# Patient Record
Sex: FEMALE | Race: WHITE | NOT HISPANIC OR LATINO | Employment: OTHER | URBAN - METROPOLITAN AREA
[De-identification: names, ages, dates, MRNs, and addresses within clinical notes are randomized per-mention and may not be internally consistent; named-entity substitution may affect disease eponyms.]

---

## 2017-01-03 ENCOUNTER — TELEPHONE (OUTPATIENT)
Dept: ENDOCRINOLOGY | Age: 72
End: 2017-01-03

## 2017-01-03 ENCOUNTER — HOSPITAL ENCOUNTER (OUTPATIENT)
Dept: CT IMAGING | Facility: HOSPITAL | Age: 72
Discharge: HOME | End: 2017-01-03
Attending: INTERNAL MEDICINE | Admitting: INTERNAL MEDICINE

## 2017-01-03 DIAGNOSIS — E89.0 POSTSURGICAL HYPOTHYROIDISM: ICD-10-CM

## 2017-01-03 DIAGNOSIS — R53.82 CHRONIC FATIGUE: ICD-10-CM

## 2017-01-03 DIAGNOSIS — E67.3 HYPERVITAMINOSIS D: ICD-10-CM

## 2017-01-03 DIAGNOSIS — N18.30 CKD (CHRONIC KIDNEY DISEASE) STAGE 3, GFR 30-59 ML/MIN (HCC): ICD-10-CM

## 2017-01-03 DIAGNOSIS — IMO0002 UNCONTROLLED TYPE 2 DIABETES MELLITUS WITH COMPLICATION, WITH LONG-TERM CURRENT USE OF INSULIN: ICD-10-CM

## 2017-01-03 DIAGNOSIS — R10.9 ABDOMINAL PAIN, UNSPECIFIED LOCATION: ICD-10-CM

## 2017-01-03 PROCEDURE — 74176 CT ABD & PELVIS W/O CONTRAST: CPT

## 2017-01-03 NOTE — TELEPHONE ENCOUNTER
----- Message from Chantal Goldberg sent at 1/3/2017  4:18 PM EST -----  Contact: PATIENT  THE PATIENT WOULD LIKE FOR HER V-GO 20, 20 UNITS DAILY, 30 DAY SUPPLY TO BE SENT [facility] AN[facility] SHE RE[facility]ED FOR IT TO GO THIS MORNING. PLEASE CANCEL THAT SCRIPT AND RESEND IT TO:  [address]   [phone] (Phone)  [phone] (Fax)    THE PATIENT SAYS SHE IS OUT OF INSULIN AND NEEDS THIS TO BE SENT IN TODAY.

## 2017-01-03 NOTE — TELEPHONE ENCOUNTER
----- Message from Riana Greene sent at 1/3/2017  9:12 AM EST -----  Contact: patient  Patient needs Vego pump insulin, 20 units per day, 30 day supply  Send to [facility]  [phone] (Phone)  [phone] (Fax)

## 2017-01-04 DIAGNOSIS — IMO0002 UNCONTROLLED TYPE 2 DIABETES MELLITUS WITH COMPLICATION, WITH LONG-TERM CURRENT USE OF INSULIN: ICD-10-CM

## 2017-01-04 DIAGNOSIS — E67.3 HYPERVITAMINOSIS D: ICD-10-CM

## 2017-01-04 DIAGNOSIS — R53.82 CHRONIC FATIGUE: ICD-10-CM

## 2017-01-04 DIAGNOSIS — E89.0 POSTSURGICAL HYPOTHYROIDISM: ICD-10-CM

## 2017-01-04 DIAGNOSIS — N18.30 CKD (CHRONIC KIDNEY DISEASE) STAGE 3, GFR 30-59 ML/MIN (HCC): ICD-10-CM

## 2017-01-04 RX ORDER — SUB-Q INSULIN DEVICE, 40 UNIT
EACH MISCELLANEOUS
Qty: 30 KIT | Refills: 0 | Status: SHIPPED | OUTPATIENT
Start: 2017-01-04 | End: 2017-01-05

## 2017-01-05 DIAGNOSIS — IMO0002 UNCONTROLLED TYPE 2 DIABETES MELLITUS WITH COMPLICATION, WITH LONG-TERM CURRENT USE OF INSULIN: Primary | ICD-10-CM

## 2017-01-05 RX ORDER — INSULIN DETEMIR 100 [IU]/ML
INJECTION, SOLUTION SUBCUTANEOUS
Qty: 5 PEN | Refills: 4 | Status: SHIPPED | OUTPATIENT
Start: 2017-01-05 | End: 2018-04-12 | Stop reason: HOSPADM

## 2017-01-06 ENCOUNTER — TELEPHONE (OUTPATIENT)
Dept: ENDOCRINOLOGY | Age: 72
End: 2017-01-06

## 2017-01-06 NOTE — TELEPHONE ENCOUNTER
----- Message from DENY Pinto sent at 1/5/2017  4:29 PM EST -----  Contact: PATIENT   Levemir 30 units in the morning increase 1 unit every day if evening blood glucose is over 120 mg/Destinee.    NovoLog 6 units before each meal with correction 1 unit for every 25 above 120 mg/Destinee    Set up for additional education with Natali Sadler the first ID in February at 9:30.  Prescriptions have been sent over      ----- Message -----     From: Moon Howell MA     Sent: 1/5/2017   1:33 PM       To: DENY Pinto    Patient wants to go back on Lantus, she cannot afford the VGO. Can we send her a RX for lantus?     ----- Message -----     From: Pinky Walker     Sent: 1/5/2017  12:59 PM       To: Moon Howell MA    PATIENT ASKED FOR CALL BACK REGARDS TO INSULIN SAYING SHE HAS NONE. SHE SAID V-G0  PUMP COST TO HER IS $300 AND HUMALOG IS $150.    SHE CANNOT AFFORD $450.  PT  [phone]

## 2017-01-11 ENCOUNTER — OFFICE VISIT (OUTPATIENT)
Dept: GASTROENTEROLOGY | Facility: CLINIC | Age: 72
End: 2017-01-11

## 2017-01-11 VITALS
WEIGHT: 219 LBS | SYSTOLIC BLOOD PRESSURE: 126 MMHG | BODY MASS INDEX: 36.49 KG/M2 | DIASTOLIC BLOOD PRESSURE: 78 MMHG | HEIGHT: 65 IN

## 2017-01-11 DIAGNOSIS — R12 HEARTBURN: Primary | ICD-10-CM

## 2017-01-11 DIAGNOSIS — K52.3 COLITIS, INDETERMINATE: ICD-10-CM

## 2017-01-11 DIAGNOSIS — R19.7 DIARRHEA, UNSPECIFIED TYPE: ICD-10-CM

## 2017-01-11 DIAGNOSIS — D12.6 ADENOMATOUS COLON POLYP: ICD-10-CM

## 2017-01-11 PROCEDURE — 99214 OFFICE O/P EST MOD 30 MIN: CPT | Performed by: INTERNAL MEDICINE

## 2017-01-11 RX ORDER — SACCHAROMYCES BOULARDII 250 MG
250 CAPSULE ORAL DAILY
Qty: 30 CAPSULE | Refills: 11 | Status: SHIPPED | OUTPATIENT
Start: 2017-01-11 | End: 2017-10-30

## 2017-01-11 RX ORDER — DICYCLOMINE HCL 20 MG
20 TABLET ORAL
Qty: 50 TABLET | Refills: 5 | Status: SHIPPED | OUTPATIENT
Start: 2017-01-11 | End: 2017-02-10

## 2017-01-11 NOTE — LETTER
January 11, 2017     Robin Ramey MD  [address]  [address]  [place] [id number]    Patient: Ya Paz   YOB: 1945   Date of Visit: 1/11/2017       Dear Dr. Karoline MD:    Ya Paz was in my office today. Below is a copy of my note.    If you have questions, please do not hesitate to call me. I look forward to following Ya along with you.         Sincerely,        Paloma Santizo MD        CC: Tank Taylor MD    Chief Complaint   Patient presents with   • Abdominal Pain   • Diverticulitis       Ya Paz is a  71 y.o. female here for a follow up visit for acute diverticulitis.  She does have chronic diarrhea at baseline. She denies seeing any blood in her stool.  CT showed acute diverticulitis in the left colon at the junction of the descending and sigmoid colons. She's never had any prior diverticulitis. She did have a hospitalization in 2007 which she states was for pancreatitis. At that time she had bloody diarrhea and underwent a colonoscopy by Dr. Berto Clinton. She had significant inflammation in the transverse colon biopsies of which were positive for ulceration and nonspecific inflammation she was found to be C. difficile positive during that hospitalization. She's had subsequent evaluations as an outpatient. She had an EGD and colonoscopy in 2013 performed by Dr. Robles Taylor. Random biopsies at that time showed focal active colitis. She had 1 tubular adenoma removed. Her most recent colonoscopy was in 2015 performed by Dr. Oscar Nelson. She had 2 polyps removed at that time-path was notable for tubular adenomas with low-grade dysplasia.    She is feeling better overall.  She has recovered from her hospitalization in November for diverticulitis and C. Difficile colitis.  She did have a resurgence of her symptoms about 2 weeks ago and I restarted her on Flagyl.  We repeated a CT of the abdomen and pelvis which showed resolution of the  previously noted diverticulitis.  She does feel better after completing Flagyl.  She's had no fevers or chills.  She has been eating a fairly mild bland diet.  She's had no nausea or vomiting.  She is not currently taking anything for acid reflux.  She has intermittent symptoms.  Her PPI was discontinued due to chronic kidney disease.  She's currently being followed by Dr. Fu Daily.  For the most part she is not having much in the way of symptoms.  She has been told that she can take an H2 blocker if needed.  She's not seen any blood in her stool.  HPI  Past Medical History   Diagnosis Date   • Anxiety    • Diabetes mellitus    • Disease of thyroid gland    • Hypertension    • Migraine    • Pancreatitis    • Tachycardia    • TIA (transient ischemic attack)      Past Surgical History   Procedure Laterality Date   • Thyroid surgery     • Lung surgery       mass LL   • Hernia repair     • Appendectomy     • Cholecystectomy     • Cardiac catheterization     •  section     • Gastric bypass     • Colonoscopy       normal per pt, Dr. Reagan       Current Outpatient Prescriptions:   •  ALPRAZolam (XANAX) 0.5 MG tablet, Take 0.5 mg by mouth 3 (Three) Times a Day As Needed., Disp: , Rfl:   •  amLODIPine (NORVASC) 2.5 MG tablet, Take 3 mg by mouth., Disp: , Rfl:   •  aspirin 81 MG EC tablet, Take 81 mg by mouth Daily., Disp: , Rfl:   •  atorvastatin (LIPITOR) 10 MG tablet, TAKE ONE TABLET BY MOUTH ONCE DAILY, Disp: , Rfl:   •  carvedilol (COREG) 6.25 MG tablet, TAKE ONE TABLET BY MOUTH TWICE DAILY, Disp: , Rfl:   •  Cholecalciferol (VITAMIN D) 1000 UNITS tablet, Take 1,000 Units by mouth 2 (Two) Times a Day., Disp: , Rfl:   •  folic acid (FOLVITE) 1 MG tablet, TAKE ONE TABLET BY MOUTH ONCE DAILY, Disp: , Rfl:   •  insulin aspart (NOVOLOG FLEXPEN) 100 UNIT/ML solution pen-injector sc pen, 6 units before each meal with sliding scale with a max of 100 units daily, Disp: 5 pen, Rfl: 4  •  LEVEMIR FLEXPEN 100  UNIT/ML injection, 30 units in the AM titrate up as instructed with a max of 100 units daily, Disp: 5 pen, Rfl: 4  •  lisinopril (PRINIVIL,ZESTRIL) 10 MG tablet, Take 1 tablet by mouth Daily., Disp: 30 tablet, Rfl: 4  •  predniSONE (DELTASONE) 5 MG tablet, Take 5 mg by mouth Daily., Disp: , Rfl:   •  SYNTHROID 112 MCG tablet, Take 1 tablet by mouth Daily. On an empty stomach, Disp: 30 tablet, Rfl: 3  •  dicyclomine (BENTYL) 20 MG tablet, Take 1 tablet by mouth 4 (Four) Times a Day Before Meals & at Bedtime As Needed (cramping/diarrhea) for up to 30 days., Disp: 50 tablet, Rfl: 5  •  saccharomyces boulardii (FLORASTOR) 250 MG capsule, Take 1 capsule by mouth Daily., Disp: 30 capsule, Rfl: 11  PRN Meds:.  Allergies   Allergen Reactions   • Augmentin [Amoxicillin-Pot Clavulanate]    • Dapsone    • Detrol [Tolterodine Tartrate]      Pt. Does not recall this allergy.   • Levaquin [Levofloxacin]    • Sulfa Antibiotics    • Tetracyclines & Related      Social History     Social History   • Marital status:      Spouse name: N/A   • Number of children: N/A   • Years of education: N/A     Occupational History   • Not on file.     Social History Main Topics   • Smoking status: Never Smoker   • Smokeless tobacco: Never Used   • Alcohol use No   • Drug use: No   • Sexual activity: Not on file     Other Topics Concern   • Not on file     Social History Narrative     Family History   Problem Relation Age of Onset   • Pancreatitis Brother    • Ulcerative colitis Son    • Crohn's disease Grandchild      Review of Systems- CKD, otherwise reviewed and negative except hpi  Vitals:    01/11/17 1521   BP: 126/78     Last Weight    01/11/17  1521   Weight: 219 lb (99.3 kg)     Physical Exam   Constitutional: She appears well-developed and well-nourished.   HENT:   Head: Normocephalic and atraumatic.   Eyes: No scleral icterus.   Abdominal: Soft. She exhibits no distension and no mass. There is no tenderness.   Incisional hernia  upper abdomen, mild rlq pain   Neurological: She is alert.   Skin: Skin is warm and dry.   Psychiatric: She has a normal mood and affect.     No images are attached to the encounter.  Diagnoses and all orders for this visit:    Heartburn  -     Case Request; Standing  -     Case Request    Diarrhea, unspecified type    Adenomatous colon polyp    Colitis, indeterminate    Other orders  -     saccharomyces boulardii (FLORASTOR) 250 MG capsule; Take 1 capsule by mouth Daily.  -     dicyclomine (BENTYL) 20 MG tablet; Take 1 tablet by mouth 4 (Four) Times a Day Before Meals & at Bedtime As Needed (cramping/diarrhea) for up to 30 days.  -     Implement Anesthesia orders day of procedure.; Standing  -     Obtain informed consent; Standing  -     Verify bowel prep was successful; Standing  -     Give tap water enema if bowel prep was insufficient; Standing  -     Give Fleet's enema for intolerance of bowel prep; Standing    Plan:  - resume Florastor she feels like her bowels move better on this medication and she has had C. Difficile twice in the past.  -We'll plan on a repeat colonoscopy to be scheduled at her convenience.  We will plan on doing an EGD at the same time.  She was recently taken off of her PPI due to her chronic kidney disease.  We will evaluate her for any evidence of ongoing acid reflux type inflammation and if needed resume an H2 blocker.  Otherwise she can continue when necessary antacids.  -Have given her a prescription for Bentyl to use as needed for cramping and urgency  -I have recommended that she start a fiber supplement daily to bulk up her stools and help prevent incontinence and urgency  - We'll take biopsies of her colon during her upcoming colonoscopy.  Of note she has had previous nonspecific colitis noted on biopsies in 2013.  She also had ulceration noted on a prior colonoscopy however she had C. Difficile at the time.  Given her ongoing issues it will be interesting to see whether she has  any evidence of chronic inflammation.

## 2017-01-11 NOTE — PROGRESS NOTES
Chief Complaint   Patient presents with   • Abdominal Pain   • Diverticulitis       Ya Paz is a  71 y.o. female here for a follow up visit for acute diverticulitis.  She does have chronic diarrhea at baseline. She denies seeing any blood in her stool.  CT showed acute diverticulitis in the left colon at the junction of the descending and sigmoid colons. She's never had any prior diverticulitis. She did have a hospitalization in 2007 which she states was for pancreatitis. At that time she had bloody diarrhea and underwent a colonoscopy by Dr. Berto Clinton. She had significant inflammation in the transverse colon biopsies of which were positive for ulceration and nonspecific inflammation she was found to be C. difficile positive during that hospitalization. She's had subsequent evaluations as an outpatient. She had an EGD and colonoscopy in 2013 performed by Dr. Robles Taylor. Random biopsies at that time showed focal active colitis. She had 1 tubular adenoma removed. Her most recent colonoscopy was in 2015 performed by Dr. Oscar Nelson. She had 2 polyps removed at that time-path was notable for tubular adenomas with low-grade dysplasia.    She is feeling better overall.  She has recovered from her hospitalization in November for diverticulitis and C. Difficile colitis.  She did have a resurgence of her symptoms about 2 weeks ago and I restarted her on Flagyl.  We repeated a CT of the abdomen and pelvis which showed resolution of the previously noted diverticulitis.  She does feel better after completing Flagyl.  She's had no fevers or chills.  She has been eating a fairly mild bland diet.  She's had no nausea or vomiting.  She is not currently taking anything for acid reflux.  She has intermittent symptoms.  Her PPI was discontinued due to chronic kidney disease.  She's currently being followed by Dr. Fu Daily.  For the most part she is not having much in the way of symptoms.  She has been told that  she can take an H2 blocker if needed.  She's not seen any blood in her stool.  HPI  Past Medical History   Diagnosis Date   • Anxiety    • Diabetes mellitus    • Disease of thyroid gland    • Hypertension    • Migraine    • Pancreatitis    • Tachycardia    • TIA (transient ischemic attack)      Past Surgical History   Procedure Laterality Date   • Thyroid surgery     • Lung surgery       mass LL   • Hernia repair     • Appendectomy     • Cholecystectomy     • Cardiac catheterization     •  section     • Gastric bypass     • Colonoscopy       normal per pt, Dr. Reagan       Current Outpatient Prescriptions:   •  ALPRAZolam (XANAX) 0.5 MG tablet, Take 0.5 mg by mouth 3 (Three) Times a Day As Needed., Disp: , Rfl:   •  amLODIPine (NORVASC) 2.5 MG tablet, Take 3 mg by mouth., Disp: , Rfl:   •  aspirin 81 MG EC tablet, Take 81 mg by mouth Daily., Disp: , Rfl:   •  atorvastatin (LIPITOR) 10 MG tablet, TAKE ONE TABLET BY MOUTH ONCE DAILY, Disp: , Rfl:   •  carvedilol (COREG) 6.25 MG tablet, TAKE ONE TABLET BY MOUTH TWICE DAILY, Disp: , Rfl:   •  Cholecalciferol (VITAMIN D) 1000 UNITS tablet, Take 1,000 Units by mouth 2 (Two) Times a Day., Disp: , Rfl:   •  folic acid (FOLVITE) 1 MG tablet, TAKE ONE TABLET BY MOUTH ONCE DAILY, Disp: , Rfl:   •  insulin aspart (NOVOLOG FLEXPEN) 100 UNIT/ML solution pen-injector sc pen, 6 units before each meal with sliding scale with a max of 100 units daily, Disp: 5 pen, Rfl: 4  •  LEVEMIR FLEXPEN 100 UNIT/ML injection, 30 units in the AM titrate up as instructed with a max of 100 units daily, Disp: 5 pen, Rfl: 4  •  lisinopril (PRINIVIL,ZESTRIL) 10 MG tablet, Take 1 tablet by mouth Daily., Disp: 30 tablet, Rfl: 4  •  predniSONE (DELTASONE) 5 MG tablet, Take 5 mg by mouth Daily., Disp: , Rfl:   •  SYNTHROID 112 MCG tablet, Take 1 tablet by mouth Daily. On an empty stomach, Disp: 30 tablet, Rfl: 3  •  dicyclomine (BENTYL) 20 MG tablet, Take 1 tablet by mouth 4 (Four) Times a Day  Before Meals & at Bedtime As Needed (cramping/diarrhea) for up to 30 days., Disp: 50 tablet, Rfl: 5  •  saccharomyces boulardii (FLORASTOR) 250 MG capsule, Take 1 capsule by mouth Daily., Disp: 30 capsule, Rfl: 11  PRN Meds:.  Allergies   Allergen Reactions   • Augmentin [Amoxicillin-Pot Clavulanate]    • Dapsone    • Detrol [Tolterodine Tartrate]      Pt. Does not recall this allergy.   • Levaquin [Levofloxacin]    • Sulfa Antibiotics    • Tetracyclines & Related      Social History     Social History   • Marital status:      Spouse name: N/A   • Number of children: N/A   • Years of education: N/A     Occupational History   • Not on file.     Social History Main Topics   • Smoking status: Never Smoker   • Smokeless tobacco: Never Used   • Alcohol use No   • Drug use: No   • Sexual activity: Not on file     Other Topics Concern   • Not on file     Social History Narrative     Family History   Problem Relation Age of Onset   • Pancreatitis Brother    • Ulcerative colitis Son    • Crohn's disease Grandchild      Review of Systems- CKD, otherwise reviewed and negative except hpi  Vitals:    01/11/17 1521   BP: 126/78     Last Weight    01/11/17  1521   Weight: 219 lb (99.3 kg)     Physical Exam   Constitutional: She appears well-developed and well-nourished.   HENT:   Head: Normocephalic and atraumatic.   Eyes: No scleral icterus.   Abdominal: Soft. She exhibits no distension and no mass. There is no tenderness.   Incisional hernia upper abdomen, mild rlq pain   Neurological: She is alert.   Skin: Skin is warm and dry.   Psychiatric: She has a normal mood and affect.     No images are attached to the encounter.  Diagnoses and all orders for this visit:    Heartburn  -     Case Request; Standing  -     Case Request    Diarrhea, unspecified type    Adenomatous colon polyp    Colitis, indeterminate    Other orders  -     saccharomyces boulardii (FLORASTOR) 250 MG capsule; Take 1 capsule by mouth Daily.  -      dicyclomine (BENTYL) 20 MG tablet; Take 1 tablet by mouth 4 (Four) Times a Day Before Meals & at Bedtime As Needed (cramping/diarrhea) for up to 30 days.  -     Implement Anesthesia orders day of procedure.; Standing  -     Obtain informed consent; Standing  -     Verify bowel prep was successful; Standing  -     Give tap water enema if bowel prep was insufficient; Standing  -     Give Fleet's enema for intolerance of bowel prep; Standing    Plan:  - resume Florastor she feels like her bowels move better on this medication and she has had C. Difficile twice in the past.  -We'll plan on a repeat colonoscopy to be scheduled at her convenience.  We will plan on doing an EGD at the same time.  She was recently taken off of her PPI due to her chronic kidney disease.  We will evaluate her for any evidence of ongoing acid reflux type inflammation and if needed resume an H2 blocker.  Otherwise she can continue when necessary antacids.  -Have given her a prescription for Bentyl to use as needed for cramping and urgency  -I have recommended that she start a fiber supplement daily to bulk up her stools and help prevent incontinence and urgency  - We'll take biopsies of her colon during her upcoming colonoscopy.  Of note she has had previous nonspecific colitis noted on biopsies in 2013.  She also had ulceration noted on a prior colonoscopy however she had C. Difficile at the time.  Given her ongoing issues it will be interesting to see whether she has any evidence of chronic inflammation.

## 2017-01-11 NOTE — MR AVS SNAPSHOT
Ya YOUNGER Brandi   1/11/2017 3:00 PM   Office Visit    Dept Phone:  [phone]   Encounter #:  [id number]    Provider:  Paloma Santizo MD   Department:  [facility]                Your Full Care Plan              Today's Medication Changes          These changes are accurate as of: 1/11/17  3:57 PM.  If you have any questions, ask your nurse or doctor.               New Medication(s)Ordered:     dicyclomine 20 MG tablet   Commonly known as:  BENTYL   Take 1 tablet by mouth 4 (Four) Times a Day Before Meals & at Bedtime As Needed (cramping/diarrhea) for up to 30 days.   Started by:  Paloma Santizo MD         Medication(s)that have changed:     saccharomyces boulardii 250 MG capsule   Commonly known as:  FLORASTOR   Take 1 capsule by mouth Daily.   What changed:  when to take this   Changed by:  Paloma Santizo MD       SYNTHROID 112 MCG tablet   Generic drug:  levothyroxine   Take 1 tablet by mouth Daily. On an empty stomach   What changed:  Another medication with the same name was removed. Continue taking this medication, and follow the directions you see here.   Changed by:  DENY Pinto         Stop taking medication(s)listed here:     metroNIDAZOLE 500 MG tablet   Commonly known as:  FLAGYL   Stopped by:  Paloma Santizo MD           pantoprazole 40 MG EC tablet   Commonly known as:  PROTONIX   Stopped by:  Paloma Santizo MD           vancomycin 50 MG/ML solution oral solution   Stopped by:  Paloma Santizo MD                Where to Get Your Medications      These medications were sent to [facility] [id number] [place] [address] [place] - [address] SEC of [facility] [address] - [phone]  - [phone] FX  [address] [facility] [place] [address]     Phone:  [phone]     dicyclomine 20 MG tablet    saccharomyces boulardii 250 MG capsule                  Your Updated Medication List          This  list is accurate as of: 1/11/17  3:57 PM.  Always use your most recent med list.                ALPRAZolam 0.5 MG tablet   Commonly known as:  XANAX       amLODIPine 2.5 MG tablet   Commonly known as:  NORVASC       aspirin 81 MG EC tablet       atorvastatin 10 MG tablet   Commonly known as:  LIPITOR       carvedilol 6.25 MG tablet   Commonly known as:  COREG       dicyclomine 20 MG tablet   Commonly known as:  BENTYL   Take 1 tablet by mouth 4 (Four) Times a Day Before Meals & at Bedtime As Needed (cramping/diarrhea) for up to 30 days.       folic acid 1 MG tablet   Commonly known as:  FOLVITE       insulin aspart 100 UNIT/ML solution pen-injector sc pen   Commonly known as:  NOVOLOG FLEXPEN   6 units before each meal with sliding scale with a max of 100 units daily       LEVEMIR FLEXPEN 100 UNIT/ML injection   Generic drug:  insulin detemir   30 units in the AM titrate up as instructed with a max of 100 units daily       lisinopril 10 MG tablet   Commonly known as:  PRINIVIL,ZESTRIL   Take 1 tablet by mouth Daily.       predniSONE 5 MG tablet   Commonly known as:  DELTASONE       saccharomyces boulardii 250 MG capsule   Commonly known as:  FLORASTOR   Take 1 capsule by mouth Daily.       SYNTHROID 112 MCG tablet   Generic drug:  levothyroxine   Take 1 tablet by mouth Daily. On an empty stomach       Vitamin D 1000 UNITS tablet               We Performed the Following     Case Request       You Were Diagnosed With        Codes Comments    Heartburn    -  Primary ICD-10-CM: R12  ICD-9-CM: 787.1     Diarrhea, unspecified type     ICD-10-CM: R19.7  ICD-9-CM: 787.91     Adenomatous colon polyp     ICD-10-CM: D12.6  ICD-9-CM: 211.3     Colitis, indeterminate     ICD-10-CM: K52.3  ICD-9-CM: 558.9       Instructions     None    Patient Instructions History      Upcoming Appointments     Visit Type Date Time Department    FOLLOW UP 1/11/2017  3:00 PM [facility]    OFFICE VISIT 1/17/2017  2:20 PM LISA [facility]  "[facility]      [address] [facility] Signup     [facility] allows you to send messages to your doctor, view your test results, renew your prescriptions, schedule appointments, and more. To sign up, go to [employer] and click on the Sign Up Now link in the New User? box. Enter your [employer] Activation Code exactly as it appears below along with the last four digits of your Social Security Number and your Date of Birth () to complete the sign-up process. If you do not sign up before the expiration date, you must request a new code.    [employer] Activation Code: [email or web address]  Expires: 2017  5:38 AM    If you have questions, you can email [email or web address] or call [phone] to talk to our [employer] staff. Remember, [employer] is NOT to be used for urgent needs. For medical emergencies, dial 911.               Other Info from Your Visit           Your Appointments     2017  2:20 PM EST   Office Visit with DENY Pinto   [facility] (--)    [address]  [facility] [address] [phone]           Arrive 15 minutes prior to appointment.              Allergies     Augmentin [Amoxicillin-pot Clavulanate]      Dapsone      Detrol [Tolterodine Tartrate]      Pt. Does not recall this allergy.    Levaquin [Levofloxacin]      Sulfa Antibiotics      Tetracyclines & Related        Reason for Visit     Abdominal Pain     Diverticulitis           Vital Signs     Blood Pressure Height Weight Body Mass Index Smoking Status       126/78 65\" (165.1 cm) 219 lb (99.3 kg) 36.44 kg/m2 Never Smoker       Problems and Diagnoses Noted     Heartburn    -  Primary    Diarrhea        Benign colon polyp        Colitis, indeterminate            "

## 2017-01-17 ENCOUNTER — OFFICE VISIT (OUTPATIENT)
Dept: ENDOCRINOLOGY | Age: 72
End: 2017-01-17

## 2017-01-17 ENCOUNTER — TELEPHONE (OUTPATIENT)
Dept: GASTROENTEROLOGY | Facility: CLINIC | Age: 72
End: 2017-01-17

## 2017-01-17 VITALS
WEIGHT: 218 LBS | SYSTOLIC BLOOD PRESSURE: 126 MMHG | BODY MASS INDEX: 36.32 KG/M2 | HEIGHT: 65 IN | DIASTOLIC BLOOD PRESSURE: 78 MMHG

## 2017-01-17 DIAGNOSIS — IMO0002 UNCONTROLLED TYPE 2 DIABETES MELLITUS WITH COMPLICATION, WITH LONG-TERM CURRENT USE OF INSULIN: Primary | ICD-10-CM

## 2017-01-17 DIAGNOSIS — N18.30 CKD (CHRONIC KIDNEY DISEASE) STAGE 3, GFR 30-59 ML/MIN (HCC): ICD-10-CM

## 2017-01-17 DIAGNOSIS — E89.0 POSTSURGICAL HYPOTHYROIDISM: ICD-10-CM

## 2017-01-17 DIAGNOSIS — R53.82 CHRONIC FATIGUE: ICD-10-CM

## 2017-01-17 PROCEDURE — 99214 OFFICE O/P EST MOD 30 MIN: CPT | Performed by: NURSE PRACTITIONER

## 2017-01-17 NOTE — TELEPHONE ENCOUNTER
Called pt and advised per Dr Santizo that her ct shows resolution of deverticuliti - no other new abnormalities noted.  Pt verb understanding.

## 2017-01-17 NOTE — TELEPHONE ENCOUNTER
----- Message from Paloma Santizo MD sent at 1/9/2017  8:25 AM EST -----  CT shows resolution of diverticulitis - no other new abnormalities noted

## 2017-01-17 NOTE — PATIENT INSTRUCTIONS
home blood tests -  Blood glucose testing: 3-4 times daily, that are: fasting- 1st thing in morning before eating or drinking, before each meal and 1 or 2 hours after meal  Bedtime, anytime you feel symptoms of hyperglycemia or hypoglycemia (high or low blood sugars)    New instructions for basal insulIn  Levemir U100 32 units in AM   Titration instructions - increase AM dose 1 units every 1 days if blood sugar before evening meal is over 120mg/dl    Novolog U100 - 1 unit for every 25 above 120 mg/dl - this is done with each meal  7 units before breakfast, 7 units before lunch, 7 units before dinner

## 2017-01-17 NOTE — PROGRESS NOTES
Ya Paz  Presents to the office  for the follow-up appointment for Type 2 diabetes mellitus.     Location is system with the  clinical course has fluctuated, the severity is Mild, and the modifying/allievating factors are insulin.  Medications and  Dosages were  reviewed with Ya Paz and suggested that compliance most of the time.    Associated symptoms of hyperglycemia have been nausea and tachycardia and headache.  Associated symptoms of hypoglycemia have been sweating. Patient reports  hypoglycemia threshold for symptoms is 50 mg/dl .     The patient is currently on insulin.    Compliance with blood glucose monitoring: good.     Meal panning: The patient is using avoidance of concentrated sweets.    The patient is currently taking home blood tests - Blood glucose testing: 3-4 times daily, that are:  fasting- 1st thing in morning before eating or drinking  before each meal  bedtime  anytime you feel symptoms of hyperglycemia or hypoglycemia (high or low blood sugars)   basal insulIn  Levemir U100 30 units in AM   Novolog U100 6 units before each meal 1 unit for every 25 over 120    Last instructions given were:   New instructions for basal insulIn Lantus U100 40 units in AM    Titration instructions - increase AM dose 1 units every 1 days if blood sugar before evening meal is over 120mg/dl    Novolog 5u before each meal- unitl you go onto the Vgo30       Home blood glucose testing daily: 3-4  FB to 120 mg/dl  before lunch 100 to 140 mg/dl  before dinner/supper 125 to 244 mg/dl  bedtime 112 to 219 mg/dl    Last reported blood glucose readings are:   Home blood glucose testing daily: 2  FB to 150 mg/dl  after breakfast  170 to 175 mg/dl  before lunch 103 to 248 mg/dl  after lunch  182 to 269 mg/dl  before dinner/supper 210 to 208 mg/dl  after dinner/supper 197 to 297 mg/dl  bedtime 170 to 270 mg/dl    Patterns reported per patient are that her blood sugars are worse now than when she  first started. The VGO has really messed her up states the patient.          The following portions of the patient's history were reviewed and updated as appropriate: current medications, past family history, past medical history, past social history, past surgical history and problem list.      Current Outpatient Prescriptions:   •  ALPRAZolam (XANAX) 0.5 MG tablet, Take 0.5 mg by mouth 3 (Three) Times a Day As Needed., Disp: , Rfl:   •  amLODIPine (NORVASC) 2.5 MG tablet, Take 3 mg by mouth., Disp: , Rfl:   •  aspirin 81 MG EC tablet, Take 81 mg by mouth Daily., Disp: , Rfl:   •  atorvastatin (LIPITOR) 10 MG tablet, TAKE ONE TABLET BY MOUTH ONCE DAILY, Disp: , Rfl:   •  carvedilol (COREG) 6.25 MG tablet, TAKE ONE TABLET BY MOUTH TWICE DAILY, Disp: , Rfl:   •  Cholecalciferol (VITAMIN D) 1000 UNITS tablet, Take 1,000 Units by mouth 2 (Two) Times a Day., Disp: , Rfl:   •  dicyclomine (BENTYL) 20 MG tablet, Take 1 tablet by mouth 4 (Four) Times a Day Before Meals & at Bedtime As Needed (cramping/diarrhea) for up to 30 days., Disp: 50 tablet, Rfl: 5  •  folic acid (FOLVITE) 1 MG tablet, TAKE ONE TABLET BY MOUTH ONCE DAILY, Disp: , Rfl:   •  insulin aspart (NOVOLOG FLEXPEN) 100 UNIT/ML solution pen-injector sc pen, 6 units before each meal with sliding scale with a max of 100 units daily, Disp: 5 pen, Rfl: 4  •  LEVEMIR FLEXPEN 100 UNIT/ML injection, 30 units in the AM titrate up as instructed with a max of 100 units daily, Disp: 5 pen, Rfl: 4  •  lisinopril (PRINIVIL,ZESTRIL) 10 MG tablet, Take 1 tablet by mouth Daily., Disp: 30 tablet, Rfl: 4  •  predniSONE (DELTASONE) 5 MG tablet, Take 5 mg by mouth Daily., Disp: , Rfl:   •  saccharomyces boulardii (FLORASTOR) 250 MG capsule, Take 1 capsule by mouth Daily., Disp: 30 capsule, Rfl: 11  •  SYNTHROID 112 MCG tablet, Take 1 tablet by mouth Daily. On an empty stomach, Disp: 30 tablet, Rfl: 3    Patient Active Problem List    Diagnosis   • ARF (acute renal failure) [N17.9]  "  • CKD (chronic kidney disease) stage 3, GFR 30-59 ml/min [N18.3]   • Proteinuria [R80.9]   • Diverticulitis [K57.92]   • Diverticulitis of large intestine without perforation or abscess without bleeding [K57.32]       Review of Systems   A comprehensive review of the 14 systems was negative except of listed below:  Constitutional: fatigue  Endocrine: diabetic symptoms including increased fatigue and polydipsia  hyperglycemia     Objective:     Wt Readings from Last 3 Encounters:   01/17/17 218 lb (98.9 kg)   01/11/17 219 lb (99.3 kg)   12/12/16 220 lb 9.6 oz (100 kg)     Temp Readings from Last 3 Encounters:   11/15/16 98.4 °F (36.9 °C) (Oral)     BP Readings from Last 3 Encounters:   01/17/17 126/78   01/11/17 126/78   12/12/16 124/70     Pulse Readings from Last 3 Encounters:   11/15/16 72          Visit Vitals   • /78   • Ht 65\" (165.1 cm)   • Wt 218 lb (98.9 kg)   • BMI 36.28 kg/m2       General appearance:  alert, cooperative, delirious, fatigued, nourished\" \"appears stated age and cooperative\" \"NAD   Neck: no carotid bruit, supple, symmetrical, trachea midline and thyroid not enlarged, symmetric, no tenderness/mass/nodules   Thyroid:  no palpable nodule, no tenderness, no enlargement   Lung:  \"clear to auscultation bilaterally\" \"no abnormal breath sounds  \" effort was normal, no labored breath, no use of accessory muscles.   Heart: regular rate and rhythm, S1, S2 normal, no murmur, click, rub or gallop      Abdomen:  normal bowel sounds- 4 quads, soft non-tender, contour - rounded and contour - obese      Extremities: extremities normal, atraumatic, no cyanosis or edema extremities normal, atraumatic, no cyanosis or edema\" WNL - gait and station, strength and stability\"       Skin:   Pulses:  warm and dry, no hyperpigmentation, normal skin coloring, or suspicious lesions   2+ and symmetric   Neuro: Alert and oriented x3. Gait normal. Reflexes and motor strength normal and symmetric. Cranial nerves 2-12 " and sensation grossly intact.      Psych: behavior - normal, judgement - normal, mood - normal, affect - normal                 Lab Review      Office Visit on 12/12/2016   Component Date Value Ref Range Status   • Glucose 12/12/2016 157* 65 - 99 mg/dL Final   • BUN 12/12/2016 40* 8 - 23 mg/dL Final   • Creatinine 12/12/2016 1.94* 0.57 - 1.00 mg/dL Final   • eGFR Non African Am 12/12/2016 26* >60 mL/min/1.73 Final   • eGFR African Am 12/12/2016 31* >60 mL/min/1.73 Final   • BUN/Creatinine Ratio 12/12/2016 20.6  7.0 - 25.0 Final   • Sodium 12/12/2016 142  136 - 145 mmol/L Final   • Potassium 12/12/2016 5.4* 3.5 - 5.2 mmol/L Final   • Chloride 12/12/2016 100  98 - 107 mmol/L Final   • Total CO2 12/12/2016 26.6  22.0 - 29.0 mmol/L Final   • Calcium 12/12/2016 9.9  8.6 - 10.5 mg/dL Final   • Total Protein 12/12/2016 7.1  6.0 - 8.5 g/dL Final   • Albumin 12/12/2016 4.00  3.50 - 5.20 g/dL Final   • Globulin 12/12/2016 3.1  gm/dL Final   • A/G Ratio 12/12/2016 1.3  g/dL Final   • Total Bilirubin 12/12/2016 0.3  0.1 - 1.2 mg/dL Final   • Alkaline Phosphatase 12/12/2016 65  39 - 117 U/L Final   • AST (SGOT) 12/12/2016 13  1 - 32 U/L Final   • ALT (SGPT) 12/12/2016 13  1 - 33 U/L Final   • C-Peptide 12/12/2016 4.1  1.1 - 4.4 ng/mL Final    C-Peptide reference interval is for fasting patients.   • Hemoglobin A1C 12/12/2016 7.85* 4.80 - 5.60 % Final    Comment: Hemoglobin A1C Ranges:  Increased Risk for Diabetes  5.7% to 6.4%  Diabetes                     >= 6.5%  Diabetic Goal                < 7.0%     • Total Cholesterol 12/12/2016 157  0 - 200 mg/dL Final   • Triglycerides 12/12/2016 174* 0 - 150 mg/dL Final   • HDL Cholesterol 12/12/2016 48  40 - 60 mg/dL Final   • VLDL Cholesterol 12/12/2016 34.8  5 - 40 mg/dL Final   • LDL Cholesterol  12/12/2016 74  0 - 100 mg/dL Final   • 25 Hydroxy, Vitamin D 12/12/2016 37.9  30.0 - 100.0 ng/mL Final    Comment: Reference Range for Total Vitamin D 25(OH)  Deficiency    <20.0  ng/mL  Insufficiency 21-29 ng/mL  Sufficiency    ng/mL  Toxicity      >100 ng/ml        • TSH 12/12/2016 0.102* 0.270 - 4.200 mIU/mL Final   • Free T4 12/12/2016 1.72* 0.93 - 1.70 ng/dL Final   • T4, Total 12/12/2016 9.64  4.50 - 11.70 mcg/dL Final   • T3, Free 12/12/2016 2.7  2.0 - 4.4 pg/mL Final   • T3, Total 12/12/2016 106.6  80.0 - 200.0 ng/dL Final   • TIBC 12/12/2016 273  298 - 536 mcg/dL Final   • UIBC 12/12/2016 226  mcg/dL Final   • Iron 12/12/2016 47  37 - 145 mcg/dL Final    Comment: Please Note: The Folate Reference Range has changed based  on the WHO Standard. Previous Reference Range was  (7.3-26.1)     • Iron Saturation 12/12/2016 17* 20 - 50 % Final   • Ferritin 12/12/2016 482.00* 13.00 - 150.00 ng/mL Final   • Vitamin B-12 12/12/2016 >2000* 211 - 946 pg/mL Final   • Folate 12/12/2016 >20.00  4.78 - 24.20 ng/mL Final   • WBC 12/12/2016 11.59* 4.50 - 10.70 10*3/mm3 Final   • RBC 12/12/2016 3.85* 3.90 - 5.20 10*6/mm3 Final   • Hemoglobin 12/12/2016 11.9  11.9 - 15.5 g/dL Final   • Hematocrit 12/12/2016 37.6  35.6 - 45.5 % Final   • MCV 12/12/2016 97.7  80.5 - 98.2 fL Final   • MCH 12/12/2016 30.9  26.9 - 32.0 pg Final   • MCHC 12/12/2016 31.6* 32.4 - 36.3 g/dL Final   • RDW 12/12/2016 14.3* 11.7 - 13.0 % Final   • Platelets 12/12/2016 323  140 - 500 10*3/mm3 Final   • Neutrophil Rel % 12/12/2016 82.8* 42.7 - 76.0 % Final   • Lymphocyte Rel % 12/12/2016 10.9* 19.6 - 45.3 % Final   • Monocyte Rel % 12/12/2016 4.5* 5.0 - 12.0 % Final   • Eosinophil Rel % 12/12/2016 1.7  0.3 - 6.2 % Final   • Basophil Rel % 12/12/2016 0.1  0.0 - 1.5 % Final   • Neutrophils Absolute 12/12/2016 9.60* 1.90 - 8.10 10*3/mm3 Final   • Lymphocytes Absolute 12/12/2016 1.26  0.90 - 4.80 10*3/mm3 Final   • Monocytes Absolute 12/12/2016 0.52  0.20 - 1.20 10*3/mm3 Final   • Eosinophils Absolute 12/12/2016 0.20  0.00 - 0.70 10*3/mm3 Final   • Basophils Absolute 12/12/2016 0.01  0.00 - 0.20 10*3/mm3 Final   • Immature Granulocyte  Rel % 12/12/2016 0.0  0.0 - 0.5 % Final   • Immature Grans Absolute 12/12/2016 0.00  0.00 - 0.03 10*3/mm3 Final   • Reticulocyte Absolute 12/12/2016 1.52* 0.50 - 1.50 % Final   • Insulin 12/12/2016 11.0  2.6 - 24.9 uIU/mL Final           Assessment:   Ya was seen today for follow-up.    Diagnoses and all orders for this visit:    Uncontrolled type 2 diabetes mellitus with complication, with long-term current use of insulin  -     Basic Metabolic Panel    Postsurgical hypothyroidism  -     TSH  -     T4, Free  -     T4  -     T3, Free  -     T3  -     Basic Metabolic Panel  -     CBC & Differential  -     UA / M With / Rflx Culture([facility] ONLY)    CKD (chronic kidney disease) stage 3, GFR 30-59 ml/min  -     Basic Metabolic Panel    Chronic fatigue  -     Basic Metabolic Panel          Plan:    Education:  interpretation of lab results, blood sugar goals, complications of diabetes mellitus, hypoglycemia prevention and treatment, exercise, illness management, self-monitoring of blood glucose skills, nutrition, carbohydrate counting, site rotation, use of insulin pen, insulin adjustments, self-injection of insulin, use of sliding scale/correction formula and use of insulin: carb ratio    home blood tests -  Blood glucose testing: 3-4 times daily, that are:  fasting- 1st thing in morning before eating or drinking  before each meal and 1 or 2 hours after meal  bedtime  anytime you feel symptoms of hyperglycemia or hypoglycemia (high or low blood sugars)  New instructions for basal insulIn  Levemir U100 32 units in AM   Titration instructions - increase AM dose 1 units every 1 days if blood sugar before evening meal is over 120mg/dl  Novolog U100   1 unit for every 25 above 120 mg/dl - this is done with each meal  7 units before breakfast, 7 units before lunch, 7 units before dinner    Office visit 24 minutes with additional education given 12 minutes - insulin titration with SMBG with goals.       Return in about  3 months (around 4/17/2017). 3 months with Dr. Patton - 1 week prior to appr. 6 months with Edith - 1 week with labs

## 2017-01-17 NOTE — MR AVS SNAPSHOT
Ya CARISA Paz   1/17/2017 2:20 PM   Office Visit    Dept Phone:  [phone]   Encounter #:  [id number]    Provider:  DENY Pinto   Department:  [facility] ENDOCRINOLOGY                Your Full Care Plan              Your Updated Medication List          This list is accurate as of: 1/17/17  3:42 PM.  Always use your most recent med list.                ALPRAZolam 0.5 MG tablet   Commonly known as:  XANAX       amLODIPine 2.5 MG tablet   Commonly known as:  NORVASC       aspirin 81 MG EC tablet       atorvastatin 10 MG tablet   Commonly known as:  LIPITOR       carvedilol 6.25 MG tablet   Commonly known as:  COREG       dicyclomine 20 MG tablet   Commonly known as:  BENTYL   Take 1 tablet by mouth 4 (Four) Times a Day Before Meals & at Bedtime As Needed (cramping/diarrhea) for up to 30 days.       folic acid 1 MG tablet   Commonly known as:  FOLVITE       insulin aspart 100 UNIT/ML solution pen-injector sc pen   Commonly known as:  NOVOLOG FLEXPEN   6 units before each meal with sliding scale with a max of 100 units daily       LEVEMIR FLEXPEN 100 UNIT/ML injection   Generic drug:  insulin detemir   30 units in the AM titrate up as instructed with a max of 100 units daily       lisinopril 10 MG tablet   Commonly known as:  PRINIVIL,ZESTRIL   Take 1 tablet by mouth Daily.       predniSONE 5 MG tablet   Commonly known as:  DELTASONE       saccharomyces boulardii 250 MG capsule   Commonly known as:  FLORASTOR   Take 1 capsule by mouth Daily.       SYNTHROID 112 MCG tablet   Generic drug:  levothyroxine   Take 1 tablet by mouth Daily. On an empty stomach       Vitamin D 1000 UNITS tablet               We Performed the Following     Basic Metabolic Panel     CBC & Differential     T3, Free     T3     T4, Free     T4     TSH     UA / M With / Rflx Culture([facility] ONLY)       You Were Diagnosed With        Codes Comments    Uncontrolled type 2 diabetes mellitus  with complication, with long-term current use of insulin    -  Primary ICD-10-CM: E11.8, E11.65, Z79.4  ICD-9-CM: 250.82, V58.67     Postsurgical hypothyroidism     ICD-10-CM: E89.0  ICD-9-CM: 244.0     CKD (chronic kidney disease) stage 3, GFR 30-59 ml/min     ICD-10-CM: N18.3  ICD-9-CM: 585.3     Chronic fatigue     ICD-10-CM: R53.82  ICD-9-CM: 780.79       Instructions    home blood tests -  Blood glucose testing: 3-4 times daily, that are: fasting- 1st thing in morning before eating or drinking, before each meal and 1 or 2 hours after meal  Bedtime, anytime you feel symptoms of hyperglycemia or hypoglycemia (high or low blood sugars)    New instructions for basal insulIn  Levemir U100 32 units in AM   Titration instructions - increase AM dose 1 units every 1 days if blood sugar before evening meal is over 120mg/dl    Novolog U100 - 1 unit for every 25 above 120 mg/dl - this is done with each meal  7 units before breakfast, 7 units before lunch, 7 units before dinner       Patient Instructions History      Upcoming Appointments     Visit Type Date Time Department    OFFICE VISIT 2017  2:20 PM [facility]    [facility] 2017  1:00 PM [facility]    OFFICE VISIT 2017  1:00 PM [facility]    [facility] 2017  1:00 PM [facility]    OFFICE VISIT 2017  1:40 PM [facility]      [email or web address] Signup     [facility] [employer] allows you to send messages to your doctor, view your test results, renew your prescriptions, schedule appointments, and more. To sign up, go to [employer] and click on the Sign Up Now link in the New User? box. Enter your [employer]Engine Activation Code exactly as it appears below along with the last four digits of your Social Security Number and your Date of Birth () to complete the sign-up process. If you do not sign up before the expiration date, you must request a new code.    [employer]Engine Activation Code: [email or web address]  Expires:  "1/19/2017  5:38 AM    If you have questions, you can email [email or web address] or call [phone] to talk to our MyChart staff. Remember, [employer] is NOT to be used for urgent needs. For medical emergencies, dial 911.               Other Info from Your Visit           Your Appointments     Apr 11, 2017  1:00 PM EDT   [facility]   [facility] ENDOCRINOLOGY (--)    [address]  [place] [id number] [phone]            Apr 18, 2017  1:00 PM EDT   Office Visit with Fred Patton MD   [facility] ENDOCRINOLOGY (--)    [address]  [place] [id number] [phone]           Arrive 15 minutes prior to appointment.            Jul 11, 2017  1:00 PM EDT   [facility], [facility]   [facility] ENDOCRINOLOGY (--)    [address] [place] [id number] [phone]            Jul 18, 2017  1:40 PM EDT   Office Visit with DENY Pinto   [facility] ENDOCRINOLOGY (--)    [address] [place] [id number] [phone]           Arrive 15 minutes prior to appointment.              Allergies     Augmentin [Amoxicillin-pot Clavulanate]      Dapsone      Detrol [Tolterodine Tartrate]      Pt. Does not recall this allergy.    Levaquin [Levofloxacin]      Sulfa Antibiotics      Tetracyclines & Related        Reason for Visit     Follow-up DM2, labs 12/12/16, (RM 1)      Vital Signs     Blood Pressure Height Weight Body Mass Index Smoking Status       126/78 65\" (165.1 cm) 218 lb (98.9 kg) 36.28 kg/m2 Never Smoker       Problems and Diagnoses Noted     CKD (chronic kidney disease) stage 3, GFR 30-59 ml/min    Uncontrolled type 2 diabetes mellitus with complication, with long-term current use of insulin    -  Primary    Underactive thyroid        Chronic fatigue            "

## 2017-01-18 LAB
BASOPHILS # BLD AUTO: 0.02 10*3/MM3 (ref 0–0.2)
BASOPHILS NFR BLD AUTO: 0.2 % (ref 0–1.5)
BUN SERPL-MCNC: 42 MG/DL (ref 8–23)
BUN/CREAT SERPL: 22.1 (ref 7–25)
CALCIUM SERPL-MCNC: 10.3 MG/DL (ref 8.6–10.5)
CHLORIDE SERPL-SCNC: 101 MMOL/L (ref 98–107)
CO2 SERPL-SCNC: 25.4 MMOL/L (ref 22–29)
CREAT SERPL-MCNC: 1.9 MG/DL (ref 0.57–1)
EOSINOPHIL # BLD AUTO: 0.09 10*3/MM3 (ref 0–0.7)
EOSINOPHIL NFR BLD AUTO: 0.7 % (ref 0.3–6.2)
ERYTHROCYTE [DISTWIDTH] IN BLOOD BY AUTOMATED COUNT: 14.3 % (ref 11.7–13)
GLUCOSE SERPL-MCNC: 112 MG/DL (ref 65–99)
HCT VFR BLD AUTO: 39.7 % (ref 35.6–45.5)
HGB BLD-MCNC: 11.8 G/DL (ref 11.9–15.5)
IMM GRANULOCYTES # BLD: 0.04 10*3/MM3 (ref 0–0.03)
IMM GRANULOCYTES NFR BLD: 0.3 % (ref 0–0.5)
LYMPHOCYTES # BLD AUTO: 1.21 10*3/MM3 (ref 0.9–4.8)
LYMPHOCYTES NFR BLD AUTO: 9.7 % (ref 19.6–45.3)
MCH RBC QN AUTO: 29.9 PG (ref 26.9–32)
MCHC RBC AUTO-ENTMCNC: 29.7 G/DL (ref 32.4–36.3)
MCV RBC AUTO: 100.5 FL (ref 80.5–98.2)
MONOCYTES # BLD AUTO: 0.62 10*3/MM3 (ref 0.2–1.2)
MONOCYTES NFR BLD AUTO: 5 % (ref 5–12)
NEUTROPHILS # BLD AUTO: 10.52 10*3/MM3 (ref 1.9–8.1)
NEUTROPHILS NFR BLD AUTO: 84.1 % (ref 42.7–76)
PLATELET # BLD AUTO: 335 10*3/MM3 (ref 140–500)
POTASSIUM SERPL-SCNC: 5.2 MMOL/L (ref 3.5–5.2)
RBC # BLD AUTO: 3.95 10*6/MM3 (ref 3.9–5.2)
SODIUM SERPL-SCNC: 142 MMOL/L (ref 136–145)
T3 SERPL-MCNC: 78.7 NG/DL (ref 80–200)
T3FREE SERPL-MCNC: 2 PG/ML (ref 2–4.4)
T4 FREE SERPL-MCNC: 1.2 NG/DL (ref 0.93–1.7)
T4 SERPL-MCNC: 7.05 MCG/DL (ref 4.5–11.7)
TSH SERPL DL<=0.005 MIU/L-ACNC: 0.75 MIU/ML (ref 0.27–4.2)
WBC # BLD AUTO: 12.5 10*3/MM3 (ref 4.5–10.7)

## 2017-01-23 ENCOUNTER — TELEPHONE (OUTPATIENT)
Dept: ENDOCRINOLOGY | Age: 72
End: 2017-01-23

## 2017-01-27 ENCOUNTER — RESULTS ENCOUNTER (OUTPATIENT)
Dept: ENDOCRINOLOGY | Age: 72
End: 2017-01-27

## 2017-01-27 DIAGNOSIS — E03.8 OTHER SPECIFIED HYPOTHYROIDISM: ICD-10-CM

## 2017-03-01 ENCOUNTER — HOSPITAL ENCOUNTER (OUTPATIENT)
Facility: HOSPITAL | Age: 72
Setting detail: HOSPITAL OUTPATIENT SURGERY
Discharge: HOME | End: 2017-03-01
Attending: INTERNAL MEDICINE | Admitting: INTERNAL MEDICINE

## 2017-03-01 ENCOUNTER — ANESTHESIA EVENT (OUTPATIENT)
Dept: GASTROENTEROLOGY | Facility: HOSPITAL | Age: 72
End: 2017-03-01

## 2017-03-01 ENCOUNTER — ANESTHESIA (OUTPATIENT)
Dept: GASTROENTEROLOGY | Facility: HOSPITAL | Age: 72
End: 2017-03-01

## 2017-03-01 VITALS
WEIGHT: 217.25 LBS | OXYGEN SATURATION: 95 % | DIASTOLIC BLOOD PRESSURE: 77 MMHG | SYSTOLIC BLOOD PRESSURE: 141 MMHG | TEMPERATURE: 97.9 F | HEIGHT: 65 IN | RESPIRATION RATE: 15 BRPM | HEART RATE: 67 BPM | BODY MASS INDEX: 36.19 KG/M2

## 2017-03-01 DIAGNOSIS — R12 HEARTBURN: ICD-10-CM

## 2017-03-01 LAB — GLUCOSE BLDC GLUCOMTR-MCNC: 86 MG/DL (ref 70–130)

## 2017-03-01 PROCEDURE — 82962 GLUCOSE BLOOD TEST: CPT

## 2017-03-01 PROCEDURE — 88312 SPECIAL STAINS GROUP 1: CPT | Performed by: INTERNAL MEDICINE

## 2017-03-01 PROCEDURE — 45385 COLONOSCOPY W/LESION REMOVAL: CPT | Performed by: INTERNAL MEDICINE

## 2017-03-01 PROCEDURE — 45380 COLONOSCOPY AND BIOPSY: CPT | Performed by: INTERNAL MEDICINE

## 2017-03-01 PROCEDURE — 88305 TISSUE EXAM BY PATHOLOGIST: CPT | Performed by: INTERNAL MEDICINE

## 2017-03-01 PROCEDURE — 25010000002 PROPOFOL 10 MG/ML EMULSION: Performed by: ANESTHESIOLOGY

## 2017-03-01 PROCEDURE — 43239 EGD BIOPSY SINGLE/MULTIPLE: CPT | Performed by: INTERNAL MEDICINE

## 2017-03-01 RX ORDER — LOSARTAN POTASSIUM 100 MG
100 TABLET ORAL DAILY
Status: ON HOLD | COMMUNITY
End: 2018-04-11 | Stop reason: ALTCHOICE

## 2017-03-01 RX ORDER — LIDOCAINE HYDROCHLORIDE 20 MG/ML
INJECTION, SOLUTION INFILTRATION; PERINEURAL AS NEEDED
Status: DISCONTINUED | OUTPATIENT
Start: 2017-03-01 | End: 2017-03-01 | Stop reason: SURG

## 2017-03-01 RX ORDER — SODIUM CHLORIDE, SODIUM LACTATE, POTASSIUM CHLORIDE, CALCIUM CHLORIDE 600; 310; 30; 20 MG/100ML; MG/100ML; MG/100ML; MG/100ML
30 INJECTION, SOLUTION INTRAVENOUS CONTINUOUS PRN
Status: DISCONTINUED | OUTPATIENT
Start: 2017-03-01 | End: 2017-03-01 | Stop reason: HOSPADM

## 2017-03-01 RX ORDER — PROPOFOL 10 MG/ML
VIAL (ML) INTRAVENOUS CONTINUOUS PRN
Status: DISCONTINUED | OUTPATIENT
Start: 2017-03-01 | End: 2017-03-01 | Stop reason: SURG

## 2017-03-01 RX ORDER — PROPOFOL 10 MG/ML
VIAL (ML) INTRAVENOUS AS NEEDED
Status: DISCONTINUED | OUTPATIENT
Start: 2017-03-01 | End: 2017-03-01 | Stop reason: SURG

## 2017-03-01 RX ADMIN — SODIUM CHLORIDE, POTASSIUM CHLORIDE, SODIUM LACTATE AND CALCIUM CHLORIDE: 600; 310; 30; 20 INJECTION, SOLUTION INTRAVENOUS at 12:15

## 2017-03-01 RX ADMIN — PROPOFOL 100 MG: 10 INJECTION, EMULSION INTRAVENOUS at 12:15

## 2017-03-01 RX ADMIN — LIDOCAINE HYDROCHLORIDE 60 MG: 20 INJECTION, SOLUTION INFILTRATION; PERINEURAL at 12:02

## 2017-03-01 RX ADMIN — PROPOFOL 100 MCG/KG/MIN: 10 INJECTION, EMULSION INTRAVENOUS at 12:15

## 2017-03-01 NOTE — H&P
[facility]  Pre Procedure History & Physical    Chief Complaint:   Heartburn, diarrhea, hx colitis on prior c/s, hx adenomatous polyps    Subjective     HPI:   Ya Paz is a 71 y.o. female here for a follow up visit for acute diverticulitis. She does have chronic diarrhea at baseline. She denies seeing any blood in her stool. CT showed acute diverticulitis in the left colon at the junction of the descending and sigmoid colons. She's never had any prior diverticulitis. She did have a hospitalization in 2007 which she states was for pancreatitis. At that time she had bloody diarrhea and underwent a colonoscopy by Dr. Berto Clinton. She had significant inflammation in the transverse colon biopsies of which were positive for ulceration and nonspecific inflammation she was found to be C. difficile positive during that hospitalization. She's had subsequent evaluations as an outpatient. She had an EGD and colonoscopy in 2013 performed by Dr. Robles Taylor. Random biopsies at that time showed focal active colitis. She had 1 tubular adenoma removed. Her most recent colonoscopy was in 2015 performed by Dr. Oscar Nelson. She had 2 polyps removed at that time-path was notable for tubular adenomas with low-grade dysplasia.     She is feeling better overall. She has recovered from her hospitalization in November for diverticulitis and C. Difficile colitis. She did have a resurgence of her symptoms about 2 weeks ago and I restarted her on Flagyl. We repeated a CT of the abdomen and pelvis which showed resolution of the previously noted diverticulitis. She does feel better after completing Flagyl. She's had no fevers or chills. She has been eating a fairly mild bland diet. She's had no nausea or vomiting. She is not currently taking anything for acid reflux. She has intermittent symptoms. Her PPI was discontinued due to chronic kidney disease. She's currently being followed by Dr. Tank Tran. For  the most part she is not having much in the way of symptoms. She has been told that she can take an H2 blocker if needed. She's not seen any blood in her stool.    Past Medical History:   Past Medical History   Diagnosis Date   • Anxiety    • Diabetes mellitus    • Disease of thyroid gland    • Hypertension    • Migraine    • Pancreatitis    • Tachycardia    • TIA (transient ischemic attack)        Past Surgical History:  Past Surgical History   Procedure Laterality Date   • Thyroid surgery     • Lung surgery       mass LL   • Hernia repair     • Appendectomy     • Cholecystectomy     • Cardiac catheterization     •  section     • Gastric bypass     • Colonoscopy       normal per pt, Dr. Reagan       Family History:  Family History   Problem Relation Age of Onset   • Pancreatitis Brother    • Ulcerative colitis Son    • Crohn's disease Grandchild        Social History:   reports that she has never smoked. She has never used smokeless tobacco. She reports that she does not drink alcohol or use illicit drugs.    Medications:   Prescriptions Prior to Admission   Medication Sig Dispense Refill Last Dose   • AMLODIPINE BESYLATE PO Take 1.5 tablets by mouth Daily.   2017 at Unknown time   • atorvastatin (LIPITOR) 10 MG tablet TAKE ONE TABLET BY MOUTH ONCE DAILY   2017 at Unknown time   • Calcium Citrate-Vitamin D (CALCIUM + D PO) Take 2 tablets by mouth Daily.   2017   • carvedilol (COREG) 6.25 MG tablet TAKE ONE TABLET BY MOUTH TWICE DAILY   2017 at Unknown time   • Cholecalciferol (VITAMIN D) 1000 UNITS tablet Take 1,000 Units by mouth 2 (Two) Times a Day.   2017 at Unknown time   • folic acid (FOLVITE) 1 MG tablet TAKE ONE TABLET BY MOUTH ONCE DAILY   2017 at Unknown time   • losartan (COZAAR) 100 MG tablet Take 100 mg by mouth Daily.   2017 at Unknown time   • predniSONE (DELTASONE) 5 MG tablet Take 5 mg by mouth Daily.   2017 at Unknown time   • SYNTHROID 112 MCG  "tablet Take 1 tablet by mouth Daily. On an empty stomach 30 tablet 3 2/28/2017 at Unknown time   • ALPRAZolam (XANAX) 0.5 MG tablet Take 0.5 mg by mouth 3 (Three) Times a Day As Needed.   More than a month at Unknown time   • aspirin 81 MG EC tablet Take 81 mg by mouth Daily.   2/23/2017   • insulin aspart (NOVOLOG FLEXPEN) 100 UNIT/ML solution pen-injector sc pen 6 units before each meal with sliding scale with a max of 100 units daily (Patient taking differently: 7 units before each meal with sliding scale with a max of 100 units daily per patient) 5 pen 4 2/27/2017   • Insulin Pen Needle 32G X 4 MM misc Use to inject insulin 4 times daily 120 each 5 2/27/2017   • LEVEMIR FLEXPEN 100 UNIT/ML injection 30 units in the AM titrate up as instructed with a max of 100 units daily 5 pen 4 2/28/2017   • saccharomyces boulardii (FLORASTOR) 250 MG capsule Take 1 capsule by mouth Daily. 30 capsule 11 2/25/2017       Allergies:  Augmentin [amoxicillin-pot clavulanate]; Dapsone; Detrol [tolterodine tartrate]; Levaquin [levofloxacin]; Sulfa antibiotics; Tetracycline; and Tetracyclines & related    ROS:    Pertinent items are noted in HPI, all other systems reviewed and negative     Objective     Blood pressure 166/64, pulse 67, temperature 97.9 °F (36.6 °C), temperature source Oral, resp. rate 16, height 65\" (165.1 cm), weight 217 lb 4 oz (98.5 kg), SpO2 98 %.    Physical Exam   Constitutional: Pt is oriented to person, place, and time and well-developed, well-nourished, and in no distress.   Mouth/Throat: Oropharynx is clear and moist.   Neck: Normal range of motion.   Cardiovascular: Normal rate, regular rhythm   Pulmonary/Chest: Effort normal   Abdominal: Soft. Nontender  Skin: Skin is warm and dry.   Psychiatric: Mood, memory, affect and judgment normal.     Assessment/Plan     Diagnosis:  Heartburn, diarrhea, hx colitis on prior c/s    Anticipated Surgical Procedure:  egd/colonoscopy    The risks, benefits, and " alternatives of this procedure have been discussed with the patient or the responsible party- the patient understands and agrees to proceed.

## 2017-03-01 NOTE — ANESTHESIA POSTPROCEDURE EVALUATION
Patient: Ya Paz    Procedure Summary     Date Anesthesia Start Anesthesia Stop Room / Location    03/01/17 1216 1305  [facility]       Procedure Diagnosis Surgeon Provider    ESOPHAGOGASTRODUODENOSCOPY with biopsy (cold) (N/A Esophagus); COLONOSCOPY with biopsies (cold) and polypectomy (cold biopsy) (N/A ) Heartburn  (Heartburn [R12]) MD Desmond Martinez MD          Anesthesia Type: MAC  Last vitals  /77 (03/01/17 1324)    Temp      Pulse 67 (03/01/17 1324)   Resp 15 (03/01/17 1324)    SpO2 95 % (03/01/17 1324)      Post Anesthesia Care and Evaluation    Patient location during evaluation: PACU  Patient participation: complete - patient participated  Level of consciousness: awake and alert  Pain score: 0  Pain management: adequate  Airway patency: patent  Anesthetic complications: No anesthetic complications    Cardiovascular status: acceptable  Respiratory status: acceptable  Hydration status: acceptable

## 2017-03-01 NOTE — PLAN OF CARE
Problem: Patient Care Overview (Adult)  Goal: Plan of Care Review  Outcome: Ongoing (interventions implemented as appropriate)    03/01/17 1139   Coping/Psychosocial Response Interventions   Plan Of Care Reviewed With patient   Patient Care Overview   Progress no change       Goal: Adult Individualization and Mutuality  Outcome: Ongoing (interventions implemented as appropriate)  Goal: Discharge Needs Assessment  Outcome: Ongoing (interventions implemented as appropriate)    Problem: GI Endoscopy (Adult)  Goal: Signs and Symptoms of Listed Potential Problems Will be Absent or Manageable (GI Endoscopy)  Outcome: Ongoing (interventions implemented as appropriate)

## 2017-03-01 NOTE — DISCHARGE INSTRUCTIONS
For the next 24 hours patient needs to be with a responsible adult.    For 24 hours DO NOT drive, operate machinery, appliances, drink alcohol, make important decisions or sign legal documents.    Start with a light or bland diet and advance to regular diet as tolerated.    Follow recommendations on procedure report provided by your doctor.    Call Dr. Santizo for problems [phone]    Problems may include but not limited to: large amounts of bleeding, trouble breathing, repeated vomiting, severe unrelieved pain, fever or chills.   See Provation reports.

## 2017-03-01 NOTE — ANESTHESIA PREPROCEDURE EVALUATION
Anesthesia Evaluation     Patient summary reviewed and Nursing notes reviewed      Airway   Mallampati: I  TM distance: <3 FB  Neck ROM: full  no difficulty expected  Dental - normal exam     Pulmonary - negative pulmonary ROS and normal exam   Cardiovascular - normal exam    (+) hypertension,       Neuro/Psych  (+) TIA, headaches, psychiatric history,    GI/Hepatic/Renal/Endo    (+)  chronic renal disease, diabetes mellitus,     Musculoskeletal (-) negative ROS    Abdominal  - normal exam    Bowel sounds: normal.   Substance History - negative use     OB/GYN negative ob/gyn ROS         Other                                    Anesthesia Plan    ASA 3     MAC     Anesthetic plan and risks discussed with patient.

## 2017-03-02 LAB
CYTO UR: NORMAL
LAB AP CASE REPORT: NORMAL
Lab: NORMAL
PATH REPORT.FINAL DX SPEC: NORMAL
PATH REPORT.GROSS SPEC: NORMAL

## 2017-03-08 ENCOUNTER — TELEPHONE (OUTPATIENT)
Dept: GASTROENTEROLOGY | Facility: CLINIC | Age: 72
End: 2017-03-08

## 2017-03-08 NOTE — TELEPHONE ENCOUNTER
----- Message from Paloma Santizo MD sent at 3/3/2017  3:14 PM EST -----  Gastric, small bowel and random colon biopsies were normal. The polyp(s) biopsies showed adenomatous change. This is not cancerous but is considered potentially precancerous. Follow-up colonoscopy in 3 years is advised. Schedule 6 wk office f/u

## 2017-03-08 NOTE — TELEPHONE ENCOUNTER
Called pt and advised per Dr Sumner that the gastric , sm bowel and random colon bx were normal.  The polyp(s) showed adenomatous change , which is not cancerous but is considered to be potentially precancerous and she recommends repeat c/s in 3 yrs and f/u in 8 wks.  Pt verb understanding and already has appt .    Message sent to Radha to place c/s in recall for 03/02/2020.

## 2017-04-07 DIAGNOSIS — E67.3 HYPERVITAMINOSIS D: Primary | ICD-10-CM

## 2017-04-07 DIAGNOSIS — E11.65 UNCONTROLLED TYPE 2 DIABETES MELLITUS WITH COMPLICATION, UNSPECIFIED LONG TERM INSULIN USE STATUS: ICD-10-CM

## 2017-04-07 DIAGNOSIS — R53.82 CHRONIC FATIGUE: ICD-10-CM

## 2017-04-07 DIAGNOSIS — E89.0 POSTSURGICAL HYPOTHYROIDISM: ICD-10-CM

## 2017-04-07 DIAGNOSIS — E11.8 UNCONTROLLED TYPE 2 DIABETES MELLITUS WITH COMPLICATION, UNSPECIFIED LONG TERM INSULIN USE STATUS: ICD-10-CM

## 2017-04-07 PROBLEM — IMO0002 DIABETES MELLITUS TYPE 2, UNCONTROLLED, WITH COMPLICATIONS: Status: ACTIVE | Noted: 2017-04-07

## 2017-05-06 ENCOUNTER — RESULTS ENCOUNTER (OUTPATIENT)
Dept: ENDOCRINOLOGY | Age: 72
End: 2017-05-06

## 2017-05-06 DIAGNOSIS — E67.3 HYPERVITAMINOSIS D: ICD-10-CM

## 2017-05-08 ENCOUNTER — RESULTS ENCOUNTER (OUTPATIENT)
Dept: ENDOCRINOLOGY | Age: 72
End: 2017-05-08

## 2017-05-08 DIAGNOSIS — E11.65 UNCONTROLLED TYPE 2 DIABETES MELLITUS WITH COMPLICATION, UNSPECIFIED LONG TERM INSULIN USE STATUS: ICD-10-CM

## 2017-05-08 DIAGNOSIS — E67.3 HYPERVITAMINOSIS D: ICD-10-CM

## 2017-05-08 DIAGNOSIS — R53.82 CHRONIC FATIGUE: ICD-10-CM

## 2017-05-08 DIAGNOSIS — E89.0 POSTSURGICAL HYPOTHYROIDISM: ICD-10-CM

## 2017-05-08 DIAGNOSIS — E11.8 UNCONTROLLED TYPE 2 DIABETES MELLITUS WITH COMPLICATION, UNSPECIFIED LONG TERM INSULIN USE STATUS: ICD-10-CM

## 2017-05-24 DIAGNOSIS — E03.8 OTHER SPECIFIED HYPOTHYROIDISM: ICD-10-CM

## 2017-05-25 RX ORDER — LEVOTHYROXINE SODIUM 112 MCG
TABLET ORAL
Qty: 30 TABLET | Refills: 0 | Status: SHIPPED | OUTPATIENT
Start: 2017-05-25 | End: 2017-10-30 | Stop reason: DRUGHIGH

## 2017-06-19 ENCOUNTER — HOSPITAL ENCOUNTER (OUTPATIENT)
Dept: LAB | Facility: HOSPITAL | Age: 72
Discharge: HOME | End: 2017-06-19
Attending: INTERNAL MEDICINE | Admitting: INTERNAL MEDICINE

## 2017-06-19 LAB
ANION GAP SERPL CALC-SCNC: 15.6 MMOL/L (ref 10–20)
BASOPHILS # BLD AUTO: 0 10*3/UL (ref 0–0.2)
BASOPHILS NFR BLD AUTO: 0 % (ref 0–2)
BILIRUB UR QL STRIP: ABNORMAL
BILIRUB UR QL STRIP: ABNORMAL MG/DL
BUN SERPL-MCNC: 38 MG/DL (ref 8–20)
BUN/CREAT SERPL: 19 (ref 5.4–26.2)
CALCIUM SERPL-MCNC: 8.5 MG/DL (ref 8.9–10.3)
CASTS URNS QL MICRO: ABNORMAL /[LPF]
CHLORIDE SERPL-SCNC: 105 MMOL/L (ref 101–111)
COLOR UR: YELLOW
CONV BACTERIA IN URINE MICRO: NEGATIVE
CONV CLARITY OF URINE: ABNORMAL
CONV CO2: 25 MMOL/L (ref 22–32)
CONV HYALINE CASTS IN URINE MICRO: 5 /[LPF] (ref 0–5)
CONV PROTEIN IN URINE BY AUTOMATED TEST STRIP: 30 MG/DL
CONV SMALL ROUND CELLS: ABNORMAL /[HPF]
CONV UROBILINOGEN IN URINE BY AUTOMATED TEST STRIP: 0.2 MG/DL
CREAT UR-MCNC: 2 MG/DL (ref 0.4–1)
CULTURE INDICATED?: ABNORMAL
DIFFERENTIAL METHOD BLD: (no result)
EOSINOPHIL # BLD AUTO: 0.1 10*3/UL (ref 0–0.3)
EOSINOPHIL # BLD AUTO: 1 % (ref 0–3)
ERYTHROCYTE [DISTWIDTH] IN BLOOD BY AUTOMATED COUNT: 14.9 % (ref 11.5–14.5)
GLUCOSE SERPL-MCNC: 131 MG/DL (ref 65–99)
GLUCOSE UR QL: NEGATIVE MG/DL
HCT VFR BLD AUTO: 33 % (ref 35–49)
HGB BLD-MCNC: 10.7 G/DL (ref 12–15)
HGB UR QL STRIP: NEGATIVE
KETONES UR QL STRIP: NEGATIVE MG/DL
LEUKOCYTE ESTERASE UR QL STRIP: NEGATIVE
LYMPHOCYTES # BLD AUTO: 1 10*3/UL (ref 0.8–4.8)
LYMPHOCYTES NFR BLD AUTO: 10 % (ref 18–42)
MCH RBC QN AUTO: 30.6 PG (ref 26–32)
MCHC RBC AUTO-ENTMCNC: 32.4 G/DL (ref 32–36)
MCV RBC AUTO: 94.5 FL (ref 80–94)
MONOCYTES # BLD AUTO: 0.5 10*3/UL (ref 0.1–1.3)
MONOCYTES NFR BLD AUTO: 6 % (ref 2–11)
NEUTROPHILS # BLD AUTO: 7.7 10*3/UL (ref 2.3–8.6)
NEUTROPHILS NFR BLD AUTO: 83 % (ref 50–75)
NITRITE UR QL STRIP: NEGATIVE
NRBC BLD AUTO-RTO: 0 /100{WBCS}
NRBC/RBC NFR BLD MANUAL: 0 10*3/UL
PH UR STRIP.AUTO: 6 [PH] (ref 4.5–8)
PHOSPHATE SERPL-MCNC: 3.9 MG/DL (ref 2.4–4.7)
PLATELET # BLD AUTO: 298 10*3/UL (ref 150–450)
PMV BLD AUTO: 7.4 FL (ref 7.4–10.4)
POTASSIUM SERPL-SCNC: 5.6 MMOL/L (ref 3.6–5.1)
RBC # BLD AUTO: 3.49 10*6/UL (ref 4–5.4)
RBC #/AREA URNS HPF: 1 /[HPF] (ref 0–3)
SODIUM SERPL-SCNC: 140 MMOL/L (ref 136–144)
SP GR UR: 1.02 (ref 1–1.03)
SPERM URNS QL MICRO: ABNORMAL /[HPF]
SQUAMOUS SPT QL MICRO: 6 /[HPF] (ref 0–5)
UNIDENT CRYS URNS QL MICRO: ABNORMAL /[HPF]
WBC # BLD AUTO: 9.3 10*3/UL (ref 4.5–11.5)
WBC #/AREA URNS HPF: 3 /[HPF] (ref 0–5)
YEAST SPEC QL WET PREP: ABNORMAL /[HPF]

## 2017-06-21 LAB — PTH-INTACT SERPL-MCNC: 129 PG/ML (ref 11–72)

## 2017-08-08 ENCOUNTER — OFFICE VISIT (OUTPATIENT)
Dept: GASTROENTEROLOGY | Facility: CLINIC | Age: 72
End: 2017-08-08

## 2017-08-08 VITALS
BODY MASS INDEX: 35.52 KG/M2 | SYSTOLIC BLOOD PRESSURE: 122 MMHG | DIASTOLIC BLOOD PRESSURE: 72 MMHG | WEIGHT: 213.2 LBS | HEIGHT: 65 IN | TEMPERATURE: 98.5 F

## 2017-08-08 DIAGNOSIS — K59.00 CONSTIPATION, UNSPECIFIED CONSTIPATION TYPE: Primary | ICD-10-CM

## 2017-08-08 DIAGNOSIS — D12.6 ADENOMATOUS COLON POLYP: ICD-10-CM

## 2017-08-08 DIAGNOSIS — R13.10 DYSPHAGIA, UNSPECIFIED TYPE: ICD-10-CM

## 2017-08-08 PROCEDURE — 99214 OFFICE O/P EST MOD 30 MIN: CPT | Performed by: INTERNAL MEDICINE

## 2017-08-08 RX ORDER — LISINOPRIL 40 MG/1
40 TABLET ORAL
COMMUNITY
Start: 2017-06-05 | End: 2017-09-03

## 2017-08-08 NOTE — PROGRESS NOTES
Chief Complaint   Patient presents with   • Follow-up   • Heartburn       Ya Paz is a  72 y.o. female here for a follow up visit for Difficulty using the restroom.  She reports that she either has diarrhea or nothing.  She uses Dulcolax as needed to have a bowel movement but she also drinks coffee or eat something that she knows will stimulate her bowels.  If she does not do one of these things will not have a bowel movement.  This will often cause diarrhea which is sometimes associated with short-lived cramping.  She has been using fiber gummies.  She was not able to try a powdered fiber supplement because she does not like to drink things.    She also reports some difficulty swallowing.  This has happened before after eating and it also happened at night.  She feels choked.  She does not have any heartburn or acid regurgitation.  We discussed doing a video swallow for further evaluation.  She reports that she had this remotely after her thyroid surgery but she reports that her symptoms have gotten worse over the years.    Since her last visit she has undergone a colonoscopy performed by me in 2017 that was notable for multiple adenomatous polyps which were removed.  She also had evidence of previous gastroplasty with a stenotic anastomosis.  Biopsies of the stomach were normal.  Biopsies of the duodenum were normal.  Random colon biopsies were also found to be normal.  HPI  Past Medical History:   Diagnosis Date   • Anxiety    • Clostridium difficile infection 2016    and in    • Diabetes mellitus    • Disease of thyroid gland    • Hypertension    • Migraine    • Pancreatitis    • Tachycardia    • TIA (transient ischemic attack)      Past Surgical History:   Procedure Laterality Date   • APPENDECTOMY     • CARDIAC CATHETERIZATION     •  SECTION     • CHOLECYSTECTOMY     • COLONOSCOPY      normal per pt, Dr. Reagan   • COLONOSCOPY N/A 3/1/2017    multiple polyps, tics, IH, scar in  transverse colon   • ENDOSCOPY N/A 3/1/2017    gastroplasty, nodular mucosa in gastric antrum, nodule in duodenum   • GASTRIC BYPASS     • HERNIA REPAIR     • LUNG SURGERY      mass LL   • THYROID SURGERY         Current Outpatient Prescriptions:   •  ALPRAZolam (XANAX) 0.5 MG tablet, Take 0.5 mg by mouth 3 (Three) Times a Day As Needed., Disp: , Rfl:   •  AMLODIPINE BESYLATE PO, Take 1.5 tablets by mouth Daily., Disp: , Rfl:   •  aspirin 81 MG EC tablet, Take 81 mg by mouth Daily., Disp: , Rfl:   •  atorvastatin (LIPITOR) 10 MG tablet, TAKE ONE TABLET BY MOUTH ONCE DAILY, Disp: , Rfl:   •  Calcium Citrate-Vitamin D (CALCIUM + D PO), Take 2 tablets by mouth Daily., Disp: , Rfl:   •  carvedilol (COREG) 6.25 MG tablet, TAKE ONE TABLET BY MOUTH TWICE DAILY, Disp: , Rfl:   •  Cholecalciferol (VITAMIN D) 1000 UNITS tablet, Take 1,000 Units by mouth 2 (Two) Times a Day., Disp: , Rfl:   •  folic acid (FOLVITE) 1 MG tablet, TAKE ONE TABLET BY MOUTH ONCE DAILY, Disp: , Rfl:   •  insulin aspart (NOVOLOG FLEXPEN) 100 UNIT/ML solution pen-injector sc pen, 6 units before each meal with sliding scale with a max of 100 units daily (Patient taking differently: 7 units before each meal with sliding scale with a max of 100 units daily per patient), Disp: 5 pen, Rfl: 4  •  Insulin Pen Needle 32G X 4 MM misc, Use to inject insulin 4 times daily, Disp: 120 each, Rfl: 5  •  LEVEMIR FLEXPEN 100 UNIT/ML injection, 30 units in the AM titrate up as instructed with a max of 100 units daily, Disp: 5 pen, Rfl: 4  •  lisinopril (PRINIVIL,ZESTRIL) 40 MG tablet, Take 40 mg by mouth., Disp: , Rfl:   •  predniSONE (DELTASONE) 5 MG tablet, Take 5 mg by mouth Daily., Disp: , Rfl:   •  saccharomyces boulardii (FLORASTOR) 250 MG capsule, Take 1 capsule by mouth Daily., Disp: 30 capsule, Rfl: 11  •  SYNTHROID 112 MCG tablet, TAKE 1 TABLET BY MOUTH DAILY ON AN EMPTY STOMACH, Disp: 30 tablet, Rfl: 0  •  losartan (COZAAR) 100 MG tablet, Take 100 mg by mouth  "Daily., Disp: , Rfl:   PRN Meds:.  Allergies   Allergen Reactions   • Augmentin [Amoxicillin-Pot Clavulanate] Rash     Rash in mouth  Rash in mouth   • Dapsone      Pt states she \"quit producing blood.\"   • Detrol [Tolterodine Tartrate]      Pt. Does not recall this allergy.   • Levaquin [Levofloxacin] Hives   • Sulfa Antibiotics      Sister went blind with taking and was advised not to take.  Sister went blind with taking and was advised not to take.   • Tetracycline Hives and Itching   • Tetracyclines & Related      Social History     Social History   • Marital status:      Spouse name: N/A   • Number of children: N/A   • Years of education: N/A     Occupational History   • Not on file.     Social History Main Topics   • Smoking status: Never Smoker   • Smokeless tobacco: Never Used   • Alcohol use No   • Drug use: No   • Sexual activity: Defer     Other Topics Concern   • Not on file     Social History Narrative     Family History   Problem Relation Age of Onset   • Pancreatitis Brother    • Ulcerative colitis Son    • Crohn's disease Grandchild      Review of Systems   Constitutional: Negative for appetite change and unexpected weight change.   Gastrointestinal: Positive for diarrhea. Negative for nausea and vomiting.     Vitals:    08/08/17 1308   BP: 122/72   Temp: 98.5 °F (36.9 °C)     Last Weight    08/08/17  1308   Weight: 213 lb 3.2 oz (96.7 kg)     Physical Exam   Constitutional: She is oriented to person, place, and time. She appears well-developed and well-nourished.   HENT:   Head: Normocephalic and atraumatic.   Eyes: Conjunctivae are normal. No scleral icterus.   Neck: Normal range of motion. Neck supple.   Pulmonary/Chest: Effort normal.   Abdominal: Soft. She exhibits no distension. There is no tenderness.   Musculoskeletal: She exhibits no edema.   Neurological: She is alert and oriented to person, place, and time.   Skin: Skin is warm and dry.   Psychiatric: She has a normal mood and " affect.   Nursing note and vitals reviewed.    No images are attached to the encounter.  Diagnoses and all orders for this visit:    Constipation, unspecified constipation type    Adenomatous colon polyp    Dysphagia, unspecified type    Other orders  -     lisinopril (PRINIVIL,ZESTRIL) 40 MG tablet; Take 40 mg by mouth.    Plan-  - Trial low-dose Amitiza for further management of her constipation.  I think that the Dulcolax is causing her diarrhea.  I've advised her to start with once daily and increase to twice a day if needed.  - She will need a repeat colonoscopy due to her history of adenomatous polyps in 3 years  - We discussed doing a video swallow evaluation given her history of dysphagia and choking.  Her symptoms sound more oropharyngeal at this point.  She do recent EGD with normal esophagus.  She would like to hold off on this for now and think about it and will let me know if she decides that she wants to proceed.

## 2017-08-15 ENCOUNTER — TRANSCRIBE ORDERS (OUTPATIENT)
Dept: ADMINISTRATIVE | Facility: HOSPITAL | Age: 72
End: 2017-08-15

## 2017-08-15 DIAGNOSIS — N28.89 KIDNEY MASS: Primary | ICD-10-CM

## 2017-09-06 ENCOUNTER — TELEPHONE (OUTPATIENT)
Dept: GASTROENTEROLOGY | Facility: CLINIC | Age: 72
End: 2017-09-06

## 2017-09-06 NOTE — TELEPHONE ENCOUNTER
Called pt and pt reports she began taking the amitiza on 08/09 and she reports it is not working.  She reports she is having a bm every 4 days.   She states it is hard to get the stool out.    She is asking if there is something else she can try.  Advised would send message to Dr Santizo.

## 2017-09-06 NOTE — TELEPHONE ENCOUNTER
----- Message from Inga Santos sent at 9/6/2017 12:15 PM EDT -----  Regarding: PT CALLED   Contact: [phone]   PT IS CALLING STATING MEDICATION AMITIZA IS NOT WORKING FOR HER ?? PT WOULD LIKE A CALL BACK

## 2017-09-11 ENCOUNTER — HOSPITAL ENCOUNTER (OUTPATIENT)
Dept: MRI IMAGING | Facility: HOSPITAL | Age: 72
Discharge: HOME | End: 2017-09-11
Attending: UROLOGY | Admitting: UROLOGY

## 2017-09-11 DIAGNOSIS — N28.89 KIDNEY MASS: ICD-10-CM

## 2017-09-11 PROCEDURE — 74181 MRI ABDOMEN W/O CONTRAST: CPT

## 2017-09-14 RX ORDER — LUBIPROSTONE 24 UG/1
24 CAPSULE ORAL 2 TIMES DAILY WITH MEALS
Qty: 60 CAPSULE | Refills: 2 | Status: SHIPPED | OUTPATIENT
Start: 2017-09-14 | End: 2018-06-06

## 2017-09-14 NOTE — TELEPHONE ENCOUNTER
Will send higher dose amitiza to pharmacy - start with one pill daily and can increase to bid if no improvement - take w/food

## 2017-09-14 NOTE — TELEPHONE ENCOUNTER
Called pt and advised per Dr Santizo that she has sent the higher dose of amitiza to her pharmacy.      Pt verb understanding , but states that it is going to cost her over $100 and she can not pay this.  She is asking what else can she do.  Advised would send message to Dr Santizo.

## 2017-09-25 RX ORDER — PEN NEEDLE, DIABETIC 32GX 5/32"
NEEDLE, DISPOSABLE MISCELLANEOUS
Refills: 0 | OUTPATIENT
Start: 2017-09-25

## 2017-10-09 ENCOUNTER — HOSPITAL ENCOUNTER (OUTPATIENT)
Dept: LAB | Facility: HOSPITAL | Age: 72
Discharge: HOME | End: 2017-10-09
Attending: INTERNAL MEDICINE | Admitting: INTERNAL MEDICINE

## 2017-10-09 LAB
ALBUMIN SERPL-MCNC: 3.1 G/DL (ref 3.5–4.8)
ALBUMIN/GLOB SERPL: 0.9 {RATIO} (ref 1–1.7)
ALP SERPL-CCNC: 49 IU/L (ref 32–91)
ALT SERPL-CCNC: 12 IU/L (ref 14–54)
ANION GAP SERPL CALC-SCNC: 13.9 MMOL/L (ref 10–20)
AST SERPL-CCNC: 14 IU/L (ref 15–41)
BASOPHILS # BLD AUTO: 0 10*3/UL (ref 0–0.2)
BASOPHILS NFR BLD AUTO: 0 % (ref 0–2)
BILIRUB SERPL-MCNC: 0.4 MG/DL (ref 0.3–1.2)
BUN SERPL-MCNC: 33 MG/DL (ref 8–20)
BUN/CREAT SERPL: 15.7 (ref 5.4–26.2)
CALCIUM SERPL-MCNC: 9 MG/DL (ref 8.9–10.3)
CHLORIDE SERPL-SCNC: 102 MMOL/L (ref 101–111)
CONV CO2: 26 MMOL/L (ref 22–32)
CONV TOTAL PROTEIN: 6.4 G/DL (ref 6.1–7.9)
CREAT UR-MCNC: 2.1 MG/DL (ref 0.4–1)
DIFFERENTIAL METHOD BLD: (no result)
EOSINOPHIL # BLD AUTO: 0.2 10*3/UL (ref 0–0.3)
EOSINOPHIL # BLD AUTO: 1 % (ref 0–3)
ERYTHROCYTE [DISTWIDTH] IN BLOOD BY AUTOMATED COUNT: 14.1 % (ref 11.5–14.5)
GLOBULIN UR ELPH-MCNC: 3.3 G/DL (ref 2.5–3.8)
GLUCOSE SERPL-MCNC: 155 MG/DL (ref 65–99)
HCT VFR BLD AUTO: 33.5 % (ref 35–49)
HGB BLD-MCNC: 11.1 G/DL (ref 12–15)
LYMPHOCYTES # BLD AUTO: 1.2 10*3/UL (ref 0.8–4.8)
LYMPHOCYTES NFR BLD AUTO: 10 % (ref 18–42)
MCH RBC QN AUTO: 30.7 PG (ref 26–32)
MCHC RBC AUTO-ENTMCNC: 33.1 G/DL (ref 32–36)
MCV RBC AUTO: 92.8 FL (ref 80–94)
MONOCYTES # BLD AUTO: 0.5 10*3/UL (ref 0.1–1.3)
MONOCYTES NFR BLD AUTO: 5 % (ref 2–11)
NEUTROPHILS # BLD AUTO: 9.6 10*3/UL (ref 2.3–8.6)
NEUTROPHILS NFR BLD AUTO: 84 % (ref 50–75)
NRBC BLD AUTO-RTO: 0 /100{WBCS}
NRBC/RBC NFR BLD MANUAL: 0 10*3/UL
PLATELET # BLD AUTO: 344 10*3/UL (ref 150–450)
PMV BLD AUTO: 7.3 FL (ref 7.4–10.4)
POTASSIUM SERPL-SCNC: 4.9 MMOL/L (ref 3.6–5.1)
RBC # BLD AUTO: 3.61 10*6/UL (ref 4–5.4)
SODIUM SERPL-SCNC: 137 MMOL/L (ref 136–144)
WBC # BLD AUTO: 11.6 10*3/UL (ref 4.5–11.5)

## 2017-10-10 ENCOUNTER — HOSPITAL ENCOUNTER (OUTPATIENT)
Dept: LAB | Facility: HOSPITAL | Age: 72
Setting detail: SPECIMEN
Discharge: HOME | End: 2017-10-10
Attending: INTERNAL MEDICINE | Admitting: INTERNAL MEDICINE

## 2017-10-10 LAB
BILIRUB UR QL STRIP: NEGATIVE MG/DL
CASTS URNS QL MICRO: NORMAL /[LPF]
COLOR UR: YELLOW
CONV BACTERIA IN URINE MICRO: NEGATIVE
CONV CLARITY OF URINE: CLEAR
CONV HYALINE CASTS IN URINE MICRO: 3 /[LPF] (ref 0–5)
CONV PROTEIN IN URINE BY AUTOMATED TEST STRIP: NEGATIVE MG/DL
CONV SMALL ROUND CELLS: NORMAL /[HPF]
CONV UROBILINOGEN IN URINE BY AUTOMATED TEST STRIP: 0.2 MG/DL
CREAT 24H UR-MCNC: 40.2 MG/DL
CULTURE INDICATED?: NORMAL
GLUCOSE UR QL: NEGATIVE MG/DL
HGB UR QL STRIP: NEGATIVE
KETONES UR QL STRIP: NEGATIVE MG/DL
LEUKOCYTE ESTERASE UR QL STRIP: NEGATIVE
NITRITE UR QL STRIP: NEGATIVE
PH UR STRIP.AUTO: 6.5 [PH] (ref 4.5–8)
PROT UR-MCNC: 17 MG/DL
PROT/CREAT UR: 0.4 MG/MG (ref 0–22)
RBC #/AREA URNS HPF: 1 /[HPF] (ref 0–3)
SP GR UR: 1.01 (ref 1–1.03)
SPERM URNS QL MICRO: NORMAL /[HPF]
SQUAMOUS SPT QL MICRO: 2 /[HPF] (ref 0–5)
UNIDENT CRYS URNS QL MICRO: NORMAL /[HPF]
WBC #/AREA URNS HPF: 1 /[HPF] (ref 0–5)
YEAST SPEC QL WET PREP: NORMAL /[HPF]

## 2017-10-20 ENCOUNTER — TRANSCRIBE ORDERS (OUTPATIENT)
Dept: ADMINISTRATIVE | Facility: HOSPITAL | Age: 72
End: 2017-10-20

## 2017-10-20 DIAGNOSIS — N28.1 RENAL CYST: ICD-10-CM

## 2017-10-20 DIAGNOSIS — N18.9 CHRONIC KIDNEY INSUFFICIENCY, UNSPECIFIED STAGE: Primary | ICD-10-CM

## 2017-10-20 DIAGNOSIS — N28.89 KIDNEY MASS: ICD-10-CM

## 2017-10-26 NOTE — NURSING NOTE
Called [facility] ([phone]), spoke with Danica who will clarify with  Dasha regarding IVF type and rate for pt for DOS 10/30/17. She will fax order clarification to Radiology Triage once corrected on order.

## 2017-10-28 RX ORDER — SODIUM CHLORIDE 9 MG/ML
150 INJECTION, SOLUTION INTRAVENOUS CONTINUOUS
Status: ACTIVE | OUTPATIENT
Start: 2017-10-30 | End: 2017-10-30

## 2017-10-30 ENCOUNTER — HOSPITAL ENCOUNTER (OUTPATIENT)
Dept: RADIOLOGY | Facility: HOSPITAL | Age: 72
Discharge: HOME | End: 2017-10-30
Attending: UROLOGY | Admitting: UROLOGY

## 2017-10-30 ENCOUNTER — HOSPITAL ENCOUNTER (OUTPATIENT)
Dept: RADIOLOGY | Facility: HOSPITAL | Age: 72
Discharge: HOME | End: 2017-10-30
Attending: UROLOGY

## 2017-10-30 ENCOUNTER — HOSPITAL ENCOUNTER (OUTPATIENT)
Dept: CT IMAGING | Facility: HOSPITAL | Age: 72
Discharge: HOME | End: 2017-10-30
Attending: UROLOGY

## 2017-10-30 VITALS
OXYGEN SATURATION: 99 % | SYSTOLIC BLOOD PRESSURE: 193 MMHG | BODY MASS INDEX: 36.86 KG/M2 | HEIGHT: 63 IN | RESPIRATION RATE: 18 BRPM | DIASTOLIC BLOOD PRESSURE: 76 MMHG | TEMPERATURE: 97.8 F | WEIGHT: 208 LBS | HEART RATE: 61 BPM

## 2017-10-30 VITALS
SYSTOLIC BLOOD PRESSURE: 154 MMHG | DIASTOLIC BLOOD PRESSURE: 69 MMHG | HEART RATE: 73 BPM | OXYGEN SATURATION: 98 % | RESPIRATION RATE: 18 BRPM

## 2017-10-30 DIAGNOSIS — N28.89 KIDNEY MASS: ICD-10-CM

## 2017-10-30 DIAGNOSIS — N18.9 CHRONIC KIDNEY INSUFFICIENCY, UNSPECIFIED STAGE: ICD-10-CM

## 2017-10-30 DIAGNOSIS — N28.1 RENAL CYST: ICD-10-CM

## 2017-10-30 LAB — CREAT BLDA-MCNC: 2 MG/DL (ref 0.6–1.3)

## 2017-10-30 PROCEDURE — 0 IOPAMIDOL 61 % SOLUTION: Performed by: UROLOGY

## 2017-10-30 PROCEDURE — 82565 ASSAY OF CREATININE: CPT

## 2017-10-30 PROCEDURE — 96361 HYDRATE IV INFUSION ADD-ON: CPT

## 2017-10-30 PROCEDURE — 74178 CT ABD&PLV WO CNTR FLWD CNTR: CPT

## 2017-10-30 PROCEDURE — 96360 HYDRATION IV INFUSION INIT: CPT

## 2017-10-30 RX ORDER — SODIUM CHLORIDE 9 MG/ML
1000 INJECTION, SOLUTION INTRAVENOUS ONCE
Status: COMPLETED | OUTPATIENT
Start: 2017-10-30 | End: 2017-10-30

## 2017-10-30 RX ORDER — LEVOTHYROXINE SODIUM 0.12 MG/1
125 TABLET ORAL DAILY
COMMUNITY
End: 2019-08-28

## 2017-10-30 RX ADMIN — SODIUM CHLORIDE 150 ML/HR: 9 INJECTION, SOLUTION INTRAVENOUS at 16:01

## 2017-10-30 RX ADMIN — SODIUM CHLORIDE 1000 ML/HR: 9 INJECTION, SOLUTION INTRAVENOUS at 09:00

## 2017-10-30 RX ADMIN — IOPAMIDOL 85 ML: 612 INJECTION, SOLUTION INTRAVENOUS at 14:00

## 2017-11-03 ENCOUNTER — HOSPITAL ENCOUNTER (OUTPATIENT)
Dept: LAB | Facility: HOSPITAL | Age: 72
Discharge: HOME | End: 2017-11-03
Attending: INTERNAL MEDICINE | Admitting: INTERNAL MEDICINE

## 2017-11-03 LAB
ANION GAP SERPL CALC-SCNC: 14.9 MMOL/L (ref 10–20)
BUN SERPL-MCNC: 37 MG/DL (ref 8–20)
BUN/CREAT SERPL: 20.6 (ref 5.4–26.2)
CALCIUM SERPL-MCNC: 9.4 MG/DL (ref 8.9–10.3)
CHLORIDE SERPL-SCNC: 103 MMOL/L (ref 101–111)
CONV CO2: 24 MMOL/L (ref 22–32)
CREAT UR-MCNC: 1.8 MG/DL (ref 0.4–1)
GLUCOSE SERPL-MCNC: 228 MG/DL (ref 65–99)
POTASSIUM SERPL-SCNC: 4.9 MMOL/L (ref 3.6–5.1)
SODIUM SERPL-SCNC: 137 MMOL/L (ref 136–144)

## 2017-11-20 ENCOUNTER — HOSPITAL ENCOUNTER (OUTPATIENT)
Dept: URGENT CARE | Facility: CLINIC | Age: 72
Discharge: HOME | End: 2017-11-20
Attending: FAMILY MEDICINE | Admitting: FAMILY MEDICINE

## 2017-11-21 DIAGNOSIS — IMO0002 UNCONTROLLED TYPE 2 DIABETES MELLITUS WITH COMPLICATION, WITH LONG-TERM CURRENT USE OF INSULIN: ICD-10-CM

## 2017-11-21 RX ORDER — INSULIN DETEMIR 100 [IU]/ML
INJECTION, SOLUTION SUBCUTANEOUS
Qty: 15 ML | Refills: 0 | OUTPATIENT
Start: 2017-11-21

## 2017-12-11 ENCOUNTER — TELEPHONE (OUTPATIENT)
Dept: GASTROENTEROLOGY | Facility: CLINIC | Age: 72
End: 2017-12-11

## 2017-12-11 RX ORDER — METRONIDAZOLE 500 MG/1
500 TABLET ORAL 3 TIMES DAILY
Qty: 30 TABLET | Refills: 0 | Status: SHIPPED | OUTPATIENT
Start: 2017-12-11 | End: 2018-04-12 | Stop reason: HOSPADM

## 2017-12-11 NOTE — TELEPHONE ENCOUNTER
----- Message from Inga Santos sent at 12/11/2017 10:46 AM EST -----  Regarding: PT CALLED  Contact: [phone]  DIVERTICULITIS IS FLARING UP ON THE PT & SHE IS ASKING WHAT CAN GET CALLED IN FOR HER. SHE STATED THE FLAGYL HELPED HER & WAS SEEING IF SHE CAN GET A PRESCRIPTION CALLED IN.

## 2017-12-11 NOTE — TELEPHONE ENCOUNTER
I will send a 10 day course of flagyl to pharmacy - pls schedule f/u with me or np in 2 weeks - pt to call if not improving

## 2017-12-11 NOTE — TELEPHONE ENCOUNTER
Called pt and pt reports that on Thursday she developed pain on her left side 12/07.  She states this is the same type of pain that she has when she has diverticulitis and in the same location.  Pt states she last had diverticulitis flare a year ag. Pt denies a fever, chills , nausea or vomiting. Pt states her stools are normal for her. She states she spoke with Dr Farr this weekend and he recommended she go on a liquid diet and pt reports that she has done this and is has helped.  She is asking if she can get Flagyl for this epiosde.  Advised would send message to Dr Santizo, and in the meantime if symptoms worsen to seek medical attn. Pt verb understanding.

## 2017-12-11 NOTE — TELEPHONE ENCOUNTER
Called pt and advised per Dr Santizo that she has sent a 10 day course of flagyl to her pharmacy and to f/u with her or np.  Also advised to call if not improving.    Advised the pt would send message to manager for appt time. Pt verb understanding.

## 2017-12-13 NOTE — TELEPHONE ENCOUNTER
Called pt and pt is unable to make appt for tomorrow due to other appts.  Message sent to manager to see if we can find another appt.

## 2017-12-16 ENCOUNTER — HOSPITAL ENCOUNTER (OUTPATIENT)
Dept: LAB | Facility: HOSPITAL | Age: 72
Discharge: HOME | End: 2017-12-16
Attending: INTERNAL MEDICINE | Admitting: INTERNAL MEDICINE

## 2017-12-16 LAB
ANION GAP SERPL CALC-SCNC: 14.2 MMOL/L (ref 10–20)
BUN SERPL-MCNC: 30 MG/DL (ref 8–20)
BUN/CREAT SERPL: 15 (ref 5.4–26.2)
CALCIUM SERPL-MCNC: 9.7 MG/DL (ref 8.9–10.3)
CHLORIDE SERPL-SCNC: 104 MMOL/L (ref 101–111)
CONV CO2: 26 MMOL/L (ref 22–32)
CREAT UR-MCNC: 2 MG/DL (ref 0.4–1)
GLUCOSE SERPL-MCNC: 126 MG/DL (ref 65–99)
POTASSIUM SERPL-SCNC: 4.2 MMOL/L (ref 3.6–5.1)
SODIUM SERPL-SCNC: 140 MMOL/L (ref 136–144)

## 2017-12-28 ENCOUNTER — HOSPITAL ENCOUNTER (EMERGENCY)
Facility: HOSPITAL | Age: 72
Discharge: LEFT BEFORE DISCHARGE | End: 2017-12-28

## 2017-12-28 ENCOUNTER — APPOINTMENT (OUTPATIENT)
Dept: GENERAL RADIOLOGY | Facility: HOSPITAL | Age: 72
End: 2017-12-28

## 2017-12-28 VITALS
TEMPERATURE: 97.3 F | OXYGEN SATURATION: 95 % | DIASTOLIC BLOOD PRESSURE: 97 MMHG | RESPIRATION RATE: 18 BRPM | HEART RATE: 93 BPM | SYSTOLIC BLOOD PRESSURE: 182 MMHG | BODY MASS INDEX: 36.32 KG/M2 | HEIGHT: 63 IN | WEIGHT: 205 LBS

## 2017-12-28 PROCEDURE — 71020 HC CHEST PA AND LATERAL: CPT

## 2017-12-28 PROCEDURE — 99211 OFF/OP EST MAY X REQ PHY/QHP: CPT

## 2017-12-28 PROCEDURE — 93005 ELECTROCARDIOGRAM TRACING: CPT

## 2017-12-28 PROCEDURE — 93010 ELECTROCARDIOGRAM REPORT: CPT | Performed by: INTERNAL MEDICINE

## 2017-12-28 RX ORDER — SODIUM CHLORIDE 0.9 % (FLUSH) 0.9 %
10 SYRINGE (ML) INJECTION AS NEEDED
Status: DISCONTINUED | OUTPATIENT
Start: 2017-12-28 | End: 2017-12-28 | Stop reason: HOSPADM

## 2018-01-05 ENCOUNTER — HOSPITAL ENCOUNTER (EMERGENCY)
Facility: HOSPITAL | Age: 73
Discharge: HOME | End: 2018-01-05
Attending: FAMILY MEDICINE | Admitting: FAMILY MEDICINE

## 2018-01-05 ENCOUNTER — APPOINTMENT (OUTPATIENT)
Dept: GENERAL RADIOLOGY | Facility: HOSPITAL | Age: 73
End: 2018-01-05
Attending: FAMILY MEDICINE

## 2018-01-05 ENCOUNTER — OFFICE VISIT (OUTPATIENT)
Dept: GASTROENTEROLOGY | Facility: CLINIC | Age: 73
End: 2018-01-05

## 2018-01-05 VITALS
BODY MASS INDEX: 36.68 KG/M2 | DIASTOLIC BLOOD PRESSURE: 80 MMHG | HEIGHT: 63 IN | WEIGHT: 207 LBS | SYSTOLIC BLOOD PRESSURE: 126 MMHG

## 2018-01-05 VITALS
HEART RATE: 66 BPM | RESPIRATION RATE: 18 BRPM | TEMPERATURE: 96.7 F | WEIGHT: 210 LBS | DIASTOLIC BLOOD PRESSURE: 58 MMHG | BODY MASS INDEX: 37.21 KG/M2 | OXYGEN SATURATION: 100 % | HEIGHT: 63 IN | SYSTOLIC BLOOD PRESSURE: 128 MMHG

## 2018-01-05 DIAGNOSIS — R13.10 ODYNOPHAGIA: ICD-10-CM

## 2018-01-05 DIAGNOSIS — R13.10 DYSPHAGIA, UNSPECIFIED TYPE: Primary | ICD-10-CM

## 2018-01-05 DIAGNOSIS — R09.89 GLOBUS SENSATION: ICD-10-CM

## 2018-01-05 DIAGNOSIS — R07.89 ATYPICAL CHEST PAIN: Primary | ICD-10-CM

## 2018-01-05 PROCEDURE — 99214 OFFICE O/P EST MOD 30 MIN: CPT | Performed by: NURSE PRACTITIONER

## 2018-01-05 PROCEDURE — 99283 EMERGENCY DEPT VISIT LOW MDM: CPT

## 2018-01-05 PROCEDURE — 74220 X-RAY XM ESOPHAGUS 1CNTRST: CPT

## 2018-01-05 RX ORDER — LORAZEPAM 2 MG/ML
1 INJECTION INTRAMUSCULAR ONCE
Status: DISCONTINUED | OUTPATIENT
Start: 2018-01-05 | End: 2018-01-05

## 2018-01-05 NOTE — ED NOTES
Pt was seen and d/c from [facility] for same- she c/o a bolus in throat and has intermittent episodes of chest discomfort and inability to swallow. During this time, pt is able to speak and o2 saturation is %. Pt was due to have scope done today and instead came down to ED. MD Rodas notified of pt. Reassurance given; call light in reach. Pts breathing even and unlabored. Family at bedside. She appears to be anxious.       Jud Pina RN  01/05/18 1956

## 2018-01-05 NOTE — PROGRESS NOTES
Chief Complaint   Patient presents with   • Follow-up     been to the hospital 3 times since kris day          HPI    Pt is being seen today for dysphagia and chest pain. She is a patient of Dr. Paloma Mahajan.  She states for the past several days she has had difficulty swallowing and pain with swallowing. Persistent globus sensation x several days.  When she tries to swallow solids it feels like something is stuck in her chest and throat.  She has been able to consume liquids but has increased difficulty swallowing liquids today. She has been spitting excessively with increased saliva.  She reports a burning sensation from her throat to her sternum.  She is currently treated for GERD with Zantac 150mg daily.  Patient was seen at [place] emergency room yesterday.  A CT of the neck without contrast was performed  but does not comment on the esophagus.  Last EGD on 2017 with gastroplasty, nodular mucosa in gastric antrum and nodule in duodenum  Past Medical History:   Diagnosis Date   • Anxiety    • Clostridium difficile infection 2016    and in    • Diabetes mellitus    • Disease of thyroid gland    • Hypertension    • Migraine    • Pancreatitis    • Tachycardia    • TIA (transient ischemic attack)      Past Surgical History:   Procedure Laterality Date   • APPENDECTOMY     • CARDIAC CATHETERIZATION     •  SECTION     • CHOLECYSTECTOMY     • COLONOSCOPY      normal per pt, Dr. Reagan   • COLONOSCOPY N/A 3/1/2017    multiple polyps, tics, IH, scar in transverse colon   • ENDOSCOPY N/A 3/1/2017    gastroplasty, nodular mucosa in gastric antrum, nodule in duodenum   • GASTRIC BYPASS     • HERNIA REPAIR     • LUNG SURGERY      mass LL   • THYROID SURGERY         Current Outpatient Prescriptions   Medication Sig Dispense Refill   • Acetylcysteine (NAC) 600 MG capsule Take 1 tablet by mouth 2 (Two) Times a Day.     • ALPRAZolam (XANAX) 0.5 MG tablet Take 0.5 mg by mouth 3 (Three) Times a Day  As Needed.     • aspirin 81 MG EC tablet Take 81 mg by mouth Daily.     • atorvastatin (LIPITOR) 10 MG tablet TAKE ONE TABLET BY MOUTH ONCE DAILY     • Calcium Citrate-Vitamin D (CALCIUM + D PO) Take 2 tablets by mouth Daily.     • carvedilol (COREG) 6.25 MG tablet TAKE ONE TABLET BY MOUTH TWICE DAILY     • Cholecalciferol (VITAMIN D) 1000 UNITS tablet Take 1,000 Units by mouth 2 (Two) Times a Day.     • folic acid (FOLVITE) 1 MG tablet TAKE ONE TABLET BY MOUTH ONCE DAILY     • insulin aspart (NOVOLOG FLEXPEN) 100 UNIT/ML solution pen-injector sc pen 6 units before each meal with sliding scale with a max of 100 units daily (Patient taking differently: 7 units before each meal with sliding scale with a max of 100 units daily per patient) 5 pen 4   • Insulin Pen Needle 32G X 4 MM misc Use to inject insulin 4 times daily 120 each 5   • LEVEMIR FLEXPEN 100 UNIT/ML injection 30 units in the AM titrate up as instructed with a max of 100 units daily 5 pen 4   • levothyroxine (SYNTHROID, LEVOTHROID) 125 MCG tablet Take 125 mcg by mouth Daily.     • losartan (COZAAR) 100 MG tablet Take 100 mg by mouth Daily.     • lubiprostone (AMITIZA) 24 MCG capsule Take 1 capsule by mouth 2 (Two) Times a Day With Meals. 60 capsule 2   • metroNIDAZOLE (FLAGYL) 500 MG tablet Take 1 tablet by mouth 3 (Three) Times a Day. 30 tablet 0   • predniSONE (DELTASONE) 5 MG tablet Take 5 mg by mouth Daily.       No current facility-administered medications for this visit.        PRN Meds:.    Allergies   Allergen Reactions   • Augmentin [Amoxicillin-Pot Clavulanate] Rash     Rash in mouth  Rash in mouth   • Detrol [Tolterodine Tartrate]      Pt. Does not recall this allergy.   • Levaquin [Levofloxacin] Hives   • Sulfa Antibiotics      Sister went blind with taking and was advised not to take.  Sister went blind with taking and was advised not to take.   • Tetracycline Hives and Itching   • Tetracyclines & Related        Social History     Social  "History   • Marital status:      Spouse name: N/A   • Number of children: N/A   • Years of education: N/A     Occupational History   • Not on file.     Social History Main Topics   • Smoking status: Never Smoker   • Smokeless tobacco: Never Used   • Alcohol use No   • Drug use: No   • Sexual activity: Defer     Other Topics Concern   • Not on file     Social History Narrative       Family History   Problem Relation Age of Onset   • Pancreatitis Brother    • Ulcerative colitis Son    • Crohn's disease Grandchild        Review of Systems   Constitutional: Negative for appetite change and unexpected weight change.   HENT: Positive for trouble swallowing.    Respiratory: Positive for chest tightness. Negative for choking and shortness of breath.    Cardiovascular: Negative for chest pain.   Gastrointestinal: Positive for nausea. Negative for abdominal distention, abdominal pain, blood in stool, constipation, diarrhea and vomiting.   Musculoskeletal: Negative for back pain.   Skin: Negative for color change.   Neurological: Negative for dizziness.   Hematological: Does not bruise/bleed easily.   Psychiatric/Behavioral: Negative.        Vitals:    01/05/18 1435   BP: 126/80     /80  Ht 160 cm (63\")  Wt 93.9 kg (207 lb)  BMI 36.67 kg/m2  Physical Exam   Constitutional: She is oriented to person, place, and time. She appears well-developed and well-nourished. She appears distressed.   Pt gagging during exam and spitting out saliva. Tearful and distressed.    HENT:   Head: Normocephalic and atraumatic.   Eyes: Pupils are equal, round, and reactive to light.   Cardiovascular: Normal rate, regular rhythm and normal heart sounds.    Pulmonary/Chest: Effort normal and breath sounds normal.   Abdominal: Soft. Bowel sounds are normal. She exhibits no shifting dullness, no distension, no pulsatile liver, no fluid wave, no abdominal bruit, no ascites, no pulsatile midline mass and no mass. There is no " hepatosplenomegaly. There is tenderness in the epigastric area. There is no rigidity and no guarding. No hernia.   Musculoskeletal: Normal range of motion.   Neurological: She is alert and oriented to person, place, and time.   Skin: Skin is warm and dry.   Psychiatric: She has a normal mood and affect. Her behavior is normal. Thought content normal.   Nursing note and vitals reviewed.      ASSESSMENT AND PLAN    Ya was seen today for follow-up.    Diagnoses and all orders for this visit:    Dysphagia, unspecified type    Odynophagia    Globus sensation    =  I have discussed the patient's symptoms and presentation with Dr. Colleen Garces who is present in the office today.  An esophagram would not be available for 1-2 weeks as an outpatient. There is concern for partial esophageal obstruction from a foreign body, however, patient reports being able to swallow liquids.  We both highly advise that she go directly to the emergency room for further imaging.      For any additional questions, concerns or changes to your condition after today's office visit please contact the office at [phone].      Cassie BARCLAY   [facility], [facility]  [address] [place] [address]  Office: [phone]

## 2018-01-05 NOTE — ED PROVIDER NOTES
EMERGENCY DEPARTMENT ENCOUNTER    CHIEF COMPLAINT  Chief Complaint: chest pain  History given by: patient, family  History limited by: N/A  Room Number: 42/42  PMD: Nicki Hidalgo MD   Gastroenterologist- Dr Santizo       HPI:  Pt states that since 12/25/17, she has mostly constant central chest pain. She describes the pain as burning and foreign body sensation from her throat to her chest. It is exacerbated by swallowing. She has also had painful swallowing and difficulty swallowing. When she swallows solids, it feels like something is stuck in her throat and chest. She has been able to tolerate PO intake of fluids but has had increased pain with swallowing fluids today. She denies trouble breathing, weakness, dizziness, sweating, N/V/D, abd pain, fevers, chills, and pain and difficulty with urination. Pt states that she was seen at [place] ED for current sx and a rash yesterday and underwent CT soft tissue neck that was unremarkable. Per family, pt followed up with APRN at Dr Santizo's office today and was referred to ED for further evaluation with an esophagram. Pt notes that she has hx of GERD for which she is on zantac (has taken with temporary relief of current sx) and seizures (secondary to stress and anxiety). Pt has no other complaints at this time.     Location: central chest  Radiation: throat  Quality: burning, foreign body sensation  Intensity/Severity: moderate  Duration: started on 12/25/17  Onset quality: gradual  Timing: intermittent  Progression: worsening  Aggravating Factors: swallowing  Alleviating Factors: temporary relief with zantac  Previous Episodes: none  Treatment before arrival: Pt states that she was seen at [place] ED for current sx yesterday and underwent CT soft tissue neck that was unremarkable. Per family, pt followed up with APRN at Dr Santizo's office today and was referred to ED for further evaluation with esophagram.   Associated Symptoms: painful swallowing, trouble  swallowing      PAST MEDICAL HISTORY  Active Ambulatory Problems     Diagnosis Date Noted   • Diverticulitis of large intestine without perforation or abscess without bleeding 11/10/2016   • ARF (acute renal failure) 2016   • CKD (chronic kidney disease) stage 3, GFR 30-59 ml/min 2016   • Proteinuria 2016   • Diverticulitis 2016   • Hypervitaminosis D 2017   • Postsurgical hypothyroidism 2017   • Chronic fatigue 2017   • Diabetes mellitus type 2, uncontrolled, with complications 2017     Resolved Ambulatory Problems     Diagnosis Date Noted   • No Resolved Ambulatory Problems     Past Medical History:   Diagnosis Date   • Anxiety    • Clostridium difficile infection 2016   • Diabetes mellitus    • Disease of thyroid gland    • Hypertension    • Migraine    • Pancreatitis    • Tachycardia    • TIA (transient ischemic attack)          PAST SURGICAL HISTORY  Past Surgical History:   Procedure Laterality Date   • APPENDECTOMY     • CARDIAC CATHETERIZATION     •  SECTION     • CHOLECYSTECTOMY     • COLONOSCOPY      normal per pt, Dr. Reagan   • COLONOSCOPY N/A 3/1/2017    multiple polyps, tics, IH, scar in transverse colon   • ENDOSCOPY N/A 3/1/2017    gastroplasty, nodular mucosa in gastric antrum, nodule in duodenum   • GASTRIC BYPASS     • HERNIA REPAIR     • LUNG SURGERY      mass LL   • THYROID SURGERY           FAMILY HISTORY  Family History   Problem Relation Age of Onset   • Pancreatitis Brother    • Ulcerative colitis Son    • Crohn's disease Grandchild          SOCIAL HISTORY  Social History     Social History   • Marital status:      Spouse name: N/A   • Number of children: N/A   • Years of education: N/A     Occupational History   • Not on file.     Social History Main Topics   • Smoking status: Never Smoker   • Smokeless tobacco: Never Used   • Alcohol use No   • Drug use: No   • Sexual activity: Defer     Other Topics Concern   • Not on  file     Social History Narrative         ALLERGIES  Augmentin [amoxicillin-pot clavulanate]; Detrol [tolterodine tartrate]; Levaquin [levofloxacin]; Sulfa antibiotics; Tetracycline; and Tetracyclines & related        REVIEW OF SYSTEMS  Review of Systems   Constitutional: Negative for chills and fever.   HENT: Positive for sore throat (burning sensation in throat) and trouble swallowing. Negative for congestion and rhinorrhea.         Painful swallowing   Eyes: Negative for pain.   Respiratory: Negative for cough and shortness of breath.    Cardiovascular: Positive for chest pain. Negative for palpitations.   Gastrointestinal: Negative for abdominal pain, diarrhea, nausea and vomiting.   Endocrine: Negative.    Genitourinary: Negative for difficulty urinating.   Musculoskeletal: Negative for myalgias.   Skin: Negative.    Neurological: Negative for speech difficulty, weakness, numbness and headaches.   Psychiatric/Behavioral: Negative.    All other systems reviewed and are negative.           PHYSICAL EXAM  ED Triage Vitals   Temp Heart Rate Resp BP SpO2   01/05/18 1621 01/05/18 1514 01/05/18 1621 01/05/18 1621 01/05/18 1514   98.9 °F (37.2 °C) 72 20 96/63 98 % WNL      Temp src Heart Rate Source Patient Position BP Location FiO2 (%)   01/05/18 1817 01/05/18 1817 01/05/18 1621 -- --   Tympanic Monitor Sitting         Physical Exam   Constitutional: She is oriented to person, place, and time. No distress.   HENT:   Head: Normocephalic.   Mouth/Throat: Mucous membranes are normal.   Tolerating secretions without difficulty, no drooling, speaks in full sentences   Eyes: EOM are normal. Pupils are equal, round, and reactive to light.   Neck: Normal range of motion. Neck supple.   Cardiovascular: Normal rate, regular rhythm and normal heart sounds.    Pulmonary/Chest: Effort normal and breath sounds normal. No stridor. No respiratory distress. She has no decreased breath sounds. She has no wheezes. She has no rhonchi.  She has no rales. She exhibits no tenderness.   No airway obstruction   Abdominal: Soft. There is no tenderness. There is no rebound and no guarding.   Musculoskeletal: Normal range of motion.   Neurological: She is alert and oriented to person, place, and time. She has normal sensation.   Skin: Skin is warm and dry.   Psychiatric: Her mood appears anxious.   Nursing note and vitals reviewed.          RADIOLOGY  FL Esophagram Complete (independently viewed by me, interpreted by radiologist)     Final Result       No foreign body or obstructive process of the esophagus is identified.   With persistent clinical indication, endoscopy could be considered for   further evaluation.       This report was finalized on 1/5/2018 5:43 PM by Dr. Eliel Josue MD.               Ordered the above noted radiological studies. Reviewed by me in PACS.       PROCEDURES  Procedures      PROGRESS AND CONSULTS  ED Course     1621- Ordered esophagram for further evaluation.     1800- Rechecked pt. She is resting comfortably and is in no acute distress. She states that her sx have resolved after esophagram. Discussed with pt and family about esophagram findings (no foreign body or obstructive process of the esophagus). Informed pt of dx and plan for discharge. Instructed to f/u closely with gastroenterologist for recheck. RTER warnings given. Pt understands and agrees with plan. Addressed all questions.    1802- Sent call out to Dr Santizo (GI) for consult.     1810- Discussed case with Dr Santizo (GI) and informed her of pt's normal esophagram.       MEDICAL DECISION MAKING  Results were reviewed/discussed with the patient and family.     MDM  Number of Diagnoses or Management Options  Atypical chest pain:      Amount and/or Complexity of Data Reviewed  Tests in the radiology section of CPT®: reviewed and ordered (Esophagram- No foreign body or obstructive process of the esophagus is identified.)  Decide to obtain previous medical  records or to obtain history from someone other than the patient: yes  Review and summarize past medical records: yes (Reviewed [place] ED visit from yesterday-> Pt was seen for throat pain. At the time, BUN was 34, creatinine was 1.8, albumin was 0.9, and glucose was 56. Troponin was negative. EKG findings were stable. CT soft tissue neck showed no acute abnormality.     EGD from 3/2017- normal esophagus, stenosed vertical banded gastroplasty, nodular mucosa in the gastric antrum, mucosal nodule found in the duodenum.)  Discuss the patient with other providers: yes (Dr Santizo (GI))  Independent visualization of images, tracings, or specimens: yes    Patient Progress  Patient progress: stable             DIAGNOSIS  Final diagnoses:   Atypical chest pain         DISPOSITION  Discharged    DISCHARGE    Patient discharged in stable condition.    Reviewed implications of results, diagnosis, responsibility to follow up, warning signs and symptoms of possible worsening, potential complications and reasons to return to ER.    Patient/Family voiced understanding of above instructions.    Discussed plan for discharge, as there is no emergent indication for admission.  Pt/family is agreeable and understands need for follow up and repeat testing.  Pt is aware that discharge does not mean that nothing is wrong but it indicates no emergency is present and they must continue care with follow-up as given below or physician of their choice.     FOLLOW-UP  Nicki Hidalgo MD  [address] [place] [address] [address]  [place] [address]  [phone]      As needed        Latest Documented Vital Signs:  As of 6:25 PM  BP- 128/58 HR- 66 Temp- 96.7 °F (35.9 °C) (Tympanic) O2 sat- 100%      --  Documentation assistance provided by leah Bailey for Dr Rodas.  Information recorded by the scribe was done at my direction and has been verified and validated by me.     Mariella Bailey  01/05/18 8314       Max Rodas MD  01/05/18  1849

## 2018-01-16 ENCOUNTER — TELEPHONE (OUTPATIENT)
Dept: GASTROENTEROLOGY | Facility: CLINIC | Age: 73
End: 2018-01-16

## 2018-01-16 NOTE — TELEPHONE ENCOUNTER
----- Message from Melissa Alexander sent at 1/16/2018 12:20 PM EST -----  Regarding: PT CALLED - BURNING IN ESOPHAGUS  Contact: [phone]  PT SAW LIAM LAST WK. SHE SENT HER TO THE ER. THEY ADVISED PT TO HAVE SCOPE. PT STATED ALWAYS FEELS AS IF SOMETHING IS IN THERE THROAT. CAN SHE STEPHANIE PROCEDURE OR WOULD DR KOEHLER WANT TO SEE HER 1ST?

## 2018-01-17 DIAGNOSIS — R12 HEARTBURN: Primary | ICD-10-CM

## 2018-01-17 DIAGNOSIS — R13.10 DYSPHAGIA, UNSPECIFIED TYPE: ICD-10-CM

## 2018-01-17 RX ORDER — SUCRALFATE ORAL 1 G/10ML
1 SUSPENSION ORAL
Qty: 840 ML | Refills: 5 | Status: SHIPPED | OUTPATIENT
Start: 2018-01-17 | End: 2018-10-02

## 2018-01-17 RX ORDER — RANITIDINE 300 MG/1
300 TABLET ORAL NIGHTLY
Qty: 60 TABLET | Refills: 11 | Status: SHIPPED | OUTPATIENT
Start: 2018-01-17 | End: 2018-03-27 | Stop reason: SDUPTHER

## 2018-01-17 NOTE — TELEPHONE ENCOUNTER
I prescribe the patient Zantac 300 mg twice a day and Carafate 4 times a day. (hesitiant to prescribe PPI d/t comorbidities)  She did have an upper scope in 3/2017 but due to the severity and persistence of her symptoms despite Zantac 150mg daily, I have placed order for EGD with Dr. Santizo. If she continues to have symptoms despite above medication change please schedule EGD.

## 2018-01-18 NOTE — TELEPHONE ENCOUNTER
Call from pt.  Advise per DENY Sepulveda, that Zantac 300 mg twice a day and Carafate 4x/day has been prescribed.  Did have EGD 3/2017, but due to severity and persistence of symptoms despite Zantac 150 mg daily, order has been placed for EGD with DR Santizo.  If continues to have symptoms despites above med change, please schedule EGD.  Pt verb understanding.

## 2018-01-19 ENCOUNTER — TELEPHONE (OUTPATIENT)
Dept: GASTROENTEROLOGY | Facility: CLINIC | Age: 73
End: 2018-01-19

## 2018-01-19 PROBLEM — R13.10 DYSPHAGIA: Status: ACTIVE | Noted: 2018-01-19

## 2018-01-19 PROBLEM — R12 HEARTBURN: Status: ACTIVE | Noted: 2018-01-19

## 2018-01-19 NOTE — TELEPHONE ENCOUNTER
Pt called and states that the carafate that CARLOS Perez NP prescribed is coming up flagged and someone with kidney disease should not take.  Advised pt would send message to Cassie ROPER and in the meantime to call her kidney MD - Dr Cotter and ask if she can take carafate. Pt verb understanding.

## 2018-02-14 ENCOUNTER — ANESTHESIA EVENT (OUTPATIENT)
Dept: GASTROENTEROLOGY | Facility: HOSPITAL | Age: 73
End: 2018-02-14

## 2018-02-14 ENCOUNTER — HOSPITAL ENCOUNTER (OUTPATIENT)
Facility: HOSPITAL | Age: 73
Setting detail: HOSPITAL OUTPATIENT SURGERY
Discharge: HOME | End: 2018-02-14
Attending: INTERNAL MEDICINE | Admitting: INTERNAL MEDICINE

## 2018-02-14 ENCOUNTER — ANESTHESIA (OUTPATIENT)
Dept: GASTROENTEROLOGY | Facility: HOSPITAL | Age: 73
End: 2018-02-14

## 2018-02-14 VITALS
WEIGHT: 214.6 LBS | SYSTOLIC BLOOD PRESSURE: 158 MMHG | BODY MASS INDEX: 38.01 KG/M2 | OXYGEN SATURATION: 97 % | TEMPERATURE: 98.6 F | DIASTOLIC BLOOD PRESSURE: 60 MMHG | RESPIRATION RATE: 16 BRPM | HEART RATE: 70 BPM

## 2018-02-14 DIAGNOSIS — R12 HEARTBURN: Primary | ICD-10-CM

## 2018-02-14 LAB
GLUCOSE BLDC GLUCOMTR-MCNC: 68 MG/DL (ref 70–130)
GLUCOSE BLDC GLUCOMTR-MCNC: 81 MG/DL (ref 70–130)
GLUCOSE BLDC GLUCOMTR-MCNC: 89 MG/DL (ref 70–130)

## 2018-02-14 PROCEDURE — 43235 EGD DIAGNOSTIC BRUSH WASH: CPT | Performed by: INTERNAL MEDICINE

## 2018-02-14 PROCEDURE — 82962 GLUCOSE BLOOD TEST: CPT

## 2018-02-14 PROCEDURE — 25010000002 PROPOFOL 10 MG/ML EMULSION: Performed by: ANESTHESIOLOGY

## 2018-02-14 PROCEDURE — S0260 H&P FOR SURGERY: HCPCS | Performed by: INTERNAL MEDICINE

## 2018-02-14 RX ORDER — LISINOPRIL 10 MG/1
20 TABLET ORAL DAILY
COMMUNITY
End: 2019-03-10 | Stop reason: HOSPADM

## 2018-02-14 RX ORDER — PROPOFOL 10 MG/ML
VIAL (ML) INTRAVENOUS AS NEEDED
Status: DISCONTINUED | OUTPATIENT
Start: 2018-02-14 | End: 2018-02-14 | Stop reason: SURG

## 2018-02-14 RX ORDER — SODIUM CHLORIDE 0.9 % (FLUSH) 0.9 %
3 SYRINGE (ML) INJECTION AS NEEDED
Status: DISCONTINUED | OUTPATIENT
Start: 2018-02-14 | End: 2018-02-14 | Stop reason: HOSPADM

## 2018-02-14 RX ORDER — SODIUM CHLORIDE, SODIUM LACTATE, POTASSIUM CHLORIDE, CALCIUM CHLORIDE 600; 310; 30; 20 MG/100ML; MG/100ML; MG/100ML; MG/100ML
1000 INJECTION, SOLUTION INTRAVENOUS CONTINUOUS PRN
Status: DISCONTINUED | OUTPATIENT
Start: 2018-02-14 | End: 2018-02-14 | Stop reason: HOSPADM

## 2018-02-14 RX ORDER — HYDROCHLOROTHIAZIDE 25 MG/1
50 TABLET ORAL 3 TIMES DAILY
Status: ON HOLD | COMMUNITY
End: 2018-04-09

## 2018-02-14 RX ORDER — LIDOCAINE HYDROCHLORIDE 20 MG/ML
INJECTION, SOLUTION INFILTRATION; PERINEURAL AS NEEDED
Status: DISCONTINUED | OUTPATIENT
Start: 2018-02-14 | End: 2018-02-14 | Stop reason: SURG

## 2018-02-14 RX ADMIN — PROPOFOL 70 MG: 10 INJECTION, EMULSION INTRAVENOUS at 08:15

## 2018-02-14 RX ADMIN — SODIUM CHLORIDE, POTASSIUM CHLORIDE, SODIUM LACTATE AND CALCIUM CHLORIDE: 600; 310; 30; 20 INJECTION, SOLUTION INTRAVENOUS at 08:10

## 2018-02-14 RX ADMIN — LIDOCAINE HYDROCHLORIDE 60 MG: 20 INJECTION, SOLUTION INFILTRATION; PERINEURAL at 08:10

## 2018-02-14 RX ADMIN — PROPOFOL 100 MG: 10 INJECTION, EMULSION INTRAVENOUS at 08:10

## 2018-02-14 RX ADMIN — SODIUM CHLORIDE, POTASSIUM CHLORIDE, SODIUM LACTATE AND CALCIUM CHLORIDE 1000 ML: 600; 310; 30; 20 INJECTION, SOLUTION INTRAVENOUS at 08:00

## 2018-02-14 NOTE — PLAN OF CARE
Problem: Patient Care Overview (Adult)  Goal: Plan of Care Review  Outcome: Ongoing (interventions implemented as appropriate)   02/14/18 0658   Coping/Psychosocial Response Interventions   Plan Of Care Reviewed With patient   Patient Care Overview   Progress progress toward functional goals as expected     Goal: Adult Individualization and Mutuality  Outcome: Ongoing (interventions implemented as appropriate)   02/14/18 0658   Individualization   Patient Specific Preferences none identified     Goal: Discharge Needs Assessment  Outcome: Ongoing (interventions implemented as appropriate)   02/14/18 0658   Discharge Needs Assessment   Concerns To Be Addressed no discharge needs identified   Discharge Disposition home or self-care   Living Environment   Transportation Available car;family or friend will provide       Problem: GI Endoscopy (Adult)  Goal: Signs and Symptoms of Listed Potential Problems Will be Absent or Manageable (GI Endoscopy)  Outcome: Ongoing (interventions implemented as appropriate)   02/14/18 0658   GI Endoscopy   Problems Assessed (GI Endoscopy) all

## 2018-02-14 NOTE — ANESTHESIA PREPROCEDURE EVALUATION
Anesthesia Evaluation     Patient summary reviewed and Nursing notes reviewed   NPO Solid Status: > 8 hours  NPO Liquid Status: > 6 hours           Airway   Mallampati: I  TM distance: <3 FB  Neck ROM: full  no difficulty expected  Dental - normal exam     Pulmonary - negative pulmonary ROS and normal exam   Cardiovascular - normal exam  Exercise tolerance: good (4-7 METS)    (+) hypertension,       Neuro/Psych  (+) TIA, headaches, psychiatric history Anxiety,     GI/Hepatic/Renal/Endo    (+)  diabetes mellitus, hypothyroidism,     Musculoskeletal (-) negative ROS    Abdominal  - normal exam    Bowel sounds: normal.   Substance History - negative use     OB/GYN negative ob/gyn ROS         Other                      Anesthesia Plan    ASA 3     MAC     Anesthetic plan and risks discussed with patient.

## 2018-02-14 NOTE — H&P
[facility]  Pre Procedure History & Physical    Chief Complaint:   Heartburn, dysphagia    Subjective     HPI:   Pt is being seen today for dysphagia and chest pain. She is a patient of Dr. Paloma Mahajan.  She states for the past several days she has had difficulty swallowing and pain with swallowing. Persistent globus sensation x several days.  When she tries to swallow solids it feels like something is stuck in her chest and throat.  She has been able to consume liquids but has increased difficulty swallowing liquids today. She has been spitting excessively with increased saliva.  She reports a burning sensation from her throat to her sternum.  She is currently treated for GERD with Zantac 150mg daily.  Patient was seen at [place] emergency room yesterday.  A CT of the neck without contrast was performed  but does not comment on the esophagus.  Last EGD on 2017 with gastroplasty, nodular mucosa in gastric antrum and nodule in duodenum    Past Medical History:   Past Medical History:   Diagnosis Date   • Anxiety    • Clostridium difficile infection 2016    and in    • Diabetes mellitus    • Disease of thyroid gland    • Hypertension    • Migraine    • Pancreatitis    • Tachycardia    • TIA (transient ischemic attack)        Past Surgical History:  Past Surgical History:   Procedure Laterality Date   • APPENDECTOMY     • CARDIAC CATHETERIZATION     •  SECTION     • CHOLECYSTECTOMY     • COLONOSCOPY      normal per pt, Dr. Reagan   • COLONOSCOPY N/A 3/1/2017    multiple polyps, tics, IH, scar in transverse colon   • ENDOSCOPY N/A 3/1/2017    gastroplasty, nodular mucosa in gastric antrum, nodule in duodenum   • GASTRIC BYPASS     • HERNIA REPAIR     • LUNG SURGERY      mass LL   • THYROID SURGERY         Family History:  Family History   Problem Relation Age of Onset   • Pancreatitis Brother    • Ulcerative colitis Son    • Crohn's disease Grandchild        Social  History:   reports that she has never smoked. She has never used smokeless tobacco. She reports that she does not drink alcohol or use illicit drugs.    Medications:   Prescriptions Prior to Admission   Medication Sig Dispense Refill Last Dose   • atorvastatin (LIPITOR) 10 MG tablet TAKE ONE TABLET BY MOUTH ONCE DAILY   2/13/2018 at Unknown time   • carvedilol (COREG) 6.25 MG tablet TAKE ONE TABLET BY MOUTH TWICE DAILY   2/14/2018 at Unknown time   • hydrochlorothiazide (HYDRODIURIL) 25 MG tablet Take 25 mg by mouth Daily.   2/14/2018 at Unknown time   • insulin aspart (NOVOLOG FLEXPEN) 100 UNIT/ML solution pen-injector sc pen 6 units before each meal with sliding scale with a max of 100 units daily (Patient taking differently: 7 units before each meal with sliding scale with a max of 100 units daily per patient) 5 pen 4 2/13/2018 at Unknown time   • Insulin Glargine (TOUJEO SOLOSTAR) 300 UNIT/ML solution pen-injector Inject 18 Units under the skin Daily. Adjust dose according to accucheck   2/13/2018 at Unknown time   • Insulin Pen Needle 32G X 4 MM misc Use to inject insulin 4 times daily 120 each 5 2/13/2018 at Unknown time   • levothyroxine (SYNTHROID, LEVOTHROID) 125 MCG tablet Take 125 mcg by mouth Daily.   2/13/2018 at Unknown time   • lisinopril (PRINIVIL,ZESTRIL) 10 MG tablet Take 10 mg by mouth Daily.   2/13/2018 at Unknown time   • predniSONE (DELTASONE) 5 MG tablet Take 5 mg by mouth Daily.   2/13/2018 at Unknown time   • raNITIdine (ZANTAC) 300 MG tablet Take 1 tablet by mouth Every Night. 60 tablet 11 2/12/2018 at Unknown time   • sucralfate (CARAFATE) 1 GM/10ML suspension Take 10 mL by mouth 4 (Four) Times a Day With Meals & at Bedtime. 840 mL 5 2/13/2018 at j   • Acetylcysteine (NAC) 600 MG capsule Take 1 tablet by mouth 2 (Two) Times a Day.   Taking   • ALPRAZolam (XANAX) 0.5 MG tablet Take 0.5 mg by mouth 3 (Three) Times a Day As Needed.   2/7/2018   • aspirin 81 MG EC tablet Take 81 mg by mouth  Daily.   2/5/2018   • Calcium Citrate-Vitamin D (CALCIUM + D PO) Take 2 tablets by mouth Daily.   More than a month at Unknown time   • Cholecalciferol (VITAMIN D) 1000 UNITS tablet Take 1,000 Units by mouth 2 (Two) Times a Day.   More than a month at Unknown time   • folic acid (FOLVITE) 1 MG tablet TAKE ONE TABLET BY MOUTH ONCE DAILY   More than a month at Unknown time   • LEVEMIR FLEXPEN 100 UNIT/ML injection 30 units in the AM titrate up as instructed with a max of 100 units daily 5 pen 4 Taking   • losartan (COZAAR) 100 MG tablet Take 100 mg by mouth Daily.   Unknown at Unknown time   • lubiprostone (AMITIZA) 24 MCG capsule Take 1 capsule by mouth 2 (Two) Times a Day With Meals. 60 capsule 2 Unknown at Unknown time   • metroNIDAZOLE (FLAGYL) 500 MG tablet Take 1 tablet by mouth 3 (Three) Times a Day. 30 tablet 0 Taking       Allergies:  Augmentin [amoxicillin-pot clavulanate]; Dapsone; Detrol [tolterodine tartrate]; Levaquin [levofloxacin]; Sulfa antibiotics; Tetracycline; and Tetracyclines & related    ROS:    Pertinent items are noted in HPI, all other systems reviewed and negative     Objective     Blood pressure (!) 187/79, pulse 67, temperature 97.6 °F (36.4 °C), temperature source Oral, resp. rate 18, weight 97.3 kg (214 lb 9.6 oz), SpO2 99 %.    Physical Exam   Constitutional: Pt is oriented to person, place, and time and well-developed, well-nourished, and in no distress.   Mouth/Throat: Oropharynx is clear and moist.   Neck: Normal range of motion.   Cardiovascular: Normal rate, regular rhythm   Pulmonary/Chest: Effort normal   Abdominal: Soft. Nontender  Skin: Skin is warm and dry.   Psychiatric: Mood, memory, affect and judgment normal.     Assessment/Plan     Diagnosis:  Heartburn, dysphagia    Anticipated Surgical Procedure:  egd    The risks, benefits, and alternatives of this procedure have been discussed with the patient or the responsible party- the patient understands and agrees to  proceed.

## 2018-02-14 NOTE — ANESTHESIA POSTPROCEDURE EVALUATION
Patient: Ya Paz    Procedure Summary     Date Anesthesia Start Anesthesia Stop Room / Location    02/14/18 0809 0832  [facility] /  [facility]       Procedure Diagnosis Surgeon Provider    ESOPHAGOGASTRODUODENOSCOPY (N/A Esophagus) Heartburn; Dysphagia, unspecified type  (Heartburn [R12]; Dysphagia, unspecified type [R13.10]) MD Desmond Martinze MD          Anesthesia Type: MAC  Last vitals  BP   158/60 (02/14/18 0854)   Temp   37 °C (98.6 °F) (02/14/18 0848)   Pulse   70 (02/14/18 0854)   Resp   16 (02/14/18 0854)     SpO2   97 % (02/14/18 0854)     Post Anesthesia Care and Evaluation    Patient location during evaluation: PHASE II  Patient participation: complete - patient participated  Level of consciousness: awake and alert  Pain management: adequate  Airway patency: patent  Anesthetic complications: No anesthetic complications    Cardiovascular status: acceptable  Respiratory status: acceptable  Hydration status: acceptable    Comments: /60 (BP Location: Left arm, Patient Position: Lying)  Pulse 70  Temp 37 °C (98.6 °F) (Oral)   Resp 16  Wt 97.3 kg (214 lb 9.6 oz)  SpO2 97%  BMI 38.01 kg/m2

## 2018-03-22 ENCOUNTER — HOSPITAL ENCOUNTER (OUTPATIENT)
Dept: GENERAL RADIOLOGY | Facility: HOSPITAL | Age: 73
Discharge: HOME | End: 2018-03-22
Attending: INTERNAL MEDICINE | Admitting: INTERNAL MEDICINE

## 2018-03-22 DIAGNOSIS — R12 HEARTBURN: ICD-10-CM

## 2018-03-22 PROCEDURE — 74241: CPT

## 2018-03-22 PROCEDURE — 63710000001 BARIUM SULFATE 96 % RECONSTITUTED SUSPENSION: Performed by: INTERNAL MEDICINE

## 2018-03-22 PROCEDURE — A9270 NON-COVERED ITEM OR SERVICE: HCPCS | Performed by: INTERNAL MEDICINE

## 2018-03-22 RX ADMIN — BARIUM SULFATE 183 ML: 960 POWDER, FOR SUSPENSION ORAL at 10:20

## 2018-03-26 ENCOUNTER — TELEPHONE (OUTPATIENT)
Dept: GASTROENTEROLOGY | Facility: CLINIC | Age: 73
End: 2018-03-26

## 2018-03-26 NOTE — TELEPHONE ENCOUNTER
Discussed the results of the recent upper GI study with the patient.  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  She is no longer draining preferentially from the surgically created opening as this is stenosed.  This reflects the finding seen on her recent EGD.  This may be contributing to her worsening refer proximal symptoms.  However she is managing this fairly well with Zantac.  Have encouraged her to continue to take Zantac but to move it up to prior to her evening meal into continue Carafate at night.  We will bring her back for office follow-up if she is not improving we may refer her back to bariatric surgery to see if she is a candidate for revision.

## 2018-03-28 RX ORDER — RANITIDINE 300 MG/1
300 TABLET ORAL NIGHTLY
Qty: 90 TABLET | Refills: 3 | Status: SHIPPED | OUTPATIENT
Start: 2018-03-28 | End: 2018-06-06

## 2018-04-08 ENCOUNTER — APPOINTMENT (OUTPATIENT)
Dept: GENERAL RADIOLOGY | Facility: HOSPITAL | Age: 73
End: 2018-04-08

## 2018-04-08 ENCOUNTER — HOSPITAL ENCOUNTER (INPATIENT)
Facility: HOSPITAL | Age: 73
LOS: 4 days | Discharge: HOME | End: 2018-04-12
Attending: EMERGENCY MEDICINE | Admitting: INTERNAL MEDICINE

## 2018-04-08 ENCOUNTER — APPOINTMENT (OUTPATIENT)
Dept: CT IMAGING | Facility: HOSPITAL | Age: 73
End: 2018-04-08

## 2018-04-08 DIAGNOSIS — R93.5 ABNORMAL ABDOMINAL CT SCAN: ICD-10-CM

## 2018-04-08 DIAGNOSIS — R10.9 ABDOMINAL PAIN, UNSPECIFIED ABDOMINAL LOCATION: ICD-10-CM

## 2018-04-08 DIAGNOSIS — R07.9 CHEST PAIN, UNSPECIFIED TYPE: Primary | ICD-10-CM

## 2018-04-08 DIAGNOSIS — M25.552 LEFT HIP PAIN: ICD-10-CM

## 2018-04-08 PROBLEM — R19.00 ABDOMINAL MASS: Status: ACTIVE | Noted: 2018-04-08

## 2018-04-08 LAB
ALBUMIN SERPL-MCNC: 3.5 G/DL (ref 3.5–5.2)
ALBUMIN/GLOB SERPL: 1.1 G/DL
ALP SERPL-CCNC: 60 U/L (ref 39–117)
ALT SERPL W P-5'-P-CCNC: 13 U/L (ref 1–33)
ANION GAP SERPL CALCULATED.3IONS-SCNC: 14.8 MMOL/L
AST SERPL-CCNC: 13 U/L (ref 1–32)
BACTERIA UR QL AUTO: NORMAL /HPF
BASOPHILS # BLD AUTO: 0.02 10*3/MM3 (ref 0–0.2)
BASOPHILS NFR BLD AUTO: 0.1 % (ref 0–1.5)
BILIRUB SERPL-MCNC: 0.3 MG/DL (ref 0.1–1.2)
BILIRUB UR QL STRIP: NEGATIVE
BUN BLD-MCNC: 43 MG/DL (ref 8–23)
BUN/CREAT SERPL: 23.4 (ref 7–25)
CALCIUM SPEC-SCNC: 9.2 MG/DL (ref 8.6–10.5)
CHLORIDE SERPL-SCNC: 101 MMOL/L (ref 98–107)
CK SERPL-CCNC: 51 U/L (ref 20–180)
CLARITY UR: CLEAR
CO2 SERPL-SCNC: 23.2 MMOL/L (ref 22–29)
COLOR UR: YELLOW
CREAT BLD-MCNC: 1.84 MG/DL (ref 0.57–1)
D-LACTATE SERPL-SCNC: 0.7 MMOL/L (ref 0.5–2)
DEPRECATED RDW RBC AUTO: 48.1 FL (ref 37–54)
EOSINOPHIL # BLD AUTO: 0.11 10*3/MM3 (ref 0–0.7)
EOSINOPHIL NFR BLD AUTO: 0.8 % (ref 0.3–6.2)
ERYTHROCYTE [DISTWIDTH] IN BLOOD BY AUTOMATED COUNT: 14.3 % (ref 11.7–13)
GFR SERPL CREATININE-BSD FRML MDRD: 27 ML/MIN/1.73
GLOBULIN UR ELPH-MCNC: 3.3 GM/DL
GLUCOSE BLD-MCNC: 131 MG/DL (ref 65–99)
GLUCOSE BLDC GLUCOMTR-MCNC: 116 MG/DL (ref 70–130)
GLUCOSE UR STRIP-MCNC: NEGATIVE MG/DL
HCT VFR BLD AUTO: 35 % (ref 35.6–45.5)
HGB BLD-MCNC: 10.9 G/DL (ref 11.9–15.5)
HGB UR QL STRIP.AUTO: NEGATIVE
HYALINE CASTS UR QL AUTO: NORMAL /LPF
IMM GRANULOCYTES # BLD: 0.03 10*3/MM3 (ref 0–0.03)
IMM GRANULOCYTES NFR BLD: 0.2 % (ref 0–0.5)
KETONES UR QL STRIP: NEGATIVE
LEUKOCYTE ESTERASE UR QL STRIP.AUTO: NEGATIVE
LIPASE SERPL-CCNC: 45 U/L (ref 13–60)
LYMPHOCYTES # BLD AUTO: 1.2 10*3/MM3 (ref 0.9–4.8)
LYMPHOCYTES NFR BLD AUTO: 9 % (ref 19.6–45.3)
MCH RBC QN AUTO: 28.8 PG (ref 26.9–32)
MCHC RBC AUTO-ENTMCNC: 31.1 G/DL (ref 32.4–36.3)
MCV RBC AUTO: 92.3 FL (ref 80.5–98.2)
MONOCYTES # BLD AUTO: 0.65 10*3/MM3 (ref 0.2–1.2)
MONOCYTES NFR BLD AUTO: 4.9 % (ref 5–12)
NEUTROPHILS # BLD AUTO: 11.38 10*3/MM3 (ref 1.9–8.1)
NEUTROPHILS NFR BLD AUTO: 85 % (ref 42.7–76)
NITRITE UR QL STRIP: NEGATIVE
PH UR STRIP.AUTO: 7 [PH] (ref 5–8)
PLATELET # BLD AUTO: 296 10*3/MM3 (ref 140–500)
PMV BLD AUTO: 10 FL (ref 6–12)
POTASSIUM BLD-SCNC: 4.8 MMOL/L (ref 3.5–5.2)
PROCALCITONIN SERPL-MCNC: 0.08 NG/ML (ref 0.1–0.25)
PROT SERPL-MCNC: 6.8 G/DL (ref 6–8.5)
PROT UR QL STRIP: ABNORMAL
RBC # BLD AUTO: 3.79 10*6/MM3 (ref 3.9–5.2)
RBC # UR: NORMAL /HPF
REF LAB TEST METHOD: NORMAL
SODIUM BLD-SCNC: 139 MMOL/L (ref 136–145)
SP GR UR STRIP: 1.01 (ref 1–1.03)
SQUAMOUS #/AREA URNS HPF: NORMAL /HPF
TROPONIN T SERPL-MCNC: <0.01 NG/ML (ref 0–0.03)
UROBILINOGEN UR QL STRIP: ABNORMAL
WBC NRBC COR # BLD: 13.39 10*3/MM3 (ref 4.5–10.7)
WBC UR QL AUTO: NORMAL /HPF

## 2018-04-08 PROCEDURE — 83690 ASSAY OF LIPASE: CPT | Performed by: PHYSICIAN ASSISTANT

## 2018-04-08 PROCEDURE — 93010 ELECTROCARDIOGRAM REPORT: CPT | Performed by: INTERNAL MEDICINE

## 2018-04-08 PROCEDURE — 71250 CT THORAX DX C-: CPT

## 2018-04-08 PROCEDURE — 83605 ASSAY OF LACTIC ACID: CPT | Performed by: PHYSICIAN ASSISTANT

## 2018-04-08 PROCEDURE — 82550 ASSAY OF CK (CPK): CPT | Performed by: PHYSICIAN ASSISTANT

## 2018-04-08 PROCEDURE — 81001 URINALYSIS AUTO W/SCOPE: CPT | Performed by: PHYSICIAN ASSISTANT

## 2018-04-08 PROCEDURE — 84484 ASSAY OF TROPONIN QUANT: CPT | Performed by: PHYSICIAN ASSISTANT

## 2018-04-08 PROCEDURE — 80053 COMPREHEN METABOLIC PANEL: CPT | Performed by: PHYSICIAN ASSISTANT

## 2018-04-08 PROCEDURE — 25010000002 ONDANSETRON PER 1 MG: Performed by: PHYSICIAN ASSISTANT

## 2018-04-08 PROCEDURE — 74176 CT ABD & PELVIS W/O CONTRAST: CPT

## 2018-04-08 PROCEDURE — 73502 X-RAY EXAM HIP UNI 2-3 VIEWS: CPT

## 2018-04-08 PROCEDURE — 25010000002 MORPHINE PER 10 MG: Performed by: EMERGENCY MEDICINE

## 2018-04-08 PROCEDURE — 85025 COMPLETE CBC W/AUTO DIFF WBC: CPT | Performed by: PHYSICIAN ASSISTANT

## 2018-04-08 PROCEDURE — 93005 ELECTROCARDIOGRAM TRACING: CPT | Performed by: PHYSICIAN ASSISTANT

## 2018-04-08 PROCEDURE — 99285 EMERGENCY DEPT VISIT HI MDM: CPT

## 2018-04-08 PROCEDURE — 84145 PROCALCITONIN (PCT): CPT | Performed by: PHYSICIAN ASSISTANT

## 2018-04-08 PROCEDURE — 82962 GLUCOSE BLOOD TEST: CPT

## 2018-04-08 PROCEDURE — 25010000002 HYDROMORPHONE PER 4 MG: Performed by: EMERGENCY MEDICINE

## 2018-04-08 RX ORDER — HYDROCODONE BITARTRATE AND ACETAMINOPHEN 5; 325 MG/1; MG/1
1 TABLET ORAL EVERY 6 HOURS PRN
COMMUNITY
End: 2019-06-18 | Stop reason: HOSPADM

## 2018-04-08 RX ORDER — NITROGLYCERIN 0.4 MG/1
0.4 TABLET SUBLINGUAL ONCE
Status: COMPLETED | OUTPATIENT
Start: 2018-04-08 | End: 2018-04-08

## 2018-04-08 RX ORDER — ONDANSETRON 2 MG/ML
4 INJECTION INTRAMUSCULAR; INTRAVENOUS ONCE
Status: COMPLETED | OUTPATIENT
Start: 2018-04-08 | End: 2018-04-08

## 2018-04-08 RX ORDER — HYDROXYZINE HYDROCHLORIDE 25 MG/1
25 TABLET, FILM COATED ORAL NIGHTLY PRN
COMMUNITY
End: 2019-06-18 | Stop reason: HOSPADM

## 2018-04-08 RX ADMIN — NITROGLYCERIN 0.4 MG: 0.4 TABLET SUBLINGUAL at 18:24

## 2018-04-08 RX ADMIN — MORPHINE SULFATE 4 MG: 4 INJECTION, SOLUTION INTRAMUSCULAR; INTRAVENOUS at 17:35

## 2018-04-08 RX ADMIN — HYDROMORPHONE HYDROCHLORIDE 1 MG: 1 INJECTION, SOLUTION INTRAMUSCULAR; INTRAVENOUS; SUBCUTANEOUS at 20:28

## 2018-04-08 RX ADMIN — SODIUM CHLORIDE 500 ML: 9 INJECTION, SOLUTION INTRAVENOUS at 17:38

## 2018-04-08 RX ADMIN — ONDANSETRON 4 MG: 2 INJECTION INTRAMUSCULAR; INTRAVENOUS at 17:35

## 2018-04-08 NOTE — ED NOTES
Pt began complaining of midsternal chest pain while lying in bed, RN obtaining straight cath urine specimen from pt. EKG completed. MD Mason and ABDOUL Calix notified. Troponin will be added to blood that has already been sent to lab.           Asuncion Reagan RN  04/08/18 0355

## 2018-04-08 NOTE — ED PROVIDER NOTES
"The EDWIN and I have discussed this patient's history, physical exam, and treatment plan.  I have reviewed the documentation and personally had a face to face interaction with the patient. I affirm the documentation and agree with the treatment and plan.  The attached note describes my personal findings.    HPI:  at bedside. Pt is a 72 y.o. female who presents to the ED c/o L inguinal pain that began this morning while at rest. Patient states that her pain is worsened with movement of abd and LE    While in the ED, patient has c/o episodes of midsternal CP with associated diaphoresis after having a cath placed while laying down. Patient has a h/o GERD which is evaluated by Dr. Sumner recently. Patient states that her sx today are a little different than her h/o GERD. Denies worsening of her sx today or h/o similar sx. Patient currently c/o nausea. Patient has a heart cath in 2014 that showed mild disease. Patient is unable to describe much about her current pain, just states that \"it hurts\".  She cannot describe the pain in her left inguinal area for the pain in her left chest.  She is unaware of anything that makes the pain worse or better in the chest.  She is aware that the pain is worse in the left inguinal and hip area with palpation and with movement of the left leg.  Patient states that her CP is not palpable or worse with breathing.  Her abdomen is obese soft subjective tenderness in the left side of her abdomen and left upper mid and lower abdomen and also in the left inguinal area.  There is normal inspection with no signs of obvious infection.  Neurologically she has no motor or sensory changes.  Skin good intact distal pulses.  She does appear anxious on my exam.    Patient has a h/o anxiety, but states that her sx are not similar today compared to her h/o panic attacks. Patient is a poor historian.     On exam, pt has anxious.TTP L inguinal area and with rasiing the LLE. Heart RRR, no murmus, abd " obese, soft. Intact distal pulses all extremi, no motor or sen changes    After reviewing old records with the patient's significant history of GERD and a mild hernia I think this is very likely [place] that that that is the etiology of her pain as this started when she was laying back.  Previous Medical Records:   Patient had an abd CT and pelvis on 10/20/17 that showed:      IMPRESSION:  Multiple bilateral predominantly simple renal cysts and a  single minimally complex cyst in the upper pole of the left kidney. The  complex cyst has increased in size slightly since the preceding CT dated  01/03/2017, but is stable since an MRI of the abdomen dated 09/11/2017.  These are all quite likely benign in etiology. There are no solid renal  masses. There is moderately extensive colonic diverticulosis without  evidence of diverticulitis.    Patient had a FL upper GI single contrast on 3/22/18 that showed:   CONCLUSION: Hiatal herniation of the upper stomach with at least 2 cm  diameter esophagogastric junction and modest gastroesophageal reflux.  Some of the distal esophageal mucosal folds appear thickened. The  findings are compatible with probable gastric stapling type bariatric  surgery, but without definite demonstration of the lumen of distal upper  pouch to the remainder of the stomach and with findings compatible with  the clinically described apparent discontinuity of staple line with  direct flow of barium through the upper stomach and into the more distal  portions of the stomach from this apparent more superior communication.  Sensitivity for inflammatory or neoplastic alterations of the upper  one-half of the stomach is limited on the current exam due to the  postsurgical findings. An apparent fundal diverticulum of a few  centimeters size is demonstrated. A small diverticulum protrudes from  the lesser curvature aspect of distal duodenum.    ED Course     EKG           EKG time: 1756  Rhythm/Rate: 56, sinus  carter  QRS, axis: narrow QRS , borderline LAD  ST and T waves: nonspecific changes  Movement artifact  Normal QT interval     Interpreted Contemporaneously by me, independently viewed  unchanged compared to prior 12 2017    EKG           EKG time: 1803  Rhythm/Rate: NSR, 64  QRS, axis: borderline LAD,   ST and T waves: nonspecific ST changes   Movement artifact  Normal QT interval    Interpreted Contemporaneously by me, independently viewed  No change from previous EKG       I reviewed Lab fingings and radiological studies performed in the ED. I review abd and Chest CT with the radiologist who states that the patient has area of small bowel inflammation in her LUQ, but that her CHest CT is negative    Discussion: I discussed plan for Chest and abd CT to be ordered and plan for NTG and additional labs to be ordered.Pt understands and agrees with the plan, all questions answered.    The patient had resolution of her chest pain in the emergency department.  She did have some improvement in her pain to her left side of her abdomen as well.  I reviewed lab work, CT reports and spoke with the radiologist, and EKG.  Documentation assistance provided by leah Rivas for Jon Mason MD.  Information recorded by the estephaniaibe was done at my direction and has been verified and validated by me     Park Rivas  04/08/18 [id number]       Jon Mason MD  04/08/18 4460

## 2018-04-08 NOTE — ED NOTES
Pt states aching pain began in the left hip this morning. Pt does not remember a source of injury. Pt states she has not been able to bear weight. Dorsalis pedis pulse palpable. Increased pain with movement. No numbness or tingling reported.      Asuncion Reagan RN  04/08/18 7023

## 2018-04-08 NOTE — ED PROVIDER NOTES
EMERGENCY DEPARTMENT ENCOUNTER    Room Number:  04/04  Date seen:  4/8/2018  Time seen: 4:22 PM  PCP: Nicki Hidalgo MD    HPI:  Chief complaint: hip pain  Context:Ya Paz is a 72 y.o. female who presents to the ED with c/o atraumatic left hip pain with increased pain w/ ambulation. Pt advised she developed it this morning upon waking, but was able to ambulate to the bathroom w/ her walker and assistance from her . Pt is typically able to ambulate w/o a walker or pain. She denies known injury or recent strenuous activity. She denies back pain or radiation of pain, fever,  sx, and n/v/d. She is unable to bend her knee w/o pain. Pt has hx of fx pelvis several years ago.    Onset: sudden  Location: left hip  Radiation: denies  Duration: since the a.m.  Timing: constant  Character: 'pain'  Aggravating Factors: ambulation, weight bearing, movement, palpation  Alleviating Factors: none  Severity: moderate      ALLERGIES  Augmentin [amoxicillin-pot clavulanate]; Dapsone; Detrol [tolterodine tartrate]; Levaquin [levofloxacin]; Sulfa antibiotics; Tetracycline; and Tetracyclines & related    PAST MEDICAL HISTORY  Active Ambulatory Problems     Diagnosis Date Noted   • Diverticulitis of large intestine without perforation or abscess without bleeding 11/10/2016   • ARF (acute renal failure) 11/11/2016   • CKD (chronic kidney disease) stage 3, GFR 30-59 ml/min 11/11/2016   • Proteinuria 11/11/2016   • Diverticulitis 11/11/2016   • Hypervitaminosis D 04/07/2017   • Postsurgical hypothyroidism 04/07/2017   • Chronic fatigue 04/07/2017   • Diabetes mellitus type 2, uncontrolled, with complications 04/07/2017   • Heartburn 01/19/2018   • Dysphagia 01/19/2018     Resolved Ambulatory Problems     Diagnosis Date Noted   • No Resolved Ambulatory Problems     Past Medical History:   Diagnosis Date   • Anxiety    • Clostridium difficile infection 11/2016   • Diabetes mellitus    • Disease of thyroid gland    •  Hypertension    • Migraine    • Pancreatitis    • Tachycardia    • TIA (transient ischemic attack)        PAST SURGICAL HISTORY  Past Surgical History:   Procedure Laterality Date   • APPENDECTOMY     • CARDIAC CATHETERIZATION     •  SECTION     • CHOLECYSTECTOMY     • COLONOSCOPY      normal per pt, Dr. Reagan   • COLONOSCOPY N/A 3/1/2017    multiple polyps, tics, IH, scar in transverse colon   • ENDOSCOPY N/A 3/1/2017    gastroplasty, nodular mucosa in gastric antrum, nodule in duodenum   • ENDOSCOPY N/A 2018    Procedure: ESOPHAGOGASTRODUODENOSCOPY;  Surgeon: Paloma Santizo MD;  Location: [facility] ENDOSCOPY;  Service:    • GASTRIC BYPASS     • HERNIA REPAIR     • LUNG SURGERY      mass LL   • THYROID SURGERY         FAMILY HISTORY  Family History   Problem Relation Age of Onset   • Pancreatitis Brother    • Ulcerative colitis Son    • Crohn's disease Grandchild        SOCIAL HISTORY  Social History     Social History   • Marital status:      Spouse name: N/A   • Number of children: N/A   • Years of education: N/A     Occupational History   • Not on file.     Social History Main Topics   • Smoking status: Never Smoker   • Smokeless tobacco: Never Used   • Alcohol use No   • Drug use: No   • Sexual activity: Defer     Other Topics Concern   • Not on file     Social History Narrative   • No narrative on file       REVIEW OF SYSTEMS  Review of Systems   Constitutional: Negative for chills and fever.   HENT: Negative.    Eyes: Negative.    Respiratory: Negative for shortness of breath.    Cardiovascular: Negative for chest pain.   Gastrointestinal: Negative for abdominal pain.   Genitourinary: Negative.    Musculoskeletal: Negative.         Left hip pain   Skin: Negative.    Neurological: Negative.    Psychiatric/Behavioral: Negative.        PHYSICAL EXAM  ED Triage Vitals   Temp Heart Rate Resp BP SpO2   18 1422 18 1340 18 1421 18 1340 18 1340   98 °F (36.7 °C)  62 16 164/84 98 %      Temp src Heart Rate Source Patient Position BP Location FiO2 (%)   04/08/18 1422 04/08/18 1340 04/08/18 1340 -- --   Oral Monitor Sitting       Physical Exam   Constitutional: She is oriented to person, place, and time and well-developed, well-nourished, and in no distress. No distress.   HENT:   Head: Normocephalic and atraumatic.   Right Ear: External ear normal.   Left Ear: External ear normal.   Nose: Nose normal.   Eyes: Conjunctivae are normal.   Neck: Normal range of motion.   Cardiovascular: Normal rate and regular rhythm.    Pulmonary/Chest: Effort normal and breath sounds normal.   Abdominal: There is generalized tenderness and tenderness in the suprapubic area and left lower quadrant.   Normal bowel sounds  Soft  Nontender  Nondistended  No rebound, guarding, or rigidity  No appreciable organomegaly     Musculoskeletal: Normal range of motion.   tenderness in the inguinal, anterior, and lateral hip, left buttock, and anterior and medial mid-thigh.  Sensation is intact to light touch throughout the bilateral lower extremities. Muscle strength is 5/5 and symmetrical with plantarflexion and EHL. Achilles and patellar reflexes are 2+ and equal bilaterally. DP and PT pulses are 2+ bilaterally.   Neurological: She is alert and oriented to person, place, and time.   Skin: Skin is warm and dry.   Psychiatric: Affect normal.   Nursing note and vitals reviewed.      LAB RESULTS  Recent Results (from the past 24 hour(s))   Comprehensive Metabolic Panel    Collection Time: 04/08/18  5:34 PM   Result Value Ref Range    Glucose 131 (H) 65 - 99 mg/dL    BUN 43 (H) 8 - 23 mg/dL    Creatinine 1.84 (H) 0.57 - 1.00 mg/dL    Sodium 139 136 - 145 mmol/L    Potassium 4.8 3.5 - 5.2 mmol/L    Chloride 101 98 - 107 mmol/L    CO2 23.2 22.0 - 29.0 mmol/L    Calcium 9.2 8.6 - 10.5 mg/dL    Total Protein 6.8 6.0 - 8.5 g/dL    Albumin 3.50 3.50 - 5.20 g/dL    ALT (SGPT) 13 1 - 33 U/L    AST (SGOT) 13 1 - 32 U/L     Alkaline Phosphatase 60 39 - 117 U/L    Total Bilirubin 0.3 0.1 - 1.2 mg/dL    eGFR Non African Amer 27 (L) >60 mL/min/1.73    Globulin 3.3 gm/dL    A/G Ratio 1.1 g/dL    BUN/Creatinine Ratio 23.4 7.0 - 25.0    Anion Gap 14.8 mmol/L   Lipase    Collection Time: 04/08/18  5:34 PM   Result Value Ref Range    Lipase 45 13 - 60 U/L   CBC Auto Differential    Collection Time: 04/08/18  5:34 PM   Result Value Ref Range    WBC 13.39 (H) 4.50 - 10.70 10*3/mm3    RBC 3.79 (L) 3.90 - 5.20 10*6/mm3    Hemoglobin 10.9 (L) 11.9 - 15.5 g/dL    Hematocrit 35.0 (L) 35.6 - 45.5 %    MCV 92.3 80.5 - 98.2 fL    MCH 28.8 26.9 - 32.0 pg    MCHC 31.1 (L) 32.4 - 36.3 g/dL    RDW 14.3 (H) 11.7 - 13.0 %    RDW-SD 48.1 37.0 - 54.0 fl    MPV 10.0 6.0 - 12.0 fL    Platelets 296 140 - 500 10*3/mm3    Neutrophil % 85.0 (H) 42.7 - 76.0 %    Lymphocyte % 9.0 (L) 19.6 - 45.3 %    Monocyte % 4.9 (L) 5.0 - 12.0 %    Eosinophil % 0.8 0.3 - 6.2 %    Basophil % 0.1 0.0 - 1.5 %    Immature Grans % 0.2 0.0 - 0.5 %    Neutrophils, Absolute 11.38 (H) 1.90 - 8.10 10*3/mm3    Lymphocytes, Absolute 1.20 0.90 - 4.80 10*3/mm3    Monocytes, Absolute 0.65 0.20 - 1.20 10*3/mm3    Eosinophils, Absolute 0.11 0.00 - 0.70 10*3/mm3    Basophils, Absolute 0.02 0.00 - 0.20 10*3/mm3    Immature Grans, Absolute 0.03 0.00 - 0.03 10*3/mm3   Troponin    Collection Time: 04/08/18  5:34 PM   Result Value Ref Range    Troponin T <0.010 0.000 - 0.030 ng/mL   CK    Collection Time: 04/08/18  5:34 PM   Result Value Ref Range    Creatine Kinase 51 20 - 180 U/L   Urinalysis With / Microscopic If Indicated - Urine, Catheter    Collection Time: 04/08/18  5:55 PM   Result Value Ref Range    Color, UA Yellow Yellow, Straw    Appearance, UA Clear Clear    pH, UA 7.0 5.0 - 8.0    Specific Gravity, UA 1.009 1.005 - 1.030    Glucose, UA Negative Negative    Ketones, UA Negative Negative    Bilirubin, UA Negative Negative    Blood, UA Negative Negative    Protein, UA 30 mg/dL (1+) (A)  Negative    Leuk Esterase, UA Negative Negative    Nitrite, UA Negative Negative    Urobilinogen, UA 0.2 E.U./dL 0.2 - 1.0 E.U./dL   Urinalysis, Microscopic Only - Urine, Clean Catch    Collection Time: 04/08/18  5:55 PM   Result Value Ref Range    RBC, UA 0-2 None Seen, 0-2 /HPF    WBC, UA 0-2 None Seen, 0-2 /HPF    Bacteria, UA None Seen None Seen /HPF    Squamous Epithelial Cells, UA 0-2 None Seen, 0-2 /HPF    Hyaline Casts, UA 0-2 None Seen /LPF    Methodology Automated Microscopy    POC Glucose Once    Collection Time: 04/08/18  6:13 PM   Result Value Ref Range    Glucose 116 70 - 130 mg/dL       I ordered the above labs and reviewed the results    RADIOLOGY  CT Chest Without Contrast   Preliminary Result   1. Segment of small bowel in the left upper quadrant appears mildly   dilated and heterogeneous in density and could conceivably contain a   soft tissue mass. This may represent focal area of inflammatory bowel   disease or neoplastic mass. No other bowel dilatation is seen. There is   some surrounding inflammatory change.   2. No evidence of pneumoperitoneum.   3. Colonic diverticulosis.   4. Multiple renal lesions may all represent benign renal cysts.   5. No acute process demonstrated in the chest.           Radiation dose reduction techniques were utilized, including automated   exposure control and exposure modulation based on body size.              CT Abdomen Pelvis Without Contrast   Preliminary Result   1. Segment of small bowel in the left upper quadrant appears mildly   dilated and heterogeneous in density and could conceivably contain a   soft tissue mass. This may represent focal area of inflammatory bowel   disease or neoplastic mass. No other bowel dilatation is seen. There is   some surrounding inflammatory change.   2. No evidence of pneumoperitoneum.   3. Colonic diverticulosis.   4. Multiple renal lesions may all represent benign renal cysts.   5. No acute process demonstrated in the  "chest.           Radiation dose reduction techniques were utilized, including automated   exposure control and exposure modulation based on body size.              XR Hip With or Without Pelvis 2 - 3 View Left   Preliminary Result   No acute process identified.              I ordered the above noted radiological studies and reviewed the images on the PACS system.    MEDICATIONS GIVEN IN ER  Medications   ondansetron (ZOFRAN) injection 4 mg (4 mg Intravenous Given 4/8/18 1735)   sodium chloride 0.9 % bolus 500 mL (0 mL Intravenous Stopped 4/8/18 2000)   morphine injection 4 mg (4 mg Intravenous Given 4/8/18 1735)   nitroglycerin (NITROSTAT) SL tablet 0.4 mg (0.4 mg Sublingual Given 4/8/18 1824)   HYDROmorphone (DILAUDID) injection 1 mg (1 mg Intravenous Given 4/8/18 2028)       PROCEDURES  Procedures      PROGRESS AND CONSULTS    Progress Notes:    ED Course   1750  RN reported that the pt is now c/o CP after the catheter.    1806  Reviewed pt's history and workup with Dr. Mason.  After a bedside evaluation; Dr. Mason agrees with the plan of care.    1817  BP- 173/76 HR- 66 Temp- 98 °F (36.7 °C) (Oral) O2 sat- 97%  Rechecked the patient who is in NAD. Dr. Mason bedside. Pt was questioned about quality of CP. Pt advises that she has a hx of anxiety but does not believe this pain is similar to that. Pt reports that her pain is the middle of her chest w/ some nausea and describes it as a \"burning\". When questioned pt about her chest pain currently, she said she \"had to think about it.\" Pt's pain is non-tender to palpation. She denies hx of MI but has a hx of TIA. Pt's last cardiac cath was in 2014 and required no intervention. She has not had any following cardiac stress test.    2002  BP- 168/93 HR- 64 Temp- 98 °F (36.7 °C) (Oral) O2 sat- 97%  Rechecked the patient who is in NAD. Pt continues to c/o left hip pain. She denies CP. Discussed imaging showing some inflammation of the small bowel but no acute fx to " "the hip and labs showing an elevated WBC.     2039  Consulted with Dr. Bradshaw [facility], about the pt. He agreed to admit and advised to add a lactic acid and procalcitonin.    Disposition vitals:  /74   Pulse 62   Temp 98 °F (36.7 °C) (Oral)   Resp 18   Ht 160 cm (63\")   Wt 90.3 kg (199 lb)   SpO2 97%   BMI 35.25 kg/m²   ADMISSION    Discussed treatment plan and reason for admission with pt/family and admitting physician.  Pt/family voiced understanding of the plan for admission for further testing/treatment as needed.       DIAGNOSIS  Final diagnoses:   Chest pain, unspecified type   Abdominal pain, unspecified abdominal location   Left hip pain   Abnormal abdominal CT scan       Documentation assistance provided by leah Dietz for Erin Ashraf PA-C.  Information recorded by the scribe was done at my direction and has been verified and validated by me.     Latasha Dietz  04/08/18 2100       ABDOUL Frankel  04/09/18 0139    "

## 2018-04-09 ENCOUNTER — APPOINTMENT (OUTPATIENT)
Dept: MRI IMAGING | Facility: HOSPITAL | Age: 73
End: 2018-04-09

## 2018-04-09 PROBLEM — M25.552 HIP PAIN, ACUTE, LEFT: Status: ACTIVE | Noted: 2018-04-09

## 2018-04-09 LAB
ANION GAP SERPL CALCULATED.3IONS-SCNC: 15.3 MMOL/L
BASOPHILS # BLD AUTO: 0.01 10*3/MM3 (ref 0–0.2)
BASOPHILS NFR BLD AUTO: 0.1 % (ref 0–1.5)
BUN BLD-MCNC: 42 MG/DL (ref 8–23)
BUN/CREAT SERPL: 22.7 (ref 7–25)
CALCIUM SPEC-SCNC: 8.5 MG/DL (ref 8.6–10.5)
CHLORIDE SERPL-SCNC: 107 MMOL/L (ref 98–107)
CO2 SERPL-SCNC: 18.7 MMOL/L (ref 22–29)
CREAT BLD-MCNC: 1.85 MG/DL (ref 0.57–1)
DEPRECATED RDW RBC AUTO: 51.6 FL (ref 37–54)
EOSINOPHIL # BLD AUTO: 0.2 10*3/MM3 (ref 0–0.7)
EOSINOPHIL NFR BLD AUTO: 2.4 % (ref 0.3–6.2)
ERYTHROCYTE [DISTWIDTH] IN BLOOD BY AUTOMATED COUNT: 14.6 % (ref 11.7–13)
GFR SERPL CREATININE-BSD FRML MDRD: 27 ML/MIN/1.73
GLUCOSE BLD-MCNC: 92 MG/DL (ref 65–99)
GLUCOSE BLDC GLUCOMTR-MCNC: 107 MG/DL (ref 70–130)
GLUCOSE BLDC GLUCOMTR-MCNC: 129 MG/DL (ref 70–130)
GLUCOSE BLDC GLUCOMTR-MCNC: 150 MG/DL (ref 70–130)
GLUCOSE BLDC GLUCOMTR-MCNC: 57 MG/DL (ref 70–130)
GLUCOSE BLDC GLUCOMTR-MCNC: 59 MG/DL (ref 70–130)
GLUCOSE BLDC GLUCOMTR-MCNC: 61 MG/DL (ref 70–130)
GLUCOSE BLDC GLUCOMTR-MCNC: 82 MG/DL (ref 70–130)
HBA1C MFR BLD: 6.1 % (ref 4.8–5.6)
HCT VFR BLD AUTO: 32.8 % (ref 35.6–45.5)
HGB BLD-MCNC: 9.8 G/DL (ref 11.9–15.5)
IMM GRANULOCYTES # BLD: 0.02 10*3/MM3 (ref 0–0.03)
IMM GRANULOCYTES NFR BLD: 0.2 % (ref 0–0.5)
LYMPHOCYTES # BLD AUTO: 1.34 10*3/MM3 (ref 0.9–4.8)
LYMPHOCYTES NFR BLD AUTO: 16.1 % (ref 19.6–45.3)
MCH RBC QN AUTO: 28.9 PG (ref 26.9–32)
MCHC RBC AUTO-ENTMCNC: 29.9 G/DL (ref 32.4–36.3)
MCV RBC AUTO: 96.8 FL (ref 80.5–98.2)
MONOCYTES # BLD AUTO: 0.95 10*3/MM3 (ref 0.2–1.2)
MONOCYTES NFR BLD AUTO: 11.4 % (ref 5–12)
NEUTROPHILS # BLD AUTO: 5.78 10*3/MM3 (ref 1.9–8.1)
NEUTROPHILS NFR BLD AUTO: 69.8 % (ref 42.7–76)
PLATELET # BLD AUTO: 238 10*3/MM3 (ref 140–500)
PMV BLD AUTO: 9.8 FL (ref 6–12)
POTASSIUM BLD-SCNC: 5.1 MMOL/L (ref 3.5–5.2)
RBC # BLD AUTO: 3.39 10*6/MM3 (ref 3.9–5.2)
RETICS/RBC NFR AUTO: 1.06 % (ref 0.5–1.5)
SODIUM BLD-SCNC: 141 MMOL/L (ref 136–145)
WBC NRBC COR # BLD: 8.3 10*3/MM3 (ref 4.5–10.7)

## 2018-04-09 PROCEDURE — 63710000001 INSULIN ASPART PER 5 UNITS: Performed by: INTERNAL MEDICINE

## 2018-04-09 PROCEDURE — 25010000002 HYDROMORPHONE HCL PF 500 MG/50ML SOLUTION: Performed by: INTERNAL MEDICINE

## 2018-04-09 PROCEDURE — 82962 GLUCOSE BLOOD TEST: CPT

## 2018-04-09 PROCEDURE — 85045 AUTOMATED RETICULOCYTE COUNT: CPT | Performed by: INTERNAL MEDICINE

## 2018-04-09 PROCEDURE — 99223 1ST HOSP IP/OBS HIGH 75: CPT | Performed by: INTERNAL MEDICINE

## 2018-04-09 PROCEDURE — 85025 COMPLETE CBC W/AUTO DIFF WBC: CPT | Performed by: INTERNAL MEDICINE

## 2018-04-09 PROCEDURE — 25010000002 ONDANSETRON PER 1 MG: Performed by: INTERNAL MEDICINE

## 2018-04-09 PROCEDURE — 99221 1ST HOSP IP/OBS SF/LOW 40: CPT | Performed by: INTERNAL MEDICINE

## 2018-04-09 PROCEDURE — 80048 BASIC METABOLIC PNL TOTAL CA: CPT | Performed by: INTERNAL MEDICINE

## 2018-04-09 PROCEDURE — 87040 BLOOD CULTURE FOR BACTERIA: CPT | Performed by: INTERNAL MEDICINE

## 2018-04-09 PROCEDURE — 83036 HEMOGLOBIN GLYCOSYLATED A1C: CPT | Performed by: INTERNAL MEDICINE

## 2018-04-09 PROCEDURE — 73721 MRI JNT OF LWR EXTRE W/O DYE: CPT

## 2018-04-09 PROCEDURE — 25010000002 ENOXAPARIN PER 10 MG: Performed by: INTERNAL MEDICINE

## 2018-04-09 RX ORDER — LORAZEPAM 1 MG/1
1 TABLET ORAL
Status: COMPLETED | OUTPATIENT
Start: 2018-04-09 | End: 2018-04-09

## 2018-04-09 RX ORDER — ATORVASTATIN CALCIUM 10 MG/1
10 TABLET, FILM COATED ORAL DAILY
Status: DISCONTINUED | OUTPATIENT
Start: 2018-04-09 | End: 2018-04-09

## 2018-04-09 RX ORDER — ASPIRIN 81 MG/1
81 TABLET ORAL DAILY
Status: DISCONTINUED | OUTPATIENT
Start: 2018-04-09 | End: 2018-04-12 | Stop reason: HOSPADM

## 2018-04-09 RX ORDER — CARVEDILOL 6.25 MG/1
6.25 TABLET ORAL 2 TIMES DAILY
Status: DISCONTINUED | OUTPATIENT
Start: 2018-04-09 | End: 2018-04-09

## 2018-04-09 RX ORDER — ONDANSETRON 2 MG/ML
4 INJECTION INTRAMUSCULAR; INTRAVENOUS EVERY 6 HOURS PRN
Status: DISCONTINUED | OUTPATIENT
Start: 2018-04-09 | End: 2018-04-12 | Stop reason: HOSPADM

## 2018-04-09 RX ORDER — ACETAMINOPHEN 325 MG/1
650 TABLET ORAL EVERY 4 HOURS PRN
Status: DISCONTINUED | OUTPATIENT
Start: 2018-04-09 | End: 2018-04-12 | Stop reason: HOSPADM

## 2018-04-09 RX ORDER — HYDRALAZINE HYDROCHLORIDE 50 MG/1
50 TABLET, FILM COATED ORAL EVERY 8 HOURS SCHEDULED
Status: DISCONTINUED | OUTPATIENT
Start: 2018-04-09 | End: 2018-04-12 | Stop reason: HOSPADM

## 2018-04-09 RX ORDER — SODIUM CHLORIDE 0.9 % (FLUSH) 0.9 %
1-10 SYRINGE (ML) INJECTION AS NEEDED
Status: DISCONTINUED | OUTPATIENT
Start: 2018-04-09 | End: 2018-04-12 | Stop reason: HOSPADM

## 2018-04-09 RX ORDER — ALUMINA, MAGNESIA, AND SIMETHICONE 2400; 2400; 240 MG/30ML; MG/30ML; MG/30ML
15 SUSPENSION ORAL EVERY 6 HOURS PRN
Status: DISCONTINUED | OUTPATIENT
Start: 2018-04-09 | End: 2018-04-12 | Stop reason: HOSPADM

## 2018-04-09 RX ORDER — LISINOPRIL 20 MG/1
20 TABLET ORAL DAILY
Status: DISCONTINUED | OUTPATIENT
Start: 2018-04-09 | End: 2018-04-12 | Stop reason: HOSPADM

## 2018-04-09 RX ORDER — PANTOPRAZOLE SODIUM 40 MG/1
40 TABLET, DELAYED RELEASE ORAL
Status: DISCONTINUED | OUTPATIENT
Start: 2018-04-09 | End: 2018-04-12 | Stop reason: HOSPADM

## 2018-04-09 RX ORDER — FOLIC ACID 1 MG/1
1000 TABLET ORAL DAILY
Status: DISCONTINUED | OUTPATIENT
Start: 2018-04-09 | End: 2018-04-12 | Stop reason: HOSPADM

## 2018-04-09 RX ORDER — ALPRAZOLAM 0.5 MG/1
0.5 TABLET ORAL 3 TIMES DAILY PRN
Status: DISCONTINUED | OUTPATIENT
Start: 2018-04-09 | End: 2018-04-12 | Stop reason: HOSPADM

## 2018-04-09 RX ORDER — HYDROCODONE BITARTRATE AND ACETAMINOPHEN 5; 325 MG/1; MG/1
1 TABLET ORAL EVERY 6 HOURS PRN
Status: DISCONTINUED | OUTPATIENT
Start: 2018-04-09 | End: 2018-04-12 | Stop reason: HOSPADM

## 2018-04-09 RX ORDER — HYDROMORPHONE HYDROCHLORIDE 1 MG/ML
0.5 INJECTION, SOLUTION INTRAMUSCULAR; INTRAVENOUS; SUBCUTANEOUS EVERY 4 HOURS PRN
Status: DISCONTINUED | OUTPATIENT
Start: 2018-04-09 | End: 2018-04-12 | Stop reason: HOSPADM

## 2018-04-09 RX ORDER — LOSARTAN POTASSIUM 100 MG/1
100 TABLET ORAL DAILY
Status: DISCONTINUED | OUTPATIENT
Start: 2018-04-09 | End: 2018-04-09

## 2018-04-09 RX ORDER — DEXTROSE MONOHYDRATE 25 G/50ML
25 INJECTION, SOLUTION INTRAVENOUS
Status: DISCONTINUED | OUTPATIENT
Start: 2018-04-09 | End: 2018-04-12 | Stop reason: HOSPADM

## 2018-04-09 RX ORDER — FAMOTIDINE 40 MG/1
40 TABLET, FILM COATED ORAL DAILY
Status: DISCONTINUED | OUTPATIENT
Start: 2018-04-09 | End: 2018-04-09

## 2018-04-09 RX ORDER — NICOTINE POLACRILEX 4 MG
15 LOZENGE BUCCAL
Status: DISCONTINUED | OUTPATIENT
Start: 2018-04-09 | End: 2018-04-12 | Stop reason: HOSPADM

## 2018-04-09 RX ORDER — SUCRALFATE ORAL 1 G/10ML
1 SUSPENSION ORAL
Status: DISCONTINUED | OUTPATIENT
Start: 2018-04-09 | End: 2018-04-12 | Stop reason: HOSPADM

## 2018-04-09 RX ORDER — SODIUM CHLORIDE 9 MG/ML
100 INJECTION, SOLUTION INTRAVENOUS CONTINUOUS
Status: DISCONTINUED | OUTPATIENT
Start: 2018-04-09 | End: 2018-04-10

## 2018-04-09 RX ORDER — LEVOTHYROXINE SODIUM 0.12 MG/1
125 TABLET ORAL DAILY
Status: DISCONTINUED | OUTPATIENT
Start: 2018-04-09 | End: 2018-04-12 | Stop reason: HOSPADM

## 2018-04-09 RX ORDER — HYDROCHLOROTHIAZIDE 50 MG/1
50 TABLET ORAL 3 TIMES DAILY
Status: DISCONTINUED | OUTPATIENT
Start: 2018-04-09 | End: 2018-04-09

## 2018-04-09 RX ADMIN — ENOXAPARIN SODIUM 30 MG: 30 INJECTION SUBCUTANEOUS at 02:40

## 2018-04-09 RX ADMIN — SODIUM CHLORIDE 100 ML/HR: 9 INJECTION, SOLUTION INTRAVENOUS at 02:40

## 2018-04-09 RX ADMIN — HYDROCHLOROTHIAZIDE 50 MG: 50 TABLET ORAL at 09:29

## 2018-04-09 RX ADMIN — ONDANSETRON 4 MG: 2 INJECTION INTRAMUSCULAR; INTRAVENOUS at 13:09

## 2018-04-09 RX ADMIN — HYDROCODONE BITARTRATE AND ACETAMINOPHEN 1 TABLET: 5; 325 TABLET ORAL at 23:21

## 2018-04-09 RX ADMIN — INSULIN ASPART 6 UNITS: 100 INJECTION, SOLUTION INTRAVENOUS; SUBCUTANEOUS at 09:29

## 2018-04-09 RX ADMIN — ALPRAZOLAM 0.5 MG: 0.5 TABLET ORAL at 18:00

## 2018-04-09 RX ADMIN — HYDRALAZINE HYDROCHLORIDE 50 MG: 50 TABLET, FILM COATED ORAL at 23:22

## 2018-04-09 RX ADMIN — SUCRALFATE 1 G: 1 SUSPENSION ORAL at 23:22

## 2018-04-09 RX ADMIN — SUCRALFATE 1 G: 1 SUSPENSION ORAL at 13:08

## 2018-04-09 RX ADMIN — LISINOPRIL 20 MG: 20 TABLET ORAL at 09:29

## 2018-04-09 RX ADMIN — HYDROCODONE BITARTRATE AND ACETAMINOPHEN 1 TABLET: 5; 325 TABLET ORAL at 04:46

## 2018-04-09 RX ADMIN — SUCRALFATE 1 G: 1 SUSPENSION ORAL at 09:29

## 2018-04-09 RX ADMIN — LORAZEPAM 1 MG: 1 TABLET ORAL at 17:59

## 2018-04-09 RX ADMIN — ASPIRIN 81 MG: 81 TABLET ORAL at 09:29

## 2018-04-09 RX ADMIN — FAMOTIDINE 40 MG: 40 TABLET, FILM COATED ORAL at 09:29

## 2018-04-09 RX ADMIN — HYDROMORPHONE HYDROCHLORIDE 0.5 MG: 10 INJECTION INTRAMUSCULAR; INTRAVENOUS; SUBCUTANEOUS at 07:08

## 2018-04-09 RX ADMIN — SUCRALFATE 1 G: 1 SUSPENSION ORAL at 18:01

## 2018-04-09 RX ADMIN — FOLIC ACID 1000 MCG: 1 TABLET ORAL at 09:29

## 2018-04-09 RX ADMIN — HYDRALAZINE HYDROCHLORIDE 50 MG: 50 TABLET, FILM COATED ORAL at 16:48

## 2018-04-09 RX ADMIN — HYDROCODONE BITARTRATE AND ACETAMINOPHEN 1 TABLET: 5; 325 TABLET ORAL at 13:30

## 2018-04-09 RX ADMIN — LEVOTHYROXINE SODIUM 125 MCG: 125 TABLET ORAL at 09:29

## 2018-04-09 NOTE — NURSING NOTE
Paged Dr. Taylor x 2 for the okay for midline or PICC for IV access since the patient has no site at this moment. Awaiting call back. Notified MRI that pt does not have access and awaiting return call from the tech to see if they are going to proceed with MRI tonight or wait until tomorrow.

## 2018-04-09 NOTE — H&P
Name: Ya Paz ADMIT: 2018   : 1945  PCP: Nicki Hidalgo MD    MRN: [id number] LOS: 1 days   AGE/SEX: 72 y.o. female  ROOM: 435/1     Chief Complaint   Patient presents with   • Hip Pain     left sided hip pain. denies injury at this time, woke this morning with pain.        History of present illness  Ms. Paz is a 72 y.o. female has a history of History of diabetes mellitus, chronic kidney disease and polyps and presents to [facility] complaining of left-sided hip pain which started suddenly yesterday and no history of fall.  She has no weakness or external injuries.  In addition she complains of weight abdominal pain.  CT of the abdomen shows a soft tissue mass in the small bowel.  She has a history of polyps in the past and normally followed by GI.  She does not have any nausea vomiting.  She has a mild elevation of white count.  X-ray of the hip showed no acute findings.         Past Medical History:   Diagnosis Date   • Anxiety    • Clostridium difficile infection 2016    and in    • Diabetes mellitus    • Disease of thyroid gland    • Hypertension    • Migraine    • Pancreatitis    • Tachycardia    • TIA (transient ischemic attack)      Past Surgical History:   Procedure Laterality Date   • APPENDECTOMY     • CARDIAC CATHETERIZATION     •  SECTION     • CHOLECYSTECTOMY     • COLONOSCOPY      normal per ptDr. Reagan   • COLONOSCOPY N/A 3/1/2017    multiple polyps, tics, IH, scar in transverse colon   • ENDOSCOPY N/A 3/1/2017    gastroplasty, nodular mucosa in gastric antrum, nodule in duodenum   • ENDOSCOPY N/A 2018    Procedure: ESOPHAGOGASTRODUODENOSCOPY;  Surgeon: Paloma Santizo MD;  Location: [facility] ENDOSCOPY;  Service:    • GASTRIC BYPASS     • HERNIA REPAIR     • LUNG SURGERY      mass LL   • THYROID SURGERY       Family History   Problem Relation Age of Onset   • Pancreatitis Brother    • Ulcerative colitis Son    • Crohn's  disease Grandchild      Social History   Substance Use Topics   • Smoking status: Never Smoker   • Smokeless tobacco: Never Used   • Alcohol use No     Prescriptions Prior to Admission   Medication Sig Dispense Refill Last Dose   • aspirin 81 MG EC tablet Take 81 mg by mouth Daily.   4/7/2018 at Unknown time   • folic acid (FOLVITE) 1 MG tablet TAKE ONE TABLET BY MOUTH ONCE DAILY   4/7/2018 at Unknown time   • hydrochlorothiazide (HYDRODIURIL) 25 MG tablet Take 50 mg by mouth 3 (Three) Times a Day.   4/8/2018 at Unknown time   • HYDROcodone-acetaminophen (NORCO) 5-325 MG per tablet Take 1 tablet by mouth Every 6 (Six) Hours As Needed for Moderate Pain .   4/8/2018 at Unknown time   • hydrOXYzine (ATARAX) 25 MG tablet Take 25 mg by mouth At Night As Needed for Itching (1-3 tablets every night at bedtime for itching).   Past Week at Unknown time   • insulin aspart (NOVOLOG FLEXPEN) 100 UNIT/ML solution pen-injector sc pen 6 units before each meal with sliding scale with a max of 100 units daily (Patient taking differently: 7 units before each meal with sliding scale with a max of 100 units daily per patient) 5 pen 4 4/8/2018 at Unknown time   • Insulin Glargine (TOUJEO SOLOSTAR) 300 UNIT/ML solution pen-injector Inject 15 Units under the skin Daily With Breakfast. Adjust dose according to accucheck    4/8/2018 at Unknown time   • levothyroxine (SYNTHROID, LEVOTHROID) 125 MCG tablet Take 125 mcg by mouth Daily.   4/8/2018 at Unknown time   • predniSONE (DELTASONE) 5 MG tablet Take 5 mg by mouth Daily.   4/8/2018 at Unknown time   • raNITIdine (ZANTAC) 300 MG tablet TAKE 1 TABLET BY MOUTH EVERY NIGHT 90 tablet 3 4/7/2018 at Unknown time   • sucralfate (CARAFATE) 1 GM/10ML suspension Take 10 mL by mouth 4 (Four) Times a Day With Meals & at Bedtime. 840 mL 5 4/7/2018 at Unknown time   • ALPRAZolam (XANAX) 0.5 MG tablet Take 0.5 mg by mouth 3 (Three) Times a Day As Needed.   Unknown at Unknown time   • atorvastatin  "(LIPITOR) 10 MG tablet TAKE ONE TABLET BY MOUTH ONCE DAILY   Not Taking   • carvedilol (COREG) 6.25 MG tablet TAKE ONE TABLET BY MOUTH TWICE DAILY   Not Taking   • Cholecalciferol (VITAMIN D) 1000 UNITS tablet Take 1,000 Units by mouth 2 (Two) Times a Day.   Unknown at Unknown time   • Insulin Pen Needle 32G X 4 MM misc Use to inject insulin 4 times daily 120 each 5 2/13/2018 at Unknown time   • LEVEMIR FLEXPEN 100 UNIT/ML injection 30 units in the AM titrate up as instructed with a max of 100 units daily 5 pen 4 Not Taking   • lisinopril (PRINIVIL,ZESTRIL) 10 MG tablet Take 20 mg by mouth Daily.   Unknown at Unknown time   • losartan (COZAAR) 100 MG tablet Take 100 mg by mouth Daily.   Not Taking at Unknown time   • lubiprostone (AMITIZA) 24 MCG capsule Take 1 capsule by mouth 2 (Two) Times a Day With Meals. (Patient not taking: Reported on 4/8/2018) 60 capsule 2 Not Taking at Unknown time   • metroNIDAZOLE (FLAGYL) 500 MG tablet Take 1 tablet by mouth 3 (Three) Times a Day. 30 tablet 0 Not Taking     Allergies:    Allergies   Allergen Reactions   • Augmentin [Amoxicillin-Pot Clavulanate] Rash     Rash in mouth  Rash in mouth   • Dapsone Other (See Comments)     Pt states she \"quit producing blood.\"   • Detrol [Tolterodine Tartrate]      Pt. Does not recall this allergy.   • Levaquin [Levofloxacin] Hives   • Sulfa Antibiotics      Sister went blind with taking and was advised not to take.  Sister went blind with taking and was advised not to take.   • Tetracycline Hives and Itching   • Tetracyclines & Related        Review of Systems   Constitutional: Negative for activity change, appetite change, chills, fatigue and fever.   HENT: Negative for congestion, ear discharge, mouth sores, nosebleeds, sneezing, sore throat and trouble swallowing.    Eyes: Negative for discharge, itching and visual disturbance.   Respiratory: Negative for apnea, cough, chest tightness, shortness of breath, wheezing and stridor.  "   Cardiovascular: Negative for chest pain, palpitations and leg swelling.   Gastrointestinal: Positive for abdominal pain. Negative for abdominal distention, blood in stool, constipation, diarrhea, nausea and vomiting.   Endocrine: Negative for cold intolerance, heat intolerance and polydipsia.   Genitourinary: Negative for difficulty urinating and frequency.   Musculoskeletal: Negative for arthralgias, back pain, gait problem, joint swelling and neck stiffness.        Left hip pain   Skin: Negative for color change, pallor and rash.   Neurological: Negative for dizziness, seizures, syncope, facial asymmetry, weakness, numbness and headaches.   Hematological: Negative for adenopathy. Does not bruise/bleed easily.   Psychiatric/Behavioral: Negative for agitation, behavioral problems and hallucinations.        Objective    Vital Signs  Temp:  [98 °F (36.7 °C)] 98 °F (36.7 °C)  Heart Rate:  [59-70] 70  Resp:  [16-20] 18  BP: (117-201)/() 126/54  SpO2:  [94 %-98 %] 94 %  on   ;   Device (Oxygen Therapy): room air  Body mass index is 35.25 kg/m².    Physical Exam   Constitutional: She is oriented to person, place, and time. She appears well-developed and well-nourished. No distress.   HENT:   Head: Normocephalic and atraumatic.   Nose: No epistaxis.   Mouth/Throat: Oropharynx is clear and moist.   Eyes: Conjunctivae and EOM are normal. Pupils are equal, round, and reactive to light.   Neck: Normal range of motion. Neck supple. No JVD present. No tracheal deviation and no edema present. No thyroid mass and no thyromegaly present.   Cardiovascular: Normal rate, regular rhythm and normal heart sounds.    No murmur heard.  Pulmonary/Chest: Breath sounds normal. She has no decreased breath sounds. She has no wheezes.   Abdominal: Soft. Normal appearance and bowel sounds are normal. She exhibits no ascites. There is no hepatosplenomegaly.   Musculoskeletal: Normal range of motion. She exhibits no edema.        Left hip:  She exhibits tenderness. She exhibits no deformity.   Neurological: She is alert and oriented to person, place, and time.   Skin: Skin is warm, dry and intact. No rash noted. No cyanosis or erythema. No pallor. Nails show no clubbing.   Psychiatric: She has a normal mood and affect. Her behavior is normal.   Vitals reviewed.      Results Review:   I reviewed the patient's new clinical results.    Lab Results (last 24 hours)     Procedure Component Value Units Date/Time    CBC & Differential [[id number]] Collected:  04/08/18 1734    Specimen:  Blood Updated:  04/08/18 1754    Narrative:       The following orders were created for panel order CBC & Differential.  Procedure                               Abnormality         Status                     ---------                               -----------         ------                     CBC Auto Differential[[id number]]        Abnormal            Final result                 Please view results for these tests on the individual orders.    Comprehensive Metabolic Panel [[id number]]  (Abnormal) Collected:  04/08/18 1734    Specimen:  Blood Updated:  04/08/18 1813     Glucose 131 (H) mg/dL      BUN 43 (H) mg/dL      Creatinine 1.84 (H) mg/dL      Sodium 139 mmol/L      Potassium 4.8 mmol/L      Chloride 101 mmol/L      CO2 23.2 mmol/L      Calcium 9.2 mg/dL      Total Protein 6.8 g/dL      Albumin 3.50 g/dL      ALT (SGPT) 13 U/L      AST (SGOT) 13 U/L      Alkaline Phosphatase 60 U/L      Total Bilirubin 0.3 mg/dL      eGFR Non African Amer 27 (L) mL/min/1.73      Globulin 3.3 gm/dL      A/G Ratio 1.1 g/dL      BUN/Creatinine Ratio 23.4     Anion Gap 14.8 mmol/L     Narrative:       The MDRD GFR formula is only valid for adults with stable renal function between ages 18 and 70.    Lipase [[id number]]  (Normal) Collected:  04/08/18 1734    Specimen:  Blood Updated:  04/08/18 1813     Lipase 45 U/L     CBC Auto Differential [[id number]]  (Abnormal) Collected:  04/08/18 1734     Specimen:  Blood Updated:  04/08/18 1754     WBC 13.39 (H) 10*3/mm3      RBC 3.79 (L) 10*6/mm3      Hemoglobin 10.9 (L) g/dL      Hematocrit 35.0 (L) %      MCV 92.3 fL      MCH 28.8 pg      MCHC 31.1 (L) g/dL      RDW 14.3 (H) %      RDW-SD 48.1 fl      MPV 10.0 fL      Platelets 296 10*3/mm3      Neutrophil % 85.0 (H) %      Lymphocyte % 9.0 (L) %      Monocyte % 4.9 (L) %      Eosinophil % 0.8 %      Basophil % 0.1 %      Immature Grans % 0.2 %      Neutrophils, Absolute 11.38 (H) 10*3/mm3      Lymphocytes, Absolute 1.20 10*3/mm3      Monocytes, Absolute 0.65 10*3/mm3      Eosinophils, Absolute 0.11 10*3/mm3      Basophils, Absolute 0.02 10*3/mm3      Immature Grans, Absolute 0.03 10*3/mm3     Troponin [[id number]]  (Normal) Collected:  04/08/18 1734    Specimen:  Blood Updated:  04/08/18 1836     Troponin T <0.010 ng/mL     Narrative:       Troponin T Reference Ranges:  Less than 0.03 ng/mL:    Negative for AMI  0.03 to 0.09 ng/mL:      Indeterminant for AMI  Greater than 0.09 ng/mL: Positive for AMI    CK [[id number]]  (Normal) Collected:  04/08/18 1734    Specimen:  Blood Updated:  04/08/18 1838     Creatine Kinase 51 U/L     Urinalysis With / Microscopic If Indicated - Urine, Catheter [[id number]]  (Abnormal) Collected:  04/08/18 1755    Specimen:  Urine from Urine, Catheter Updated:  04/08/18 1822     Color, UA Yellow     Appearance, UA Clear     pH, UA 7.0     Specific Gravity, UA 1.009     Glucose, UA Negative     Ketones, UA Negative     Bilirubin, UA Negative     Blood, UA Negative     Protein, UA 30 mg/dL (1+) (A)     Leuk Esterase, UA Negative     Nitrite, UA Negative     Urobilinogen, UA 0.2 E.U./dL    Urinalysis, Microscopic Only - Urine, Clean Catch [[id number]] Collected:  04/08/18 1755    Specimen:  Urine from Urine, Catheter Updated:  04/08/18 1822     RBC, UA 0-2 /HPF      WBC, UA 0-2 /HPF      Bacteria, UA None Seen /HPF      Squamous Epithelial Cells, UA 0-2 /HPF      Hyaline Casts, UA 0-2 /LPF   "    Methodology Automated Microscopy    POC Glucose Once [[id number]]  (Normal) Collected:  04/08/18 1813    Specimen:  Blood Updated:  04/08/18 1814     Glucose 116 mg/dL     Narrative:       Meter: [id number] : [id number] Shiv Goldman RN    Lactic Acid, Plasma [[id number]]  (Normal) Collected:  04/08/18 2127    Specimen:  Blood Updated:  04/08/18 2150     Lactate 0.7 mmol/L     Procalcitonin [[id number]]  (Abnormal) Collected:  04/08/18 2127    Specimen:  Blood Updated:  04/08/18 2214     Procalcitonin 0.08 (L) ng/mL     Narrative:       As a Marker for Sepsis (Non-Neonates):   1. <0.5 ng/mL represents a low risk of severe sepsis and/or septic shock.  1. >2 ng/mL represents a high risk of severe sepsis and/or septic shock.    As a Marker for Lower Respiratory Tract Infections that require antibiotic therapy:  PCT on Admission     Antibiotic Therapy             6-12 Hrs later  > 0.5                Strongly Recommended            >0.25 - <0.5         Recommended  0.1 - 0.25           Discouraged                   Remeasure/reassess PCT  <0.1                 Strongly Discouraged          Remeasure/reassess PCT      As 28 day mortality risk marker: \"Change in Procalcitonin Result\" (> 80 % or <=80 %) if Day 0 (or Day 1) and Day 4 values are available. Refer to http://www.[facility]-pct-calculator.com/   Change in PCT <=80 %   A decrease of PCT levels below or equal to 80 % defines a positive change in PCT test result representing a higher risk for 28-day all-cause mortality of patients diagnosed with severe sepsis or septic shock.  Change in PCT > 80 %   A decrease of PCT levels of more than 80 % defines a negative change in PCT result representing a lower risk for 28-day all-cause mortality of patients diagnosed with severe sepsis or septic shock.                      CT Chest Without Contrast   Preliminary Result   1. Segment of small bowel in the left upper quadrant appears mildly   dilated and heterogeneous in density " and could conceivably contain a   soft tissue mass. This may represent focal area of inflammatory bowel   disease or neoplastic mass. No other bowel dilatation is seen. There is   some surrounding inflammatory change.   2. No evidence of pneumoperitoneum.   3. Colonic diverticulosis.   4. Multiple renal lesions may all represent benign renal cysts.   5. No acute process demonstrated in the chest.           Radiation dose reduction techniques were utilized, including automated   exposure control and exposure modulation based on body size.              CT Abdomen Pelvis Without Contrast   Preliminary Result   1. Segment of small bowel in the left upper quadrant appears mildly   dilated and heterogeneous in density and could conceivably contain a   soft tissue mass. This may represent focal area of inflammatory bowel   disease or neoplastic mass. No other bowel dilatation is seen. There is   some surrounding inflammatory change.   2. No evidence of pneumoperitoneum.   3. Colonic diverticulosis.   4. Multiple renal lesions may all represent benign renal cysts.   5. No acute process demonstrated in the chest.           Radiation dose reduction techniques were utilized, including automated   exposure control and exposure modulation based on body size.              XR Hip With or Without Pelvis 2 - 3 View Left   Preliminary Result   No acute process identified.          MRI Hip Left Without Contrast    (Results Pending)     Assessment/Plan      Active Hospital Problems (** Indicates Principal Problem)    Diagnosis Date Noted   • **Hip pain, acute, left [M25.552] 04/09/2018   • Abdominal mass [R19.00] 04/08/2018     Priority: High   • Diabetes mellitus type 2, uncontrolled, with complications [E11.8, E11.65] 04/07/2017   • Postsurgical hypothyroidism [E89.0] 04/07/2017   • CKD (chronic kidney disease) stage 3, GFR 30-59 ml/min [N18.3] 11/11/2016      Resolved Hospital Problems    Diagnosis Date Noted Date Resolved   No  resolved problems to display.         Ms. Paz is a 72 y.o. female who Acute left hip pain with no history of fall.  The x-ray had no acute changes but she needs MRI which can be done in the morning.  In addition she has a abdominal mass which was seen on the CT.  This is seen in small bowel and she has a history of polyps in the past.  Will consult GI see her.  And meantime continue IV fluids along with sliding scale insulin.  I will keep her in a soft diet.          Delonte Loving MD  [facility]  04/09/18

## 2018-04-09 NOTE — CONSULTS
[facility]  Initial Inpatient Consult Note    Referring Provider: Dr. Werner    Reason for Consultation: Possible mass in small bowel seen in LUQ and found on CT of the abd/pelvis    Subjective     History of present illness:    72 y.o. female who was admitted 4/8/18 with left hip pain that started the AM of admission. No h/o trauma and xrays did not show any fracture of the left hip. She did have some LLQ abdominal pain and some substernal burning discomfort so a CT of the chest, abd, and pelvis without IV contrast was done on 4/8/18 that showed:    IMPRESSION:  1. Segment of small bowel in the left upper quadrant appears mildly  dilated and heterogeneous in density and could conceivably contain a  soft tissue mass. This may represent focal area of inflammatory bowel  disease or neoplastic mass. No other bowel dilatation is seen. There is  some surrounding inflammatory change.  2. No evidence of pneumoperitoneum.  3. Colonic diverticulosis.  4. Multiple renal lesions may all represent benign renal cysts.  5. No acute process demonstrated in the chest.    I did review this CT abd/pelvis with Dr. Leonard and he wants to go over it with Dr. Heaven Wolfe because he isn't sure exactly what is in the LUQ of the abdomen.     She tells me that the LLQ abdominal pain is gone though she has taken some pain meds for this. She has no nausea or vomiting. No fevers, chills. Nornally she has alternating diarrhea and constipation. No rectal bleeding or melena. Her weight has decreased about 7 pounds since [place]. She has a poor appetite and had bariatric surgery in 1984 by Dr. GUSTAVO Reagan. Her last EGD (by Dr. Paloma Santizo) was done 2/14/18 that showed:  - Normal esophagus.  - A banded gastroplasty was found, characterized by a non-intact appearance.  - No endoscopic esophageal abnormality to explain patient's dysphagia.    The patient then had an UGI series that was felt to show that some of the  staples of the verticle banded gastroplasty had broken down and barium was going around the possibly banded part of the gastroplasty.    The patient last had a colonoscopy 3/1/17 by Dr. LUBA Santizo when several small polyps were removed (tubular adenomas). It was recommended by Dr. Santizo that the patient have a repeat colonsocopy in 3 yrs. She is a nonsmoker and occasionally drinks wine. She denies NSAID's use.     Past Medical History:  Past Medical History:   Diagnosis Date   • Anxiety    • Clostridium difficile infection 2016    and in    • Diabetes mellitus    • Disease of thyroid gland    • Hypertension    • Migraine    • Pancreatitis    • Tachycardia    • TIA (transient ischemic attack)      Past Surgical History:  Past Surgical History:   Procedure Laterality Date   • APPENDECTOMY     • CARDIAC CATHETERIZATION     •  SECTION     • CHOLECYSTECTOMY     • COLONOSCOPY      normal per pt, Dr. Reagan   • COLONOSCOPY N/A 3/1/2017    multiple polyps, tics, IH, scar in transverse colon   • ENDOSCOPY N/A 3/1/2017    gastroplasty, nodular mucosa in gastric antrum, nodule in duodenum   • ENDOSCOPY N/A 2018    Procedure: ESOPHAGOGASTRODUODENOSCOPY;  Surgeon: Paloma Santizo MD;  Location: [facility] ENDOSCOPY;  Service:    • GASTRIC BYPASS     • HERNIA REPAIR     • LUNG SURGERY      mass LL   • THYROID SURGERY        Social History:   Social History   Substance Use Topics   • Smoking status: Never Smoker   • Smokeless tobacco: Never Used   • Alcohol use No      Family History:  Family History   Problem Relation Age of Onset   • Pancreatitis Brother    • Ulcerative colitis Son    • Crohn's disease Grandchild        Home Meds:  Prescriptions Prior to Admission   Medication Sig Dispense Refill Last Dose   • aspirin 81 MG EC tablet Take 81 mg by mouth Daily.   2018 at Unknown time   • folic acid (FOLVITE) 1 MG tablet TAKE ONE TABLET BY MOUTH ONCE DAILY   2018 at Unknown time   •  hydrochlorothiazide (HYDRODIURIL) 25 MG tablet Take 50 mg by mouth 3 (Three) Times a Day.   4/8/2018 at Unknown time   • HYDROcodone-acetaminophen (NORCO) 5-325 MG per tablet Take 1 tablet by mouth Every 6 (Six) Hours As Needed for Moderate Pain .   4/8/2018 at Unknown time   • hydrOXYzine (ATARAX) 25 MG tablet Take 25 mg by mouth At Night As Needed for Itching (1-3 tablets every night at bedtime for itching).   Past Week at Unknown time   • insulin aspart (NOVOLOG FLEXPEN) 100 UNIT/ML solution pen-injector sc pen 6 units before each meal with sliding scale with a max of 100 units daily (Patient taking differently: 7 units before each meal with sliding scale with a max of 100 units daily per patient) 5 pen 4 4/8/2018 at Unknown time   • Insulin Glargine (TOUJEO SOLOSTAR) 300 UNIT/ML solution pen-injector Inject 15 Units under the skin Daily With Breakfast. Adjust dose according to accucheck    4/8/2018 at Unknown time   • levothyroxine (SYNTHROID, LEVOTHROID) 125 MCG tablet Take 125 mcg by mouth Daily.   4/8/2018 at Unknown time   • predniSONE (DELTASONE) 5 MG tablet Take 5 mg by mouth Daily.   4/8/2018 at Unknown time   • raNITIdine (ZANTAC) 300 MG tablet TAKE 1 TABLET BY MOUTH EVERY NIGHT 90 tablet 3 4/7/2018 at Unknown time   • sucralfate (CARAFATE) 1 GM/10ML suspension Take 10 mL by mouth 4 (Four) Times a Day With Meals & at Bedtime. 840 mL 5 4/7/2018 at Unknown time   • ALPRAZolam (XANAX) 0.5 MG tablet Take 0.5 mg by mouth 3 (Three) Times a Day As Needed.   Unknown at Unknown time   • atorvastatin (LIPITOR) 10 MG tablet TAKE ONE TABLET BY MOUTH ONCE DAILY   Not Taking   • carvedilol (COREG) 6.25 MG tablet TAKE ONE TABLET BY MOUTH TWICE DAILY   Not Taking   • Cholecalciferol (VITAMIN D) 1000 UNITS tablet Take 1,000 Units by mouth 2 (Two) Times a Day.   Unknown at Unknown time   • Insulin Pen Needle 32G X 4 MM misc Use to inject insulin 4 times daily 120 each 5 2/13/2018 at Unknown time   • LEVEMIR FLEXPEN 100  "UNIT/ML injection 30 units in the AM titrate up as instructed with a max of 100 units daily 5 pen 4 Not Taking   • lisinopril (PRINIVIL,ZESTRIL) 10 MG tablet Take 20 mg by mouth Daily.   Unknown at Unknown time   • losartan (COZAAR) 100 MG tablet Take 100 mg by mouth Daily.   Not Taking at Unknown time   • lubiprostone (AMITIZA) 24 MCG capsule Take 1 capsule by mouth 2 (Two) Times a Day With Meals. (Patient not taking: Reported on 4/8/2018) 60 capsule 2 Not Taking at Unknown time   • metroNIDAZOLE (FLAGYL) 500 MG tablet Take 1 tablet by mouth 3 (Three) Times a Day. 30 tablet 0 Not Taking     Current Meds:     aspirin 81 mg Oral Daily   enoxaparin 30 mg Subcutaneous Q24H   famotidine 40 mg Oral Daily   folic acid 1,000 mcg Oral Daily   hydrochlorothiazide 50 mg Oral TID   insulin aspart 0-7 Units Subcutaneous 4x Daily With Meals & Nightly   insulin aspart 6 Units Subcutaneous TID With Meals   insulin detemir 15 Units Subcutaneous QAM   levothyroxine 125 mcg Oral Daily   lisinopril 20 mg Oral Daily   sucralfate 1 g Oral 4x Daily With Meals & Nightly     Allergies:  Allergies   Allergen Reactions   • Augmentin [Amoxicillin-Pot Clavulanate] Rash     Rash in mouth  Rash in mouth   • Dapsone Other (See Comments)     Pt states she \"quit producing blood.\"   • Detrol [Tolterodine Tartrate]      Pt. Does not recall this allergy.   • Levaquin [Levofloxacin] Hives   • Sulfa Antibiotics      Sister went blind with taking and was advised not to take.  Sister went blind with taking and was advised not to take.   • Tetracycline Hives and Itching   • Tetracyclines & Related      Review of Systems  The following systems were reviewed and negative;  respiratory, cardiovascular, musculoskeletal and neurological     Objective     Vital Signs  Temp:  [97.8 °F (36.6 °C)-98 °F (36.7 °C)] 97.8 °F (36.6 °C)  Heart Rate:  [59-70] 70  Resp:  [16-20] 18  BP: (117-201)/() 134/55  Physical Exam:  General Appearance:    Alert, cooperative, " in no acute distress   Head:    Normocephalic, without obvious abnormality, atraumatic   Eyes:            Lids and lashes normal, conjunctivae and sclerae normal, no   icterus   Throat:   No oral lesions, no thrush, oral mucosa moist   Neck:   No adenopathy, supple, trachea midline, no thyromegaly, no   carotid bruit, no JVD   Lungs:     Clear to auscultation,respirations regular, even and                   unlabored    Heart:    Regular rhythm and normal rate, normal S1 and S2, no            murmur, no gallop, no rub, no click   Chest Wall:    No abnormalities observed   Abdomen:     Normal bowel sounds, no masses, no organomegaly, soft        non-tender, non-distended, no guarding, no rebound                 tenderness   Rectal:     Deferred   Extremities:   no edema, no cyanosis, no redness   Skin:   No bleeding, bruising or rash   Lymph nodes:   No palpable adenopathy   Psychiatric:  Judgement and insight: normal   Orientation to person place and time: normal   Mood and affect: normal   Results Review:   I reviewed the patient's new clinical results.      Results from last 7 days  Lab Units 04/09/18  0434 04/08/18  1734   WBC 10*3/mm3 8.30 13.39*   HEMOGLOBIN g/dL 9.8* 10.9*   HEMATOCRIT % 32.8* 35.0*   PLATELETS 10*3/mm3 238 296       Results from last 7 days  Lab Units 04/09/18  0434 04/08/18  1734   SODIUM mmol/L 141 139   POTASSIUM mmol/L 5.1 4.8   CHLORIDE mmol/L 107 101   CO2 mmol/L 18.7* 23.2   BUN mg/dL 42* 43*   CREATININE mg/dL 1.85* 1.84*   CALCIUM mg/dL 8.5* 9.2   BILIRUBIN mg/dL  --  0.3   ALK PHOS U/L  --  60   ALT (SGPT) U/L  --  13   AST (SGOT) U/L  --  13   GLUCOSE mg/dL 92 131*           Lab Results  Lab Value Date/Time   LIPASE 45 04/08/2018 1734       Radiology:  CT Chest Without Contrast   Preliminary Result   1. Segment of small bowel in the left upper quadrant appears mildly   dilated and heterogeneous in density and could conceivably contain a   soft tissue mass. This may represent focal  area of inflammatory bowel   disease or neoplastic mass. No other bowel dilatation is seen. There is   some surrounding inflammatory change.   2. No evidence of pneumoperitoneum.   3. Colonic diverticulosis.   4. Multiple renal lesions may all represent benign renal cysts.   5. No acute process demonstrated in the chest.           Radiation dose reduction techniques were utilized, including automated   exposure control and exposure modulation based on body size.              CT Abdomen Pelvis Without Contrast   Preliminary Result   1. Segment of small bowel in the left upper quadrant appears mildly   dilated and heterogeneous in density and could conceivably contain a   soft tissue mass. This may represent focal area of inflammatory bowel   disease or neoplastic mass. No other bowel dilatation is seen. There is   some surrounding inflammatory change.   2. No evidence of pneumoperitoneum.   3. Colonic diverticulosis.   4. Multiple renal lesions may all represent benign renal cysts.   5. No acute process demonstrated in the chest.           Radiation dose reduction techniques were utilized, including automated   exposure control and exposure modulation based on body size.              XR Hip With or Without Pelvis 2 - 3 View Left   Preliminary Result   No acute process identified.          MRI Hip Left Without Contrast    (Results Pending)       Assessment/Plan   Patient Active Problem List   Diagnosis   • Diverticulitis of large intestine without perforation or abscess without bleeding   • ARF (acute renal failure)   • CKD (chronic kidney disease) stage 3, GFR 30-59 ml/min   • Proteinuria   • Diverticulitis   • Hypervitaminosis D   • Postsurgical hypothyroidism   • Chronic fatigue   • Diabetes mellitus type 2, uncontrolled, with complications   • Heartburn   • Dysphagia   • Chest pain   • Abdominal mass   • Hip pain, acute, left       I discussed the patients findings and my recommendations with patient and nursing  staff.    Asssessment:  1) Dyspepsia in a 72 you who is s/p vertical banded gastroplasty in 1984 by Dr. MARYJO Reagan. UGI and EGD seem to show some breakdown of some of the sutures such that barium is going around the banded part of this verticle banded gastroplasty.  2) Possible small bowel mass in the LUQ of the abdomen as seen on CT abd/pelvis? This would be difficult to get to endoscopically since Dr. LUBA Santizo could not complete the 2/18 EGD because of a sharp turn in the stomach. In discussing this with Dr. Heaven Wolfe this knuckle of small bowel was distended during 10/17 CT abdomen. She is not convinced that there is a small bowel mass but there is some inflammation around that area of small bowel (enteritis).   3) Weight loss in a 72 who has had bariatric surgery in 1984.  4) Anemia    Recommendations:  1) I would recommend that she put on IV antibiotics. Because of all of her allergies to antibiotics I will defer to A to choose the antibiotics (Cipro and flagyl but she is allergic to Levaquin).  2) Continue a PPI with Carafate elixir.  3) Check anemia labs and hemoccult stool.   4) Repeat CT abd without IV contrast (because of CRI) in 8 ronny weeks.     Silverio Shah MD

## 2018-04-09 NOTE — PROGRESS NOTES
Discharge Planning Assessment  [facility]     Patient Name: Ya Paz  MRN: [id number]  Today's Date: 4/9/2018    Admit Date: 4/8/2018          Discharge Needs Assessment     Row Name 04/09/18 1524       Living Environment    Lives With spouse    Current Living Arrangements home/apartment/condo    Primary Care Provided by self    Family Caregiver if Needed spouse    Quality of Family Relationships helpful;involved    Able to Return to Prior Arrangements yes    Living Arrangement Comments pt lives with spouse in a first floor apartment       Resource/Environmental Concerns    Resource/Environmental Concerns none       Transition Planning    Patient/Family Anticipates Transition to home with family    Transportation Anticipated family or friend will provide       Discharge Needs Assessment    Readmission Within the Last 30 Days no previous admission in last 30 days    Concerns to be Addressed no discharge needs identified;denies needs/concerns at this time    Equipment Currently Used at Home none    Anticipated Changes Related to Illness none    Equipment Needed After Discharge none            Discharge Plan     Row Name 04/09/18 1525       Plan    Plan home with spouse; Denies needs    Patient/Family in Agreement with Plan yes    Plan Comments Checked IMM. Met with pt bedside. Confirmed facesheet correct. Explained role of CCP. Pt lives with spouse in a first floor apartment. Pt reports she is IADLs no DME used but has a cane and walker at home if needed. Pt has never used HH or SNF in past. Pt uses [facility] on [address] no issues. No POA. Pt reports she plans home with spouse denies d/c needs…CCP to follow…TAMI RiddleW        Destination     No service coordination in this encounter.      Durable Medical Equipment     No service coordination in this encounter.      Dialysis/Infusion     No service coordination in this encounter.      Home Medical Care     No service coordination in this encounter.       Social Care     No service coordination in this encounter.                Demographic Summary     Row Name 04/09/18 1524       General Information    Admission Type inpatient    Required Notices Provided Important Message from Medicare    Reason for Consult discharge planning    Preferred Language English     Used During This Interaction no       Contact Information    Permission Granted to Share Info With facility ;family/designee            Functional Status     Row Name 04/09/18 1524       Functional Status    Usual Activity Tolerance good    Current Activity Tolerance moderate       Functional Status, IADL    Medications independent    Meal Preparation independent    Housekeeping independent    Laundry independent    Shopping independent       Mental Status    General Appearance WDL WDL       Mental Status Summary    Recent Changes in Mental Status/Cognitive Functioning no changes            Psychosocial    No documentation.           Abuse/Neglect    No documentation.           Legal    No documentation.           Substance Abuse    No documentation.           Patient Forms    No documentation.         Cassie Mcginnis

## 2018-04-09 NOTE — PLAN OF CARE
"Problem: Fall Risk (Adult)  Goal: Identify Related Risk Factors and Signs and Symptoms  Outcome: Ongoing (interventions implemented as appropriate)    Goal: Absence of Fall  Outcome: Ongoing (interventions implemented as appropriate)      Problem: Patient Care Overview  Goal: Plan of Care Review  Outcome: Ongoing (interventions implemented as appropriate)   04/09/18 0532   Coping/Psychosocial   Plan of Care Reviewed With patient   Plan of Care Review   Progress no change   OTHER   Outcome Summary Pt admitted with left hip pain and and abd mass found on CT. Pt scheduled for MRI in morning but does not want the MRI until she speaks with her nephrologist. I spoke with Dr. Loving and he stated to \"let day shift figure that out\". Pt's IV infiltrated and left arm is edematous. Cold compress provided. New IV obtained in left shoulder. Per IV nurse pt may need a PICC because she is such a hard stick. VSS. Safety maintained. Continue to monitor.       Problem: Pain, Chronic (Adult)  Goal: Identify Related Risk Factors and Signs and Symptoms  Outcome: Ongoing (interventions implemented as appropriate)    Goal: Acceptable Pain/Comfort Level and Functional Ability  Outcome: Ongoing (interventions implemented as appropriate)      Problem: Pain, Acute (Adult)  Goal: Identify Related Risk Factors and Signs and Symptoms  Outcome: Ongoing (interventions implemented as appropriate)    Goal: Acceptable Pain Control/Comfort Level  Outcome: Ongoing (interventions implemented as appropriate)        "

## 2018-04-09 NOTE — NURSING NOTE
Assessed pt for PIV, pt has had 3 Piv in less than 24 hrs that are no longer good. No accessible veins seen in lower arms bilaterally. OBINNA does have a vein that could be accessed with either midline or picc line if needed. Pt does have a hx of CKD with a creat of 1.85 and gfr of 27 so pt will need nephrology approval for one of these accesses if found necessary. Jose RN calling Md see if access is necessary at this time and will notify the iv team if needed.

## 2018-04-09 NOTE — CONSULTS
[facility]                                                                                             Nephrology Initial Consult Note    Patient Identification:  Name: Ya Paz MRN: [id number]  Age: 72 y.o. : 1945  Sex: female  Date:2018    Requesting Physician: As per consult order.  Reason for Consultation: Chronic kidney disease, hypertension management  Information from:patient/ family/ chart      History of Present Illness: This is a 72 y.o. year old female  with a history of stage III chronic kidney disease secondary to chronic hypertension, difficult to control hypertension which eventually been better as of late with the addition of 3, day hydralazine.  Her baseline creatinine is between 1.8 and 2.0.  Medical history otherwise significant for diabetes which is well controlled, admitted yesterday through the emergency department with acute left-sided hip pain which started suddenly, not associated with any fall or acute trauma.  She also complained of some vague abdominal pain but no nausea vomiting, 10 pound weight loss unintentional over the last 23 months.  CT the abdomen and pelvis noncontrast showed a possible soft tissue mass in the small bowel, MRI is scheduled today.  Patient was concerned about a possible effects of contrast and requested that I evaluate her here in the hospital.  She currently feels well, on IV fluids, blood pressure is stable, creatinine is 1.8 which is baseline.  No fevers or chills, no lower extremity edema.  Her home medications were incorrectly reconciled and her hydralazine was substituted for hydrochlorothiazide.  I discussed with her nurse and this has been corrected.    The following medical history and medications personally reviewed by me:    Problem List:   Patient Active Problem List    Diagnosis   • Hip pain, acute, left [M25.552]   • Chest  pain [R07.9]   • Abdominal mass [R19.00]   • Heartburn [R12]     Overview Note:     Added automatically from request for surgery [id number]     • Dysphagia [R13.10]     Overview Note:     Added automatically from request for surgery [id number]     • Hypervitaminosis D [E67.3]   • Postsurgical hypothyroidism [E89.0]   • Chronic fatigue [R53.82]   • Diabetes mellitus type 2, uncontrolled, with complications [E11.8, E11.65]   • ARF (acute renal failure) [N17.9]   • CKD (chronic kidney disease) stage 3, GFR 30-59 ml/min [N18.3]   • Proteinuria [R80.9]   • Diverticulitis [K57.92]   • Diverticulitis of large intestine without perforation or abscess without bleeding [K57.32]       Past Medical History:  Past Medical History:   Diagnosis Date   • Anxiety    • Clostridium difficile infection 2016    and in    • Diabetes mellitus    • Disease of thyroid gland    • Hypertension    • Migraine    • Pancreatitis    • Tachycardia    • TIA (transient ischemic attack)        Past Surgical History:  Past Surgical History:   Procedure Laterality Date   • APPENDECTOMY     • CARDIAC CATHETERIZATION     •  SECTION     • CHOLECYSTECTOMY     • COLONOSCOPY      normal per pt, Dr. Reagan   • COLONOSCOPY N/A 3/1/2017    multiple polyps, tics, IH, scar in transverse colon   • ENDOSCOPY N/A 3/1/2017    gastroplasty, nodular mucosa in gastric antrum, nodule in duodenum   • ENDOSCOPY N/A 2018    Procedure: ESOPHAGOGASTRODUODENOSCOPY;  Surgeon: Paloma Santizo MD;  Location: [facility] ENDOSCOPY;  Service:    • GASTRIC BYPASS     • HERNIA REPAIR     • LUNG SURGERY      [facility]   • THYROID SURGERY          Home Meds:   Prescriptions Prior to Admission   Medication Sig Dispense Refill Last Dose   • aspirin 81 MG EC tablet Take 81 mg by mouth Daily.   2018 at Unknown time   • folic acid (FOLVITE) 1 MG tablet TAKE ONE TABLET BY MOUTH ONCE DAILY   2018 at Unknown time   • HYDROcodone-acetaminophen (NORCO) 5-325 MG per tablet Take  1 tablet by mouth Every 6 (Six) Hours As Needed for Moderate Pain .   4/8/2018 at Unknown time   • hydrOXYzine (ATARAX) 25 MG tablet Take 25 mg by mouth At Night As Needed for Itching (1-3 tablets every night at bedtime for itching).   Past Week at Unknown time   • insulin aspart (NOVOLOG FLEXPEN) 100 UNIT/ML solution pen-injector sc pen 6 units before each meal with sliding scale with a max of 100 units daily (Patient taking differently: 7 units before each meal with sliding scale with a max of 100 units daily per patient) 5 pen 4 4/8/2018 at Unknown time   • Insulin Glargine (TOUJEO SOLOSTAR) 300 UNIT/ML solution pen-injector Inject 15 Units under the skin Daily With Breakfast. Adjust dose according to accucheck    4/8/2018 at Unknown time   • levothyroxine (SYNTHROID, LEVOTHROID) 125 MCG tablet Take 125 mcg by mouth Daily.   4/8/2018 at Unknown time   • predniSONE (DELTASONE) 5 MG tablet Take 5 mg by mouth Daily.   4/8/2018 at Unknown time   • raNITIdine (ZANTAC) 300 MG tablet TAKE 1 TABLET BY MOUTH EVERY NIGHT 90 tablet 3 4/7/2018 at Unknown time   • sucralfate (CARAFATE) 1 GM/10ML suspension Take 10 mL by mouth 4 (Four) Times a Day With Meals & at Bedtime. 840 mL 5 4/7/2018 at Unknown time   • ALPRAZolam (XANAX) 0.5 MG tablet Take 0.5 mg by mouth 3 (Three) Times a Day As Needed.   Unknown at Unknown time   • atorvastatin (LIPITOR) 10 MG tablet TAKE ONE TABLET BY MOUTH ONCE DAILY   Not Taking   • carvedilol (COREG) 6.25 MG tablet TAKE ONE TABLET BY MOUTH TWICE DAILY   Not Taking   • Cholecalciferol (VITAMIN D) 1000 UNITS tablet Take 1,000 Units by mouth 2 (Two) Times a Day.   Unknown at Unknown time   • Insulin Pen Needle 32G X 4 MM misc Use to inject insulin 4 times daily 120 each 5 2/13/2018 at Unknown time   • LEVEMIR FLEXPEN 100 UNIT/ML injection 30 units in the AM titrate up as instructed with a max of 100 units daily 5 pen 4 Not Taking   • lisinopril (PRINIVIL,ZESTRIL) 10 MG tablet Take 20 mg by mouth  Daily.   Unknown at Unknown time   • losartan (COZAAR) 100 MG tablet Take 100 mg by mouth Daily.   Not Taking at Unknown time   • lubiprostone (AMITIZA) 24 MCG capsule Take 1 capsule by mouth 2 (Two) Times a Day With Meals. (Patient not taking: Reported on 4/8/2018) 60 capsule 2 Not Taking at Unknown time   • metroNIDAZOLE (FLAGYL) 500 MG tablet Take 1 tablet by mouth 3 (Three) Times a Day. 30 tablet 0 Not Taking       Current Meds:   Current Facility-Administered Medications   Medication Dose Route Frequency Provider Last Rate Last Dose   • acetaminophen (TYLENOL) tablet 650 mg  650 mg Oral Q4H PRN Delonte Loving MD       • ALPRAZolam (XANAX) tablet 0.5 mg  0.5 mg Oral TID PRN Delonte Loving MD       • aluminum-magnesium hydroxide-simethicone (MAALOX MAX) 400-400-40 MG/5ML suspension 15 mL  15 mL Oral Q6H PRN Delonte Loving MD       • aspirin EC tablet 81 mg  81 mg Oral Daily Delonte Loving MD   81 mg at 04/09/18 0929   • dextrose (D50W) solution 25 g  25 g Intravenous Q15 Min PRN Delonte Loving MD       • dextrose (GLUTOSE) oral gel 15 g  15 g Oral Q15 Min PRN Delonte Loving MD       • enoxaparin (LOVENOX) syringe 30 mg  30 mg Subcutaneous Q24H Delonte Loving MD   30 mg at 04/09/18 0240   • folic acid (FOLVITE) tablet 1,000 mcg  1,000 mcg Oral Daily Delonte Loving MD   1,000 mcg at 04/09/18 0929   • glucagon (human recombinant) (GLUCAGEN DIAGNOSTIC) injection 1 mg  1 mg Subcutaneous PRN Delonte Loving MD       • hydrALAZINE (APRESOLINE) tablet 50 mg  50 mg Oral Q8H Tank Taylor MD       • HYDROcodone-acetaminophen (NORCO) 5-325 MG per tablet 1 tablet  1 tablet Oral Q6H PRN Delonte Loving MD   1 tablet at 04/09/18 0446   • HYDROmorphone (DILAUDID) injection 0.5 mg  0.5 mg Intravenous Q4H PRN Delonte Loving MD   0.5 mg at 04/09/18 0708   • insulin aspart (novoLOG) injection 0-7 Units  0-7 Units Subcutaneous 4x Daily With Meals & Nightly  "Delonte Loving MD       • insulin aspart (novoLOG) injection 6 Units  6 Units Subcutaneous TID With Meals Delonte Loving MD   6 Units at 04/09/18 0929   • insulin detemir (LEVEMIR) injection 15 Units  15 Units Subcutaneous QAM Jorge Luis Werner MD       • levothyroxine (SYNTHROID, LEVOTHROID) tablet 125 mcg  125 mcg Oral Daily Delonte Loving MD   125 mcg at 04/09/18 0929   • lisinopril (PRINIVIL,ZESTRIL) tablet 20 mg  20 mg Oral Daily Delonte Loving MD   20 mg at 04/09/18 0929   • LORazepam (ATIVAN) tablet 1 mg  1 mg Oral Once in imaging Jorge Luis Werner MD       • metroNIDAZOLE (FLAGYL) IVPB 500 mg  500 mg Intravenous Q8H Jorge Luis Werner MD       • ondansetron (ZOFRAN) injection 4 mg  4 mg Intravenous Q6H PRN Delonte Loving MD       • pantoprazole (PROTONIX) EC tablet 40 mg  40 mg Oral Q AM Silverio Shah MD       • sodium chloride 0.9 % flush 1-10 mL  1-10 mL Intravenous PRN Delonte Loving MD       • sodium chloride 0.9 % infusion  100 mL/hr Intravenous Continuous Delonte Loving  mL/hr at 04/09/18 0240 100 mL/hr at 04/09/18 0240   • sucralfate (CARAFATE) 1 GM/10ML suspension 1 g  1 g Oral 4x Daily With Meals & Nightly Delonte Loving MD   1 g at 04/09/18 0929       Allergies:  Allergies   Allergen Reactions   • Augmentin [Amoxicillin-Pot Clavulanate] Rash     Rash in mouth  Rash in mouth   • Dapsone Other (See Comments)     Pt states she \"quit producing blood.\"   • Detrol [Tolterodine Tartrate]      Pt. Does not recall this allergy.   • Levaquin [Levofloxacin] Hives   • Sulfa Antibiotics      Sister went blind with taking and was advised not to take.  Sister went blind with taking and was advised not to take.   • Tetracycline Hives and Itching   • Tetracyclines & Related        Social History:   Social History     Social History   • Marital status:      Social History Main Topics   • Smoking status: Never Smoker   • Smokeless tobacco: Never Used   • " "Alcohol use No   • Drug use: No   • Sexual activity: Defer     Other Topics Concern   • Not on file        Family History:  Family History   Problem Relation Age of Onset   • Pancreatitis Brother    • Ulcerative colitis Son    • Crohn's disease Grandchild         Review of Systems: as per HPI, in addition:    General:       + weakness / fatigue,Weight loss as described above                       No fevers / chills                       no weight loss  HEENT:       no dysphagia / odynophagia  Neck:           normal range of motion, no swelling  Respiratory: no cough / congestion                      No shortness of air                       No wheezing  CV:              No chest pain                       No palpitations  Abdomen/GI: no nausea / vomiting                      No diarrhea / constipation                      + abdominal pain  :             no dysuria / urinary frequency                       No urgency, normal output  Endocrine:   no polyuria / polydipsia,                      No heat or cold intolerance  Skin:           no rashes or skin breakdown   Vascular:   No edema                     No claudication  Psych:        no depression/ anxiety  Neuro:        no focal weakness, no seizures  Musculoskeletal: no joint pain or deformities      Physical Exam:  Vitals:   Temp (24hrs), Av.9 °F (36.6 °C), Min:97.8 °F (36.6 °C), Max:98 °F (36.7 °C)    /55 (BP Location: Left arm, Patient Position: Lying)   Pulse 70   Temp 97.8 °F (36.6 °C) (Oral)   Resp 18   Ht 160 cm (63\")   Wt 90.3 kg (199 lb)   SpO2 94%   BMI 35.25 kg/m²   Intake/Output:     Intake/Output Summary (Last 24 hours) at 18 1235  Last data filed at 18 0536   Gross per 24 hour   Intake             1690 ml   Output                0 ml   Net             1690 ml        Wt Readings from Last 1 Encounters:   18 1421 90.3 kg (199 lb)       Exam:    General Appearance:  Awake, alert, oriented x3, no acute " distress  Well-appearing   Head and Face:  Normocephalic, atraumatic, mucus membranes moist, oropharynx clear   Eyes:  No icterus, pupils equal round and reactive to light, extraocular movements intact    ENMT: Moist mucosa, tongue symmetric    Neck: Supple  no jugular venous distention  no thyromegaly   Pulmonary:  Respiratory effort: Normal  Auscultation of lungs: Clear bilaterally  No wheezes  No rhonchi  Good air movement, good expansion   Chest wall:  No tenderness or deformity   Cardiovascular:  Auscultation of the heart: Normal rhythm, no murmurs  No edema of extremities    Abdomen:  Abdomen: soft, non-tender, normal bowel sounds all four quadrants, no masses   Liver and spleen: no hepatosplenomegaly   Musculoskeletal: Digits and nails: normal  Normal range of motion  No joint swelling or gross deformities    Skin: Skin inspection: color normal, no visible rashes or lesions  Skin palpation: texture, turgor normal, no palpable lesions   Lymphatic:  no cervical lymphadenopathy    Psychiatric: Judgement and insight: normal  Orientation to person place and time: normal  Mood and affect: normal       DATA:  Radiology and Labs:  The following labs independently reviewed by me  Old records independently reviewed showing Stage III chronic kidney disease, baseline creatinine as described above  The following radiologic studies independently viewed by me, findings CT abdomen pelvis with dilated small bowel, inflammatory changes possible focal enteritis versus inflammatory bowel disease, felt less likely to be a neoplastic mass, multiple renal cysts were seen but no hydronephrosis noted.  Interval notes, chart personally reviewed by me.   New problems include abdominal pain, possible enteritis  Discussed with she and her RN  Platelet count 296    Risk/ complexity of medical care/ medical decision making  High given the need for a contrast agent, IV fluids          Labs:   Recent Results (from the past 24 hour(s))    Comprehensive Metabolic Panel    Collection Time: 04/08/18  5:34 PM   Result Value Ref Range    Glucose 131 (H) 65 - 99 mg/dL    BUN 43 (H) 8 - 23 mg/dL    Creatinine 1.84 (H) 0.57 - 1.00 mg/dL    Sodium 139 136 - 145 mmol/L    Potassium 4.8 3.5 - 5.2 mmol/L    Chloride 101 98 - 107 mmol/L    CO2 23.2 22.0 - 29.0 mmol/L    Calcium 9.2 8.6 - 10.5 mg/dL    Total Protein 6.8 6.0 - 8.5 g/dL    Albumin 3.50 3.50 - 5.20 g/dL    ALT (SGPT) 13 1 - 33 U/L    AST (SGOT) 13 1 - 32 U/L    Alkaline Phosphatase 60 39 - 117 U/L    Total Bilirubin 0.3 0.1 - 1.2 mg/dL    eGFR Non African Amer 27 (L) >60 mL/min/1.73    Globulin 3.3 gm/dL    A/G Ratio 1.1 g/dL    BUN/Creatinine Ratio 23.4 7.0 - 25.0    Anion Gap 14.8 mmol/L   Lipase    Collection Time: 04/08/18  5:34 PM   Result Value Ref Range    Lipase 45 13 - 60 U/L   CBC Auto Differential    Collection Time: 04/08/18  5:34 PM   Result Value Ref Range    WBC 13.39 (H) 4.50 - 10.70 10*3/mm3    RBC 3.79 (L) 3.90 - 5.20 10*6/mm3    Hemoglobin 10.9 (L) 11.9 - 15.5 g/dL    Hematocrit 35.0 (L) 35.6 - 45.5 %    MCV 92.3 80.5 - 98.2 fL    MCH 28.8 26.9 - 32.0 pg    MCHC 31.1 (L) 32.4 - 36.3 g/dL    RDW 14.3 (H) 11.7 - 13.0 %    RDW-SD 48.1 37.0 - 54.0 fl    MPV 10.0 6.0 - 12.0 fL    Platelets 296 140 - 500 10*3/mm3    Neutrophil % 85.0 (H) 42.7 - 76.0 %    Lymphocyte % 9.0 (L) 19.6 - 45.3 %    Monocyte % 4.9 (L) 5.0 - 12.0 %    Eosinophil % 0.8 0.3 - 6.2 %    Basophil % 0.1 0.0 - 1.5 %    Immature Grans % 0.2 0.0 - 0.5 %    Neutrophils, Absolute 11.38 (H) 1.90 - 8.10 10*3/mm3    Lymphocytes, Absolute 1.20 0.90 - 4.80 10*3/mm3    Monocytes, Absolute 0.65 0.20 - 1.20 10*3/mm3    Eosinophils, Absolute 0.11 0.00 - 0.70 10*3/mm3    Basophils, Absolute 0.02 0.00 - 0.20 10*3/mm3    Immature Grans, Absolute 0.03 0.00 - 0.03 10*3/mm3   Troponin    Collection Time: 04/08/18  5:34 PM   Result Value Ref Range    Troponin T <0.010 0.000 - 0.030 ng/mL   CK    Collection Time: 04/08/18  5:34 PM    Result Value Ref Range    Creatine Kinase 51 20 - 180 U/L   Urinalysis With / Microscopic If Indicated - Urine, Catheter    Collection Time: 04/08/18  5:55 PM   Result Value Ref Range    Color, UA Yellow Yellow, Straw    Appearance, UA Clear Clear    pH, UA 7.0 5.0 - 8.0    Specific Gravity, UA 1.009 1.005 - 1.030    Glucose, UA Negative Negative    Ketones, UA Negative Negative    Bilirubin, UA Negative Negative    Blood, UA Negative Negative    Protein, UA 30 mg/dL (1+) (A) Negative    Leuk Esterase, UA Negative Negative    Nitrite, UA Negative Negative    Urobilinogen, UA 0.2 E.U./dL 0.2 - 1.0 E.U./dL   Urinalysis, Microscopic Only - Urine, Clean Catch    Collection Time: 04/08/18  5:55 PM   Result Value Ref Range    RBC, UA 0-2 None Seen, 0-2 /HPF    WBC, UA 0-2 None Seen, 0-2 /HPF    Bacteria, UA None Seen None Seen /HPF    Squamous Epithelial Cells, UA 0-2 None Seen, 0-2 /HPF    Hyaline Casts, UA 0-2 None Seen /LPF    Methodology Automated Microscopy    POC Glucose Once    Collection Time: 04/08/18  6:13 PM   Result Value Ref Range    Glucose 116 70 - 130 mg/dL   Lactic Acid, Plasma    Collection Time: 04/08/18  9:27 PM   Result Value Ref Range    Lactate 0.7 0.5 - 2.0 mmol/L   Procalcitonin    Collection Time: 04/08/18  9:27 PM   Result Value Ref Range    Procalcitonin 0.08 (L) 0.10 - 0.25 ng/mL   Basic Metabolic Panel    Collection Time: 04/09/18  4:34 AM   Result Value Ref Range    Glucose 92 65 - 99 mg/dL    BUN 42 (H) 8 - 23 mg/dL    Creatinine 1.85 (H) 0.57 - 1.00 mg/dL    Sodium 141 136 - 145 mmol/L    Potassium 5.1 3.5 - 5.2 mmol/L    Chloride 107 98 - 107 mmol/L    CO2 18.7 (L) 22.0 - 29.0 mmol/L    Calcium 8.5 (L) 8.6 - 10.5 mg/dL    eGFR Non African Amer 27 (L) >60 mL/min/1.73    BUN/Creatinine Ratio 22.7 7.0 - 25.0    Anion Gap 15.3 mmol/L   CBC Auto Differential    Collection Time: 04/09/18  4:34 AM   Result Value Ref Range    WBC 8.30 4.50 - 10.70 10*3/mm3    RBC 3.39 (L) 3.90 - 5.20 10*6/mm3     Hemoglobin 9.8 (L) 11.9 - 15.5 g/dL    Hematocrit 32.8 (L) 35.6 - 45.5 %    MCV 96.8 80.5 - 98.2 fL    MCH 28.9 26.9 - 32.0 pg    MCHC 29.9 (L) 32.4 - 36.3 g/dL    RDW 14.6 (H) 11.7 - 13.0 %    RDW-SD 51.6 37.0 - 54.0 fl    MPV 9.8 6.0 - 12.0 fL    Platelets 238 140 - 500 10*3/mm3    Neutrophil % 69.8 42.7 - 76.0 %    Lymphocyte % 16.1 (L) 19.6 - 45.3 %    Monocyte % 11.4 5.0 - 12.0 %    Eosinophil % 2.4 0.3 - 6.2 %    Basophil % 0.1 0.0 - 1.5 %    Immature Grans % 0.2 0.0 - 0.5 %    Neutrophils, Absolute 5.78 1.90 - 8.10 10*3/mm3    Lymphocytes, Absolute 1.34 0.90 - 4.80 10*3/mm3    Monocytes, Absolute 0.95 0.20 - 1.20 10*3/mm3    Eosinophils, Absolute 0.20 0.00 - 0.70 10*3/mm3    Basophils, Absolute 0.01 0.00 - 0.20 10*3/mm3    Immature Grans, Absolute 0.02 0.00 - 0.03 10*3/mm3   Hemoglobin A1c    Collection Time: 04/09/18  4:34 AM   Result Value Ref Range    Hemoglobin A1C 6.10 (H) 4.80 - 5.60 %   Reticulocytes    Collection Time: 04/09/18  4:34 AM   Result Value Ref Range    Reticulocyte % 1.06 0.50 - 1.50 %   POC Glucose Once    Collection Time: 04/09/18  7:10 AM   Result Value Ref Range    Glucose 82 70 - 130 mg/dL   POC Glucose Once    Collection Time: 04/09/18 11:18 AM   Result Value Ref Range    Glucose 59 (L) 70 - 130 mg/dL   POC Glucose Once    Collection Time: 04/09/18 11:56 AM   Result Value Ref Range    Glucose 61 (L) 70 - 130 mg/dL   POC Glucose Once    Collection Time: 04/09/18 12:24 PM   Result Value Ref Range    Glucose 57 (L) 70 - 130 mg/dL       Radiology:  Imaging Results (last 24 hours)     Procedure Component Value Units Date/Time    CT Chest Without Contrast [[id number]] Collected:  04/08/18 1922     Updated:  04/09/18 1044    Narrative:       CT OF THE CHEST, ABDOMEN AND PELVIS WITHOUT CONTRAST 04/08/2018     HISTORY: Chest pain. Abdominal pain.     TECHNIQUE: Spiral images were obtained from the lung apices to the  symphysis pubis. No intravenous or oral contrast was given.     FINDINGS:  No lung masses or significant pulmonary infiltrates are seen.     No pathologically enlarged hilar or mediastinal lymph nodes are seen.  Tiny amount of fluid is seen in pericardial recesses. There is small  amount of aortic calcification.     Gallbladder has been removed. The liver, spleen, pancreas and adrenals  appear unremarkable. Surgical clips are seen from previous gastric  surgery.     There is a segment of small bowel in the left upper quadrant which  appears mild to moderately dilated and heterogeneous in density. It  contains a few tiny radiodensities which may be related to surgical  sutures. There is moderate surrounding inflammatory change. No free air  is seen.     There is colonic diverticulosis. No other bowel dilatation is seen.     There are multiple bilateral renal lesions difficult to characterize in  the absence of IV contrast but may represent multiple renal cysts.     Uterus and urinary bladder appear unremarkable.       Impression:       1. Segment of small bowel in the left upper quadrant appears mildly  dilated and heterogeneous in density. Surrounding inflammatory changes  seen. There appear to be surgical sutures in this region. This segment  of small bowel appear mildly dilated on the previous CT scan of  10/30/2017. This may represent focal area of focal enteritis,  inflammatory bowel disease or less likely neoplastic mass. No other  bowel dilatation is seen.   2. No evidence of pneumoperitoneum.  3. Colonic diverticulosis.  4. Multiple renal lesions may all represent benign renal cysts.  5. No acute process demonstrated in the chest.        Radiation dose reduction techniques were utilized, including automated  exposure control and exposure modulation based on body size.     This report was finalized on 4/9/2018 10:41 AM by Dr. Iker Leonard MD.       CT Abdomen Pelvis Without Contrast [[id number]] Collected:  04/08/18 1922     Updated:  04/09/18 1044    Narrative:       CT OF THE  CHEST, ABDOMEN AND PELVIS WITHOUT CONTRAST 04/08/2018     HISTORY: Chest pain. Abdominal pain.     TECHNIQUE: Spiral images were obtained from the lung apices to the  symphysis pubis. No intravenous or oral contrast was given.     FINDINGS: No lung masses or significant pulmonary infiltrates are seen.     No pathologically enlarged hilar or mediastinal lymph nodes are seen.  Tiny amount of fluid is seen in pericardial recesses. There is small  amount of aortic calcification.     Gallbladder has been removed. The liver, spleen, pancreas and adrenals  appear unremarkable. Surgical clips are seen from previous gastric  surgery.     There is a segment of small bowel in the left upper quadrant which  appears mild to moderately dilated and heterogeneous in density. It  contains a few tiny radiodensities which may be related to surgical  sutures. There is moderate surrounding inflammatory change. No free air  is seen.     There is colonic diverticulosis. No other bowel dilatation is seen.     There are multiple bilateral renal lesions difficult to characterize in  the absence of IV contrast but may represent multiple renal cysts.     Uterus and urinary bladder appear unremarkable.       Impression:       1. Segment of small bowel in the left upper quadrant appears mildly  dilated and heterogeneous in density. Surrounding inflammatory changes  seen. There appear to be surgical sutures in this region. This segment  of small bowel appear mildly dilated on the previous CT scan of  10/30/2017. This may represent focal area of focal enteritis,  inflammatory bowel disease or less likely neoplastic mass. No other  bowel dilatation is seen.   2. No evidence of pneumoperitoneum.  3. Colonic diverticulosis.  4. Multiple renal lesions may all represent benign renal cysts.  5. No acute process demonstrated in the chest.        Radiation dose reduction techniques were utilized, including automated  exposure control and exposure  modulation based on body size.     This report was finalized on 4/9/2018 10:41 AM by Dr. Iker Leonard MD.       XR Hip With or Without Pelvis 2 - 3 View Left [[id number]] Collected:  04/08/18 1513     Updated:  04/09/18 1042    Narrative:       AP PELVIS AND LEFT HIP 2 VIEWS 04/08/4018      HISTORY: Left hip pain. No trauma.     FINDINGS:  No acute bone, joint or soft tissue abnormalities are seen.  There is contrast material within multiple colonic diverticula. There is  deformity of the right superior pubic ramus likely from an old healed  fracture.       Impression:       No acute process identified.     This report was finalized on 4/9/2018 10:39 AM by Dr. Iker Leonard MD.                  Assessment:   Problem List:   Stage III chronic kidney disease, hypertension, stable and near baseline  Anemia of chronic kidney disease, slightly worse than her baseline  Difficult to control hypertension but well controlled today  ?  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  type 2 diabetes mellitus  Hypothyroidism  Worsening metabolic acidosis, monitor  Low-grade proteinuria, early diabetic kidney disease    Plan:   Her renal function is stable, near baseline  Agree with continued IV hydration, encourage oral fluid intake  Continue current antihypertensive regimen  Change hydrochlorothiazide back to hydralazine which is her prescribed antihypertensive at home  We discussed the difference between gadolinium and iodinated contrast, okay to proceed with the MRI today      Continue to monitor electrolytes and volume closely, avoid IV contrast and nephrotoxic  medications       I appreciate the opportunity to participate in this patient's care.  Please call with any questions or concerns.         Tank Taylor M.D  [facility]  Office phone number: [phone]  Answering service phone number: [phone]        4/9/2018        Dictation via Dragon dictation software

## 2018-04-09 NOTE — PROGRESS NOTES
Name: Ya Paz ADMIT: 2018   : 1945  PCP: Nicki Hidalgo MD    MRN: [id number] LOS: 1 days   AGE/SEX: 72 y.o. female  ROOM: [facility]/   Subjective   Let hip pain is new. She did have some swelling in bilateral wrists previous weeks so was concerned about chronic RA. She seen rheumatologist and has some chronic elevated on inflammatory markers reported. No CP SOA NVD. Discussed MRI and that no contrast is ordered. She reports claustrophobia with previous MRI and would like medicaiton. Also would like to discuss with orthopedics.    Objective   Vital Signs  Temp:  [97.8 °F (36.6 °C)-98 °F (36.7 °C)] 97.8 °F (36.6 °C)  Heart Rate:  [59-70] 70  Resp:  [16-20] 18  BP: (117-201)/() 134/55  SpO2:  [94 %-98 %] 94 %  on   ;   Device (Oxygen Therapy): room air  Body mass index is 35.25 kg/m².    Physical Exam   Constitutional: She is oriented to person, place, and time. She appears well-developed. No distress.   HENT:   Head: Normocephalic and atraumatic.   Eyes: EOM are normal. Pupils are equal, round, and reactive to light.   Neck: Normal range of motion. Neck supple.   Cardiovascular: Normal rate, regular rhythm and intact distal pulses.    Pulmonary/Chest: Effort normal and breath sounds normal. She has no wheezes.   Abdominal: Soft. She exhibits no distension.   Musculoskeletal: She exhibits tenderness. She exhibits no edema.   Neurological: She is alert and oriented to person, place, and time.   Skin: Skin is warm and dry. She is not diaphoretic.   Psychiatric: She has a normal mood and affect. Her behavior is normal.   Nursing note and vitals reviewed.      Results Review:       I reviewed the patient's new clinical results. Reviewed imaging, agree with interpretation. Reviewed telemetry, sinus rhythm.    Results from last 7 days  Lab Units 18  0434 18  1734   WBC 10*3/mm3 8.30 13.39*   HEMOGLOBIN g/dL 9.8* 10.9*   PLATELETS 10*3/mm3 238 296     Results from last 7  days  Lab Units 04/09/18  0434 04/08/18  1734   SODIUM mmol/L 141 139   POTASSIUM mmol/L 5.1 4.8   CHLORIDE mmol/L 107 101   CO2 mmol/L 18.7* 23.2   BUN mg/dL 42* 43*   CREATININE mg/dL 1.85* 1.84*   GLUCOSE mg/dL 92 131*   Estimated Creatinine Clearance: 29.8 mL/min (by C-G formula based on SCr of 1.85 mg/dL (H)).  Results from last 7 days  Lab Units 04/09/18  0434 04/08/18  1734   CALCIUM mg/dL 8.5* 9.2   ALBUMIN g/dL  --  3.50         aspirin 81 mg Oral Daily   enoxaparin 30 mg Subcutaneous Q24H   folic acid 1,000 mcg Oral Daily   hydrochlorothiazide 50 mg Oral TID   insulin aspart 0-7 Units Subcutaneous 4x Daily With Meals & Nightly   insulin aspart 6 Units Subcutaneous TID With Meals   insulin detemir 15 Units Subcutaneous QAM   levothyroxine 125 mcg Oral Daily   lisinopril 20 mg Oral Daily   pantoprazole 40 mg Oral Q AM   sucralfate 1 g Oral 4x Daily With Meals & Nightly       sodium chloride 100 mL/hr Last Rate: 100 mL/hr (04/09/18 0240)   Diet Soft Texture; Whole Foods; Consistent Carbohydrate      Assessment/Plan      Active Hospital Problems (** Indicates Principal Problem)    Diagnosis Date Noted   • **Hip pain, acute, left [M25.552] 04/09/2018   • Abdominal mass [R19.00] 04/08/2018   • Diabetes mellitus type 2, uncontrolled, with complications [E11.8, E11.65] 04/07/2017   • Postsurgical hypothyroidism [E89.0] 04/07/2017   • CKD (chronic kidney disease) stage 3, GFR 30-59 ml/min [N18.3] 11/11/2016      Resolved Hospital Problems    Diagnosis Date Noted Date Resolved   No resolved problems to display.     - Left Hip Pain: MRI ordered. Ativan PRN for imaging. Check inflammatory markers. Consult Orthopedic Surgery.  - Abdominal Mass: IV antibiotics recommended. Multiple allergies including PCN and levaquin. Check blood cultures. Aztreonam/Flagyl. Consult ID. GI following.  - CKD3: Stable. Nephrology consulted.  - DM2: A1c 6.1. Continue insulin therapy.  - Disposition: TBD    MD [place]  [facility]  04/09/18  11:28 AM

## 2018-04-09 NOTE — CONSULTS
Adult Nutrition  Assessment/PES    Patient Name:  Ya Paz  YOB: 1945  MRN: [id number]  Admit Date:  4/8/2018    Assessment Date:  4/9/2018    Assessment completed. Patient reported decreased appetite for the past few months.  Provided nutrition therapy-encouraged adequate po intake, recommended supplement daily-ordered glucerna  Reason for Assessment   Reason For Assessment nurse/nurse practitioner consult   Diagnosis   Primary Problem:  Hip pain, acute, left    Identified At Risk by Screening Criteria MST SCORE 2+;unintentional loss of 10 lbs or more in the past 2 mos           Adult Nutrition Assessment     Row Name 04/09/18 1053       PO Evaluation    Number of Meals 1    % PO Intake 50    Row Name 04/09/18 1052       Physical Findings    Overall Physical Appearance   B=22       Calculation Measurements    Weight Used For Calculations 90.3 kg (199 lb)       Estimated/Assessed Needs    Additional Documentation KCAL/KG (Group);Protein Requirements (Group);Fluid Requirements (Group)       KCAL/KG                                                      kcal/kg (Specify) --   5856-1602       Hayes-St. Jeor Equation    RMR (Hayes-St. Jeor Equation) 1386.79       Protein Requirements    Est Protein Requirement Amount (gms/kg) 1.0 gm protein    Estimated Protein Requirements (gms/day) 90.27       Fluid Requirements    Estimated Fluid Requirements (mL/day) 2250    RDA Method (mL) 2250    Le Roy-Segar Method (over 20 kg) 3305.32       Nutrition Prescription PO    Current PO Diet Dysphagia    Dysphagia Level 4  Mechanical soft no mixed consistencies    Common Modifiers Consistent Carbohydrate    Row Name 04/09/18 1051                             Usual Body Weight (UBW)    Weight Loss unintentional    Weight Loss Time Frame 14# in the past 2 months       Body Mass Index (BMI)    BMI Assessment BMI 35-39.9: obesity grade II       Labs/Procedures/Meds    Lab Results Reviewed reviewed, pertinent     Lab Results Comments Meds-folic acid, carafate, NaCl          Problem/Interventions:        Problem 1     Row Name 04/09/18 1053       Nutrition Diagnoses Problem 1    Problem 1 Inadequate Nutrient Intake    Etiology (related to) MNT for Treatment/Condition    Signs/Symptoms (evidenced by) Unintended Weight Change    Unintended Weight Change Loss    Number of Pounds Lost 14#    Weight loss time period past few months                    Intervention Goal     Row Name 04/09/18 1053       Intervention Goal    General Maintain nutrition;Meet nutritional needs for age/condition    PO Tolerate PO;Increase intake    Weight Maintain weight            Nutrition Intervention     Row Name 04/09/18 1053       Nutrition Intervention    RD/Tech Action Interview for preference;Follow Tx progress;Care plan reviewd;Encourage intake;Recommend/ordered;Supplement provided    Recommended/Ordered Supplement            Nutrition Prescription     Row Name 04/09/18 1053       Nutrition Prescription PO    PO Prescription Begin/change supplement    Supplement Glucerna Shake    Supplement Frequency Daily    New PO Prescription Ordered? Yes            Education/Evaluation     Row Name 04/09/18 1053       Education    Education Will Instruct as appropriate       Monitor/Evaluation    Monitor Per protocol    Education Follow-up Reinforce PRN        Electronically signed by:  Shira Perrin RD  04/09/18 10:54 AM

## 2018-04-09 NOTE — CONSULTS
"Referring Provider: Delonte Loving MD  [address]  [address] [address]    Reason for Consultation: antibiotic recommendations possible small bowel infection    History of present illness:  Ms Paz is a 73 YO who I am asked to evaluate and give opinion for antibiotic recommendations possible small bowel infection. History is obtained from the patient and review of the old medical records which I summarize/synthesize as follows: She underwent a gastric bypass surgery in 1984 and lost about 110#. She follows with Dr Santizo in GI clinic. Per their note on 1/5/18 she was seen for globus sensation and esophagitis. Note states \"Last EGD on March 2017 with gastroplasty, nodular mucosa in gastric antrum and nodule in duodenum.\" She had only mild improvement from ranitidine. On 3/22/18 she ended up having an upper GI series that showed hiatal herniation of the upper stomach and thickened esophageal mucosal folds with probable breakdown in her staple line from her previous vertical banded gastroplasty. She was also noted to have \"an apparent fundal diverticulum of a few centimeters size is demonstrated. A small diverticulum protrudes from the lesser curvature aspect of distal duodenum.\"    Moving forward, she came to the ER yesterday for L groin pain worse w/ movement and relieved w/ rest. There was no known trauma. XR was negative and MRI is ordered. Ortho eval is pending.    Last night she reported brief episode of LUQ pain so had a CT (see below). She was started on empiric aztreonam and Flagyl. She has no fevers, nausea, vomiting, or diarrhea. The symptoms are completely resolved today.    She reports a distant history of C diff x 2. She recently finished a course of cephalexin for L 5the toe cellulitis.    Past Medical History:   Diagnosis Date   • Anxiety    • Clostridium difficile infection 11/2016    and in 2005   • Diabetes mellitus    • Disease of thyroid gland    • Hypertension    • Migraine  " "  • Pancreatitis    • Tachycardia    • TIA (transient ischemic attack)        Past Surgical History:   Procedure Laterality Date   • APPENDECTOMY     • CARDIAC CATHETERIZATION     •  SECTION     • CHOLECYSTECTOMY     • COLONOSCOPY      normal per pt, Dr. Reagan   • COLONOSCOPY N/A 3/1/2017    multiple polyps, tics, IH, scar in transverse colon   • ENDOSCOPY N/A 3/1/2017    gastroplasty, nodular mucosa in gastric antrum, nodule in duodenum   • ENDOSCOPY N/A 2018    Procedure: ESOPHAGOGASTRODUODENOSCOPY;  Surgeon: Paloma Santizo MD;  Location: [facility] ENDOSCOPY;  Service:    • GASTRIC BYPASS     • HERNIA REPAIR     • LUNG SURGERY      mass LL   • THYROID SURGERY         Social History:    Lives in [place], [place]  Never smoker    Family History:  No 1st degree relatives w/ GI cancer    Allergies:    1. Amoxicillin-clavulanate - rash  **tolerates cephalosporins**  2. Sulfa - \"sister went blind\"  3. Tetracycline - itching  4. Levofloxacin - hives    Medications:    Current Facility-Administered Medications:   •  acetaminophen (TYLENOL) tablet 650 mg, 650 mg, Oral, Q4H PRN, Delonte Loving MD  •  ALPRAZolam (XANAX) tablet 0.5 mg, 0.5 mg, Oral, TID PRN, Delonte Loving MD  •  aluminum-magnesium hydroxide-simethicone (MAALOX MAX) 400-400-40 MG/5ML suspension 15 mL, 15 mL, Oral, Q6H PRN, Delonte Loving MD  •  aspirin EC tablet 81 mg, 81 mg, Oral, Daily, Delonte Loving MD, 81 mg at 18 0929  •  dextrose (D50W) solution 25 g, 25 g, Intravenous, Q15 Min PRN, Delonte Loving MD  •  dextrose (GLUTOSE) oral gel 15 g, 15 g, Oral, Q15 Min PRN, Delonte Loving MD  •  enoxaparin (LOVENOX) syringe 30 mg, 30 mg, Subcutaneous, Q24H, Delonte Lovign MD, 30 mg at 18 0240  •  folic acid (FOLVITE) tablet 1,000 mcg, 1,000 mcg, Oral, Daily, Delonte Loving MD, 1,000 mcg at 18 7520  •  glucagon (human recombinant) (GLUCAGEN DIAGNOSTIC) injection 1 mg, 1 mg, " Subcutaneous, PRN, Delonte Loving MD  •  hydrochlorothiazide (HYDRODIURIL) tablet 50 mg, 50 mg, Oral, TID, Delonte Loving MD, 50 mg at 04/09/18 0929  •  HYDROcodone-acetaminophen (NORCO) 5-325 MG per tablet 1 tablet, 1 tablet, Oral, Q6H PRN, Delonte Loving MD, 1 tablet at 04/09/18 0446  •  HYDROmorphone (DILAUDID) injection 0.5 mg, 0.5 mg, Intravenous, Q4H PRN, Delonte Loving MD, 0.5 mg at 04/09/18 0708  •  insulin aspart (novoLOG) injection 0-7 Units, 0-7 Units, Subcutaneous, 4x Daily With Meals & Nightly, Delonte Loving MD  •  insulin aspart (novoLOG) injection 6 Units, 6 Units, Subcutaneous, TID With Meals, Delonte Loving MD, 6 Units at 04/09/18 0929  •  insulin detemir (LEVEMIR) injection 15 Units, 15 Units, Subcutaneous, QAM, Jorge Luis Werner MD  •  levothyroxine (SYNTHROID, LEVOTHROID) tablet 125 mcg, 125 mcg, Oral, Daily, Delonte Loving MD, 125 mcg at 04/09/18 0929  •  lisinopril (PRINIVIL,ZESTRIL) tablet 20 mg, 20 mg, Oral, Daily, Delonte Loving MD, 20 mg at 04/09/18 0929  •  LORazepam (ATIVAN) tablet 1 mg, 1 mg, Oral, Once in imaging, Jorge Luis Werner MD  •  metroNIDAZOLE (FLAGYL) IVPB 500 mg, 500 mg, Intravenous, Q8H, Jorge Luis Werner MD  •  ondansetron (ZOFRAN) injection 4 mg, 4 mg, Intravenous, Q6H PRN, Delonte Loving MD  •  pantoprazole (PROTONIX) EC tablet 40 mg, 40 mg, Oral, Q AM, Silverio Shah MD  •  sodium chloride 0.9 % flush 1-10 mL, 1-10 mL, Intravenous, PRN, Delonte Loving MD  •  sodium chloride 0.9 % infusion, 100 mL/hr, Intravenous, Continuous, Delonte Loving MD, Last Rate: 100 mL/hr at 04/09/18 0240, 100 mL/hr at 04/09/18 0240  •  sucralfate (CARAFATE) 1 GM/10ML suspension 1 g, 1 g, Oral, 4x Daily With Meals & Nightly, Delonte Loving MD, 1 g at 04/09/18 0929      Review of Systems  All systems were reviewed and are negative unless otherwise stated above in the HPI    Objective   Vital Signs   Temp:  [97.8 °F (36.6  °C)-98 °F (36.7 °C)] 97.8 °F (36.6 °C)  Heart Rate:  [59-70] 70  Resp:  [16-20] 18  BP: (117-201)/() 134/55    Physical Exam:   General: awake, alert, NAD, fair historian  Head: Normocephalic, atraumatic  Eyes: PERRL, EOMI, no scleral icterus, no conjunctival pallor, no conjunctival hemorrhages.   ENT: MM dry, OP clear, no thrush. Fair dentition.   Neck: Supple, no visible thyromegaly  Cardiovascular: NR, RR, no murmurs, rubs, or gallops; no LE edema  Respiratory: Lungs are clear to ascultation bilaterally, no rales or wheezing; normal work of breathing on ambient air  GI: Abdomen w/ well-healed scars, obese, soft, non-tender, non-distended, normal bowel sounds in all four quadrants; no hepatosplenomegaly, no masses palpated  : no Sood catheter present  Musculoskeletal: L hip not tender to palpation  Skin: No rashes, lesions, or embolic phenomenon  Neurological: Alert and oriented x 3, cranial nerves 2-12 grossly intact, motor strength 5/5 in all four extremities  Psychiatric: Normal mood and affect   Lymph: no pre-auricular, post-auricular, submandibular, cervical, supraclavicular  LAD  Vasc: no cyanosis; PIV w/o erythema    Labs:     Lab Results   Component Value Date    WBC 8.30 04/09/2018    HGB 9.8 (L) 04/09/2018    HCT 32.8 (L) 04/09/2018    MCV 96.8 04/09/2018     04/09/2018       Lab Results   Component Value Date    GLUCOSE 92 04/09/2018    BUN 42 (H) 04/09/2018    CREATININE 1.85 (H) 04/09/2018    EGFRIFNONA 27 (L) 04/09/2018    EGFRIFAFRI 32 (L) 01/17/2017    BCR 22.7 04/09/2018    CO2 18.7 (L) 04/09/2018    CALCIUM 8.5 (L) 04/09/2018    PROTENTOTREF 7.1 12/12/2016    ALBUMIN 3.50 04/08/2018    LABIL2 1.1 04/08/2018    AST 13 04/08/2018    ALT 13 04/08/2018     UA negative for infection  Procal 0.08    Microbiology:  4/9 BCx: ordered    Radiology (personally reviewed images/report):  CT A/P per radiologist: There is a segment of small bowel in the left upper quadrant which  appears mild to  "moderately dilated and heterogeneous in density. It contains a few tiny radiodensities which may be related to surgical sutures. There is moderate surrounding inflammatory change. No free air is seen.\"    Assessment/Plan   1. Small bowel inflammation - polyp, mass, diverticulum, inflammation at suture site?  -did not come in with a presenting symptom of abdominal pain but rather a transient episode after admission; area of concern appears fairly mild on scan  -taking into consideration her prior outpatient GI workup, this seems more of either a mechanical issue related to prior bariatric surgery or a duodenal diverticulum seen on CT  -no clear evidence of infection; no fevers, no fluid collection that would require drainage  -no clear benefit to antibiotics and given history of C diff we should avoid them  -stop aztreonam and Flagyl  -my opinion is to ask bariatric surgery opinion, consider endoscopy (will of course defer to GI), and repeat another CT A/P in 2-4 weeks to see if any changes or progression  -symptomatic treatment per GI    2. Multiple antibiotic allergies  -she tolerates cephalosporins in case antibiotics are needed    3. Hip pain  -really more of groin pain  -MRI ordered and ortho consulted per primary team    Thank you for this consult. ID will follow.    "

## 2018-04-10 ENCOUNTER — APPOINTMENT (OUTPATIENT)
Dept: GENERAL RADIOLOGY | Facility: HOSPITAL | Age: 73
End: 2018-04-10
Attending: INTERNAL MEDICINE

## 2018-04-10 ENCOUNTER — APPOINTMENT (OUTPATIENT)
Dept: GENERAL RADIOLOGY | Facility: HOSPITAL | Age: 73
End: 2018-04-10

## 2018-04-10 PROBLEM — D50.9 IRON DEFICIENCY ANEMIA: Status: ACTIVE | Noted: 2018-04-10

## 2018-04-10 LAB
ALBUMIN SERPL-MCNC: 2.9 G/DL (ref 3.5–5.2)
ALBUMIN/GLOB SERPL: 1.1 G/DL
ALP SERPL-CCNC: 53 U/L (ref 39–117)
ALT SERPL W P-5'-P-CCNC: 16 U/L (ref 1–33)
AMYLASE SERPL-CCNC: 49 U/L (ref 28–100)
ANION GAP SERPL CALCULATED.3IONS-SCNC: 10.6 MMOL/L
APPEARANCE FLD: ABNORMAL
AST SERPL-CCNC: 16 U/L (ref 1–32)
BASOPHILS # BLD AUTO: 0.01 10*3/MM3 (ref 0–0.2)
BASOPHILS NFR BLD AUTO: 0.1 % (ref 0–1.5)
BILIRUB SERPL-MCNC: <0.2 MG/DL (ref 0.1–1.2)
BUN BLD-MCNC: 41 MG/DL (ref 8–23)
BUN/CREAT SERPL: 21.8 (ref 7–25)
CALCIUM SPEC-SCNC: 8.3 MG/DL (ref 8.6–10.5)
CHLORIDE SERPL-SCNC: 105 MMOL/L (ref 98–107)
CO2 SERPL-SCNC: 22.4 MMOL/L (ref 22–29)
COLOR FLD: ABNORMAL
CREAT BLD-MCNC: 1.88 MG/DL (ref 0.57–1)
CRP SERPL-MCNC: 8.53 MG/DL (ref 0–0.5)
CRYSTALS FLD MICRO: NORMAL
DEPRECATED RDW RBC AUTO: 51.5 FL (ref 37–54)
EOSINOPHIL # BLD AUTO: 0.36 10*3/MM3 (ref 0–0.7)
EOSINOPHIL NFR BLD AUTO: 4.9 % (ref 0.3–6.2)
ERYTHROCYTE [DISTWIDTH] IN BLOOD BY AUTOMATED COUNT: 14.5 % (ref 11.7–13)
ERYTHROCYTE [SEDIMENTATION RATE] IN BLOOD: 61 MM/HR (ref 0–30)
FERRITIN SERPL-MCNC: 239.6 NG/ML (ref 13–150)
GFR SERPL CREATININE-BSD FRML MDRD: 26 ML/MIN/1.73
GLOBULIN UR ELPH-MCNC: 2.7 GM/DL
GLUCOSE BLD-MCNC: 117 MG/DL (ref 65–99)
GLUCOSE BLDC GLUCOMTR-MCNC: 100 MG/DL (ref 70–130)
GLUCOSE BLDC GLUCOMTR-MCNC: 135 MG/DL (ref 70–130)
GLUCOSE BLDC GLUCOMTR-MCNC: 183 MG/DL (ref 70–130)
GLUCOSE BLDC GLUCOMTR-MCNC: 280 MG/DL (ref 70–130)
GLUCOSE BLDC GLUCOMTR-MCNC: 287 MG/DL (ref 70–130)
GLUCOSE BLDC GLUCOMTR-MCNC: 98 MG/DL (ref 70–130)
HCT VFR BLD AUTO: 29.6 % (ref 35.6–45.5)
HGB BLD-MCNC: 8.8 G/DL (ref 11.9–15.5)
IMM GRANULOCYTES # BLD: 0.02 10*3/MM3 (ref 0–0.03)
IMM GRANULOCYTES NFR BLD: 0.3 % (ref 0–0.5)
INR PPP: 1.07 (ref 0.9–1.1)
IRON 24H UR-MRATE: 26 MCG/DL (ref 37–145)
IRON SATN MFR SERPL: 13 % (ref 20–50)
LIPASE SERPL-CCNC: 35 U/L (ref 13–60)
LYMPHOCYTES # BLD AUTO: 1.83 10*3/MM3 (ref 0.9–4.8)
LYMPHOCYTES NFR BLD AUTO: 25.1 % (ref 19.6–45.3)
LYMPHOCYTES NFR FLD MANUAL: 1 %
MCH RBC QN AUTO: 28.8 PG (ref 26.9–32)
MCHC RBC AUTO-ENTMCNC: 29.7 G/DL (ref 32.4–36.3)
MCV RBC AUTO: 96.7 FL (ref 80.5–98.2)
MONOCYTES # BLD AUTO: 0.4 10*3/MM3 (ref 0.2–1.2)
MONOCYTES NFR BLD AUTO: 5.5 % (ref 5–12)
MONOCYTES NFR FLD: 3 %
MONOS+MACROS NFR FLD: 31 %
NEUTROPHILS # BLD AUTO: 4.66 10*3/MM3 (ref 1.9–8.1)
NEUTROPHILS NFR BLD AUTO: 64.1 % (ref 42.7–76)
NEUTROPHILS NFR FLD MANUAL: 65 %
PHOSPHATE SERPL-MCNC: 4.1 MG/DL (ref 2.5–4.5)
PLATELET # BLD AUTO: 203 10*3/MM3 (ref 140–500)
PMV BLD AUTO: 9.8 FL (ref 6–12)
POTASSIUM BLD-SCNC: 4.8 MMOL/L (ref 3.5–5.2)
PROT SERPL-MCNC: 5.6 G/DL (ref 6–8.5)
PROTHROMBIN TIME: 13.7 SECONDS (ref 11.7–14.2)
RBC # BLD AUTO: 3.06 10*6/MM3 (ref 3.9–5.2)
RBC # FLD AUTO: 4800 /MM3
SODIUM BLD-SCNC: 138 MMOL/L (ref 136–145)
TIBC SERPL-MCNC: 195 MCG/DL
TRANSFERRIN SERPL-MCNC: 131 MG/DL (ref 200–360)
TROPONIN T SERPL-MCNC: <0.01 NG/ML (ref 0–0.03)
VIT B12 BLD-MCNC: 749 PG/ML (ref 211–946)
WBC # FLD: 5800 /MM3
WBC NRBC COR # BLD: 7.28 10*3/MM3 (ref 4.5–10.7)

## 2018-04-10 PROCEDURE — 25010000002 ONDANSETRON PER 1 MG: Performed by: INTERNAL MEDICINE

## 2018-04-10 PROCEDURE — 84100 ASSAY OF PHOSPHORUS: CPT | Performed by: INTERNAL MEDICINE

## 2018-04-10 PROCEDURE — 99233 SBSQ HOSP IP/OBS HIGH 50: CPT | Performed by: INTERNAL MEDICINE

## 2018-04-10 PROCEDURE — 85610 PROTHROMBIN TIME: CPT | Performed by: INTERNAL MEDICINE

## 2018-04-10 PROCEDURE — 89060 EXAM SYNOVIAL FLUID CRYSTALS: CPT | Performed by: INTERNAL MEDICINE

## 2018-04-10 PROCEDURE — 85025 COMPLETE CBC W/AUTO DIFF WBC: CPT | Performed by: INTERNAL MEDICINE

## 2018-04-10 PROCEDURE — 74022 RADEX COMPL AQT ABD SERIES: CPT

## 2018-04-10 PROCEDURE — 87205 SMEAR GRAM STAIN: CPT | Performed by: INTERNAL MEDICINE

## 2018-04-10 PROCEDURE — 63710000001 INSULIN DETEMER PER 5 UNITS: Performed by: INTERNAL MEDICINE

## 2018-04-10 PROCEDURE — 87015 SPECIMEN INFECT AGNT CONCNTJ: CPT | Performed by: INTERNAL MEDICINE

## 2018-04-10 PROCEDURE — 80053 COMPREHEN METABOLIC PANEL: CPT | Performed by: INTERNAL MEDICINE

## 2018-04-10 PROCEDURE — 63710000001 PREDNISONE PER 5 MG: Performed by: INTERNAL MEDICINE

## 2018-04-10 PROCEDURE — 86140 C-REACTIVE PROTEIN: CPT | Performed by: INTERNAL MEDICINE

## 2018-04-10 PROCEDURE — 84466 ASSAY OF TRANSFERRIN: CPT | Performed by: INTERNAL MEDICINE

## 2018-04-10 PROCEDURE — 85014 HEMATOCRIT: CPT | Performed by: INTERNAL MEDICINE

## 2018-04-10 PROCEDURE — 82150 ASSAY OF AMYLASE: CPT | Performed by: INTERNAL MEDICINE

## 2018-04-10 PROCEDURE — 0S9B3ZX DRAINAGE OF LEFT HIP JOINT, PERCUTANEOUS APPROACH, DIAGNOSTIC: ICD-10-PCS | Performed by: RADIOLOGY

## 2018-04-10 PROCEDURE — 25010000002 ENOXAPARIN PER 10 MG: Performed by: INTERNAL MEDICINE

## 2018-04-10 PROCEDURE — 82747 ASSAY OF FOLIC ACID RBC: CPT | Performed by: INTERNAL MEDICINE

## 2018-04-10 PROCEDURE — 85652 RBC SED RATE AUTOMATED: CPT | Performed by: INTERNAL MEDICINE

## 2018-04-10 PROCEDURE — 77002 NEEDLE LOCALIZATION BY XRAY: CPT

## 2018-04-10 PROCEDURE — 82728 ASSAY OF FERRITIN: CPT | Performed by: INTERNAL MEDICINE

## 2018-04-10 PROCEDURE — 99232 SBSQ HOSP IP/OBS MODERATE 35: CPT | Performed by: INTERNAL MEDICINE

## 2018-04-10 PROCEDURE — 84484 ASSAY OF TROPONIN QUANT: CPT | Performed by: INTERNAL MEDICINE

## 2018-04-10 PROCEDURE — 82607 VITAMIN B-12: CPT | Performed by: INTERNAL MEDICINE

## 2018-04-10 PROCEDURE — 89051 BODY FLUID CELL COUNT: CPT | Performed by: INTERNAL MEDICINE

## 2018-04-10 PROCEDURE — 87075 CULTR BACTERIA EXCEPT BLOOD: CPT | Performed by: INTERNAL MEDICINE

## 2018-04-10 PROCEDURE — 82962 GLUCOSE BLOOD TEST: CPT

## 2018-04-10 PROCEDURE — 87070 CULTURE OTHR SPECIMN AEROBIC: CPT | Performed by: INTERNAL MEDICINE

## 2018-04-10 PROCEDURE — 83540 ASSAY OF IRON: CPT | Performed by: INTERNAL MEDICINE

## 2018-04-10 PROCEDURE — 83690 ASSAY OF LIPASE: CPT | Performed by: INTERNAL MEDICINE

## 2018-04-10 RX ORDER — PREDNISONE 1 MG/1
5 TABLET ORAL DAILY
Status: DISCONTINUED | OUTPATIENT
Start: 2018-04-10 | End: 2018-04-12

## 2018-04-10 RX ORDER — FERROUS SULFATE 325(65) MG
325 TABLET ORAL 2 TIMES DAILY WITH MEALS
Status: DISCONTINUED | OUTPATIENT
Start: 2018-04-10 | End: 2018-04-12 | Stop reason: HOSPADM

## 2018-04-10 RX ORDER — LIDOCAINE HYDROCHLORIDE 10 MG/ML
10 INJECTION, SOLUTION INFILTRATION; PERINEURAL ONCE
Status: COMPLETED | OUTPATIENT
Start: 2018-04-10 | End: 2018-04-10

## 2018-04-10 RX ADMIN — HYDRALAZINE HYDROCHLORIDE 50 MG: 50 TABLET, FILM COATED ORAL at 15:10

## 2018-04-10 RX ADMIN — FOLIC ACID 1000 MCG: 1 TABLET ORAL at 10:31

## 2018-04-10 RX ADMIN — PANTOPRAZOLE SODIUM 40 MG: 40 TABLET, DELAYED RELEASE ORAL at 06:23

## 2018-04-10 RX ADMIN — HYDRALAZINE HYDROCHLORIDE 50 MG: 50 TABLET, FILM COATED ORAL at 06:22

## 2018-04-10 RX ADMIN — PREDNISONE 5 MG: 5 TABLET ORAL at 15:16

## 2018-04-10 RX ADMIN — LISINOPRIL 20 MG: 20 TABLET ORAL at 10:31

## 2018-04-10 RX ADMIN — ONDANSETRON 4 MG: 2 INJECTION INTRAMUSCULAR; INTRAVENOUS at 21:50

## 2018-04-10 RX ADMIN — ENOXAPARIN SODIUM 30 MG: 30 INJECTION SUBCUTANEOUS at 06:24

## 2018-04-10 RX ADMIN — SUCRALFATE 1 G: 1 SUSPENSION ORAL at 20:39

## 2018-04-10 RX ADMIN — LEVOTHYROXINE SODIUM 125 MCG: 125 TABLET ORAL at 10:31

## 2018-04-10 RX ADMIN — LIDOCAINE HYDROCHLORIDE 8 ML: 10 INJECTION, SOLUTION INFILTRATION; PERINEURAL at 14:34

## 2018-04-10 RX ADMIN — HYDRALAZINE HYDROCHLORIDE 50 MG: 50 TABLET, FILM COATED ORAL at 20:39

## 2018-04-10 RX ADMIN — SUCRALFATE 1 G: 1 SUSPENSION ORAL at 18:45

## 2018-04-10 RX ADMIN — SUCRALFATE 1 G: 1 SUSPENSION ORAL at 10:31

## 2018-04-10 RX ADMIN — ASPIRIN 81 MG: 81 TABLET ORAL at 10:30

## 2018-04-10 RX ADMIN — FERROUS SULFATE TAB 325 MG (65 MG ELEMENTAL FE) 325 MG: 325 (65 FE) TAB at 18:46

## 2018-04-10 RX ADMIN — HYDROCODONE BITARTRATE AND ACETAMINOPHEN 1 TABLET: 5; 325 TABLET ORAL at 15:11

## 2018-04-10 NOTE — PROGRESS NOTES
LOS: 2 days     Chief Complaint:  Follow-up L groin/hip pain     Interval History:  No acute events. Dull intermittent hip pain is better but not resolved. Worse w/ movement. No fevers. WBC normal. She denies LUQ pain today.    ROS: no n/v/d; no rash    Vital Signs  Temp:  [97.7 °F (36.5 °C)-98.8 °F (37.1 °C)] 98.2 °F (36.8 °C)  Heart Rate:  [64-74] 74  Resp:  [16-18] 18  BP: (132-155)/(51-59) 134/55    Physical Exam:  General: awake, alert, NAD, fair historian  Head: Normocephalic  Eyes: PERRL, EOMI, no scleral icterus  ENT: MM dry, OP clear, no thrush. Fair dentition.   Neck: Supple, no visible thyromegaly  Cardiovascular: NR, ; no LE edema  Respiratory:  normal work of breathing on ambient air  GI: Abdomen w/ well-healed scars, obese, soft, non-tender, non-distended,  : no Sood catheter present  Musculoskeletal: L hip not tender to palpation  Skin: No rashes, lesions, or embolic phenomenon  Neurological: Alert and oriented x 3,  motor strength 5/5 in all four extremities  Psychiatric: Normal mood and affect   Vasc: no cyanosis; PIV w/o erythema    Meds:  Current Facility-Administered Medications:   •  acetaminophen (TYLENOL) tablet 650 mg, 650 mg, Oral, Q4H PRN, Delonte Loving MD  •  ALPRAZolam (XANAX) tablet 0.5 mg, 0.5 mg, Oral, TID PRN, Delonte Loving MD, 0.5 mg at 04/09/18 1800  •  aluminum-magnesium hydroxide-simethicone (MAALOX MAX) 400-400-40 MG/5ML suspension 15 mL, 15 mL, Oral, Q6H PRN, Delonte Loving MD  •  aspirin EC tablet 81 mg, 81 mg, Oral, Daily, Delonte Loving MD, 81 mg at 04/10/18 1030  •  dextrose (D50W) solution 25 g, 25 g, Intravenous, Q15 Min PRN, Delonte Loving MD  •  dextrose (GLUTOSE) oral gel 15 g, 15 g, Oral, Q15 Min PRN, Delonte Loving MD  •  enoxaparin (LOVENOX) syringe 30 mg, 30 mg, Subcutaneous, Q24H, Delonte Loving MD, 30 mg at 04/10/18 0624  •  folic acid (FOLVITE) tablet 1,000 mcg, 1,000 mcg, Oral, Daily, Delonte Loving MD, 1,000  mcg at 04/10/18 1031  •  glucagon (human recombinant) (GLUCAGEN DIAGNOSTIC) injection 1 mg, 1 mg, Subcutaneous, PRN, Delonte Loving MD  •  hydrALAZINE (APRESOLINE) tablet 50 mg, 50 mg, Oral, Q8H, Tank Taylor MD, 50 mg at 04/10/18 0622  •  HYDROcodone-acetaminophen (NORCO) 5-325 MG per tablet 1 tablet, 1 tablet, Oral, Q6H PRN, Delotne Loving MD, 1 tablet at 04/09/18 2321  •  HYDROmorphone (DILAUDID) injection 0.5 mg, 0.5 mg, Intravenous, Q4H PRN, Delonte Loving MD, 0.5 mg at 04/09/18 0708  •  insulin aspart (novoLOG) injection 0-7 Units, 0-7 Units, Subcutaneous, 4x Daily With Meals & Nightly, Delonte Loving MD  •  insulin detemir (LEVEMIR) injection 15 Units, 15 Units, Subcutaneous, [facility], Jorge Luis Werner MD  •  levothyroxine (SYNTHROID, LEVOTHROID) tablet 125 mcg, 125 mcg, Oral, Daily, Delonte Loving MD, 125 mcg at 04/10/18 1031  •  lisinopril (PRINIVIL,ZESTRIL) tablet 20 mg, 20 mg, Oral, Daily, Delonte Loving MD, 20 mg at 04/10/18 1031  •  ondansetron (ZOFRAN) injection 4 mg, 4 mg, Intravenous, Q6H PRN, Delonte Loving MD, 4 mg at 04/09/18 1309  •  pantoprazole (PROTONIX) EC tablet 40 mg, 40 mg, Oral, Q AM, Silverio Shah MD, 40 mg at 04/10/18 0623  •  sodium chloride 0.9 % flush 1-10 mL, 1-10 mL, Intravenous, PRN, Delonte Loving MD  •  sodium chloride 0.9 % infusion, 100 mL/hr, Intravenous, Continuous, Delonte Loving MD, Last Rate: 100 mL/hr at 04/09/18 0240, 100 mL/hr at 04/09/18 0240  •  sucralfate (CARAFATE) 1 GM/10ML suspension 1 g, 1 g, Oral, 4x Daily With Meals & Nightly, Delonte Loving MD, 1 g at 04/10/18 1031    LABS:  CBC, CMP, CRP, micro reviewed today  Lab Results   Component Value Date    WBC 7.28 04/10/2018    HGB 8.8 (L) 04/10/2018    HCT 29.6 (L) 04/10/2018    MCV 96.7 04/10/2018     04/10/2018     Lab Results   Component Value Date    GLUCOSE 117 (H) 04/10/2018    BUN 41 (H) 04/10/2018    CREATININE 1.88 (H) 04/10/2018     EGFRIFNONA 26 (L) 04/10/2018    EGFRIFAFRI 32 (L) 01/17/2017    BCR 21.8 04/10/2018    CO2 22.4 04/10/2018    CALCIUM 8.3 (L) 04/10/2018    PROTENTOTREF 7.1 12/12/2016    ALBUMIN 2.90 (L) 04/10/2018    LABIL2 1.1 04/10/2018    AST 16 04/10/2018    ALT 16 04/10/2018    CRP 8.53 (H) 04/10/2018     Procal 0.08  UA negative    Microbiology:  4/9 BCX: NGTD    Radiology:  MRI with L hip effusion; discussed w/ reading radiologist Dr ELIF Heller    Assessment/Plan   1. Small bowel inflammation - polyp, mass, diverticulum, inflammation at suture site?  -discussed w/ Dr Santizo re: differential diagnosis and workup  -no clear evidence of infeciton and risk of empiric antibiotics outweigh benefits in the absence of clear fluid collection or systemic symptoms  -I believe she would benefit from bariatric surgery consultation  -repeat another CT A/P in 2-4 weeks to see if any changes or progression  -symptomatic treatment per GI      2. Left hip effusion -  new  -noted MRI results  -will defer to ortho but I would favor an IR-guided aspiration sent for cell count and culture  -no role for empiric antibiotics    3. Multiple antibiotic allergies  -she tolerates cephalosporins in case antibiotics are needed    Thank you for this consult. ID will follow. I have discussed this case with Dr Santizo.

## 2018-04-10 NOTE — PROGRESS NOTES
Name: Ya Paz ADMIT: 2018   : 1945  PCP: Nicki Hidalgo MD    MRN: [id number] LOS: 2 days   AGE/SEX: 72 y.o. female  ROOM: [facility]   Subjective   Reports generalized worsened stiffness of joints off home steroid. Also concerned about not getting home toujeo pens back from pharmacy. No CP SOA NVD. Hip pain continues but improved some.    Objective   Vital Signs  Temp:  [97.7 °F (36.5 °C)-98.8 °F (37.1 °C)] 98.2 °F (36.8 °C)  Heart Rate:  [64-74] 74  Resp:  [16-18] 18  BP: (132-155)/(51-59) 134/55  SpO2:  [94 %] 94 %  on   ;   Device (Oxygen Therapy): (P) room air  Body mass index is 35.25 kg/m².    Physical Exam   Constitutional: She is oriented to person, place, and time. She appears well-developed. No distress.   HENT:   Head: Normocephalic and atraumatic.   Eyes: EOM are normal. Pupils are equal, round, and reactive to light.   Neck: Normal range of motion. Neck supple.   Cardiovascular: Normal rate, regular rhythm and intact distal pulses.    Pulmonary/Chest: Effort normal and breath sounds normal. She has no wheezes.   Abdominal: Soft. She exhibits no distension.   Musculoskeletal: She exhibits tenderness (L hip). She exhibits no edema.   Neurological: She is alert and oriented to person, place, and time.   Skin: Skin is warm and dry. She is not diaphoretic.   Psychiatric: She has a normal mood and affect. Her behavior is normal.   Nursing note and vitals reviewed.      Results Review:       I reviewed the patient's new clinical results. Reviewed imaging, agree with interpretation. Reviewed telemetry, sinus rhythm.    Results from last 7 days  Lab Units 04/10/18  04018  04318  1734   WBC 10*3/mm3 7.28 8.30 13.39*   HEMOGLOBIN g/dL 8.8* 9.8* 10.9*   PLATELETS 10*3/mm3 203 238 296     Results from last 7 days  Lab Units 04/10/18  04018  0434 18  1734   SODIUM mmol/L 138 141 139   POTASSIUM mmol/L 4.8 5.1 4.8   CHLORIDE mmol/L 105 107 101   CO2 mmol/L  22.4 18.7* 23.2   BUN mg/dL 41* 42* 43*   CREATININE mg/dL 1.88* 1.85* 1.84*   GLUCOSE mg/dL 117* 92 131*   Estimated Creatinine Clearance: 29.3 mL/min (by C-G formula based on SCr of 1.88 mg/dL (H)).  Results from last 7 days  Lab Units 04/10/18  0409 04/09/18  0434 04/08/18  1734   CALCIUM mg/dL 8.3* 8.5* 9.2   ALBUMIN g/dL 2.90*  --  3.50   PHOSPHORUS mg/dL 4.1  --   --          aspirin 81 mg Oral Daily   enoxaparin 30 mg Subcutaneous Q24H   folic acid 1,000 mcg Oral Daily   hydrALAZINE 50 mg Oral Q8H   insulin aspart 0-7 Units Subcutaneous 4x Daily With Meals & Nightly   insulin detemir 15 Units Subcutaneous QAM   levothyroxine 125 mcg Oral Daily   lisinopril 20 mg Oral Daily   pantoprazole 40 mg Oral Q AM   predniSONE 5 mg Oral Daily   sucralfate 1 g Oral 4x Daily With Meals & Nightly       sodium chloride 100 mL/hr Last Rate: 100 mL/hr (04/09/18 0240)   Diet Soft Texture; Whole Foods; Consistent Carbohydrate      Assessment/Plan      Active Hospital Problems (** Indicates Principal Problem)    Diagnosis Date Noted   • **Hip pain, acute, left [M25.552] 04/09/2018   • Abdominal mass [R19.00] 04/08/2018   • Diabetes mellitus type 2, uncontrolled, with complications [E11.8, E11.65] 04/07/2017   • Postsurgical hypothyroidism [E89.0] 04/07/2017   • CKD (chronic kidney disease) stage 3, GFR 30-59 ml/min [N18.3] 11/11/2016      Resolved Hospital Problems    Diagnosis Date Noted Date Resolved   No resolved problems to display.     - L Hip Pain: Effusion on MRI. ESR and CRP elevated in setting of RA. Will consult IR for arthrocentesis and fluid analysis. Also will check RF and Uric acid levels. Blood cultures negative to date. Orthopedic surgery following.  - RA: Resume home prednisone. Monitor.  - Abdominal Mass: No empiric antibiotics recommended by ID. WBC stable off of them at this time, expect possible elevation with resumption of prednisone. PPI, Sucralfate. GI and ID following.  - DM2: A1c 6.1. Levemir 15 units  daily for long acting. SSI.  - CKD3: Stable. Nephrology following.  - Disposition: TBD    Jorge Luis Werner MD  [facility]  04/10/18  11:14 AM

## 2018-04-10 NOTE — PROGRESS NOTES
[facility]  Inpatient Progress Note    Reason for Follow Up:  Abnormal CT    Subjective     Interval History:   Abdominal pain is better.  No nausea/vomiting.  Appetite not great - she reports that it has been only fair since prior to [facility].  She has lost about 15 lbs.  BM every 3-4 days - this is normal for her.  Still with hip pain    Current Facility-Administered Medications:   •  acetaminophen (TYLENOL) tablet 650 mg, 650 mg, Oral, Q4H PRN, Delonte Loving MD  •  ALPRAZolam (XANAX) tablet 0.5 mg, 0.5 mg, Oral, TID PRN, Delonte Loving MD, 0.5 mg at 04/09/18 1800  •  aluminum-magnesium hydroxide-simethicone (MAALOX MAX) 400-400-40 MG/5ML suspension 15 mL, 15 mL, Oral, Q6H PRN, Delonte Loving MD  •  aspirin EC tablet 81 mg, 81 mg, Oral, Daily, Delonte Loving MD, 81 mg at 04/09/18 0929  •  dextrose (D50W) solution 25 g, 25 g, Intravenous, Q15 Min PRN, Delonte Loving MD  •  dextrose (GLUTOSE) oral gel 15 g, 15 g, Oral, Q15 Min PRN, Delonte Loving MD  •  enoxaparin (LOVENOX) syringe 30 mg, 30 mg, Subcutaneous, Q24H, Delonte Loving MD, 30 mg at 04/10/18 0624  •  folic acid (FOLVITE) tablet 1,000 mcg, 1,000 mcg, Oral, Daily, Delonte Loving MD, 1,000 mcg at 04/09/18 0929  •  glucagon (human recombinant) (GLUCAGEN DIAGNOSTIC) injection 1 mg, 1 mg, Subcutaneous, PRN, Delonte Loving MD  •  hydrALAZINE (APRESOLINE) tablet 50 mg, 50 mg, Oral, Q8H, Tank Taylor MD, 50 mg at 04/10/18 0622  •  HYDROcodone-acetaminophen (NORCO) 5-325 MG per tablet 1 tablet, 1 tablet, Oral, Q6H PRN, Delonte Loving MD, 1 tablet at 04/09/18 2321  •  HYDROmorphone (DILAUDID) injection 0.5 mg, 0.5 mg, Intravenous, Q4H PRN, Delonte Loving MD, 0.5 mg at 04/09/18 0708  •  insulin aspart (novoLOG) injection 0-7 Units, 0-7 Units, Subcutaneous, 4x Daily With Meals & Nightly, Delonte Loving MD  •  insulin detemir (LEVEMIR) injection 15 Units, 15 Units,  Subcutaneous, QA, Jorge Luis Werner MD  •  levothyroxine (SYNTHROID, LEVOTHROID) tablet 125 mcg, 125 mcg, Oral, Daily, Delonte Loving MD, 125 mcg at 04/09/18 0929  •  lisinopril (PRINIVIL,ZESTRIL) tablet 20 mg, 20 mg, Oral, Daily, Delonte Loving MD, 20 mg at 04/09/18 0929  •  ondansetron (ZOFRAN) injection 4 mg, 4 mg, Intravenous, Q6H PRN, Delonte Loving MD, 4 mg at 04/09/18 1309  •  pantoprazole (PROTONIX) EC tablet 40 mg, 40 mg, Oral, Q AM, Silverio Shah MD, 40 mg at 04/10/18 0623  •  sodium chloride 0.9 % flush 1-10 mL, 1-10 mL, Intravenous, PRN, Delonte Loving MD  •  sodium chloride 0.9 % infusion, 100 mL/hr, Intravenous, Continuous, Delonte Loving MD, Last Rate: 100 mL/hr at 04/09/18 0240, 100 mL/hr at 04/09/18 0240  •  sucralfate (CARAFATE) 1 GM/10ML suspension 1 g, 1 g, Oral, 4x Daily With Meals & Nightly, Delonte Loving MD, 1 g at 04/09/18 2322  Review of Systems:    All systems were reviewed and negative except for:  Constitution:  positive for anorexia and weight loss  Gastrointestinal: postitive for  pain  Musculoskeletal: positive for  bone pain    Objective     Vital Signs  Temp:  [97.7 °F (36.5 °C)-98.8 °F (37.1 °C)] 98.2 °F (36.8 °C)  Heart Rate:  [64-71] 68  Resp:  [16-18] 18  BP: (132-155)/(51-59) 134/55  Body mass index is 35.25 kg/m².    Intake/Output Summary (Last 24 hours) at 04/10/18 0846  Last data filed at 04/10/18 0733   Gross per 24 hour   Intake                0 ml   Output              900 ml   Net             -900 ml     I/O this shift:  In: -   Out: 400 [Urine:400]     Physical Exam:   General: patient awake, alert and cooperative   Eyes: Normal lids and lashes, no scleral icterus   Neck: supple, normal ROM   Skin: warm and dry, not jaundiced   Abdomen: soft, focal mild luq tenderness, nondistended; normal bowel sounds    Extremities: no rash    Psychiatric: Normal mood and behavior; memory intact     Results Review:     I reviewed the patient's new  clinical results.      Results from last 7 days  Lab Units 04/10/18  0409 04/09/18  0434 04/08/18  1734   WBC 10*3/mm3 7.28 8.30 13.39*   HEMOGLOBIN g/dL 8.8* 9.8* 10.9*   HEMATOCRIT % 29.6* 32.8* 35.0*   PLATELETS 10*3/mm3 203 238 296       Results from last 7 days  Lab Units 04/10/18  0409 04/09/18  0434 04/08/18  1734   SODIUM mmol/L 138 141 139   POTASSIUM mmol/L 4.8 5.1 4.8   CHLORIDE mmol/L 105 107 101   CO2 mmol/L 22.4 18.7* 23.2   BUN mg/dL 41* 42* 43*   CREATININE mg/dL 1.88* 1.85* 1.84*   CALCIUM mg/dL 8.3* 8.5* 9.2   BILIRUBIN mg/dL <0.2  --  0.3   ALK PHOS U/L 53  --  60   ALT (SGPT) U/L 16  --  13   AST (SGOT) U/L 16  --  13   GLUCOSE mg/dL 117* 92 131*       Results from last 7 days  Lab Units 04/10/18  0409   INR  1.07       Lab Results  Lab Value Date/Time   LIPASE 35 04/10/2018 0409   LIPASE 45 04/08/2018 1734       Radiology:  MRI Hip Left Without Contrast   Preliminary Result   Mild osteoarthritic change at the hips bilaterally with a   nonspecific left hip effusion. Some periarticular soft tissue edema is   present. Infectious or inflammatory sources of left hip effusion are   favored. Osteoarthritis can also be associated with an effusion as seen   here; however, the periarticular soft tissue edema suggests inflammatory   infectious etiology.          CT Chest Without Contrast   Final Result   1. Segment of small bowel in the left upper quadrant appears mildly   dilated and heterogeneous in density. Surrounding inflammatory changes   seen. There appear to be surgical sutures in this region. This segment   of small bowel appear mildly dilated on the previous CT scan of   10/30/2017. This may represent focal area of focal enteritis,   inflammatory bowel disease or less likely neoplastic mass. No other   bowel dilatation is seen.    2. No evidence of pneumoperitoneum.   3. Colonic diverticulosis.   4. Multiple renal lesions may all represent benign renal cysts.   5. No acute process demonstrated in  the chest.           Radiation dose reduction techniques were utilized, including automated   exposure control and exposure modulation based on body size.       This report was finalized on 4/9/2018 10:41 AM by Dr. Iker Leonard MD.          CT Abdomen Pelvis Without Contrast   Final Result   1. Segment of small bowel in the left upper quadrant appears mildly   dilated and heterogeneous in density. Surrounding inflammatory changes   seen. There appear to be surgical sutures in this region. This segment   of small bowel appear mildly dilated on the previous CT scan of   10/30/2017. This may represent focal area of focal enteritis,   inflammatory bowel disease or less likely neoplastic mass. No other   bowel dilatation is seen.    2. No evidence of pneumoperitoneum.   3. Colonic diverticulosis.   4. Multiple renal lesions may all represent benign renal cysts.   5. No acute process demonstrated in the chest.           Radiation dose reduction techniques were utilized, including automated   exposure control and exposure modulation based on body size.       This report was finalized on 4/9/2018 10:41 AM by Dr. Iker Leonard MD.          XR Hip With or Without Pelvis 2 - 3 View Left   Final Result   No acute process identified.       This report was finalized on 4/9/2018 10:39 AM by Dr. Iker Leonard MD.              Assessment/Plan     Patient Active Problem List   Diagnosis   • Diverticulitis of large intestine without perforation or abscess without bleeding   • ARF (acute renal failure)   • CKD (chronic kidney disease) stage 3, GFR 30-59 ml/min   • Proteinuria   • Diverticulitis   • Hypervitaminosis D   • Postsurgical hypothyroidism   • Chronic fatigue   • Diabetes mellitus type 2, uncontrolled, with complications   • Heartburn   • Dysphagia   • Chest pain   • Abdominal mass   • Hip pain, acute, left     Impression:  1. Abnormal CT with small bowel abnormality in the LUQ of the abdomen as seen on CT  abd/pelvis- This would be difficult to get to endoscopically since Dr. LUBA Santizo could not complete the 2/18 EGD because of a sharp turn in the stomach due to prior bariatric surgery and stenotic outflow from gastroplasty. Dr Shah discussed CT with Dr. Heaven Wolfe - this knuckle of small bowel was distended during 10/17 CT abdomen. She is not convinced that there is a small bowel mass but there is some inflammation around that area of small bowel (enteritis).   2. Dyspepsia/heartburn- in a 72 you who is s/p vertical banded gastroplasty in 1984 by Dr. MARYJO Reagan. UGI and EGD seem to show some breakdown of some of the sutures such that barium is going around the banded part of this verticle banded gastroplasty.  3.  Weight loss in a 72 who has had bariatric surgery in 1984.  4. Anemia    Plan:  - discussed with Dr Shah and Dr Ochoa - will hold on any antibx given no other signs of infection, hx c diff in the past and multiple antibx allergies  - plan to reimage her abdomen in 2-3 weeks - would be difficult if not impossible to access with a scope due to prior surgery and stenotic outflow - same issue with capsule   - continue aggressive suppression of acid - small meals, upright position for meals    I discussed the patients findings and my recommendations with patient.    Paloma Santizo MD

## 2018-04-10 NOTE — PROGRESS NOTES
"   LOS: 2 days   Patient Care Team:  Nicki Hidalgo MD as PCP - General (Internal Medicine)    Chief Complaint/ Reason for encounter: Chronic kidney disease, fluid management  Chief Complaint   Patient presents with   • Hip Pain     left sided hip pain. denies injury at this time, woke this morning with pain.          Subjective     History of Present Illness    Subjective:  Symptoms:  Improved.  She reports weakness.  No shortness of breath or chest pain.  (Still complaining of some hip pain with radiation to her leg).    Diet:  Adequate intake.    Activity level: Impaired due to weakness.    Pain:  She complains of pain that is mild.  She reports pain is unchanged.  Pain is partially controlled and requiring pain medication.          History taken from: Patient and chart    Objective     Vital Signs  Temp:  [98.2 °F (36.8 °C)-98.8 °F (37.1 °C)] 98.3 °F (36.8 °C)  Heart Rate:  [64-74] 74  Resp:  [16-18] 18  BP: (132-165)/(51-87) 165/87       Wt Readings from Last 1 Encounters:   04/08/18 1421 90.3 kg (199 lb)       Objective:  General Appearance:  Comfortable, well-appearing, in no acute distress and not in pain.    Vital signs: (most recent): Blood pressure 165/87, pulse 74, temperature 98.3 °F (36.8 °C), temperature source Oral, resp. rate 18, height 160 cm (63\"), weight 90.3 kg (199 lb), SpO2 94 %.  Vital signs are normal.  No fever.    Output: Producing urine.    HEENT: Normal HEENT exam.    Lungs:  Normal effort and normal respiratory rate.  Breath sounds clear to auscultation.  She is not in respiratory distress.  No rales or wheezes.    Heart: Normal rate.  Regular rhythm.  No murmur.   Abdomen: Abdomen is soft and non-distended.  Bowel sounds are normal.   There is no abdominal tenderness.  There is no epigastric area or suprapubic area tenderness.     Extremities: Normal range of motion.  There is no deformity or dependent edema.    Pulses: Distal pulses are intact.    Neurological: Patient is " "alert.    Skin:  Warm and dry.  No rash or cyanosis.             Results Review:    Past Medical History: reviewed and updated  Past Medical History:   Diagnosis Date   • Anxiety    • Clostridium difficile infection 11/2016    and in 2005   • Diabetes mellitus    • Disease of thyroid gland    • Hypertension    • Migraine    • Pancreatitis    • Tachycardia    • TIA (transient ischemic attack)          Allergies:  Allergies   Allergen Reactions   • Augmentin [Amoxicillin-Pot Clavulanate] Rash     rash  **tolerates cephalosporins**   **no reason to give aztreonam in the future**   • Dapsone Other (See Comments)     Pt states she \"quit producing blood.\"   • Detrol [Tolterodine Tartrate]      Pt. Does not recall this allergy.   • Levaquin [Levofloxacin] Hives   • Sulfa Antibiotics      Sister went blind with taking and was advised not to take.  Sister went blind with taking and was advised not to take.   • Tetracycline Hives and Itching   • Tetracyclines & Related        Intake/Output:     Intake/Output Summary (Last 24 hours) at 04/10/18 1348  Last data filed at 04/10/18 1319   Gross per 24 hour   Intake              220 ml   Output             1250 ml   Net            -1030 ml         DATA:  Radiology and Labs:  The following labs independently reviewed by me.  Interval notes, chart personally reviewed by me.   Old records independently reviewed showing Stage III chronic kidney disease, baseline creatinine 1.8-2.0  The following radiologic studies independently viewed by me, findings left hip MRI showing soft tissue edema, effusion  New problems include left hip effusion, inflammatory versus infectious  Discussed with patient and interval notes/chart reviewed      Risk/ complexity of medical care/ medical decision making high given the need for operative intervention of her left hip effusion, IV fluids                Labs:   Recent Results (from the past 24 hour(s))   POC Glucose Once    Collection Time: 04/09/18  4:32 " PM   Result Value Ref Range    Glucose 107 70 - 130 mg/dL   POC Glucose Once    Collection Time: 04/09/18  8:48 PM   Result Value Ref Range    Glucose 129 70 - 130 mg/dL   Protime-INR    Collection Time: 04/10/18  4:09 AM   Result Value Ref Range    Protime 13.7 11.7 - 14.2 Seconds    INR 1.07 0.90 - 1.10   Troponin    Collection Time: 04/10/18  4:09 AM   Result Value Ref Range    Troponin T <0.010 0.000 - 0.030 ng/mL   Amylase    Collection Time: 04/10/18  4:09 AM   Result Value Ref Range    Amylase 49 28 - 100 U/L   Lipase    Collection Time: 04/10/18  4:09 AM   Result Value Ref Range    Lipase 35 13 - 60 U/L   Comprehensive Metabolic Panel    Collection Time: 04/10/18  4:09 AM   Result Value Ref Range    Glucose 117 (H) 65 - 99 mg/dL    BUN 41 (H) 8 - 23 mg/dL    Creatinine 1.88 (H) 0.57 - 1.00 mg/dL    Sodium 138 136 - 145 mmol/L    Potassium 4.8 3.5 - 5.2 mmol/L    Chloride 105 98 - 107 mmol/L    CO2 22.4 22.0 - 29.0 mmol/L    Calcium 8.3 (L) 8.6 - 10.5 mg/dL    Total Protein 5.6 (L) 6.0 - 8.5 g/dL    Albumin 2.90 (L) 3.50 - 5.20 g/dL    ALT (SGPT) 16 1 - 33 U/L    AST (SGOT) 16 1 - 32 U/L    Alkaline Phosphatase 53 39 - 117 U/L    Total Bilirubin <0.2 0.1 - 1.2 mg/dL    eGFR Non African Amer 26 (L) >60 mL/min/1.73    Globulin 2.7 gm/dL    A/G Ratio 1.1 g/dL    BUN/Creatinine Ratio 21.8 7.0 - 25.0    Anion Gap 10.6 mmol/L   Ferritin    Collection Time: 04/10/18  4:09 AM   Result Value Ref Range    Ferritin 239.60 (H) 13.00 - 150.00 ng/mL   Iron Profile    Collection Time: 04/10/18  4:09 AM   Result Value Ref Range    Iron 26 (L) 37 - 145 mcg/dL    Iron Saturation 13 (L) 20 - 50 %    Transferrin 131 (L) 200 - 360 mg/dL    TIBC 195 mcg/dL   Vitamin B12    Collection Time: 04/10/18  4:09 AM   Result Value Ref Range    Vitamin B-12 749 211 - 946 pg/mL   C-reactive Protein    Collection Time: 04/10/18  4:09 AM   Result Value Ref Range    C-Reactive Protein 8.53 (H) 0.00 - 0.50 mg/dL   Sedimentation Rate     Collection Time: 04/10/18  4:09 AM   Result Value Ref Range    Sed Rate 61 (H) 0 - 30 mm/hr   CBC Auto Differential    Collection Time: 04/10/18  4:09 AM   Result Value Ref Range    WBC 7.28 4.50 - 10.70 10*3/mm3    RBC 3.06 (L) 3.90 - 5.20 10*6/mm3    Hemoglobin 8.8 (L) 11.9 - 15.5 g/dL    Hematocrit 29.6 (L) 35.6 - 45.5 %    MCV 96.7 80.5 - 98.2 fL    MCH 28.8 26.9 - 32.0 pg    MCHC 29.7 (L) 32.4 - 36.3 g/dL    RDW 14.5 (H) 11.7 - 13.0 %    RDW-SD 51.5 37.0 - 54.0 fl    MPV 9.8 6.0 - 12.0 fL    Platelets 203 140 - 500 10*3/mm3    Neutrophil % 64.1 42.7 - 76.0 %    Lymphocyte % 25.1 19.6 - 45.3 %    Monocyte % 5.5 5.0 - 12.0 %    Eosinophil % 4.9 0.3 - 6.2 %    Basophil % 0.1 0.0 - 1.5 %    Immature Grans % 0.3 0.0 - 0.5 %    Neutrophils, Absolute 4.66 1.90 - 8.10 10*3/mm3    Lymphocytes, Absolute 1.83 0.90 - 4.80 10*3/mm3    Monocytes, Absolute 0.40 0.20 - 1.20 10*3/mm3    Eosinophils, Absolute 0.36 0.00 - 0.70 10*3/mm3    Basophils, Absolute 0.01 0.00 - 0.20 10*3/mm3    Immature Grans, Absolute 0.02 0.00 - 0.03 10*3/mm3   Phosphorus    Collection Time: 04/10/18  4:09 AM   Result Value Ref Range    Phosphorus 4.1 2.5 - 4.5 mg/dL   POC Glucose Once    Collection Time: 04/10/18  6:34 AM   Result Value Ref Range    Glucose 100 70 - 130 mg/dL   POC Glucose Once    Collection Time: 04/10/18  7:32 AM   Result Value Ref Range    Glucose 98 70 - 130 mg/dL   POC Glucose Once    Collection Time: 04/10/18 11:22 AM   Result Value Ref Range    Glucose 135 (H) 70 - 130 mg/dL       Radiology:  Imaging Results (last 24 hours)     Procedure Component Value Units Date/Time    MRI Hip Left Without Contrast [[id number]] Collected:  04/10/18 0833     Updated:  04/10/18 0942    Narrative:       LEFT HIP MRI WITHOUT CONTRAST     HISTORY: Left hip and groin pain.     TECHNIQUE: MRI of the left hip was performed using axial and coronal T1  and STIR sequences which show the entire pelvis and a sagittal proton  density sequence which shows  the left hip. These are correlated with  abdomen and pelvis CT and pelvis and left hip x-rays performed  yesterday.     FINDINGS: There is a left hip effusion with mild deep soft tissue edema  around the hip. Mild degenerative change is observed at the left hip.  There is no evidence of osteonecrosis, stress fracture, or bone tumor.  Marrow signal in the proximal femurs and throughout the pelvis is  normal. There are small greater trochanteric bursal fluid collections  bilaterally. Fatty atrophic change is observed at the gluteus minimus  muscles and along the anterior portion of the left gluteus medius  muscle, a common finding in this age group and of doubtful clinical  significance in this case. No focal acute muscle defect is present.     The pubic symphysis and the SI joints appear normal. Coronal images show  degenerative change in the lower lumbar spine. There is no compression  deformity in the L3, L4, or L5 vertebra. Colonic diverticulosis is  incidentally noted. There is no pelvic free fluid nor any intrapelvic  mass.       Impression:       Mild osteoarthritic change at the hips bilaterally with a  nonspecific left hip effusion. Some periarticular soft tissue edema is  present. Infectious or inflammatory sources of left hip effusion are  favored. Osteoarthritis can also be associated with an effusion as seen  here; however, the periarticular soft tissue edema suggests inflammatory  infectious etiology.     This report was finalized on 4/10/2018 9:39 AM by Dr. Raheel Heller MD.                Medications have been reviewed:  Current Facility-Administered Medications   Medication Dose Route Frequency Provider Last Rate Last Dose   • acetaminophen (TYLENOL) tablet 650 mg  650 mg Oral Q4H PRN Delonte Loving MD       • ALPRAZolam (XANAX) tablet 0.5 mg  0.5 mg Oral TID PRN Delonte Loving MD   0.5 mg at 04/09/18 1800   • aluminum-magnesium hydroxide-simethicone (MAALOX MAX) 400-400-40 MG/5ML  suspension 15 mL  15 mL Oral Q6H PRN Delonte Loving MD       • aspirin EC tablet 81 mg  81 mg Oral Daily Delonte Loving MD   81 mg at 04/10/18 1030   • dextrose (D50W) solution 25 g  25 g Intravenous Q15 Min PRN Delonte Loving MD       • dextrose (GLUTOSE) oral gel 15 g  15 g Oral Q15 Min PRN Delonte Loving MD       • enoxaparin (LOVENOX) syringe 30 mg  30 mg Subcutaneous Q24H Delonte Loving MD   30 mg at 04/10/18 0624   • ferrous sulfate tablet 325 mg  325 mg Oral BID With Meals Tank Taylor MD       • folic acid (FOLVITE) tablet 1,000 mcg  1,000 mcg Oral Daily Delonte Loving MD   1,000 mcg at 04/10/18 1031   • glucagon (human recombinant) (GLUCAGEN DIAGNOSTIC) injection 1 mg  1 mg Subcutaneous PRN Delonte Loving MD       • hydrALAZINE (APRESOLINE) tablet 50 mg  50 mg Oral Q8H Tank Taylor MD   50 mg at 04/10/18 0622   • HYDROcodone-acetaminophen (NORCO) 5-325 MG per tablet 1 tablet  1 tablet Oral Q6H PRN Delonte Loving MD   1 tablet at 04/09/18 2321   • HYDROmorphone (DILAUDID) injection 0.5 mg  0.5 mg Intravenous Q4H PRN Delonte Loving MD   0.5 mg at 04/09/18 0708   • insulin aspart (novoLOG) injection 0-7 Units  0-7 Units Subcutaneous 4x Daily With Meals & Nightly Delonte Loving MD       • insulin detemir (LEVEMIR) injection 15 Units  15 Units Subcutaneous KARLA Werner MD       • levothyroxine (SYNTHROID, LEVOTHROID) tablet 125 mcg  125 mcg Oral Daily Delonte Loving MD   125 mcg at 04/10/18 1031   • lisinopril (PRINIVIL,ZESTRIL) tablet 20 mg  20 mg Oral Daily Delonte Loving MD   20 mg at 04/10/18 1031   • ondansetron (ZOFRAN) injection 4 mg  4 mg Intravenous Q6H PRN Delonte Loving MD   4 mg at 04/09/18 1309   • pantoprazole (PROTONIX) EC tablet 40 mg  40 mg Oral Q AM Silverio Shah MD   40 mg at 04/10/18 0623   • predniSONE (DELTASONE) tablet 5 mg  5 mg Oral Daily Jorge Luis Werner MD       • sodium chloride  0.9 % flush 1-10 mL  1-10 mL Intravenous PRN Delonte Loving MD       • sucralfate (CARAFATE) 1 GM/10ML suspension 1 g  1 g Oral 4x Daily With Meals & Nightly Delonte Loving MD   1 g at 04/10/18 1031       Assessment/Plan     Principal Problem:    Hip pain, acute, left  Active Problems:    CKD (chronic kidney disease) stage 3, GFR 30-59 ml/min    Postsurgical hypothyroidism    Diabetes mellitus type 2, uncontrolled, with complications    Abdominal mass    Iron deficiency anemia      Assessment & Plan  Chronic kidney disease stage III, stable, near baseline  New, iron deficiency anemia, add oral iron sulfate, endoscopy per GI  Left hip pain  New left hip effusion with plans for IR drainage  Diabetes mellitus type 2  Chronic hypertension, stable, controlled  Anemia of chronic kidney disease      Plan:  Renal function stable, near baseline  Discontinue IV fluids  Await IR drainage of left hip effusion  Continue current antihypertensive regimen          Continue to monitor renal function, electroytes and volume closely   Please call me with any questions or concerns    Tank Taylor MD   [facility]   Office phone number: [phone]  Answering service phone number: [phone]    04/10/18  1:48 PM      Dictation performed using Dragon dictation software

## 2018-04-10 NOTE — PLAN OF CARE
Problem: Patient Care Overview  Goal: Plan of Care Review  Outcome: Ongoing (interventions implemented as appropriate)      Problem: Pain, Chronic (Adult)  Goal: Identify Related Risk Factors and Signs and Symptoms  Outcome: Ongoing (interventions implemented as appropriate)   04/09/18 2004   Pain, Chronic (Adult)   Related Risk Factors (Chronic Pain) pain control inadequate       Problem: Pain, Acute (Adult)  Goal: Identify Related Risk Factors and Signs and Symptoms  Outcome: Ongoing (interventions implemented as appropriate)      Problem: Diabetes, Type 2 (Adult)  Goal: Signs and Symptoms of Listed Potential Problems Will be Absent, Minimized or Managed (Diabetes, Type 2)   04/09/18 2004   Goal/Outcome Evaluation   Problems Assessed (Type 2 Diabetes) all   Problems Present (Type 2 Diabetes) hypoglycemia

## 2018-04-10 NOTE — PLAN OF CARE
Problem: Fall Risk (Adult)  Goal: Identify Related Risk Factors and Signs and Symptoms  Outcome: Outcome(s) achieved Date Met: 04/10/18   04/10/18 1749   Fall Risk (Adult)   Related Risk Factors (Fall Risk) depression/anxiety;gait/mobility problems;history of falls;environment unfamiliar   Signs and Symptoms (Fall Risk) presence of risk factors     Goal: Absence of Fall  Outcome: Ongoing (interventions implemented as appropriate)      Problem: Patient Care Overview  Goal: Plan of Care Review  Outcome: Ongoing (interventions implemented as appropriate)    Goal: Individualization and Mutuality  Outcome: Ongoing (interventions implemented as appropriate)    Goal: Discharge Needs Assessment  Outcome: Ongoing (interventions implemented as appropriate)    Goal: Interprofessional Rounds/Family Conf  Outcome: Outcome(s) achieved Date Met: 04/10/18      Problem: Pain, Chronic (Adult)  Goal: Identify Related Risk Factors and Signs and Symptoms  Outcome: Outcome(s) achieved Date Met: 04/10/18    Goal: Acceptable Pain/Comfort Level and Functional Ability  Outcome: Ongoing (interventions implemented as appropriate)      Problem: Pain, Acute (Adult)  Goal: Identify Related Risk Factors and Signs and Symptoms  Outcome: Outcome(s) achieved Date Met: 04/10/18    Goal: Acceptable Pain Control/Comfort Level  Outcome: Ongoing (interventions implemented as appropriate)      Problem: Diabetes, Type 2 (Adult)  Goal: Signs and Symptoms of Listed Potential Problems Will be Absent, Minimized or Managed (Diabetes, Type 2)  Outcome: Ongoing (interventions implemented as appropriate)

## 2018-04-10 NOTE — CONSULTS
Orthopedic Consult      Patient: Ya Paz    Date of Admission: 4/8/2018  4:01 PM    YOB: 1945    Medical Record Number: [id number]    Attending Physician: Jorge Luis Werner MD  Consulting Physician: Blake Franz MD      Chief Complaints: Abdominal mass [R19.00]  Abnormal abdominal CT scan [R93.5]  Left hip pain [M25.552]  Abdominal pain, unspecified abdominal location [R10.9]  Chest pain, unspecified type [R07.9]      History of Present Illness: 72 y.o. female admitted to [facility] with Abdominal mass [R19.00]  Abnormal abdominal CT scan [R93.5]  Left hip pain [M25.552]  Abdominal pain, unspecified abdominal location [R10.9]  Chest pain, unspecified type [R07.9]. I was consulted for further evaluation and treatment of the left hip pain. She has a history of stage III chronic kidney disease secondary to chronic hypertension, difficult to control hypertension which eventually been better as of lat.  Her baseline creatinine is between 1.8 and 2.0.  Medical history otherwise significant for diabetes which is well controlled, admitted yesterday through the emergency department with acute left-sided hip pain which started suddenly, not associated with any fall or acute trauma.  She also complained of some vague abdominal pain but no nausea vomiting, 10 pound weight loss unintentional over the last 23 months.  CT the abdomen and pelvis noncontrast showed a possible soft tissue mass in the small bowel, MRI is scheduled today.  Patient was concerned about a possible effects of contrast and requested that I evaluate her here in the hospital.  She currently feels well, on IV fluids, blood pressure is stable, creatinine is 1.8 which is baseline. Onset of symptoms was abrupt starting 1 day ago. Symptoms are aggravated by standing and attempted active motion.   Symptoms improve with rest. No fever, chills, etc       Allergies   Allergen Reactions   • Augmentin [Amoxicillin-Pot Clavulanate]  "Rash     rash  **tolerates cephalosporins**   **no reason to give aztreonam in the future**   • Dapsone Other (See Comments)     Pt states she \"quit producing blood.\"   • Detrol [Tolterodine Tartrate]      Pt. Does not recall this allergy.   • Levaquin [Levofloxacin] Hives   • Sulfa Antibiotics      Sister went blind with taking and was advised not to take.  Sister went blind with taking and was advised not to take.   • Tetracycline Hives and Itching   • Tetracyclines & Related        Home Medications:  Prescriptions Prior to Admission   Medication Sig Dispense Refill Last Dose   • aspirin 81 MG EC tablet Take 81 mg by mouth Daily.   4/7/2018 at Unknown time   • folic acid (FOLVITE) 1 MG tablet TAKE ONE TABLET BY MOUTH ONCE DAILY   4/7/2018 at Unknown time   • HYDROcodone-acetaminophen (NORCO) 5-325 MG per tablet Take 1 tablet by mouth Every 6 (Six) Hours As Needed for Moderate Pain .   4/8/2018 at Unknown time   • hydrOXYzine (ATARAX) 25 MG tablet Take 25 mg by mouth At Night As Needed for Itching (1-3 tablets every night at bedtime for itching).   Past Week at Unknown time   • insulin aspart (NOVOLOG FLEXPEN) 100 UNIT/ML solution pen-injector sc pen 6 units before each meal with sliding scale with a max of 100 units daily (Patient taking differently: 7 units before each meal with sliding scale with a max of 100 units daily per patient) 5 pen 4 4/8/2018 at Unknown time   • Insulin Glargine (TOUJEO SOLOSTAR) 300 UNIT/ML solution pen-injector Inject 15 Units under the skin Daily With Breakfast. Adjust dose according to accucheck    4/8/2018 at Unknown time   • levothyroxine (SYNTHROID, LEVOTHROID) 125 MCG tablet Take 125 mcg by mouth Daily.   4/8/2018 at Unknown time   • predniSONE (DELTASONE) 5 MG tablet Take 5 mg by mouth Daily.   4/8/2018 at Unknown time   • raNITIdine (ZANTAC) 300 MG tablet TAKE 1 TABLET BY MOUTH EVERY NIGHT 90 tablet 3 4/7/2018 at Unknown time   • sucralfate (CARAFATE) 1 GM/10ML suspension Take " 10 mL by mouth 4 (Four) Times a Day With Meals & at Bedtime. 840 mL 5 4/7/2018 at Unknown time   • ALPRAZolam (XANAX) 0.5 MG tablet Take 0.5 mg by mouth 3 (Three) Times a Day As Needed.   Unknown at Unknown time   • atorvastatin (LIPITOR) 10 MG tablet TAKE ONE TABLET BY MOUTH ONCE DAILY   Not Taking   • carvedilol (COREG) 6.25 MG tablet TAKE ONE TABLET BY MOUTH TWICE DAILY   Not Taking   • Cholecalciferol (VITAMIN D) 1000 UNITS tablet Take 1,000 Units by mouth 2 (Two) Times a Day.   Unknown at Unknown time   • Insulin Pen Needle 32G X 4 MM misc Use to inject insulin 4 times daily 120 each 5 2/13/2018 at Unknown time   • LEVEMIR FLEXPEN 100 UNIT/ML injection 30 units in the AM titrate up as instructed with a max of 100 units daily 5 pen 4 Not Taking   • lisinopril (PRINIVIL,ZESTRIL) 10 MG tablet Take 20 mg by mouth Daily.   Unknown at Unknown time   • losartan (COZAAR) 100 MG tablet Take 100 mg by mouth Daily.   Not Taking at Unknown time   • lubiprostone (AMITIZA) 24 MCG capsule Take 1 capsule by mouth 2 (Two) Times a Day With Meals. (Patient not taking: Reported on 4/8/2018) 60 capsule 2 Not Taking at Unknown time   • metroNIDAZOLE (FLAGYL) 500 MG tablet Take 1 tablet by mouth 3 (Three) Times a Day. 30 tablet 0 Not Taking       Current Medications:  Scheduled Meds:  aspirin 81 mg Oral Daily   enoxaparin 30 mg Subcutaneous Q24H   folic acid 1,000 mcg Oral Daily   hydrALAZINE 50 mg Oral Q8H   insulin aspart 0-7 Units Subcutaneous 4x Daily With Meals & Nightly   insulin detemir 15 Units Subcutaneous QAM   levothyroxine 125 mcg Oral Daily   lisinopril 20 mg Oral Daily   pantoprazole 40 mg Oral Q AM   sucralfate 1 g Oral 4x Daily With Meals & Nightly     Continuous Infusions:  sodium chloride 100 mL/hr Last Rate: 100 mL/hr (04/09/18 0240)     PRN Meds:.•  acetaminophen  •  ALPRAZolam  •  aluminum-magnesium hydroxide-simethicone  •  dextrose  •  dextrose  •  glucagon (human recombinant)  •  HYDROcodone-acetaminophen  •   HYDROmorphone  •  ondansetron  •  sodium chloride    Past Medical History:   Diagnosis Date   • Anxiety    • Clostridium difficile infection 2016    and in    • Diabetes mellitus    • Disease of thyroid gland    • Hypertension    • Migraine    • Pancreatitis    • Tachycardia    • TIA (transient ischemic attack)      Past Surgical History:   Procedure Laterality Date   • APPENDECTOMY     • CARDIAC CATHETERIZATION     •  SECTION     • CHOLECYSTECTOMY     • COLONOSCOPY      normal per pt, Dr. Reagan   • COLONOSCOPY N/A 3/1/2017    multiple polyps, tics, IH, scar in transverse colon   • ENDOSCOPY N/A 3/1/2017    gastroplasty, nodular mucosa in gastric antrum, nodule in duodenum   • ENDOSCOPY N/A 2018    Procedure: ESOPHAGOGASTRODUODENOSCOPY;  Surgeon: Paloma Santizo MD;  Location: [facility] ENDOSCOPY;  Service:    • GASTRIC BYPASS     • HERNIA REPAIR     • LUNG SURGERY      mass LL   • THYROID SURGERY       Social History     Occupational History   • Not on file.     Social History Main Topics   • Smoking status: Never Smoker   • Smokeless tobacco: Never Used   • Alcohol use No   • Drug use: No   • Sexual activity: Defer    Social History     Social History Narrative   • No narrative on file     Family History   Problem Relation Age of Onset   • Pancreatitis Brother    • Ulcerative colitis Son    • Crohn's disease Grandchild          Review of Systems:   HEENT: Patient denies any headaches, vision changes, change in hearing, or tinnitus, Patient denies any rhinorrhea,epistaxis, sinus pain, mouth or dental problems, sore throat or hoarseness, or dysphagia  Pulmonary: Patient denies any cough, congestion, SOA, or wheezing  Cardiovascular: Patient denies any chest pain, dyspnea, palpitations, weakness, intolerance of exercise, varicosities, swelling of extremities, known murmur  Gastrointestinal:  Patient denies nausea, vomiting, diarrhea, constipation, loss  of appetite, change in appetite,  dysphagia, gas, heartburn, melena, change in bowel habits, use of laxatives or other drugs to alter the function of the gastrointestinal tract.  Genital/Urinary: Patient denies dysuria, change in color of urine, change in frequency of urination, pain with urgency, incontinence, retention, or nocturia.  Musculoskeletal: Patient denies increased warmth; redness; or swelling of joints; limitation of function; deformity; crepitation: pain in a joint or an extremity, the neck, or the back, especially with movement.  Neurological: Patient denies dizziness, tremor, ataxia, difficulty in speaking, change in speech, paresthesia, loss of sensation, seizures, syncope, changes in memory.  Endocrine system: Patient denies tremors, palpitations, intolerance of heat or cold, polyuria, polydipsia, polyphagia, diaphoresis, exophthalmos, or goiter.  Psychological: Patient denies thoughts/plans or harming self or other; depression,  insomnia, night terrors, marianela, memory loss, disorientation.  Skin: Patient denies any bruising, rashes, discoloration, pruritus, wounds, ulcers, decubiti, changes in the hair or nails  Hematopoietic: Patient denies history of spontaneous or excessive bleeding, epistaxis, hematuria, melena, fatigue, enlarged or tender lymph nodes, pallor, history of anemia.    Physical Exam: 72 y.o. female  General Appearance:    Alert, cooperative, in no acute distress                 Vitals:    04/09/18 0726 04/09/18 1240 04/09/18 1648 04/09/18 1937   BP: 134/55 147/57  155/59   BP Location: Left arm Left arm  Left arm   Patient Position: Lying Lying  Lying   Pulse:   67 66   Resp: 18 18 16   Temp: 97.8 °F (36.6 °C) 97.7 °F (36.5 °C)  98.8 °F (37.1 °C)   TempSrc: Oral Oral  Oral   SpO2:  94%     Weight:       Height:            Head:    Normocephalic, without obvious abnormality, atraumatic   Eyes:            Lids and lashes normal, conjunctivae and sclerae normal, no   icterus, no pallor, corneas clear, PERRLA   Ears:     Ears appear intact with no abnormalities noted   Throat:   No oral lesions, no thrush, oral mucosa moist   Neck:   No adenopathy, supple, trachea midline, no thyromegaly, no   carotid bruit, no JVD   Back:     No kyphosis present, no scoliosis present, no skin lesions,      erythema or scars, no tenderness to percussion or                   palpation,   range of motion normal   Lungs:     Clear to auscultation,respirations regular, even and                  unlabored    Heart:    Regular rhythm and normal rate, normal S1 and S2, no            murmur, no gallop, no rub, no click   Chest Wall:    No abnormalities observed   Abdomen:     Normal bowel sounds, no masses, no organomegaly, soft        non-tender, non-distended, no guarding, no rebound                tenderness   Rectal:     Deferred   Extremities:   Tenderness noted at left adductor muscles in groin. No erythema or palpable mass.  Pain and weakness with attempted hip flexion. Passive ROM is fairly well maintained but painful at 20-30 degrees of internal and external rotation.  no edema, no cyanosis, no redness   Pulses:   Pulses palpable and equal bilaterally   Skin:   No bleeding, bruising or rash   Lymph nodes:   No palpable adenopathy   Neurologic:   Cranial nerves 2 - 12 grossly intact, sensation intact, DTR       present and equal bilaterally           Diagnostic Tests:    Admission on 04/08/2018   Component Date Value Ref Range Status   • Glucose 04/08/2018 131* 65 - 99 mg/dL Final   • BUN 04/08/2018 43* 8 - 23 mg/dL Final   • Creatinine 04/08/2018 1.84* 0.57 - 1.00 mg/dL Final   • Sodium 04/08/2018 139  136 - 145 mmol/L Final   • Potassium 04/08/2018 4.8  3.5 - 5.2 mmol/L Final   • Chloride 04/08/2018 101  98 - 107 mmol/L Final   • CO2 04/08/2018 23.2  22.0 - 29.0 mmol/L Final   • Calcium 04/08/2018 9.2  8.6 - 10.5 mg/dL Final   • Total Protein 04/08/2018 6.8  6.0 - 8.5 g/dL Final   • Albumin 04/08/2018 3.50  3.50 - 5.20 g/dL Final   • ALT (SGPT)  04/08/2018 13  1 - 33 U/L Final   • AST (SGOT) 04/08/2018 13  1 - 32 U/L Final   • Alkaline Phosphatase 04/08/2018 60  39 - 117 U/L Final   • Total Bilirubin 04/08/2018 0.3  0.1 - 1.2 mg/dL Final   • eGFR Non African Amer 04/08/2018 27* >60 mL/min/1.73 Final   • Globulin 04/08/2018 3.3  gm/dL Final   • A/G Ratio 04/08/2018 1.1  g/dL Final   • BUN/Creatinine Ratio 04/08/2018 23.4  7.0 - 25.0 Final   • Anion Gap 04/08/2018 14.8  mmol/L Final   • Color, UA 04/08/2018 Yellow  Yellow, Straw Final   • Appearance, UA 04/08/2018 Clear  Clear Final   • pH, UA 04/08/2018 7.0  5.0 - 8.0 Final   • Specific Gravity, UA 04/08/2018 1.009  1.005 - 1.030 Final   • Glucose, UA 04/08/2018 Negative  Negative Final   • Ketones, UA 04/08/2018 Negative  Negative Final   • Bilirubin, UA 04/08/2018 Negative  Negative Final   • Blood, UA 04/08/2018 Negative  Negative Final   • Protein, UA 04/08/2018 30 mg/dL (1+)* Negative Final   • Leuk Esterase, UA 04/08/2018 Negative  Negative Final   • Nitrite, UA 04/08/2018 Negative  Negative Final   • Urobilinogen, UA 04/08/2018 0.2 E.U./dL  0.2 - 1.0 E.U./dL Final   • Lipase 04/08/2018 45  13 - 60 U/L Final   • WBC 04/08/2018 13.39* 4.50 - 10.70 10*3/mm3 Final   • RBC 04/08/2018 3.79* 3.90 - 5.20 10*6/mm3 Final   • Hemoglobin 04/08/2018 10.9* 11.9 - 15.5 g/dL Final   • Hematocrit 04/08/2018 35.0* 35.6 - 45.5 % Final   • MCV 04/08/2018 92.3  80.5 - 98.2 fL Final   • MCH 04/08/2018 28.8  26.9 - 32.0 pg Final   • MCHC 04/08/2018 31.1* 32.4 - 36.3 g/dL Final   • RDW 04/08/2018 14.3* 11.7 - 13.0 % Final   • RDW-SD 04/08/2018 48.1  37.0 - 54.0 fl Final   • MPV 04/08/2018 10.0  6.0 - 12.0 fL Final   • Platelets 04/08/2018 296  140 - 500 10*3/mm3 Final   • Neutrophil % 04/08/2018 85.0* 42.7 - 76.0 % Final   • Lymphocyte % 04/08/2018 9.0* 19.6 - 45.3 % Final   • Monocyte % 04/08/2018 4.9* 5.0 - 12.0 % Final   • Eosinophil % 04/08/2018 0.8  0.3 - 6.2 % Final   • Basophil % 04/08/2018 0.1  0.0 - 1.5 % Final   •  Immature Grans % 04/08/2018 0.2  0.0 - 0.5 % Final   • Neutrophils, Absolute 04/08/2018 11.38* 1.90 - 8.10 10*3/mm3 Final   • Lymphocytes, Absolute 04/08/2018 1.20  0.90 - 4.80 10*3/mm3 Final   • Monocytes, Absolute 04/08/2018 0.65  0.20 - 1.20 10*3/mm3 Final   • Eosinophils, Absolute 04/08/2018 0.11  0.00 - 0.70 10*3/mm3 Final   • Basophils, Absolute 04/08/2018 0.02  0.00 - 0.20 10*3/mm3 Final   • Immature Grans, Absolute 04/08/2018 0.03  0.00 - 0.03 10*3/mm3 Final   • Troponin T 04/08/2018 <0.010  0.000 - 0.030 ng/mL Final   • Creatine Kinase 04/08/2018 51  20 - 180 U/L Final   • Glucose 04/08/2018 116  70 - 130 mg/dL Final   • RBC, UA 04/08/2018 0-2  None Seen, 0-2 /HPF Final   • WBC, UA 04/08/2018 0-2  None Seen, 0-2 /HPF Final   • Bacteria, UA 04/08/2018 None Seen  None Seen /HPF Final   • Squamous Epithelial Cells, UA 04/08/2018 0-2  None Seen, 0-2 /HPF Final   • Hyaline Casts, UA 04/08/2018 0-2  None Seen /LPF Final   • Methodology 04/08/2018 Automated Microscopy   Final   • Lactate 04/08/2018 0.7  0.5 - 2.0 mmol/L Final   • Procalcitonin 04/08/2018 0.08* 0.10 - 0.25 ng/mL Final   • Glucose 04/09/2018 92  65 - 99 mg/dL Final   • BUN 04/09/2018 42* 8 - 23 mg/dL Final   • Creatinine 04/09/2018 1.85* 0.57 - 1.00 mg/dL Final   • Sodium 04/09/2018 141  136 - 145 mmol/L Final   • Potassium 04/09/2018 5.1  3.5 - 5.2 mmol/L Final   • Chloride 04/09/2018 107  98 - 107 mmol/L Final   • CO2 04/09/2018 18.7* 22.0 - 29.0 mmol/L Final   • Calcium 04/09/2018 8.5* 8.6 - 10.5 mg/dL Final   • eGFR Non African Amer 04/09/2018 27* >60 mL/min/1.73 Final   • BUN/Creatinine Ratio 04/09/2018 22.7  7.0 - 25.0 Final   • Anion Gap 04/09/2018 15.3  mmol/L Final   • WBC 04/09/2018 8.30  4.50 - 10.70 10*3/mm3 Final   • RBC 04/09/2018 3.39* 3.90 - 5.20 10*6/mm3 Final   • Hemoglobin 04/09/2018 9.8* 11.9 - 15.5 g/dL Final   • Hematocrit 04/09/2018 32.8* 35.6 - 45.5 % Final   • MCV 04/09/2018 96.8  80.5 - 98.2 fL Final   • MCH 04/09/2018 28.9   26.9 - 32.0 pg Final   • MCHC 04/09/2018 29.9* 32.4 - 36.3 g/dL Final   • RDW 04/09/2018 14.6* 11.7 - 13.0 % Final   • RDW-SD 04/09/2018 51.6  37.0 - 54.0 fl Final   • MPV 04/09/2018 9.8  6.0 - 12.0 fL Final   • Platelets 04/09/2018 238  140 - 500 10*3/mm3 Final   • Neutrophil % 04/09/2018 69.8  42.7 - 76.0 % Final   • Lymphocyte % 04/09/2018 16.1* 19.6 - 45.3 % Final   • Monocyte % 04/09/2018 11.4  5.0 - 12.0 % Final   • Eosinophil % 04/09/2018 2.4  0.3 - 6.2 % Final   • Basophil % 04/09/2018 0.1  0.0 - 1.5 % Final   • Immature Grans % 04/09/2018 0.2  0.0 - 0.5 % Final   • Neutrophils, Absolute 04/09/2018 5.78  1.90 - 8.10 10*3/mm3 Final   • Lymphocytes, Absolute 04/09/2018 1.34  0.90 - 4.80 10*3/mm3 Final   • Monocytes, Absolute 04/09/2018 0.95  0.20 - 1.20 10*3/mm3 Final   • Eosinophils, Absolute 04/09/2018 0.20  0.00 - 0.70 10*3/mm3 Final   • Basophils, Absolute 04/09/2018 0.01  0.00 - 0.20 10*3/mm3 Final   • Immature Grans, Absolute 04/09/2018 0.02  0.00 - 0.03 10*3/mm3 Final   • Hemoglobin A1C 04/09/2018 6.10* 4.80 - 5.60 % Final   • Glucose 04/09/2018 82  70 - 130 mg/dL Final   • Reticulocyte % 04/09/2018 1.06  0.50 - 1.50 % Final   • Glucose 04/09/2018 59* 70 - 130 mg/dL Final   • Glucose 04/09/2018 61* 70 - 130 mg/dL Final   • Glucose 04/09/2018 57* 70 - 130 mg/dL Final   • Glucose 04/09/2018 150* 70 - 130 mg/dL Final   • Glucose 04/09/2018 107  70 - 130 mg/dL Final   • Glucose 04/09/2018 129  70 - 130 mg/dL Final       No results found.      Assessment:  Patient Active Problem List   Diagnosis   • Diverticulitis of large intestine without perforation or abscess without bleeding   • ARF (acute renal failure)   • CKD (chronic kidney disease) stage 3, GFR 30-59 ml/min   • Proteinuria   • Diverticulitis   • Hypervitaminosis D   • Postsurgical hypothyroidism   • Chronic fatigue   • Diabetes mellitus type 2, uncontrolled, with complications   • Heartburn   • Dysphagia   • Chest pain   • Abdominal mass   • Hip  pain, acute, left           Plan:  MRI pending. Will evaluate images when available and make recommendations as indicated.       Date: 4/9/2018    Blake Franz MD      Time: 15 minutes in face to face encounter.

## 2018-04-10 NOTE — PLAN OF CARE
Problem: Fall Risk (Adult)  Goal: Identify Related Risk Factors and Signs and Symptoms  Outcome: Ongoing (interventions implemented as appropriate)    Goal: Absence of Fall  Outcome: Ongoing (interventions implemented as appropriate)      Problem: Patient Care Overview  Goal: Plan of Care Review  Outcome: Ongoing (interventions implemented as appropriate)      Problem: Pain, Chronic (Adult)  Goal: Identify Related Risk Factors and Signs and Symptoms  Outcome: Ongoing (interventions implemented as appropriate)      Problem: Pain, Acute (Adult)  Goal: Identify Related Risk Factors and Signs and Symptoms  Outcome: Ongoing (interventions implemented as appropriate)    Goal: Acceptable Pain Control/Comfort Level  Outcome: Ongoing (interventions implemented as appropriate)

## 2018-04-11 ENCOUNTER — APPOINTMENT (OUTPATIENT)
Dept: GENERAL RADIOLOGY | Facility: HOSPITAL | Age: 73
End: 2018-04-11
Attending: INTERNAL MEDICINE

## 2018-04-11 PROBLEM — M11.252: Status: ACTIVE | Noted: 2018-04-09

## 2018-04-11 LAB
ALBUMIN SERPL-MCNC: 2.8 G/DL (ref 3.5–5.2)
ANION GAP SERPL CALCULATED.3IONS-SCNC: 12 MMOL/L
BASOPHILS # BLD AUTO: 0.01 10*3/MM3 (ref 0–0.2)
BASOPHILS NFR BLD AUTO: 0.1 % (ref 0–1.5)
BUN BLD-MCNC: 33 MG/DL (ref 8–23)
BUN/CREAT SERPL: 18.8 (ref 7–25)
CALCIUM SPEC-SCNC: 8.6 MG/DL (ref 8.6–10.5)
CHLORIDE SERPL-SCNC: 108 MMOL/L (ref 98–107)
CHROMATIN AB SERPL-ACNC: <10 IU/ML (ref 0–14)
CO2 SERPL-SCNC: 22 MMOL/L (ref 22–29)
CREAT BLD-MCNC: 1.76 MG/DL (ref 0.57–1)
DEPRECATED RDW RBC AUTO: 49.8 FL (ref 37–54)
EOSINOPHIL # BLD AUTO: 0.18 10*3/MM3 (ref 0–0.7)
EOSINOPHIL NFR BLD AUTO: 2.2 % (ref 0.3–6.2)
ERYTHROCYTE [DISTWIDTH] IN BLOOD BY AUTOMATED COUNT: 14.1 % (ref 11.7–13)
FOLATE BLD-MCNC: 444.5 NG/ML
FOLATE RBC-MCNC: 1560 NG/ML
GFR SERPL CREATININE-BSD FRML MDRD: 28 ML/MIN/1.73
GLUCOSE BLD-MCNC: 160 MG/DL (ref 65–99)
GLUCOSE BLDC GLUCOMTR-MCNC: 109 MG/DL (ref 70–130)
GLUCOSE BLDC GLUCOMTR-MCNC: 115 MG/DL (ref 70–130)
GLUCOSE BLDC GLUCOMTR-MCNC: 122 MG/DL (ref 70–130)
GLUCOSE BLDC GLUCOMTR-MCNC: 152 MG/DL (ref 70–130)
HCT VFR BLD AUTO: 28.5 % (ref 34–46.6)
HCT VFR BLD AUTO: 30.7 % (ref 35.6–45.5)
HGB BLD-MCNC: 9.1 G/DL (ref 11.9–15.5)
IMM GRANULOCYTES # BLD: 0.03 10*3/MM3 (ref 0–0.03)
IMM GRANULOCYTES NFR BLD: 0.4 % (ref 0–0.5)
LYMPHOCYTES # BLD AUTO: 0.94 10*3/MM3 (ref 0.9–4.8)
LYMPHOCYTES NFR BLD AUTO: 11.7 % (ref 19.6–45.3)
MCH RBC QN AUTO: 28.5 PG (ref 26.9–32)
MCHC RBC AUTO-ENTMCNC: 29.6 G/DL (ref 32.4–36.3)
MCV RBC AUTO: 96.2 FL (ref 80.5–98.2)
MONOCYTES # BLD AUTO: 0.56 10*3/MM3 (ref 0.2–1.2)
MONOCYTES NFR BLD AUTO: 7 % (ref 5–12)
NEUTROPHILS # BLD AUTO: 6.32 10*3/MM3 (ref 1.9–8.1)
NEUTROPHILS NFR BLD AUTO: 78.6 % (ref 42.7–76)
PHOSPHATE SERPL-MCNC: 3.3 MG/DL (ref 2.5–4.5)
PLATELET # BLD AUTO: 208 10*3/MM3 (ref 140–500)
PMV BLD AUTO: 9.8 FL (ref 6–12)
POTASSIUM BLD-SCNC: 5.2 MMOL/L (ref 3.5–5.2)
RBC # BLD AUTO: 3.19 10*6/MM3 (ref 3.9–5.2)
SODIUM BLD-SCNC: 142 MMOL/L (ref 136–145)
URATE SERPL-MCNC: 7.3 MG/DL (ref 2.4–5.7)
WBC NRBC COR # BLD: 8.04 10*3/MM3 (ref 4.5–10.7)

## 2018-04-11 PROCEDURE — 74018 RADEX ABDOMEN 1 VIEW: CPT

## 2018-04-11 PROCEDURE — 63710000001 PREDNISONE PER 5 MG: Performed by: INTERNAL MEDICINE

## 2018-04-11 PROCEDURE — 86431 RHEUMATOID FACTOR QUANT: CPT | Performed by: INTERNAL MEDICINE

## 2018-04-11 PROCEDURE — 99232 SBSQ HOSP IP/OBS MODERATE 35: CPT | Performed by: INTERNAL MEDICINE

## 2018-04-11 PROCEDURE — 25010000002 ENOXAPARIN PER 10 MG: Performed by: INTERNAL MEDICINE

## 2018-04-11 PROCEDURE — 84550 ASSAY OF BLOOD/URIC ACID: CPT | Performed by: INTERNAL MEDICINE

## 2018-04-11 PROCEDURE — 80069 RENAL FUNCTION PANEL: CPT | Performed by: INTERNAL MEDICINE

## 2018-04-11 PROCEDURE — 85025 COMPLETE CBC W/AUTO DIFF WBC: CPT | Performed by: INTERNAL MEDICINE

## 2018-04-11 PROCEDURE — 82962 GLUCOSE BLOOD TEST: CPT

## 2018-04-11 PROCEDURE — 25010000002 HYDRALAZINE PER 20 MG: Performed by: INTERNAL MEDICINE

## 2018-04-11 RX ORDER — METOPROLOL SUCCINATE 50 MG/1
50 TABLET, EXTENDED RELEASE ORAL
Status: DISCONTINUED | OUTPATIENT
Start: 2018-04-11 | End: 2018-04-12 | Stop reason: HOSPADM

## 2018-04-11 RX ORDER — HYDROCHLOROTHIAZIDE 25 MG/1
25 TABLET ORAL DAILY
Status: DISCONTINUED | OUTPATIENT
Start: 2018-04-11 | End: 2018-04-12 | Stop reason: HOSPADM

## 2018-04-11 RX ORDER — HYDRALAZINE HYDROCHLORIDE 20 MG/ML
20 INJECTION INTRAMUSCULAR; INTRAVENOUS EVERY 6 HOURS PRN
Status: DISCONTINUED | OUTPATIENT
Start: 2018-04-11 | End: 2018-04-12 | Stop reason: HOSPADM

## 2018-04-11 RX ADMIN — FOLIC ACID 1000 MCG: 1 TABLET ORAL at 10:03

## 2018-04-11 RX ADMIN — PREDNISONE 5 MG: 5 TABLET ORAL at 10:03

## 2018-04-11 RX ADMIN — ENOXAPARIN SODIUM 30 MG: 30 INJECTION SUBCUTANEOUS at 04:12

## 2018-04-11 RX ADMIN — LISINOPRIL 20 MG: 20 TABLET ORAL at 10:03

## 2018-04-11 RX ADMIN — PANTOPRAZOLE SODIUM 40 MG: 40 TABLET, DELAYED RELEASE ORAL at 06:56

## 2018-04-11 RX ADMIN — LEVOTHYROXINE SODIUM 125 MCG: 125 TABLET ORAL at 10:03

## 2018-04-11 RX ADMIN — HYDROCHLOROTHIAZIDE 25 MG: 25 TABLET ORAL at 18:12

## 2018-04-11 RX ADMIN — SUCRALFATE 1 G: 1 SUSPENSION ORAL at 10:04

## 2018-04-11 RX ADMIN — SUCRALFATE 1 G: 1 SUSPENSION ORAL at 21:13

## 2018-04-11 RX ADMIN — HYDRALAZINE HYDROCHLORIDE 50 MG: 50 TABLET, FILM COATED ORAL at 06:56

## 2018-04-11 RX ADMIN — ASPIRIN 81 MG: 81 TABLET ORAL at 10:03

## 2018-04-11 RX ADMIN — METOPROLOL SUCCINATE 50 MG: 50 TABLET, FILM COATED, EXTENDED RELEASE ORAL at 21:12

## 2018-04-11 RX ADMIN — FERROUS SULFATE TAB 325 MG (65 MG ELEMENTAL FE) 325 MG: 325 (65 FE) TAB at 10:03

## 2018-04-11 RX ADMIN — FERROUS SULFATE TAB 325 MG (65 MG ELEMENTAL FE) 325 MG: 325 (65 FE) TAB at 18:12

## 2018-04-11 RX ADMIN — Medication 20 MG: at 23:39

## 2018-04-11 RX ADMIN — Medication 20 MG: at 18:51

## 2018-04-11 RX ADMIN — SUCRALFATE 1 G: 1 SUSPENSION ORAL at 18:12

## 2018-04-11 RX ADMIN — HYDRALAZINE HYDROCHLORIDE 50 MG: 50 TABLET, FILM COATED ORAL at 18:12

## 2018-04-11 RX ADMIN — HYDRALAZINE HYDROCHLORIDE 50 MG: 50 TABLET, FILM COATED ORAL at 21:13

## 2018-04-11 NOTE — PROGRESS NOTES
Continued Stay Note  [facility]     Patient Name: Ya Paz  MRN: [id number]  Today's Date: 4/11/2018    Admit Date: 4/8/2018          Discharge Plan     Row Name 04/11/18 1605       Plan    Plan Home    Plan Comments Pt plan is to return home with no needs              Discharge Codes    No documentation.           Radha Kenny RN

## 2018-04-11 NOTE — CONSULTS
Consultation note    Referring physician: Delonte Loving MD    Consulting Physician: Raheel Carrizales Jr, MD    Reason for consultation: Abdominal pain, questionable small bowel mass left upper quadrant per CT    Chief Complaint   Patient presents with   • Hip Pain     left sided hip pain. denies injury at this time, woke this morning with pain.        HPI:   The patient is a very pleasant 72 y.o. years old female that presented to the hospital with complaint of left hip pain.  Patient states that she was not having any abdominal pain and has not had any abdominal pain over the past several months.  Patient does states that ever since she's had her bariatric surgery that she's had issues with swallowing in which she would regurgitate liquids and solids.  She states that this happens occasionally.  She states that she thought this was fairly normal and has been like this for many years.  Patient states that she has lost about 15 pounds since February and does not understand why she is losing weight.  CT scan of the abdomen was performed which revealed questionable area in the left upper quadrant and the small bowel but after further review may be related to mild dilatation or inflammation.  Patient states that she thought she had the gastric bypass but reviewing her studies and looks more like a vertical banded gastroplasty with breakdown of the staple line.  Patient states that she initially lost about 110 pounds but has regained some of that weight back over the years.  Patient presently denies fever chills chest pain shortness of air melena hematochezia hematemesis dysuria frequency materia jaundice or abdominal pain.  Her left hip pain is much better.  Plain abdominal film today was unremarkable which did reveal resolution of the area in the left upper quadrant per plain film.       Past Medical History:   Diagnosis Date   • Anxiety    • Clostridium difficile infection 11/2016    and in 2005   • Diabetes  mellitus    • Disease of thyroid gland    • Hypertension    • Migraine    • Pancreatitis    • Tachycardia    • TIA (transient ischemic attack)        Past Surgical History:   Procedure Laterality Date   • APPENDECTOMY     • CARDIAC CATHETERIZATION     •  SECTION     • CHOLECYSTECTOMY     • COLONOSCOPY      normal per pt, Dr. Reagan   • COLONOSCOPY N/A 3/1/2017    multiple polyps, tics, IH, scar in transverse colon   • ENDOSCOPY N/A 3/1/2017    gastroplasty, nodular mucosa in gastric antrum, nodule in duodenum   • ENDOSCOPY N/A 2018    Procedure: ESOPHAGOGASTRODUODENOSCOPY;  Surgeon: Paloma Santizo MD;  Location: [facility] ENDOSCOPY;  Service:    • GASTRIC BYPASS     • HERNIA REPAIR     • LUNG SURGERY      mass LL   • THYROID SURGERY           Current Facility-Administered Medications:   •  acetaminophen (TYLENOL) tablet 650 mg, 650 mg, Oral, Q4H PRN, Delonte Loving MD  •  ALPRAZolam (XANAX) tablet 0.5 mg, 0.5 mg, Oral, TID PRN, Delonte Loving MD, 0.5 mg at 18 1800  •  aluminum-magnesium hydroxide-simethicone (MAALOX MAX) 400-400-40 MG/5ML suspension 15 mL, 15 mL, Oral, Q6H PRN, Delonte Loving MD  •  aspirin EC tablet 81 mg, 81 mg, Oral, Daily, Delonte Loving MD, 81 mg at 18 1003  •  dextrose (D50W) solution 25 g, 25 g, Intravenous, Q15 Min PRN, Delonte Loving MD  •  dextrose (GLUTOSE) oral gel 15 g, 15 g, Oral, Q15 Min PRN, Delonte Loving MD  •  enoxaparin (LOVENOX) syringe 30 mg, 30 mg, Subcutaneous, Q24H, Delonte Loving MD, 30 mg at 18 0412  •  ferrous sulfate tablet 325 mg, 325 mg, Oral, BID With Meals, Tank Taylor MD, 325 mg at 18 1003  •  folic acid (FOLVITE) tablet 1,000 mcg, 1,000 mcg, Oral, Daily, Delonte Loving MD, 1,000 mcg at 18 1003  •  glucagon (human recombinant) (GLUCAGEN DIAGNOSTIC) injection 1 mg, 1 mg, Subcutaneous, PRN, Delonte Loving MD  •  hydrALAZINE (APRESOLINE) injection 20 mg, 20 mg,  "Intravenous, Q6H PRN, Tank Taylor MD  •  hydrALAZINE (APRESOLINE) tablet 50 mg, 50 mg, Oral, Q8H, Tank Taylor MD, 50 mg at 04/11/18 0656  •  hydrochlorothiazide (HYDRODIURIL) tablet 25 mg, 25 mg, Oral, Daily, Tank Taylor MD  •  HYDROcodone-acetaminophen (NORCO) 5-325 MG per tablet 1 tablet, 1 tablet, Oral, Q6H PRN, Delonte Loving MD, 1 tablet at 04/10/18 1511  •  HYDROmorphone (DILAUDID) injection 0.5 mg, 0.5 mg, Intravenous, Q4H PRN, Delonte Loving MD, 0.5 mg at 04/09/18 0708  •  insulin aspart (novoLOG FLEXPEN) sc pen 0-7 Units, 0-7 Units, Subcutaneous, 4x Daily With Meals & Nightly, Jorge Luis Werner MD, 2 Units at 04/10/18 1846  •  Insulin Glargine solution pen-injector 15 Units, 15 Units, Subcutaneous, Daily With Breakfast, Jorge Luis Werner MD, 15 Units at 04/11/18 1012  •  levothyroxine (SYNTHROID, LEVOTHROID) tablet 125 mcg, 125 mcg, Oral, Daily, Delonte Loving MD, 125 mcg at 04/11/18 1003  •  lisinopril (PRINIVIL,ZESTRIL) tablet 20 mg, 20 mg, Oral, Daily, Delonte Loving MD, 20 mg at 04/11/18 1003  •  ondansetron (ZOFRAN) injection 4 mg, 4 mg, Intravenous, Q6H PRN, Delonte Loving MD, 4 mg at 04/10/18 2150  •  pantoprazole (PROTONIX) EC tablet 40 mg, 40 mg, Oral, Q AM, Silverio Shah MD, 40 mg at 04/11/18 0656  •  predniSONE (DELTASONE) tablet 5 mg, 5 mg, Oral, Daily, Jorge Luis Werner MD, 5 mg at 04/11/18 1003  •  sodium chloride 0.9 % flush 1-10 mL, 1-10 mL, Intravenous, PRN, Delonte Loving MD  •  sucralfate (CARAFATE) 1 GM/10ML suspension 1 g, 1 g, Oral, 4x Daily With Meals & Nightly, Delonte Loving MD, 1 g at 04/11/18 1004    Allergies   Allergen Reactions   • Augmentin [Amoxicillin-Pot Clavulanate] Rash     rash  **tolerates cephalosporins**   **no reason to give aztreonam in the future**   • Dapsone Other (See Comments)     Pt states she \"quit producing blood.\"   • Detrol [Tolterodine Tartrate]      Pt. Does not recall this allergy. "   • Levaquin [Levofloxacin] Hives   • Sulfa Antibiotics      Sister went blind with taking and was advised not to take.  Sister went blind with taking and was advised not to take.   • Tetracycline Hives and Itching   • Tetracyclines & Related        Social History     Social History   • Marital status:      Spouse name: N/A   • Number of children: N/A   • Years of education: N/A     Occupational History   • Not on file.     Social History Main Topics   • Smoking status: Never Smoker   • Smokeless tobacco: Never Used   • Alcohol use No   • Drug use: No   • Sexual activity: Defer     Other Topics Concern   • Not on file     Social History Narrative   • No narrative on file       Family History   Problem Relation Age of Onset   • Pancreatitis Brother    • Ulcerative colitis Son    • Crohn's disease Grandchild        ROS:   Constitutional: denies any weakness.  Eyes: : denies blurred or double vision  Cardiovascular: denies chest pain, palpitations, edemas.  Respiratory: denies cough, sputum, SOB.  Gastrointestinal: see HPI  Genitourinary: denies dysuria, frequency.  Endocrine: denies cold intolerance, lethargy and flushing.  Hem: denies excessive bruising and postop bleeding.  Musculoskeletal: denies weakness, joint swelling, pain or stiffness.  Neuro: denies seizures, CVA, paresthesia, or peripheral neuropathy.   Skin: denies change in nevi, rashes, masses.    Physical Exam:   Vitals:    04/11/18 1443   BP: 180/80   Pulse: 69   Resp:    Temp:    SpO2:      GENERAL:alert, well appearing, and in no distress, oriented to person, place, and time and overweight  HEENT: normochephalic, atraumatic, no scleral icterus moist mucous membranes.  NECK: Supple there is no thyromegaly  CHEST: clear to auscultation, no wheezes, rales or rhonchi, symmetric air entry  CARDIAC: regular rate    ABDOMEN: soft, nondistended, mild tenderness to deep palpation on the left side of the abdomen without guarding, rebound or tympany.   Questionable epigastric hernia that is nontender on exam  EXTREMITIES: no cyanosis, clubbing or edema    NEURO: alert and oriented, normal speech, no focal deficits   SKIN: Moist, warm, no rashes.    Diagnostic workup:   Plain abdominal film, CT scan of the abdomen and pelvis, upper GI were all reviewed      Assessment and plan:   The patient is a very pleasant 72 y.o. years old female with history of most likely vertical banded gastroplasty who presented with left hip pain and noted to have questionable area in the left upper quadrant on CT scan.  This appears to have resolved and patient presently asymptomatic without complaints of abdominal pain.  Patient did have upper endoscopy by Dr. Edmond about a year ago which sounds like that she may have a stenosis at the banded area with breakdown of the staple line.  Patient has had 2 ventral hernia repairs in the past after her vertical banded gastroplasty.  She has a possible small recurrent hernia in the epigastric region but is insignificant and nontender on exam.  She is presently afebrile and hemodynamically stable and abdomen is benign on exam.  Would recommend to start on a diet and if patient has any dysphagia then would recommend to proceed with upper endoscopy with possible dilatation of any stenosis at the banded stomach.  Upper GI with recommend repeating CAT scan of the abdomen at a later date unless she starts having abdominal pain with her diet.  We'll follow along.        Case was discussed with the patient.    Risk and benefits of the current recommended treatment were explained to the patient in detail and all questions were answered. Patient verbalized understanding and agreed with the plan.     Time: Approximately 30 minutes was spent with the patient and over half that time was spent counseling.    Thank you for the consultation!    Raheel Carrizales MD  Medical Director [facility]  [facility]    04/11/18    [address], [address]  [place], [place], [address]  P: [phone]  F: [phone]

## 2018-04-11 NOTE — PROGRESS NOTES
[facility]  Inpatient Progress Note    Reason for Follow Up:  Abnormal CT    Subjective     Interval History:   Pt with episode of increase LUQ pain and vomiting last night, which seems to have resolved.  She is currently NPO.  KUB performed this am, read pending.      Current Facility-Administered Medications:   •  acetaminophen (TYLENOL) tablet 650 mg, 650 mg, Oral, Q4H PRN, Delonte Loving MD  •  ALPRAZolam (XANAX) tablet 0.5 mg, 0.5 mg, Oral, TID PRN, Delonte Loving MD, 0.5 mg at 04/09/18 1800  •  aluminum-magnesium hydroxide-simethicone (MAALOX MAX) 400-400-40 MG/5ML suspension 15 mL, 15 mL, Oral, Q6H PRN, Delonte Loving MD  •  aspirin EC tablet 81 mg, 81 mg, Oral, Daily, Delonte Loving MD, 81 mg at 04/10/18 1030  •  dextrose (D50W) solution 25 g, 25 g, Intravenous, Q15 Min PRN, Delonte Loving MD  •  dextrose (GLUTOSE) oral gel 15 g, 15 g, Oral, Q15 Min PRN, Delonte Loving MD  •  enoxaparin (LOVENOX) syringe 30 mg, 30 mg, Subcutaneous, Q24H, Delonte Loving MD, 30 mg at 04/11/18 0412  •  ferrous sulfate tablet 325 mg, 325 mg, Oral, BID With Meals, Tank Taylor MD, 325 mg at 04/10/18 1846  •  folic acid (FOLVITE) tablet 1,000 mcg, 1,000 mcg, Oral, Daily, Delonte Loving MD, 1,000 mcg at 04/10/18 1031  •  glucagon (human recombinant) (GLUCAGEN DIAGNOSTIC) injection 1 mg, 1 mg, Subcutaneous, PRN, Delonte Loving MD  •  hydrALAZINE (APRESOLINE) tablet 50 mg, 50 mg, Oral, Q8H, Tank Taylor MD, 50 mg at 04/11/18 0656  •  HYDROcodone-acetaminophen (NORCO) 5-325 MG per tablet 1 tablet, 1 tablet, Oral, Q6H PRN, Delonte Loving MD, 1 tablet at 04/10/18 1511  •  HYDROmorphone (DILAUDID) injection 0.5 mg, 0.5 mg, Intravenous, Q4H PRN, Delonte Loving MD, 0.5 mg at 04/09/18 0708  •  insulin aspart (novoLOG FLEXPEN) sc pen 0-7 Units, 0-7 Units, Subcutaneous, 4x Daily With Meals & Nightly, Jorge Luis Werner MD, 2 Units at  04/10/18 1846  •  Insulin Glargine solution pen-injector 15 Units, 15 Units, Subcutaneous, Daily With Breakfast, Jorge Luis Werner MD, 15 Units at 04/10/18 1639  •  levothyroxine (SYNTHROID, LEVOTHROID) tablet 125 mcg, 125 mcg, Oral, Daily, Delonte Loving MD, 125 mcg at 04/10/18 1031  •  lisinopril (PRINIVIL,ZESTRIL) tablet 20 mg, 20 mg, Oral, Daily, Delonte Loving MD, 20 mg at 04/10/18 1031  •  ondansetron (ZOFRAN) injection 4 mg, 4 mg, Intravenous, Q6H PRN, Delonte Loving MD, 4 mg at 04/10/18 2150  •  pantoprazole (PROTONIX) EC tablet 40 mg, 40 mg, Oral, Q AM, Silverio Shah MD, 40 mg at 04/11/18 0656  •  predniSONE (DELTASONE) tablet 5 mg, 5 mg, Oral, Daily, Jorge Luis Werner MD, 5 mg at 04/10/18 1516  •  sodium chloride 0.9 % flush 1-10 mL, 1-10 mL, Intravenous, PRN, Delonte Loving MD  •  sucralfate (CARAFATE) 1 GM/10ML suspension 1 g, 1 g, Oral, 4x Daily With Meals & Nightly, Delonte Loving MD, 1 g at 04/10/18 2039  Review of Systems:    All systems were reviewed and negative except for:  Constitution:  positive for anorexia and weight loss  Gastrointestinal: postitive for  pain  Musculoskeletal: positive for  bone pain    Objective     Vital Signs  Temp:  [97.7 °F (36.5 °C)-98.4 °F (36.9 °C)] 97.7 °F (36.5 °C)  Heart Rate:  [65-78] 65  Resp:  [18] 18  BP: (160-192)/(62-87) 160/62  Body mass index is 35.25 kg/m².    Intake/Output Summary (Last 24 hours) at 04/11/18 0922  Last data filed at 04/10/18 2325   Gross per 24 hour   Intake              460 ml   Output             1050 ml   Net             -590 ml     No intake/output data recorded.     Physical Exam:   General: patient awake, alert and cooperative   Eyes: Normal lids and lashes, no scleral icterus   Neck: supple, normal ROM   Skin: warm and dry, not jaundiced   Abdomen: soft, focal mild luq tenderness, nondistended; normal bowel sounds    Extremities: no rash    Psychiatric: Normal mood and behavior; memory  intact     Results Review:     I reviewed the patient's new clinical results.      Results from last 7 days  Lab Units 04/11/18  0338 04/10/18  0409 04/09/18  0434   WBC 10*3/mm3 8.04 7.28 8.30   HEMOGLOBIN g/dL 9.1* 8.8* 9.8*   HEMATOCRIT % 30.7* 29.6* 32.8*   PLATELETS 10*3/mm3 208 203 238       Results from last 7 days  Lab Units 04/11/18  0338 04/10/18  0409 04/09/18  0434 04/08/18  1734   SODIUM mmol/L 142 138 141 139   POTASSIUM mmol/L 5.2 4.8 5.1 4.8   CHLORIDE mmol/L 108* 105 107 101   CO2 mmol/L 22.0 22.4 18.7* 23.2   BUN mg/dL 33* 41* 42* 43*   CREATININE mg/dL 1.76* 1.88* 1.85* 1.84*   CALCIUM mg/dL 8.6 8.3* 8.5* 9.2   BILIRUBIN mg/dL  --  <0.2  --  0.3   ALK PHOS U/L  --  53  --  60   ALT (SGPT) U/L  --  16  --  13   AST (SGOT) U/L  --  16  --  13   GLUCOSE mg/dL 160* 117* 92 131*       Results from last 7 days  Lab Units 04/10/18  0409   INR  1.07       Lab Results  Lab Value Date/Time   LIPASE 35 04/10/2018 0409   LIPASE 45 04/08/2018 1734       Radiology:  XR Abdomen 2 View With Chest 1 View   Final Result   Within the left mid abdomen there is dilated bowel loop   which contains an air-fluid level. This may represent localized ileus or   early obstruction and CT follow-up may be helpful if clinically   indicated. There is gas and stool throughout the colon. No evidence for   active disease in the chest.       This report was finalized on 4/10/2018 6:51 PM by Dr. Chirag Diggs MD.          FL Guided Aspiration Joint         MRI Hip Left Without Contrast   Final Result   Mild osteoarthritic change at the hips bilaterally with a   nonspecific left hip effusion. Some periarticular soft tissue edema is   present. Infectious or inflammatory sources of left hip effusion are   favored. Osteoarthritis can also be associated with an effusion as seen   here; however, the periarticular soft tissue edema suggests inflammatory   infectious etiology.       This report was finalized on 4/10/2018 9:39 AM by   Raheel Heller MD.          CT Chest Without Contrast   Final Result   1. Segment of small bowel in the left upper quadrant appears mildly   dilated and heterogeneous in density. Surrounding inflammatory changes   seen. There appear to be surgical sutures in this region. This segment   of small bowel appear mildly dilated on the previous CT scan of   10/30/2017. This may represent focal area of focal enteritis,   inflammatory bowel disease or less likely neoplastic mass. No other   bowel dilatation is seen.    2. No evidence of pneumoperitoneum.   3. Colonic diverticulosis.   4. Multiple renal lesions may all represent benign renal cysts.   5. No acute process demonstrated in the chest.           Radiation dose reduction techniques were utilized, including automated   exposure control and exposure modulation based on body size.       This report was finalized on 4/9/2018 10:41 AM by Dr. Iker Leonard MD.          CT Abdomen Pelvis Without Contrast   Final Result   1. Segment of small bowel in the left upper quadrant appears mildly   dilated and heterogeneous in density. Surrounding inflammatory changes   seen. There appear to be surgical sutures in this region. This segment   of small bowel appear mildly dilated on the previous CT scan of   10/30/2017. This may represent focal area of focal enteritis,   inflammatory bowel disease or less likely neoplastic mass. No other   bowel dilatation is seen.    2. No evidence of pneumoperitoneum.   3. Colonic diverticulosis.   4. Multiple renal lesions may all represent benign renal cysts.   5. No acute process demonstrated in the chest.           Radiation dose reduction techniques were utilized, including automated   exposure control and exposure modulation based on body size.       This report was finalized on 4/9/2018 10:41 AM by Dr. Iker Leonard MD.          XR Hip With or Without Pelvis 2 - 3 View Left   Final Result   No acute process identified.        This report was finalized on 4/9/2018 10:39 AM by Dr. Iker Leonard MD.          XR Abdomen KUB    (Results Pending)       Assessment/Plan     Patient Active Problem List   Diagnosis   • Diverticulitis of large intestine without perforation or abscess without bleeding   • ARF (acute renal failure)   • CKD (chronic kidney disease) stage 3, GFR 30-59 ml/min   • Proteinuria   • Diverticulitis   • Hypervitaminosis D   • Postsurgical hypothyroidism   • Chronic fatigue   • Diabetes mellitus type 2, uncontrolled, with complications   • Heartburn   • Dysphagia   • Chest pain   • Abdominal mass   • Hip pain, acute, left   • Iron deficiency anemia     Impression:  1. Abnormal CT with small bowel abnormality in the LUQ of the abdomen as seen on CT abd/pelvis- This would be difficult to get to endoscopically since Dr. LUBA Santizo could not complete the 2/18 EGD because of a sharp turn in the stomach due to prior bariatric surgery and stenotic outflow from gastroplasty. Dr Shah discussed CT with Dr. Heaven Wolfe - this knuckle of small bowel was distended during 10/17 CT abdomen. She is not convinced that there is a small bowel mass but there is some inflammation around that area of small bowel (enteritis).   2. Dyspepsia/heartburn- in a 72 you who is s/p vertical banded gastroplasty in 1984 by Dr. MARYJO Reagan. UGI and EGD seem to show some breakdown of some of the sutures such that barium is going around the banded part of this verticle banded gastroplasty.  3.  Weight loss in a 72 who has had bariatric surgery in 1984.  4. Anemia    Plan:  -await results of KUB (by my read appears improved) - if continued dilation as seen on yesterday's study, will repeat CT scan to ensure no obstruction given increasing abdominal pain and vomiting last evening   -continue aggressive suppression of acid - small meals, upright position for meals  -we may need to consider opinion of bariatic or general surgery regarding this small bowel  abnormality    I discussed the patients findings and my recommendations with patient.          Sergio Bhakta M.D.  [facility]  [address] [place] [address]  Office: [phone]

## 2018-04-11 NOTE — PROGRESS NOTES
Name: Ya Paz ADMIT: 2018   : 1945  PCP: Nicki Hidalgo MD    MRN: [id number] LOS: 3 days   AGE/SEX: 72 y.o. female  ROOM: [facility]/   Subjective   More nausea, nbnb vomiting overnight, and epigastric pain. Now improving. Reports generalized joint pain is improved. Pain still present in hip. No CP SOA.    Objective   Vital Signs  Temp:  [97.7 °F (36.5 °C)-98.4 °F (36.9 °C)] 97.7 °F (36.5 °C)  Heart Rate:  [65-78] 65  Resp:  [18] 18  BP: (160-192)/(62-87) 160/62     on   ;   Device (Oxygen Therapy): (P) room air  Body mass index is 35.25 kg/m².    Physical Exam   Constitutional: She is oriented to person, place, and time. She appears well-developed. No distress.   HENT:   Head: Normocephalic and atraumatic.   Eyes: EOM are normal. Pupils are equal, round, and reactive to light.   Neck: Normal range of motion. Neck supple.   Cardiovascular: Normal rate, regular rhythm and intact distal pulses.    Pulmonary/Chest: Effort normal and breath sounds normal. She has no wheezes.   Abdominal: Soft. She exhibits no distension.   Musculoskeletal: She exhibits tenderness (L hip). She exhibits no edema.   Neurological: She is alert and oriented to person, place, and time.   Skin: Skin is warm and dry. She is not diaphoretic.   Psychiatric: She has a normal mood and affect. Her behavior is normal.   Nursing note and vitals reviewed.      Results Review:       I reviewed the patient's new clinical results. Reviewed other test results, agree with interpretation. Reviewed telemetry, sinus rhythm.    Results from last 7 days  Lab Units 04/11/18  0338 04/10/18  0409 04/09/18  0434 18  1734   WBC 10*3/mm3 8.04 7.28 8.30 13.39*   HEMOGLOBIN g/dL 9.1* 8.8* 9.8* 10.9*   PLATELETS 10*3/mm3 208 203 238 296     Results from last 7 days  Lab Units 04/11/18  0338 04/10/18  0409 04/09/18  0434 18  1734   SODIUM mmol/L 142 138 141 139   POTASSIUM mmol/L 5.2 4.8 5.1 4.8   CHLORIDE mmol/L 108* 105 107 101    CO2 mmol/L 22.0 22.4 18.7* 23.2   BUN mg/dL 33* 41* 42* 43*   CREATININE mg/dL 1.76* 1.88* 1.85* 1.84*   GLUCOSE mg/dL 160* 117* 92 131*   Estimated Creatinine Clearance: 31.3 mL/min (by C-G formula based on SCr of 1.76 mg/dL (H)).  Results from last 7 days  Lab Units 04/11/18  0338 04/10/18  0409 04/09/18  0434 04/08/18  1734   CALCIUM mg/dL 8.6 8.3* 8.5* 9.2   ALBUMIN g/dL 2.80* 2.90*  --  3.50   PHOSPHORUS mg/dL 3.3 4.1  --   --          aspirin 81 mg Oral Daily   enoxaparin 30 mg Subcutaneous Q24H   ferrous sulfate 325 mg Oral BID With Meals   folic acid 1,000 mcg Oral Daily   hydrALAZINE 50 mg Oral Q8H   insulin aspart 0-7 Units Subcutaneous 4x Daily With Meals & Nightly   Insulin Glargine 15 Units Subcutaneous Daily With Breakfast   levothyroxine 125 mcg Oral Daily   lisinopril 20 mg Oral Daily   pantoprazole 40 mg Oral Q AM   predniSONE 5 mg Oral Daily   sucralfate 1 g Oral 4x Daily With Meals & Nightly      NPO Diet NPO Except: Ice chips      Assessment/Plan      Active Hospital Problems (** Indicates Principal Problem)    Diagnosis Date Noted   • **Pseudogout of hip, left [M11.252] 04/09/2018   • Iron deficiency anemia [D50.9] 04/10/2018   • Abdominal mass [R19.00] 04/08/2018   • Diabetes mellitus type 2, uncontrolled, with complications [E11.8, E11.65] 04/07/2017   • Postsurgical hypothyroidism [E89.0] 04/07/2017   • CKD (chronic kidney disease) stage 3, GFR 30-59 ml/min [N18.3] 11/11/2016      Resolved Hospital Problems    Diagnosis Date Noted Date Resolved   No resolved problems to display.     - L Hip Pseudogout: Crystal on aspirate. No organism on gram stain, Cx pending. Will see if Orthopedics would like to do joint injection or we can increase her oral chronic prednisone dose. Orthopedic surgery following.  - RA: Resume home prednisone. Monitor.  - Abdominal Mass/Enteritis sp gastric bypass: PPI, Sucralfate. GI and ID following. Will consult Bariatric surgery.  - DM2: A1c 6.1. Taking home toujeo  long acting insulin. SSI as needed.  - CKD3: Stable. Nephrology following.  - Disposition: Home/few days    Jorge Luis Werner MD  [facility]  04/11/18  10:52 AM

## 2018-04-11 NOTE — PROGRESS NOTES
"   LOS: 3 days   Patient Care Team:  Nicki Hidalgo MD as PCP - General (Internal Medicine)    Chief Complaint/ Reason for encounter: Chronic kidney disease, fluid management  Chief Complaint   Patient presents with   • Hip Pain     left sided hip pain. denies injury at this time, woke this morning with pain.          Subjective     History of Present Illness    Subjective:  Symptoms:  Improved.  She reports weakness.  No shortness of breath or chest pain.  (Still complaining of some hip pain with radiation to her leg, fluid analysis results noted, pseudogout).    Diet:  Adequate intake.    Activity level: Impaired due to weakness.    Pain:  She complains of pain that is mild.  She reports pain is unchanged.  Pain is well controlled and requiring pain medication.          History taken from: Patient and chart    Objective     Vital Signs  Temp:  [97.7 °F (36.5 °C)-98.5 °F (36.9 °C)] 98.5 °F (36.9 °C)  Heart Rate:  [65-78] 71  Resp:  [18] 18  BP: (160-192)/(62-77) 180/75       Wt Readings from Last 1 Encounters:   04/08/18 1421 90.3 kg (199 lb)       Objective:  General Appearance:  Comfortable, well-appearing, in no acute distress and not in pain.    Vital signs: (most recent): Blood pressure 180/75, pulse 71, temperature 98.5 °F (36.9 °C), temperature source Oral, resp. rate 18, height 160 cm (63\"), weight 90.3 kg (199 lb), SpO2 94 %.  Vital signs are normal.  No fever.    Output: Producing urine.    HEENT: Normal HEENT exam.    Lungs:  Normal effort and normal respiratory rate.  Breath sounds clear to auscultation.  She is not in respiratory distress.  No rales or wheezes.    Heart: Normal rate.  Regular rhythm.  No murmur.   Abdomen: Abdomen is soft and non-distended.  Bowel sounds are normal.   There is no abdominal tenderness.  There is no epigastric area or suprapubic area tenderness.     Extremities: Normal range of motion.  There is no deformity or dependent edema.    Pulses: Distal pulses are " "intact.    Neurological: Patient is alert.    Skin:  Warm and dry.  No rash or cyanosis.             Results Review:    Past Medical History: reviewed and updated  Past Medical History:   Diagnosis Date   • Anxiety    • Clostridium difficile infection 11/2016    and in 2005   • Diabetes mellitus    • Disease of thyroid gland    • Hypertension    • Migraine    • Pancreatitis    • Tachycardia    • TIA (transient ischemic attack)          Allergies:  Allergies   Allergen Reactions   • Augmentin [Amoxicillin-Pot Clavulanate] Rash     rash  **tolerates cephalosporins**   **no reason to give aztreonam in the future**   • Dapsone Other (See Comments)     Pt states she \"quit producing blood.\"   • Detrol [Tolterodine Tartrate]      Pt. Does not recall this allergy.   • Levaquin [Levofloxacin] Hives   • Sulfa Antibiotics      Sister went blind with taking and was advised not to take.  Sister went blind with taking and was advised not to take.   • Tetracycline Hives and Itching   • Tetracyclines & Related        Intake/Output:     Intake/Output Summary (Last 24 hours) at 04/11/18 1415  Last data filed at 04/10/18 2325   Gross per 24 hour   Intake              240 ml   Output              700 ml   Net             -460 ml         DATA:  Radiology and Labs:  The following labs independently reviewed by me.  Interval notes, chart personally reviewed by me.   Old records independently reviewed showing Stage III chronic kidney disease, baseline creatinine 1.8-2.0  New problems Pseudogout tip  Discussed with patient and interval notes/chart reviewed  Level risk: High due to need for IV steroids to treat her pseudogout, worsening hypertension in need of as needed IV antihypertensives        Labs:   Recent Results (from the past 24 hour(s))   Body Fluid Culture - Aspirate, Synovium    Collection Time: 04/10/18  2:56 PM   Result Value Ref Range    BF Culture No growth     Gram Stain Result No organisms seen     Gram Stain Result Rare " (1+) WBCs per low power field    Crystal Exam, Fluid - Synovial Fluid,    Collection Time: 04/10/18  2:56 PM   Result Value Ref Range    Crystals, Fluid       Intra and Extracellular crystals observed exhibiting polarization characteristics of Calcium Pyrophosphate   Body fluid cell count - Body Fluid,    Collection Time: 04/10/18  2:56 PM   Result Value Ref Range    Color, Fluid Pink     Appearance, Fluid Hazy (A) Clear    WBC, Fluid 5,800 /mm3    RBC, Fluid 4,800 /mm3   Body fluid differential - Body Fluid,    Collection Time: 04/10/18  2:56 PM   Result Value Ref Range    Neutrophils, Fluid 65 %    Lymphocytes, Fluid 1 %    Monocytes, Fluid 3 %    Mononuclear, Fluid 31 %   POC Glucose Once    Collection Time: 04/10/18  4:15 PM   Result Value Ref Range    Glucose 183 (H) 70 - 130 mg/dL   POC Glucose Once    Collection Time: 04/10/18  8:31 PM   Result Value Ref Range    Glucose 287 (H) 70 - 130 mg/dL   POC Glucose Once    Collection Time: 04/10/18  9:57 PM   Result Value Ref Range    Glucose 280 (H) 70 - 130 mg/dL   Renal Function Panel    Collection Time: 04/11/18  3:38 AM   Result Value Ref Range    Glucose 160 (H) 65 - 99 mg/dL    BUN 33 (H) 8 - 23 mg/dL    Creatinine 1.76 (H) 0.57 - 1.00 mg/dL    Sodium 142 136 - 145 mmol/L    Potassium 5.2 3.5 - 5.2 mmol/L    Chloride 108 (H) 98 - 107 mmol/L    CO2 22.0 22.0 - 29.0 mmol/L    Calcium 8.6 8.6 - 10.5 mg/dL    Albumin 2.80 (L) 3.50 - 5.20 g/dL    Phosphorus 3.3 2.5 - 4.5 mg/dL    Anion Gap 12.0 mmol/L    BUN/Creatinine Ratio 18.8 7.0 - 25.0    eGFR Non African Amer 28 (L) >60 mL/min/1.73   Rheumatoid Factor    Collection Time: 04/11/18  3:38 AM   Result Value Ref Range    Rheumatoid Factor Quantitative <10.0 0.0 - 14.0 IU/mL   Uric Acid    Collection Time: 04/11/18  3:38 AM   Result Value Ref Range    Uric Acid 7.3 (H) 2.4 - 5.7 mg/dL   CBC Auto Differential    Collection Time: 04/11/18  3:38 AM   Result Value Ref Range    WBC 8.04 4.50 - 10.70 10*3/mm3    RBC 3.19  (L) 3.90 - 5.20 10*6/mm3    Hemoglobin 9.1 (L) 11.9 - 15.5 g/dL    Hematocrit 30.7 (L) 35.6 - 45.5 %    MCV 96.2 80.5 - 98.2 fL    MCH 28.5 26.9 - 32.0 pg    MCHC 29.6 (L) 32.4 - 36.3 g/dL    RDW 14.1 (H) 11.7 - 13.0 %    RDW-SD 49.8 37.0 - 54.0 fl    MPV 9.8 6.0 - 12.0 fL    Platelets 208 140 - 500 10*3/mm3    Neutrophil % 78.6 (H) 42.7 - 76.0 %    Lymphocyte % 11.7 (L) 19.6 - 45.3 %    Monocyte % 7.0 5.0 - 12.0 %    Eosinophil % 2.2 0.3 - 6.2 %    Basophil % 0.1 0.0 - 1.5 %    Immature Grans % 0.4 0.0 - 0.5 %    Neutrophils, Absolute 6.32 1.90 - 8.10 10*3/mm3    Lymphocytes, Absolute 0.94 0.90 - 4.80 10*3/mm3    Monocytes, Absolute 0.56 0.20 - 1.20 10*3/mm3    Eosinophils, Absolute 0.18 0.00 - 0.70 10*3/mm3    Basophils, Absolute 0.01 0.00 - 0.20 10*3/mm3    Immature Grans, Absolute 0.03 0.00 - 0.03 10*3/mm3   POC Glucose Once    Collection Time: 04/11/18  6:08 AM   Result Value Ref Range    Glucose 122 70 - 130 mg/dL   POC Glucose Once    Collection Time: 04/11/18 11:12 AM   Result Value Ref Range    Glucose 152 (H) 70 - 130 mg/dL       Radiology:  Imaging Results (last 24 hours)     Procedure Component Value Units Date/Time    XR Abdomen KUB [[id number]] Collected:  04/11/18 1052     Updated:  04/11/18 1200    Narrative:       SUPINE ABDOMEN     HISTORY: Left upper quadrant pain. Abnormal small bowel. Follow-up exam.     FINDINGS: The dilated small bowel loops in the left mid abdomen are not  evident on today's exam. Bowel gas pattern appears nonobstructive. There  is contrast within sigmoid colon diverticula. Surgical chain sutures and  surgical clips overlie the abdomen. There is elevation of the right  hemidiaphragm.       Impression:       Small bowel dilatation in the left mid abdomen is not  demonstrated on today's exam. This may be due to resolution or technical  factors as this was evident on the upright exam one day prior and this  is a supine abdomen view.     This report was finalized on 4/11/2018  11:57 AM by Dr. Chirag Diggs MD.       XR Abdomen 2 View With Chest 1 View [[id number]] Collected:  04/10/18 1846     Updated:  04/10/18 1854    Narrative:       ACUTE ABDOMEN SERIES     HISTORY: Left upper quadrant pain, nausea and vomiting.     FLAT AND UPRIGHT ABDOMEN: Within the left midabdomen there are dilated  bowel loops with air-fluid levels. This is not specific and may  represent localized ileus. Early obstructing process is a consideration.  There is gas and stool throughout the colon and no additional dilated  bowel loops are present. There are surgical chain sutures and clips  within the abdomen. Contrast is present within sigmoid colon  diverticula.     AP chest demonstrates elevation of the right hemidiaphragm. There are  chronic changes in the chest without evidence for active disease in the  chest.       Impression:       Within the left mid abdomen there is dilated bowel loop  which contains an air-fluid level. This may represent localized ileus or  early obstruction and CT follow-up may be helpful if clinically  indicated. There is gas and stool throughout the colon. No evidence for  active disease in the chest.     This report was finalized on 4/10/2018 6:51 PM by Dr. Chirag Diggs MD.       FL Guided Aspiration Joint [[id number]] Collected:  04/10/18 1618     Updated:  04/10/18 1618    Narrative:       FLUOROSCOPIC GUIDED ASPIRATION OF THE LEFT HIP 4/10/2018      HISTORY: Left hip pain.     TECHNIQUE: After signed informed consent was obtained the anterior left  hip was prepped and draped in the usual sterile fashion. Lidocaine was  used for local anesthesia.     A 22-gauge needle was introduced into the left hip joint using  fluoroscopic guidance. Approximately 2 mL to 3 mL of nonpurulent  serosanguineous fluid was aspirated.     Confirmatory images were obtained.     Patient tolerated the procedure well with no complications.                 Medications have been reviewed:  Current  Facility-Administered Medications   Medication Dose Route Frequency Provider Last Rate Last Dose   • acetaminophen (TYLENOL) tablet 650 mg  650 mg Oral Q4H PRN Delonte Loving MD       • ALPRAZolam (XANAX) tablet 0.5 mg  0.5 mg Oral TID PRN Delonte Loving MD   0.5 mg at 04/09/18 1800   • aluminum-magnesium hydroxide-simethicone (MAALOX MAX) 400-400-40 MG/5ML suspension 15 mL  15 mL Oral Q6H PRN Delonte Loving MD       • aspirin EC tablet 81 mg  81 mg Oral Daily Delonte Loving MD   81 mg at 04/11/18 1003   • dextrose (D50W) solution 25 g  25 g Intravenous Q15 Min PRN Delonte Loving MD       • dextrose (GLUTOSE) oral gel 15 g  15 g Oral Q15 Min PRN Delonte Loving MD       • enoxaparin (LOVENOX) syringe 30 mg  30 mg Subcutaneous Q24H Delonte Loving MD   30 mg at 04/11/18 0412   • ferrous sulfate tablet 325 mg  325 mg Oral BID With Meals Tank Taylor MD   325 mg at 04/11/18 1003   • folic acid (FOLVITE) tablet 1,000 mcg  1,000 mcg Oral Daily Delonte Loving MD   1,000 mcg at 04/11/18 1003   • glucagon (human recombinant) (GLUCAGEN DIAGNOSTIC) injection 1 mg  1 mg Subcutaneous PRN Delonte Loving MD       • hydrALAZINE (APRESOLINE) tablet 50 mg  50 mg Oral Q8H Tank Taylor MD   50 mg at 04/11/18 0656   • HYDROcodone-acetaminophen (NORCO) 5-325 MG per tablet 1 tablet  1 tablet Oral Q6H PRN Delonte Loving MD   1 tablet at 04/10/18 1511   • HYDROmorphone (DILAUDID) injection 0.5 mg  0.5 mg Intravenous Q4H PRN Delonte Loving MD   0.5 mg at 04/09/18 0708   • insulin aspart (novoLOG FLEXPEN) sc pen 0-7 Units  0-7 Units Subcutaneous 4x Daily With Meals & Nightly Jorge Luis Werner MD   2 Units at 04/10/18 1846   • Insulin Glargine solution pen-injector 15 Units  15 Units Subcutaneous Daily With Breakfast Jorge Luis Werner MD   15 Units at 04/11/18 1012   • levothyroxine (SYNTHROID, LEVOTHROID) tablet 125 mcg  125 mcg Oral Daily Delonte Loving,  MD   125 mcg at 04/11/18 1003   • lisinopril (PRINIVIL,ZESTRIL) tablet 20 mg  20 mg Oral Daily Delonte Loving MD   20 mg at 04/11/18 1003   • ondansetron (ZOFRAN) injection 4 mg  4 mg Intravenous Q6H PRN Delonte Loving MD   4 mg at 04/10/18 2150   • pantoprazole (PROTONIX) EC tablet 40 mg  40 mg Oral Q AM Silverio Shah MD   40 mg at 04/11/18 0656   • predniSONE (DELTASONE) tablet 5 mg  5 mg Oral Daily Jorge Luis Werner MD   5 mg at 04/11/18 1003   • sodium chloride 0.9 % flush 1-10 mL  1-10 mL Intravenous PRN Delonte Loving MD       • sucralfate (CARAFATE) 1 GM/10ML suspension 1 g  1 g Oral 4x Daily With Meals & Nightly Delonte Loving MD   1 g at 04/11/18 1004       Assessment/Plan     Assessment & Plan  Chronic kidney disease stage III, stable, near baseline  iron deficiency anemia, oral iron sulfate, endoscopy per GI  Left hip pain  left hip effusion , Crystal analysis consistent with pseudogout  Diabetes mellitus type 2  New diagnosis left hip pseudogout  Chronic hypertension, worse today, continue meds, monitor   Anemia of chronic kidney disease      Plan:  Renal function stable, near baseline  Blood pressure worse today  Continue current antihypertensive regimen  Add hydrochlorothiazide.  She inadvertently got a dose of this on admission and her blood pressure had been fairly well controlled up until yesterday      Continue to monitor renal function, electroytes and volume closely   Please call me with any questions or concerns    Tank Taylor MD   [facility]   Office phone number: [phone]  Answering service phone number: [phone]    04/11/18  2:15 PM      Dictation performed using Dragon dictation software

## 2018-04-11 NOTE — PLAN OF CARE
Problem: Patient Care Overview  Goal: Plan of Care Review  Outcome: Ongoing (interventions implemented as appropriate)   04/11/18 0344   Coping/Psychosocial   Plan of Care Reviewed With patient   Plan of Care Review   Progress improving   OTHER   Outcome Summary Abd Xray noted with intermittent nausea , Results phoned to Dr. Santizo, order noted for NPO. PRN Zofran with good relief . Safety maintained. No BM since 4/6, hyperactive bowel sounds noted.      Goal: Individualization and Mutuality  Outcome: Ongoing (interventions implemented as appropriate)    Goal: Discharge Needs Assessment  Outcome: Ongoing (interventions implemented as appropriate)      Problem: Pain, Chronic (Adult)  Goal: Acceptable Pain/Comfort Level and Functional Ability  Outcome: Ongoing (interventions implemented as appropriate)      Problem: Pain, Acute (Adult)  Goal: Acceptable Pain Control/Comfort Level  Outcome: Ongoing (interventions implemented as appropriate)      Problem: Diabetes, Type 2 (Adult)  Goal: Signs and Symptoms of Listed Potential Problems Will be Absent, Minimized or Managed (Diabetes, Type 2)  Outcome: Ongoing (interventions implemented as appropriate)

## 2018-04-12 VITALS
BODY MASS INDEX: 35.26 KG/M2 | RESPIRATION RATE: 18 BRPM | HEIGHT: 63 IN | DIASTOLIC BLOOD PRESSURE: 75 MMHG | TEMPERATURE: 98 F | WEIGHT: 199 LBS | HEART RATE: 65 BPM | OXYGEN SATURATION: 94 % | SYSTOLIC BLOOD PRESSURE: 190 MMHG

## 2018-04-12 LAB
ALBUMIN SERPL-MCNC: 3 G/DL (ref 3.5–5.2)
ANION GAP SERPL CALCULATED.3IONS-SCNC: 13.2 MMOL/L
BUN BLD-MCNC: 28 MG/DL (ref 8–23)
BUN/CREAT SERPL: 18.8 (ref 7–25)
CALCIUM SPEC-SCNC: 8.8 MG/DL (ref 8.6–10.5)
CHLORIDE SERPL-SCNC: 105 MMOL/L (ref 98–107)
CO2 SERPL-SCNC: 21.8 MMOL/L (ref 22–29)
CREAT BLD-MCNC: 1.49 MG/DL (ref 0.57–1)
DEPRECATED RDW RBC AUTO: 49.3 FL (ref 37–54)
ERYTHROCYTE [DISTWIDTH] IN BLOOD BY AUTOMATED COUNT: 14.2 % (ref 11.7–13)
GFR SERPL CREATININE-BSD FRML MDRD: 34 ML/MIN/1.73
GLUCOSE BLD-MCNC: 104 MG/DL (ref 65–99)
GLUCOSE BLDC GLUCOMTR-MCNC: 102 MG/DL (ref 70–130)
GLUCOSE BLDC GLUCOMTR-MCNC: 184 MG/DL (ref 70–130)
GLUCOSE BLDC GLUCOMTR-MCNC: 90 MG/DL (ref 70–130)
HCT VFR BLD AUTO: 31.4 % (ref 35.6–45.5)
HGB BLD-MCNC: 9.5 G/DL (ref 11.9–15.5)
MCH RBC QN AUTO: 28.8 PG (ref 26.9–32)
MCHC RBC AUTO-ENTMCNC: 30.3 G/DL (ref 32.4–36.3)
MCV RBC AUTO: 95.2 FL (ref 80.5–98.2)
PHOSPHATE SERPL-MCNC: 3.3 MG/DL (ref 2.5–4.5)
PLATELET # BLD AUTO: 229 10*3/MM3 (ref 140–500)
PMV BLD AUTO: 9.9 FL (ref 6–12)
POTASSIUM BLD-SCNC: 4.3 MMOL/L (ref 3.5–5.2)
RBC # BLD AUTO: 3.3 10*6/MM3 (ref 3.9–5.2)
SODIUM BLD-SCNC: 140 MMOL/L (ref 136–145)
WBC NRBC COR # BLD: 8.63 10*3/MM3 (ref 4.5–10.7)

## 2018-04-12 PROCEDURE — 25010000002 HYDRALAZINE PER 20 MG: Performed by: INTERNAL MEDICINE

## 2018-04-12 PROCEDURE — 82962 GLUCOSE BLOOD TEST: CPT

## 2018-04-12 PROCEDURE — 63710000001 PREDNISONE PER 5 MG: Performed by: INTERNAL MEDICINE

## 2018-04-12 PROCEDURE — 63710000001 PREDNISONE PER 1 MG: Performed by: INTERNAL MEDICINE

## 2018-04-12 PROCEDURE — 99232 SBSQ HOSP IP/OBS MODERATE 35: CPT | Performed by: INTERNAL MEDICINE

## 2018-04-12 PROCEDURE — 80069 RENAL FUNCTION PANEL: CPT | Performed by: INTERNAL MEDICINE

## 2018-04-12 PROCEDURE — 25010000002 ENOXAPARIN PER 10 MG: Performed by: INTERNAL MEDICINE

## 2018-04-12 PROCEDURE — 85027 COMPLETE CBC AUTOMATED: CPT | Performed by: INTERNAL MEDICINE

## 2018-04-12 RX ORDER — HYDROCHLOROTHIAZIDE 25 MG/1
25 TABLET ORAL DAILY
Qty: 30 TABLET | Refills: 0 | Status: SHIPPED | OUTPATIENT
Start: 2018-04-13 | End: 2018-10-02

## 2018-04-12 RX ORDER — METOPROLOL SUCCINATE 50 MG/1
50 TABLET, EXTENDED RELEASE ORAL
Qty: 30 TABLET | Refills: 0 | Status: SHIPPED | OUTPATIENT
Start: 2018-04-13 | End: 2019-06-17

## 2018-04-12 RX ORDER — FERROUS SULFATE 325(65) MG
325 TABLET ORAL EVERY OTHER DAY
Qty: 15 TABLET | Refills: 0 | Status: SHIPPED | OUTPATIENT
Start: 2018-04-12 | End: 2019-06-17

## 2018-04-12 RX ORDER — HYDRALAZINE HYDROCHLORIDE 50 MG/1
50 TABLET, FILM COATED ORAL 3 TIMES DAILY
Qty: 90 TABLET | Refills: 0 | Status: SHIPPED | OUTPATIENT
Start: 2018-04-12 | End: 2019-06-17

## 2018-04-12 RX ORDER — PREDNISONE 20 MG/1
20 TABLET ORAL
Status: DISCONTINUED | OUTPATIENT
Start: 2018-04-12 | End: 2018-04-12 | Stop reason: HOSPADM

## 2018-04-12 RX ORDER — PREDNISONE 1 MG/1
TABLET ORAL
Qty: 56 TABLET | Refills: 0 | Status: SHIPPED | OUTPATIENT
Start: 2018-04-12 | End: 2019-06-17

## 2018-04-12 RX ADMIN — HYDRALAZINE HYDROCHLORIDE 50 MG: 50 TABLET, FILM COATED ORAL at 13:53

## 2018-04-12 RX ADMIN — SUCRALFATE 1 G: 1 SUSPENSION ORAL at 09:45

## 2018-04-12 RX ADMIN — FERROUS SULFATE TAB 325 MG (65 MG ELEMENTAL FE) 325 MG: 325 (65 FE) TAB at 09:45

## 2018-04-12 RX ADMIN — HYDROCHLOROTHIAZIDE 25 MG: 25 TABLET ORAL at 09:42

## 2018-04-12 RX ADMIN — PREDNISONE 20 MG: 20 TABLET ORAL at 16:16

## 2018-04-12 RX ADMIN — ACETAMINOPHEN 650 MG: 325 TABLET ORAL at 00:33

## 2018-04-12 RX ADMIN — FERROUS SULFATE TAB 325 MG (65 MG ELEMENTAL FE) 325 MG: 325 (65 FE) TAB at 17:01

## 2018-04-12 RX ADMIN — ENOXAPARIN SODIUM 30 MG: 30 INJECTION SUBCUTANEOUS at 06:52

## 2018-04-12 RX ADMIN — ASPIRIN 81 MG: 81 TABLET ORAL at 09:45

## 2018-04-12 RX ADMIN — SUCRALFATE 1 G: 1 SUSPENSION ORAL at 17:01

## 2018-04-12 RX ADMIN — PREDNISONE 5 MG: 5 TABLET ORAL at 09:45

## 2018-04-12 RX ADMIN — SUCRALFATE 1 G: 1 SUSPENSION ORAL at 13:53

## 2018-04-12 RX ADMIN — FOLIC ACID 1000 MCG: 1 TABLET ORAL at 09:45

## 2018-04-12 RX ADMIN — Medication 20 MG: at 09:46

## 2018-04-12 RX ADMIN — PANTOPRAZOLE SODIUM 40 MG: 40 TABLET, DELAYED RELEASE ORAL at 06:55

## 2018-04-12 RX ADMIN — LEVOTHYROXINE SODIUM 125 MCG: 125 TABLET ORAL at 09:45

## 2018-04-12 RX ADMIN — ALPRAZOLAM 0.5 MG: 0.5 TABLET ORAL at 09:45

## 2018-04-12 RX ADMIN — LISINOPRIL 20 MG: 20 TABLET ORAL at 09:45

## 2018-04-12 RX ADMIN — HYDRALAZINE HYDROCHLORIDE 50 MG: 50 TABLET, FILM COATED ORAL at 06:53

## 2018-04-12 NOTE — DISCHARGE SUMMARY
Date of Admission: 4/8/2018  Date of Discharge:  4/12/2018  Primary Care Physician: Nicki Hidalgo MD     Discharge Diagnosis:  Active Hospital Problems (** Indicates Principal Problem)    Diagnosis Date Noted   • **Pseudogout of hip, left [M11.252] 04/09/2018   • Iron deficiency anemia [D50.9] 04/10/2018   • Abdominal mass [R19.00] 04/08/2018   • Diabetes mellitus type 2, uncontrolled, with complications [E11.8, E11.65] 04/07/2017   • Postsurgical hypothyroidism [E89.0] 04/07/2017   • CKD (chronic kidney disease) stage 3, GFR 30-59 ml/min [N18.3] 11/11/2016      Resolved Hospital Problems    Diagnosis Date Noted Date Resolved   No resolved problems to display.       Presenting Problem/History of Present Illness:  Abdominal mass [R19.00]  Abnormal abdominal CT scan [R93.5]  Left hip pain [M25.552]  Abdominal pain, unspecified abdominal location [R10.9]  Chest pain, unspecified type [R07.9]     Ms. Paz is a 72 y.o. female has a history of History of diabetes mellitus, chronic kidney disease and polyps and presents to [facility] complaining of left-sided hip pain which started suddenly yesterday and no history of fall.  She has no weakness or external injuries.  In addition she complains of weight abdominal pain.  CT of the abdomen shows a soft tissue mass in the small bowel.  She has a history of polyps in the past and normally followed by GI.  She does not have any nausea vomiting.  She has a mild elevation of white count.  X-ray of the hip showed no acute findings.     Hospital Course:  The patient is a 72 y.o. female who presented with left hip pain. She was admitted and underwent XR hip which was negative. She had persistent pain and difficulty with movement so MRI was obtained which showed joint effusion. Orthopedic Surgery was consulted. Arthrocentesis was performed and results were consistent with pseudogout. Her inflammatory markers were elevated in setting of chronic steroid for  RA. Her pain improved and PO prednisone was increased to 20mg daily. This will be tapered over next few weeks back to her 5mg daily dose. She can follow up with Orthopedic Surgery as needed.    On CT imaging she had abnormal findings of her billroth ii gastric bypass. GI was consulted and initially recommended antibiotics for enteritis. She had multiple allergies so Infectious disease was consulted. Antibotics were stopped. She had some residual pain so Bariatric Surgery was consulted. Her repeat CT was more consistent with Enteritis instead of mass. Repeat KUB did not show dilation. Her pain improved, diet was advanced, and she did not need additional imaging studies acutely. She was anxious for discharge today and should follow up with GI and Bariatric surgery and scheduled/needed. Repeat endoscopy and dilation of any stricture can be considered should issues arise.    She has DM2 and A1c was 6.1. She was taken off prednisone on admission and had transiet hypoglycemia. When home regimen was resumed, she tolerated her usual long acting insulin dose. She will be discharged on home regimen.    She has CKD3 and was very concerned about MRI of her leg even though it did not have contrast. Nephrology was consulted and she discussed with them. Her CKD remained stable while here. She did have worsening HTN. She was on the regime below in the med rec and will continue that on discharge. Recommend follow up with nephrology and her primary care for additional monitoring and titration.    Exam Today:  Constitutional: She is oriented to person, place, and time. She appears well-developed. No distress.   HENT:   Head: Normocephalic and atraumatic.   Eyes: EOM are normal. Pupils are equal, round, and reactive to light.   Neck: Normal range of motion. Neck supple.   Cardiovascular: Normal rate, regular rhythm and intact distal pulses.    Pulmonary/Chest: Effort normal and breath sounds normal. She has no wheezes.   Abdominal:  Soft. She exhibits no distension.   Musculoskeletal: She exhibits tenderness (L hip). She exhibits no edema.   Neurological: She is alert and oriented to person, place, and time.   Skin: Skin is warm and dry. She is not diaphoretic.   Psychiatric: She has a normal mood and affect. Her behavior is normal.     Results:  CT Chest/Abdomen/Pelvis  1. Segment of small bowel in the left upper quadrant appears mildly  dilated and heterogeneous in density. Surrounding inflammatory changes  seen. There appear to be surgical sutures in this region. This segment  of small bowel appear mildly dilated on the previous CT scan of  10/30/2017. This may represent focal area of focal enteritis,  inflammatory bowel disease or less likely neoplastic mass. No other  bowel dilatation is seen.   2. No evidence of pneumoperitoneum.  3. Colonic diverticulosis.  4. Multiple renal lesions may all represent benign renal cysts.  5. No acute process demonstrated in the chest.    MRI Left Hip  Mild osteoarthritic change at the hips bilaterally with a  nonspecific left hip effusion. Some periarticular soft tissue edema is  present. Infectious or inflammatory sources of left hip effusion are  favored. Osteoarthritis can also be associated with an effusion as seen  here; however, the periarticular soft tissue edema suggests inflammatory  infectious etiology.    KUB  Small bowel dilatation in the left mid abdomen is not  demonstrated on today's exam. This may be due to resolution or technical  factors as this was evident on the upright exam one day prior and this  is a supine abdomen view.    Procedures Performed:         Consults:   Consults     Date and Time Order Name Status Description    4/11/2018 1022 Inpatient Bariatric Surgery Consult      4/9/2018 1138 Inpatient Orthopedic Surgery Consult      4/9/2018 1138 Inpatient Infectious Diseases Consult Completed     4/9/2018 0857 Inpatient Nephrology Consult Completed     4/9/2018 0151 Inpatient  Gastroenterology Consult Completed     4/8/2018 2015 [facility] (on-call MD unless specified) Completed            Discharge Disposition:  Home or Self Care    Discharge Medications:   Ya Paz   Home Medication Instructions SEBASTIEN:[id number]    Printed on:04/12/18 6140   Medication Information                      ALPRAZolam (XANAX) 0.5 MG tablet  Take 0.5 mg by mouth 3 (Three) Times a Day As Needed.             aspirin 81 MG EC tablet  Take 81 mg by mouth Daily.             Cholecalciferol (VITAMIN D) 1000 UNITS tablet  Take 1,000 Units by mouth 2 (Two) Times a Day.             ferrous sulfate 325 (65 FE) MG tablet  Take 1 tablet by mouth Every Other Day.             folic acid (FOLVITE) 1 MG tablet  TAKE ONE TABLET BY MOUTH ONCE DAILY             hydrALAZINE (APRESOLINE) 50 MG tablet  Take 1 tablet by mouth 3 (Three) Times a Day.             hydrochlorothiazide (HYDRODIURIL) 25 MG tablet  Take 1 tablet by mouth Daily.             HYDROcodone-acetaminophen (NORCO) 5-325 MG per tablet  Take 1 tablet by mouth Every 6 (Six) Hours As Needed for Moderate Pain .             hydrOXYzine (ATARAX) 25 MG tablet  Take 25 mg by mouth At Night As Needed for Itching (1-3 tablets every night at bedtime for itching).             insulin aspart (NOVOLOG FLEXPEN) 100 UNIT/ML solution pen-injector sc pen  6 units before each meal with sliding scale with a max of 100 units daily             Insulin Glargine (TOUJEO SOLOSTAR) 300 UNIT/ML solution pen-injector  Inject 15 Units under the skin Daily With Breakfast. Adjust dose according to accucheck              Insulin Pen Needle 32G X 4 MM misc  Use to inject insulin 4 times daily             levothyroxine (SYNTHROID, LEVOTHROID) 125 MCG tablet  Take 125 mcg by mouth Daily.             lisinopril (PRINIVIL,ZESTRIL) 10 MG tablet  Take 20 mg by mouth Daily.             lubiprostone (AMITIZA) 24 MCG capsule  Take 1 capsule by mouth 2 (Two) Times a Day With Meals.             metoprolol  succinate XL (TOPROL-XL) 50 MG 24 hr tablet  Take 1 tablet by mouth Daily.             predniSONE (DELTASONE) 5 MG tablet  Take PO daily: 20mg x1week then 10mg x1week then resume 5mg             raNITIdine (ZANTAC) 300 MG tablet  TAKE 1 TABLET BY MOUTH EVERY NIGHT             sucralfate (CARAFATE) 1 GM/10ML suspension  Take 10 mL by mouth 4 (Four) Times a Day With Meals & at Bedtime.                 Discharge Diet:   Diet Instructions     Advance Diet As Tolerated             Activity at Discharge:   Activity Instructions     Activity as Tolerated             Follow-up Appointments:  No future appointments.  Additional Instructions for the Follow-ups that You Need to Schedule     Discharge Follow-up with PCP    As directed      Follow Up Details:  1-2 weeks         Discharge Follow-up with Specialty: Bariatric Surgery    As directed      Specialty:  Bariatric Surgery    Follow Up Details:  as scheduled         Discharge Follow-up with Specified Provider: Gastroenterology    As directed      To:  Gastroenterology    Follow Up Details:  as scheduled         Discharge Follow-up with Specified Provider: Nephrology    As directed      To:  Nephrology    Follow Up Details:  as scheduled               Test Results Pending at Discharge:   Order Current Status    Anaerobic Culture - Aspirate, Synovium In process    Blood Culture - Blood, Preliminary result    Blood Culture - Blood, Preliminary result    Body Fluid Culture - Aspirate, Synovium Preliminary result           Jorge Luis Werner MD  04/12/18  3:31 PM    Time Spent on Discharge Activities: >30 minutes

## 2018-04-12 NOTE — PLAN OF CARE
Problem: Fall Risk (Adult)  Goal: Absence of Fall  Outcome: Ongoing (interventions implemented as appropriate)      Problem: Patient Care Overview  Goal: Plan of Care Review  Outcome: Ongoing (interventions implemented as appropriate)   04/11/18 2003   Coping/Psychosocial   Plan of Care Reviewed With patient;spouse   Plan of Care Review   Progress improving   OTHER   Outcome Summary Denies any L upper quad pain today. States L hip pain is better. Wants to eat. B/P elevated. Dr. Taylor started pt on HCTZ and IV Hydralazine. B/P down after one dose of IV HYdralazine.     Goal: Individualization and Mutuality  Outcome: Ongoing (interventions implemented as appropriate)    Goal: Discharge Needs Assessment  Outcome: Ongoing (interventions implemented as appropriate)      Problem: Pain, Chronic (Adult)  Goal: Acceptable Pain/Comfort Level and Functional Ability  Outcome: Ongoing (interventions implemented as appropriate)      Problem: Pain, Acute (Adult)  Goal: Acceptable Pain Control/Comfort Level  Outcome: Ongoing (interventions implemented as appropriate)      Problem: Diabetes, Type 2 (Adult)  Goal: Signs and Symptoms of Listed Potential Problems Will be Absent, Minimized or Managed (Diabetes, Type 2)  Outcome: Ongoing (interventions implemented as appropriate)

## 2018-04-12 NOTE — PROGRESS NOTES
"   LOS: 4 days   Patient Care Team:  Nicki Hidalgo MD as PCP - General (Internal Medicine)    Chief Complaint/ Reason for encounter: Chronic kidney disease, fluid management  Chief Complaint   Patient presents with   • Hip Pain     left sided hip pain. denies injury at this time, woke this morning with pain.          Subjective     Hip Pain          Subjective:  Symptoms:  Improved.  She reports weakness.  No shortness of breath or chest pain.  (Her hip and abdominal pain improved  No fevers or chills  Good appetite, blood pressure still labile).    Diet:  Adequate intake.    Activity level: Impaired due to weakness.    Pain:  She complains of pain that is mild.  She reports pain is unchanged.  Pain is well controlled and requiring pain medication.          History taken from: Patient and chart    Objective     Vital Signs  Temp:  [98 °F (36.7 °C)-98.7 °F (37.1 °C)] 98 °F (36.7 °C)  Heart Rate:  [59-87] 65  Resp:  [16-18] 18  BP: (178-201)/(72-86) 190/75       Wt Readings from Last 1 Encounters:   04/08/18 1421 90.3 kg (199 lb)       Objective:  General Appearance:  Comfortable, well-appearing, in no acute distress and not in pain.    Vital signs: (most recent): Blood pressure (!) 190/75, pulse 65, temperature 98 °F (36.7 °C), temperature source Oral, resp. rate 18, height 160 cm (63\"), weight 90.3 kg (199 lb), SpO2 94 %.  Vital signs are normal.  No fever.    Output: Producing urine.    HEENT: Normal HEENT exam.    Lungs:  Normal effort and normal respiratory rate.  Breath sounds clear to auscultation.  She is not in respiratory distress.  No rales or wheezes.    Heart: Normal rate.  Regular rhythm.  No murmur.   Abdomen: Abdomen is soft and non-distended.  Bowel sounds are normal.   There is no abdominal tenderness.  There is no epigastric area or suprapubic area tenderness.     Extremities: Normal range of motion.  There is no deformity or dependent edema.    Pulses: Distal pulses are intact.  " "  Neurological: Patient is alert.    Skin:  Warm and dry.  No rash or cyanosis.             Results Review:    Past Medical History: reviewed and updated  Past Medical History:   Diagnosis Date   • Anxiety    • Clostridium difficile infection 11/2016    and in 2005   • Diabetes mellitus    • Disease of thyroid gland    • Hypertension    • Migraine    • Pancreatitis    • Tachycardia    • TIA (transient ischemic attack)          Allergies:  Allergies   Allergen Reactions   • Augmentin [Amoxicillin-Pot Clavulanate] Rash     rash  **tolerates cephalosporins**   **no reason to give aztreonam in the future**   • Dapsone Other (See Comments)     Pt states she \"quit producing blood.\"   • Detrol [Tolterodine Tartrate]      Pt. Does not recall this allergy.   • Levaquin [Levofloxacin] Hives   • Sulfa Antibiotics      Sister went blind with taking and was advised not to take.  Sister went blind with taking and was advised not to take.   • Tetracycline Hives and Itching   • Tetracyclines & Related        Intake/Output:     Intake/Output Summary (Last 24 hours) at 04/12/18 1451  Last data filed at 04/12/18 0942   Gross per 24 hour   Intake              120 ml   Output                0 ml   Net              120 ml         DATA:  Radiology and Labs:  The following labs independently reviewed by me.  Interval notes, chart personally reviewed by me.   Old records independently reviewed showing Stage III chronic kidney disease, baseline creatinine 1.8-2.0  New problems Pseudogout tip  Discussed with patient and interval notes/chart reviewed    Labs:   Recent Results (from the past 24 hour(s))   POC Glucose Once    Collection Time: 04/11/18  4:33 PM   Result Value Ref Range    Glucose 109 70 - 130 mg/dL   POC Glucose Once    Collection Time: 04/11/18  9:15 PM   Result Value Ref Range    Glucose 115 70 - 130 mg/dL   Renal Function Panel    Collection Time: 04/12/18  3:07 AM   Result Value Ref Range    Glucose 104 (H) 65 - 99 mg/dL    " BUN 28 (H) 8 - 23 mg/dL    Creatinine 1.49 (H) 0.57 - 1.00 mg/dL    Sodium 140 136 - 145 mmol/L    Potassium 4.3 3.5 - 5.2 mmol/L    Chloride 105 98 - 107 mmol/L    CO2 21.8 (L) 22.0 - 29.0 mmol/L    Calcium 8.8 8.6 - 10.5 mg/dL    Albumin 3.00 (L) 3.50 - 5.20 g/dL    Phosphorus 3.3 2.5 - 4.5 mg/dL    Anion Gap 13.2 mmol/L    BUN/Creatinine Ratio 18.8 7.0 - 25.0    eGFR Non African Amer 34 (L) >60 mL/min/1.73   CBC (No Diff)    Collection Time: 04/12/18  3:07 AM   Result Value Ref Range    WBC 8.63 4.50 - 10.70 10*3/mm3    RBC 3.30 (L) 3.90 - 5.20 10*6/mm3    Hemoglobin 9.5 (L) 11.9 - 15.5 g/dL    Hematocrit 31.4 (L) 35.6 - 45.5 %    MCV 95.2 80.5 - 98.2 fL    MCH 28.8 26.9 - 32.0 pg    MCHC 30.3 (L) 32.4 - 36.3 g/dL    RDW 14.2 (H) 11.7 - 13.0 %    RDW-SD 49.3 37.0 - 54.0 fl    MPV 9.9 6.0 - 12.0 fL    Platelets 229 140 - 500 10*3/mm3   POC Glucose Once    Collection Time: 04/12/18  6:25 AM   Result Value Ref Range    Glucose 90 70 - 130 mg/dL   POC Glucose Once    Collection Time: 04/12/18 11:08 AM   Result Value Ref Range    Glucose 102 70 - 130 mg/dL       Radiology:  Imaging Results (last 24 hours)     Procedure Component Value Units Date/Time    FL Guided Aspiration Joint [[id number]] Collected:  04/10/18 1618     Updated:  04/12/18 0642    Narrative:       FLUOROSCOPIC GUIDED ASPIRATION OF THE LEFT HIP 4/10/2018      HISTORY: Left hip pain.     TECHNIQUE: After signed informed consent was obtained the anterior left  hip was prepped and draped in the usual sterile fashion. Lidocaine was  used for local anesthesia.     A 22-gauge needle was introduced into the left hip joint using  fluoroscopic guidance. Approximately 2 mL to 3 mL of nonpurulent  serosanguineous fluid was aspirated.     Confirmatory images were obtained.     Patient tolerated the procedure well with no complications.      This report was finalized on 4/12/2018 6:39 AM by Dr. Iker Leonard MD.                Medications have been  reviewed:  Current Facility-Administered Medications   Medication Dose Route Frequency Provider Last Rate Last Dose   • acetaminophen (TYLENOL) tablet 650 mg  650 mg Oral Q4H PRN Delonte Loving MD   650 mg at 04/12/18 0033   • ALPRAZolam (XANAX) tablet 0.5 mg  0.5 mg Oral TID PRN Delonte Loving MD   0.5 mg at 04/12/18 0945   • aluminum-magnesium hydroxide-simethicone (MAALOX MAX) 400-400-40 MG/5ML suspension 15 mL  15 mL Oral Q6H PRN Delonte Loving MD       • aspirin EC tablet 81 mg  81 mg Oral Daily Delonte Loving MD   81 mg at 04/12/18 0945   • dextrose (D50W) solution 25 g  25 g Intravenous Q15 Min PRN Delonte Loving MD       • dextrose (GLUTOSE) oral gel 15 g  15 g Oral Q15 Min PRN Delonte Loving MD       • enoxaparin (LOVENOX) syringe 30 mg  30 mg Subcutaneous Q24H Delonte Loving MD   30 mg at 04/12/18 0652   • ferrous sulfate tablet 325 mg  325 mg Oral BID With Meals Tank Taylor MD   325 mg at 04/12/18 0945   • folic acid (FOLVITE) tablet 1,000 mcg  1,000 mcg Oral Daily Delonte Loving MD   1,000 mcg at 04/12/18 0945   • glucagon (human recombinant) (GLUCAGEN DIAGNOSTIC) injection 1 mg  1 mg Subcutaneous PRN Delonte Lvoing MD       • hydrALAZINE (APRESOLINE) injection 20 mg  20 mg Intravenous Q6H PRN Tank Taylor MD   20 mg at 04/12/18 0946   • hydrALAZINE (APRESOLINE) tablet 50 mg  50 mg Oral Q8H Tank Taylor MD   50 mg at 04/12/18 1353   • hydrochlorothiazide (HYDRODIURIL) tablet 25 mg  25 mg Oral Daily Tank Taylor MD   25 mg at 04/12/18 0942   • HYDROcodone-acetaminophen (NORCO) 5-325 MG per tablet 1 tablet  1 tablet Oral Q6H PRN Delonte Loving MD   1 tablet at 04/10/18 1511   • HYDROmorphone (DILAUDID) injection 0.5 mg  0.5 mg Intravenous Q4H PRN Delonte Lovign MD   0.5 mg at 04/09/18 0708   • insulin aspart (novoLOG FLEXPEN) sc pen 0-7 Units  0-7 Units Subcutaneous 4x Daily With Meals & Nightly Jorge Luis GUPTA  MD Alphonso   2 Units at 04/10/18 1846   • Insulin Glargine solution pen-injector 15 Units  15 Units Subcutaneous Daily With Breakfast Jorge Luis Werner MD   15 Units at 04/12/18 0949   • levothyroxine (SYNTHROID, LEVOTHROID) tablet 125 mcg  125 mcg Oral Daily Delonte Loving MD   125 mcg at 04/12/18 0945   • lisinopril (PRINIVIL,ZESTRIL) tablet 20 mg  20 mg Oral Daily Delonte Loving MD   20 mg at 04/12/18 0945   • metoprolol succinate XL (TOPROL-XL) 24 hr tablet 50 mg  50 mg Oral Q24H Tank Taylor MD   50 mg at 04/11/18 2112   • ondansetron (ZOFRAN) injection 4 mg  4 mg Intravenous Q6H PRN Delonte Loving MD   4 mg at 04/10/18 2150   • pantoprazole (PROTONIX) EC tablet 40 mg  40 mg Oral Q AM Silverio Shah MD   40 mg at 04/12/18 0655   • predniSONE (DELTASONE) tablet 20 mg  20 mg Oral Daily With Breakfast Jorge Luis Werner MD       • sodium chloride 0.9 % flush 1-10 mL  1-10 mL Intravenous PRN Delonte Loving MD       • sucralfate (CARAFATE) 1 GM/10ML suspension 1 g  1 g Oral 4x Daily With Meals & Nightly Delonte Loving MD   1 g at 04/12/18 1353       Assessment/Plan     Assessment & Plan  Chronic kidney disease stage III, stable, near baseline  iron deficiency anemia, oral iron sulfate, endoscopy per GI  Left hip pain  left hip effusion , Crystal analysis consistent with pseudogout  Diabetes mellitus type 2  New diagnosis left hip pseudogout  Chronic hypertension, worse today, continue meds, monitor   Anemia of chronic kidney disease      Plan:  Renal function stable, near baseline  Blood pressure remains elevated but her blood pressure medicines have not been the being given according to her home schedule, she should be back on all her home medicines now  Added HCTZ yesterday, continue  Continue current antihypertensive regimen  Stable for discharge today from a renal standpoint    Tank Taylor MD   [facility]   Office phone number: [phone]  Answering  service phone number: [phone]    04/12/18  2:51 PM      Dictation performed using Dragon dictation software

## 2018-04-12 NOTE — PLAN OF CARE
Problem: Fall Risk (Adult)  Goal: Absence of Fall  Outcome: Ongoing (interventions implemented as appropriate)      Problem: Patient Care Overview  Goal: Plan of Care Review  Outcome: Ongoing (interventions implemented as appropriate)      Problem: Pain, Chronic (Adult)  Goal: Acceptable Pain/Comfort Level and Functional Ability  Outcome: Ongoing (interventions implemented as appropriate)      Problem: Pain, Acute (Adult)  Goal: Acceptable Pain Control/Comfort Level  Outcome: Ongoing (interventions implemented as appropriate)      Problem: Diabetes, Type 2 (Adult)  Goal: Signs and Symptoms of Listed Potential Problems Will be Absent, Minimized or Managed (Diabetes, Type 2)  Outcome: Ongoing (interventions implemented as appropriate)

## 2018-04-12 NOTE — PROGRESS NOTES
[facility]  Inpatient Progress Note    Reason for Follow Up:  Abnormal CT    Subjective     Interval History:   Pt seems much improved this am.  Tolerating diet with minimal abdominal pain.  No vomiting.       Current Facility-Administered Medications:   •  acetaminophen (TYLENOL) tablet 650 mg, 650 mg, Oral, Q4H PRN, Delonte Loving MD, 650 mg at 04/12/18 0033  •  ALPRAZolam (XANAX) tablet 0.5 mg, 0.5 mg, Oral, TID PRN, Delonte Loving MD, 0.5 mg at 04/09/18 1800  •  aluminum-magnesium hydroxide-simethicone (MAALOX MAX) 400-400-40 MG/5ML suspension 15 mL, 15 mL, Oral, Q6H PRN, Delonte Loving MD  •  aspirin EC tablet 81 mg, 81 mg, Oral, Daily, Delonte Loving MD, 81 mg at 04/11/18 1003  •  dextrose (D50W) solution 25 g, 25 g, Intravenous, Q15 Min PRN, Delonte Loving MD  •  dextrose (GLUTOSE) oral gel 15 g, 15 g, Oral, Q15 Min PRN, Delonte Loving MD  •  enoxaparin (LOVENOX) syringe 30 mg, 30 mg, Subcutaneous, Q24H, Delonte Loving MD, 30 mg at 04/12/18 0652  •  ferrous sulfate tablet 325 mg, 325 mg, Oral, BID With Meals, Tank Taylor MD, 325 mg at 04/11/18 1812  •  folic acid (FOLVITE) tablet 1,000 mcg, 1,000 mcg, Oral, Daily, Delonte Loving MD, 1,000 mcg at 04/11/18 1003  •  glucagon (human recombinant) (GLUCAGEN DIAGNOSTIC) injection 1 mg, 1 mg, Subcutaneous, PRN, Delonte Loving MD  •  hydrALAZINE (APRESOLINE) injection 20 mg, 20 mg, Intravenous, Q6H PRN, Tank Taylor MD, 20 mg at 04/11/18 7889  •  hydrALAZINE (APRESOLINE) tablet 50 mg, 50 mg, Oral, Q8H, Tank Taylor MD, 50 mg at 04/12/18 0653  •  hydrochlorothiazide (HYDRODIURIL) tablet 25 mg, 25 mg, Oral, Daily, Tank Taylor MD, 25 mg at 04/11/18 1812  •  HYDROcodone-acetaminophen (NORCO) 5-325 MG per tablet 1 tablet, 1 tablet, Oral, Q6H PRN, Delonte Loving MD, 1 tablet at 04/10/18 1511  •  HYDROmorphone (DILAUDID) injection 0.5 mg, 0.5 mg,  Intravenous, Q4H PRN, Delonte Loving MD, 0.5 mg at 04/09/18 0708  •  insulin aspart (novoLOG FLEXPEN) sc pen 0-7 Units, 0-7 Units, Subcutaneous, 4x Daily With Meals & Nightly, Jorge Luis Werner MD, 2 Units at 04/10/18 1846  •  Insulin Glargine solution pen-injector 15 Units, 15 Units, Subcutaneous, Daily With Breakfast, Jorge Luis Werner MD, 15 Units at 04/11/18 1012  •  levothyroxine (SYNTHROID, LEVOTHROID) tablet 125 mcg, 125 mcg, Oral, Daily, Delonte Loving MD, 125 mcg at 04/11/18 1003  •  lisinopril (PRINIVIL,ZESTRIL) tablet 20 mg, 20 mg, Oral, Daily, Delonte Loving MD, 20 mg at 04/11/18 1003  •  metoprolol succinate XL (TOPROL-XL) 24 hr tablet 50 mg, 50 mg, Oral, Q24H, Tank Taylor MD, 50 mg at 04/11/18 2112  •  ondansetron (ZOFRAN) injection 4 mg, 4 mg, Intravenous, Q6H PRN, Delonte Loving MD, 4 mg at 04/10/18 2150  •  pantoprazole (PROTONIX) EC tablet 40 mg, 40 mg, Oral, Q AM, Silverio Shah MD, 40 mg at 04/12/18 0655  •  predniSONE (DELTASONE) tablet 5 mg, 5 mg, Oral, Daily, Jorge Luis Werner MD, 5 mg at 04/11/18 1003  •  sodium chloride 0.9 % flush 1-10 mL, 1-10 mL, Intravenous, PRN, Delonte Loving MD  •  sucralfate (CARAFATE) 1 GM/10ML suspension 1 g, 1 g, Oral, 4x Daily With Meals & Nightly, Delonte Loving MD, 1 g at 04/11/18 2113  Review of Systems:    All systems were reviewed and negative except for:  Constitution:  positive for anorexia and weight loss  Gastrointestinal: postitive for  pain  Musculoskeletal: positive for  bone pain    Objective     Vital Signs  Temp:  [98 °F (36.7 °C)-98.7 °F (37.1 °C)] 98.2 °F (36.8 °C)  Heart Rate:  [59-87] 59  Resp:  [16-18] 16  BP: (178-201)/(72-86) 184/74  Body mass index is 35.25 kg/m².  No intake or output data in the 24 hours ending 04/12/18 0933  No intake/output data recorded.     Physical Exam:   General: patient awake, alert and cooperative   Eyes: Normal lids and lashes, no scleral icterus   Neck: supple,  normal ROM   Skin: warm and dry, not jaundiced   Abdomen: soft, focal mild luq tenderness, nondistended; normal bowel sounds    Extremities: no rash    Psychiatric: Normal mood and behavior; memory intact     Results Review:     I reviewed the patient's new clinical results.      Results from last 7 days  Lab Units 04/12/18  0307 04/11/18 0338 04/10/18  0931 04/10/18  0409   WBC 10*3/mm3 8.63 8.04  --  7.28   HEMOGLOBIN g/dL 9.5* 9.1*  --  8.8*   HEMATOCRIT % 31.4* 30.7* 28.5* 29.6*   PLATELETS 10*3/mm3 229 208  --  203       Results from last 7 days  Lab Units 04/12/18  0307 04/11/18  0338 04/10/18  0409  04/08/18  1734   SODIUM mmol/L 140 142 138  < > 139   POTASSIUM mmol/L 4.3 5.2 4.8  < > 4.8   CHLORIDE mmol/L 105 108* 105  < > 101   CO2 mmol/L 21.8* 22.0 22.4  < > 23.2   BUN mg/dL 28* 33* 41*  < > 43*   CREATININE mg/dL 1.49* 1.76* 1.88*  < > 1.84*   CALCIUM mg/dL 8.8 8.6 8.3*  < > 9.2   BILIRUBIN mg/dL  --   --  <0.2  --  0.3   ALK PHOS U/L  --   --  53  --  60   ALT (SGPT) U/L  --   --  16  --  13   AST (SGOT) U/L  --   --  16  --  13   GLUCOSE mg/dL 104* 160* 117*  < > 131*   < > = values in this interval not displayed.    Results from last 7 days  Lab Units 04/10/18  0409   INR  1.07       Lab Results  Lab Value Date/Time   LIPASE 35 04/10/2018 0409   LIPASE 45 04/08/2018 1734       Radiology:  FL Guided Aspiration Joint   Final Result      XR Abdomen KUB   Final Result   Small bowel dilatation in the left mid abdomen is not   demonstrated on today's exam. This may be due to resolution or technical   factors as this was evident on the upright exam one day prior and this   is a supine abdomen view.       This report was finalized on 4/11/2018 11:57 AM by Dr. Chirag Diggs MD.          XR Abdomen 2 View With Chest 1 View   Final Result   Within the left mid abdomen there is dilated bowel loop   which contains an air-fluid level. This may represent localized ileus or   early obstruction and CT follow-up  may be helpful if clinically   indicated. There is gas and stool throughout the colon. No evidence for   active disease in the chest.       This report was finalized on 4/10/2018 6:51 PM by Dr. Chirag Diggs MD.          MRI Hip Left Without Contrast   Final Result   Mild osteoarthritic change at the hips bilaterally with a   nonspecific left hip effusion. Some periarticular soft tissue edema is   present. Infectious or inflammatory sources of left hip effusion are   favored. Osteoarthritis can also be associated with an effusion as seen   here; however, the periarticular soft tissue edema suggests inflammatory   infectious etiology.       This report was finalized on 4/10/2018 9:39 AM by Dr. Raheel Heller MD.          CT Chest Without Contrast   Final Result   1. Segment of small bowel in the left upper quadrant appears mildly   dilated and heterogeneous in density. Surrounding inflammatory changes   seen. There appear to be surgical sutures in this region. This segment   of small bowel appear mildly dilated on the previous CT scan of   10/30/2017. This may represent focal area of focal enteritis,   inflammatory bowel disease or less likely neoplastic mass. No other   bowel dilatation is seen.    2. No evidence of pneumoperitoneum.   3. Colonic diverticulosis.   4. Multiple renal lesions may all represent benign renal cysts.   5. No acute process demonstrated in the chest.           Radiation dose reduction techniques were utilized, including automated   exposure control and exposure modulation based on body size.       This report was finalized on 4/9/2018 10:41 AM by Dr. Iker Leonard MD.          CT Abdomen Pelvis Without Contrast   Final Result   1. Segment of small bowel in the left upper quadrant appears mildly   dilated and heterogeneous in density. Surrounding inflammatory changes   seen. There appear to be surgical sutures in this region. This segment   of small bowel appear mildly dilated  on the previous CT scan of   10/30/2017. This may represent focal area of focal enteritis,   inflammatory bowel disease or less likely neoplastic mass. No other   bowel dilatation is seen.    2. No evidence of pneumoperitoneum.   3. Colonic diverticulosis.   4. Multiple renal lesions may all represent benign renal cysts.   5. No acute process demonstrated in the chest.           Radiation dose reduction techniques were utilized, including automated   exposure control and exposure modulation based on body size.       This report was finalized on 4/9/2018 10:41 AM by Dr. Iker Leonard MD.          XR Hip With or Without Pelvis 2 - 3 View Left   Final Result   No acute process identified.       This report was finalized on 4/9/2018 10:39 AM by Dr. Iker Leonard MD.              Assessment/Plan     Patient Active Problem List   Diagnosis   • Diverticulitis of large intestine without perforation or abscess without bleeding   • ARF (acute renal failure)   • CKD (chronic kidney disease) stage 3, GFR 30-59 ml/min   • Proteinuria   • Diverticulitis   • Hypervitaminosis D   • Postsurgical hypothyroidism   • Chronic fatigue   • Diabetes mellitus type 2, uncontrolled, with complications   • Heartburn   • Dysphagia   • Chest pain   • Abdominal mass   • Pseudogout of hip, left   • Iron deficiency anemia     Impression:  1. Abnormal CT with small bowel abnormality in the LUQ of the abdomen as seen on CT abd/pelvis- This would be difficult to get to endoscopically since Dr. LUBA Santizo could not complete the 2/18 EGD because of a sharp turn in the stomach due to prior bariatric surgery and stenotic outflow from gastroplasty. Dr Shah discussed CT with Dr. Heaven Wolfe - this knuckle of small bowel was distended during 10/17 CT abdomen. She is not convinced that there is a small bowel mass but there is some inflammation around that area of small bowel (enteritis).   2. Dyspepsia/heartburn- in a 72 you who is s/p vertical  banded gastroplasty in 1984 by Dr. MARYJO Reagan. UGI and EGD seem to show some breakdown of some of the sutures such that barium is going around the banded part of this verticle banded gastroplasty.  3.  Weight loss in a 72 who has had bariatric surgery in 1984.  4. Anemia    Plan:  -continue aggressive suppression of acid - small meals, upright position for meals  -appreciated Dr. Carrizales's input.  Agree that at this point given improvement would hold on repeat CT scan or dedicated small bowel imaging.      I discussed the patients findings and my recommendations with patient.          Sergio Bhakta M.D.  [facility], [place], operated by [facility]  [address] [place] [address]  Office: [phone]

## 2018-04-12 NOTE — PROGRESS NOTES
Name: Ya Paz ADMIT: 2018   : 1945  PCP: Nicki Hidalgo MD    MRN: [id number] LOS: 4 days   AGE/SEX: 72 y.o. female  ROOM: [facility]   Subjective   No CP SOA NVD. Reports she wants to try lunch and also would likek to be discharged when able. Left hip pain is improved but still present. Discussed increased steroid trial and she is agreeable.    Objective   Vital Signs  Temp:  [98 °F (36.7 °C)-98.7 °F (37.1 °C)] 98.2 °F (36.8 °C)  Heart Rate:  [59-87] 61  Resp:  [16] 16  BP: (178-201)/(72-86) 179/78     on   ;   Device (Oxygen Therapy): room air  Body mass index is 35.25 kg/m².    Physical Exam   Constitutional: She is oriented to person, place, and time. She appears well-developed. No distress.   HENT:   Head: Normocephalic and atraumatic.   Eyes: EOM are normal. Pupils are equal, round, and reactive to light.   Neck: Normal range of motion. Neck supple.   Cardiovascular: Normal rate, regular rhythm and intact distal pulses.    Pulmonary/Chest: Effort normal and breath sounds normal. She has no wheezes.   Abdominal: Soft. She exhibits no distension.   Musculoskeletal: She exhibits tenderness (L hip). She exhibits no edema.   Neurological: She is alert and oriented to person, place, and time.   Skin: Skin is warm and dry. She is not diaphoretic.   Psychiatric: She has a normal mood and affect. Her behavior is normal.   Nursing note and vitals reviewed.      Results Review:       I reviewed the patient's new clinical results. Reviewed imaging, agree with interpretation. Reviewed telemetry, sinus rhythm.    Results from last 7 days  Lab Units 18  0307 04/11/18  0338 04/10/18  0409 18  0434   WBC 10*3/mm3 8.63 8.04 7.28 8.30   HEMOGLOBIN g/dL 9.5* 9.1* 8.8* 9.8*   PLATELETS 10*3/mm3 229 208 203 238     Results from last 7 days  Lab Units 18  030188 04/10/18  0409 18  0434   SODIUM mmol/L 140 142 138 141   POTASSIUM mmol/L 4.3 5.2 4.8 5.1   CHLORIDE  mmol/L 105 108* 105 107   CO2 mmol/L 21.8* 22.0 22.4 18.7*   BUN mg/dL 28* 33* 41* 42*   CREATININE mg/dL 1.49* 1.76* 1.88* 1.85*   GLUCOSE mg/dL 104* 160* 117* 92   Estimated Creatinine Clearance: 37 mL/min (by C-G formula based on SCr of 1.49 mg/dL (H)).  Results from last 7 days  Lab Units 04/12/18  0307 04/11/18  0338 04/10/18  0409 04/09/18  0434 04/08/18  1734   CALCIUM mg/dL 8.8 8.6 8.3* 8.5* 9.2   ALBUMIN g/dL 3.00* 2.80* 2.90*  --  3.50   PHOSPHORUS mg/dL 3.3 3.3 4.1  --   --          aspirin 81 mg Oral Daily   enoxaparin 30 mg Subcutaneous Q24H   ferrous sulfate 325 mg Oral BID With Meals   folic acid 1,000 mcg Oral Daily   hydrALAZINE 50 mg Oral Q8H   Hydrochlorothiazide Oral 25 mg Oral Daily   insulin aspart 0-7 Units Subcutaneous 4x Daily With Meals & Nightly   Insulin Glargine 15 Units Subcutaneous Daily With Breakfast   levothyroxine 125 mcg Oral Daily   lisinopril 20 mg Oral Daily   metoprolol succinate XL 50 mg Oral Q24H   pantoprazole 40 mg Oral Q AM   predniSONE 5 mg Oral Daily   sucralfate 1 g Oral 4x Daily With Meals & Nightly      Diet Clear Liquid; Consistent Carbohydrate      Assessment/Plan      Active Hospital Problems (** Indicates Principal Problem)    Diagnosis Date Noted   • **Pseudogout of hip, left [M11.252] 04/09/2018   • Iron deficiency anemia [D50.9] 04/10/2018   • Abdominal mass [R19.00] 04/08/2018   • Diabetes mellitus type 2, uncontrolled, with complications [E11.8, E11.65] 04/07/2017   • Postsurgical hypothyroidism [E89.0] 04/07/2017   • CKD (chronic kidney disease) stage 3, GFR 30-59 ml/min [N18.3] 11/11/2016      Resolved Hospital Problems    Diagnosis Date Noted Date Resolved   No resolved problems to display.     - L Hip Pseudogout: Crystal on aspirate. Cx ngtd. Increase PO prednisone to 20mg and will plan for slow taper backto 5mg daily dose as outpatient. Orthopedics following.  - RA: Prednisone.  - Abdominal Mass/Enteritis sp gastric bypass: No dilation on KUB.  Tolerating some PO intake now. PPI, Sucralfate. Monitor with increased diet. Bariatrics, GI, and ID following.  - DM2: A1c 6.1. Taking home toujeo long acting insulin. SSI as needed.  - CKD3: Stable. Nephrology following.  - Disposition: Home/possibly tomorrow (10-12)    Jorge Luis Werner MD  [facility]  04/12/18  1:25 PM

## 2018-04-13 LAB
BACTERIA FLD CULT: NORMAL
GRAM STN SPEC: NORMAL
GRAM STN SPEC: NORMAL

## 2018-04-14 LAB
BACTERIA SPEC AEROBE CULT: NORMAL
BACTERIA SPEC AEROBE CULT: NORMAL

## 2018-04-15 LAB — BACTERIA SPEC ANAEROBE CULT: NORMAL

## 2018-04-16 NOTE — PROGRESS NOTES
Case Management Discharge Note    Final Note: Home with no needs    Destination     No service coordination in this encounter.      Durable Medical Equipment     No service coordination in this encounter.      Dialysis/Infusion     No service coordination in this encounter.      Home Medical Care     No service coordination in this encounter.      Social Care     No service coordination in this encounter.        Other: Other    Final Discharge Disposition Code: 01 - home or self-care

## 2018-04-30 DIAGNOSIS — IMO0002 UNCONTROLLED TYPE 2 DIABETES MELLITUS WITH COMPLICATION, WITH LONG-TERM CURRENT USE OF INSULIN: ICD-10-CM

## 2018-05-02 RX ORDER — INSULIN ASPART 100 [IU]/ML
INJECTION, SOLUTION INTRAVENOUS; SUBCUTANEOUS
Qty: 15 ML | Refills: 0 | OUTPATIENT
Start: 2018-05-02

## 2018-05-03 ENCOUNTER — HOSPITAL ENCOUNTER (EMERGENCY)
Facility: HOSPITAL | Age: 73
Discharge: HOME | End: 2018-05-04
Attending: EMERGENCY MEDICINE | Admitting: EMERGENCY MEDICINE

## 2018-05-03 ENCOUNTER — APPOINTMENT (OUTPATIENT)
Dept: CT IMAGING | Facility: HOSPITAL | Age: 73
End: 2018-05-03

## 2018-05-03 DIAGNOSIS — K57.32 DIVERTICULITIS OF LARGE INTESTINE WITHOUT PERFORATION OR ABSCESS WITHOUT BLEEDING: Primary | ICD-10-CM

## 2018-05-03 LAB
ALBUMIN SERPL-MCNC: 3.2 G/DL (ref 3.5–5.2)
ALBUMIN/GLOB SERPL: 0.9 G/DL
ALP SERPL-CCNC: 63 U/L (ref 39–117)
ALT SERPL W P-5'-P-CCNC: 9 U/L (ref 1–33)
ANION GAP SERPL CALCULATED.3IONS-SCNC: 13.5 MMOL/L
AST SERPL-CCNC: 12 U/L (ref 1–32)
BACTERIA UR QL AUTO: ABNORMAL /HPF
BASOPHILS # BLD AUTO: 0.01 10*3/MM3 (ref 0–0.2)
BASOPHILS NFR BLD AUTO: 0.1 % (ref 0–1.5)
BILIRUB SERPL-MCNC: <0.2 MG/DL (ref 0.1–1.2)
BILIRUB UR QL STRIP: NEGATIVE
BUN BLD-MCNC: 34 MG/DL (ref 8–23)
BUN/CREAT SERPL: 16.5 (ref 7–25)
CALCIUM SPEC-SCNC: 8.8 MG/DL (ref 8.6–10.5)
CHLORIDE SERPL-SCNC: 104 MMOL/L (ref 98–107)
CLARITY UR: CLEAR
CO2 SERPL-SCNC: 23.5 MMOL/L (ref 22–29)
COLOR UR: YELLOW
CREAT BLD-MCNC: 2.06 MG/DL (ref 0.57–1)
DEPRECATED RDW RBC AUTO: 52.5 FL (ref 37–54)
EOSINOPHIL # BLD AUTO: 0.17 10*3/MM3 (ref 0–0.7)
EOSINOPHIL NFR BLD AUTO: 2 % (ref 0.3–6.2)
ERYTHROCYTE [DISTWIDTH] IN BLOOD BY AUTOMATED COUNT: 15.1 % (ref 11.7–13)
GFR SERPL CREATININE-BSD FRML MDRD: 24 ML/MIN/1.73
GLOBULIN UR ELPH-MCNC: 3.4 GM/DL
GLUCOSE BLD-MCNC: 185 MG/DL (ref 65–99)
GLUCOSE UR STRIP-MCNC: NEGATIVE MG/DL
HCT VFR BLD AUTO: 34.1 % (ref 35.6–45.5)
HGB BLD-MCNC: 10 G/DL (ref 11.9–15.5)
HGB UR QL STRIP.AUTO: NEGATIVE
HOLD SPECIMEN: NORMAL
HOLD SPECIMEN: NORMAL
HYALINE CASTS UR QL AUTO: ABNORMAL /LPF
IMM GRANULOCYTES # BLD: 0.03 10*3/MM3 (ref 0–0.03)
IMM GRANULOCYTES NFR BLD: 0.3 % (ref 0–0.5)
KETONES UR QL STRIP: NEGATIVE
LEUKOCYTE ESTERASE UR QL STRIP.AUTO: ABNORMAL
LIPASE SERPL-CCNC: 122 U/L (ref 13–60)
LYMPHOCYTES # BLD AUTO: 0.91 10*3/MM3 (ref 0.9–4.8)
LYMPHOCYTES NFR BLD AUTO: 10.5 % (ref 19.6–45.3)
MCH RBC QN AUTO: 28 PG (ref 26.9–32)
MCHC RBC AUTO-ENTMCNC: 29.3 G/DL (ref 32.4–36.3)
MCV RBC AUTO: 95.5 FL (ref 80.5–98.2)
MONOCYTES # BLD AUTO: 0.36 10*3/MM3 (ref 0.2–1.2)
MONOCYTES NFR BLD AUTO: 4.1 % (ref 5–12)
NEUTROPHILS # BLD AUTO: 7.24 10*3/MM3 (ref 1.9–8.1)
NEUTROPHILS NFR BLD AUTO: 83.3 % (ref 42.7–76)
NITRITE UR QL STRIP: NEGATIVE
PH UR STRIP.AUTO: 6.5 [PH] (ref 5–8)
PLATELET # BLD AUTO: 252 10*3/MM3 (ref 140–500)
PMV BLD AUTO: 9.7 FL (ref 6–12)
POTASSIUM BLD-SCNC: 5.6 MMOL/L (ref 3.5–5.2)
PROT SERPL-MCNC: 6.6 G/DL (ref 6–8.5)
PROT UR QL STRIP: ABNORMAL
RBC # BLD AUTO: 3.57 10*6/MM3 (ref 3.9–5.2)
RBC # UR: ABNORMAL /HPF
REF LAB TEST METHOD: ABNORMAL
SODIUM BLD-SCNC: 141 MMOL/L (ref 136–145)
SP GR UR STRIP: 1.01 (ref 1–1.03)
SQUAMOUS #/AREA URNS HPF: ABNORMAL /HPF
UROBILINOGEN UR QL STRIP: ABNORMAL
WBC NRBC COR # BLD: 8.69 10*3/MM3 (ref 4.5–10.7)
WBC UR QL AUTO: ABNORMAL /HPF
WHOLE BLOOD HOLD SPECIMEN: NORMAL
WHOLE BLOOD HOLD SPECIMEN: NORMAL

## 2018-05-03 PROCEDURE — 83690 ASSAY OF LIPASE: CPT

## 2018-05-03 PROCEDURE — 80053 COMPREHEN METABOLIC PANEL: CPT

## 2018-05-03 PROCEDURE — 81001 URINALYSIS AUTO W/SCOPE: CPT | Performed by: EMERGENCY MEDICINE

## 2018-05-03 PROCEDURE — 85025 COMPLETE CBC W/AUTO DIFF WBC: CPT

## 2018-05-03 PROCEDURE — 36415 COLL VENOUS BLD VENIPUNCTURE: CPT

## 2018-05-03 PROCEDURE — 74176 CT ABD & PELVIS W/O CONTRAST: CPT

## 2018-05-03 PROCEDURE — 87086 URINE CULTURE/COLONY COUNT: CPT | Performed by: EMERGENCY MEDICINE

## 2018-05-03 PROCEDURE — 99283 EMERGENCY DEPT VISIT LOW MDM: CPT

## 2018-05-03 RX ORDER — SODIUM CHLORIDE 0.9 % (FLUSH) 0.9 %
10 SYRINGE (ML) INJECTION AS NEEDED
Status: DISCONTINUED | OUTPATIENT
Start: 2018-05-03 | End: 2018-05-04 | Stop reason: HOSPADM

## 2018-05-04 VITALS
RESPIRATION RATE: 18 BRPM | HEIGHT: 63 IN | BODY MASS INDEX: 34.91 KG/M2 | DIASTOLIC BLOOD PRESSURE: 90 MMHG | TEMPERATURE: 97.8 F | HEART RATE: 79 BPM | WEIGHT: 197 LBS | SYSTOLIC BLOOD PRESSURE: 180 MMHG | OXYGEN SATURATION: 92 %

## 2018-05-04 RX ORDER — METRONIDAZOLE 500 MG/1
500 TABLET ORAL 4 TIMES DAILY
Qty: 40 TABLET | Refills: 0 | Status: SHIPPED | OUTPATIENT
Start: 2018-05-04 | End: 2018-10-02

## 2018-05-04 NOTE — ED PROVIDER NOTES
EMERGENCY DEPARTMENT ENCOUNTER    CHIEF COMPLAINT  Chief Complaint: abdominal pain  History given by: pt  History limited by: nothing  Room Number: 14/14  PMD: Nicki Hidalgo MD  GI: Dr. Luna    HPI:  Pt is a 72 y.o. female with hx of pancreatitis and diverticulitis presents complaining of lower abdominal pain that began around one week ago. Pt states that the pain worsens with movement and is alleviated with rest. Pt also states that the pain is similar to the pain that she has experienced in the past with diverticulitis. Pt also complains of fatigue, generalized weakness, and nausea. Pt states that she has had regular BM's, but the stool has been loose. Pt also states that she vomited six days ago. Pt denies a fever or any other sx at this time.    Duration: Began one week ago  Onset: gradual  Timing: constant  Location: lower  Quality: pain  Intensity/Severity: moderate  Associated Symptoms: fatigue, generalized weakness, nausea, and vomiting  Aggravating Factors: movement  Alleviating Factors: rest  Previous Episodes: Pt states that the pain is similar to the pain that she has experienced in the past with diverticulitis.     PAST MEDICAL HISTORY  Active Ambulatory Problems     Diagnosis Date Noted   • Diverticulitis of large intestine without perforation or abscess without bleeding 11/10/2016   • ARF (acute renal failure) 11/11/2016   • CKD (chronic kidney disease) stage 3, GFR 30-59 ml/min 11/11/2016   • Proteinuria 11/11/2016   • Diverticulitis 11/11/2016   • Hypervitaminosis D 04/07/2017   • Postsurgical hypothyroidism 04/07/2017   • Chronic fatigue 04/07/2017   • Diabetes mellitus type 2, uncontrolled, with complications 04/07/2017   • Heartburn 01/19/2018   • Dysphagia 01/19/2018   • Chest pain 04/08/2018   • Abdominal mass 04/08/2018   • Pseudogout of hip, left 04/09/2018   • Iron deficiency anemia 04/10/2018     Resolved Ambulatory Problems     Diagnosis Date Noted   • No Resolved Ambulatory  Problems     Past Medical History:   Diagnosis Date   • Anxiety    • Clostridium difficile infection 2016   • Diabetes mellitus    • Disease of thyroid gland    • Hypertension    • Migraine    • Pancreatitis    • Tachycardia    • TIA (transient ischemic attack)        PAST SURGICAL HISTORY  Past Surgical History:   Procedure Laterality Date   • APPENDECTOMY     • CARDIAC CATHETERIZATION     •  SECTION     • CHOLECYSTECTOMY     • COLONOSCOPY      normal per pt, Dr. Reagan   • COLONOSCOPY N/A 3/1/2017    multiple polyps, tics, IH, scar in transverse colon   • ENDOSCOPY N/A 3/1/2017    gastroplasty, nodular mucosa in gastric antrum, nodule in duodenum   • ENDOSCOPY N/A 2018    Procedure: ESOPHAGOGASTRODUODENOSCOPY;  Surgeon: Paloma Santizo MD;  Location: [facility] ENDOSCOPY;  Service:    • GASTRIC BYPASS     • HERNIA REPAIR     • LUNG SURGERY      mass LL   • THYROID SURGERY         FAMILY HISTORY  Family History   Problem Relation Age of Onset   • Pancreatitis Brother    • Ulcerative colitis Son    • Crohn's disease Grandchild        SOCIAL HISTORY  Social History     Social History   • Marital status:      Spouse name: N/A   • Number of children: N/A   • Years of education: N/A     Occupational History   • Not on file.     Social History Main Topics   • Smoking status: Never Smoker   • Smokeless tobacco: Never Used   • Alcohol use No   • Drug use: No   • Sexual activity: Defer     Other Topics Concern   • Not on file     Social History Narrative   • No narrative on file       ALLERGIES  Augmentin [amoxicillin-pot clavulanate]; Dapsone; Levaquin [levofloxacin]; Sulfa antibiotics; Tetracycline; and Tetracyclines & related    REVIEW OF SYSTEMS  Review of Systems   Constitutional: Positive for fatigue. Negative for fever.   HENT: Negative for sore throat.    Eyes: Negative.    Respiratory: Negative for cough and shortness of breath.    Cardiovascular: Negative for chest pain.    Gastrointestinal: Positive for abdominal pain (lower), nausea and vomiting (six days ago). Negative for diarrhea.   Genitourinary: Negative for dysuria.   Musculoskeletal: Negative for neck pain.   Skin: Negative for rash.   Allergic/Immunologic: Negative.    Neurological: Positive for weakness (generalized). Negative for numbness and headaches.   Hematological: Negative.    Psychiatric/Behavioral: Negative.    All other systems reviewed and are negative.      PHYSICAL EXAM  ED Triage Vitals   Temp Heart Rate Resp BP SpO2   05/03/18 2047 05/03/18 2047 05/03/18 2047 05/03/18 2101 05/03/18 2047   97.8 °F (36.6 °C) 82 16 165/83 95 %      Temp src Heart Rate Source Patient Position BP Location FiO2 (%)   05/03/18 2047 05/03/18 2047 05/03/18 2212 05/03/18 2212 --   Tympanic Monitor Sitting Right arm        Physical Exam   Constitutional: She is oriented to person, place, and time and well-developed, well-nourished, and in no distress. No distress.   HENT:   Head: Normocephalic and atraumatic.   Eyes: EOM are normal. Pupils are equal, round, and reactive to light.   Neck: Normal range of motion. Neck supple.   Cardiovascular: Normal rate, regular rhythm and normal heart sounds.    Pulmonary/Chest: Effort normal and breath sounds normal. No respiratory distress.   Abdominal: Soft. There is tenderness (moderate) in the left lower quadrant. There is no rebound and no guarding.   Musculoskeletal: Normal range of motion. She exhibits no edema.   Neurological: She is alert and oriented to person, place, and time. She has normal sensation and normal strength.   Skin: Skin is warm and dry. No rash noted.   Psychiatric: Mood and affect normal.   Nursing note and vitals reviewed.      LAB RESULTS  Lab Results (last 24 hours)     Procedure Component Value Units Date/Time    CBC & Differential [[id number]] Collected:  05/03/18 2110    Specimen:  Blood Updated:  05/03/18 2213    Narrative:       The following orders were created for  panel order CBC & Differential.  Procedure                               Abnormality         Status                     ---------                               -----------         ------                     CBC Auto Differential[[id number]]        Abnormal            Final result                 Please view results for these tests on the individual orders.    Comprehensive Metabolic Panel [[id number]]  (Abnormal) Collected:  05/03/18 2110    Specimen:  Blood Updated:  05/03/18 2207     Glucose 185 (H) mg/dL      BUN 34 (H) mg/dL      Creatinine 2.06 (H) mg/dL      Sodium 141 mmol/L      Potassium 5.6 (H) mmol/L      Chloride 104 mmol/L      CO2 23.5 mmol/L      Calcium 8.8 mg/dL      Total Protein 6.6 g/dL      Albumin 3.20 (L) g/dL      ALT (SGPT) 9 U/L      AST (SGOT) 12 U/L      Alkaline Phosphatase 63 U/L      Total Bilirubin <0.2 mg/dL      eGFR Non African Amer 24 (L) mL/min/1.73      Globulin 3.4 gm/dL      A/G Ratio 0.9 g/dL      BUN/Creatinine Ratio 16.5     Anion Gap 13.5 mmol/L     Narrative:       The MDRD GFR formula is only valid for adults with stable renal function between ages 18 and 70.    Lipase [[id number]]  (Abnormal) Collected:  05/03/18 2110    Specimen:  Blood Updated:  05/03/18 2158     Lipase 122 (H) U/L     CBC Auto Differential [[id number]]  (Abnormal) Collected:  05/03/18 2110    Specimen:  Blood Updated:  05/03/18 2213     WBC 8.69 10*3/mm3      RBC 3.57 (L) 10*6/mm3      Hemoglobin 10.0 (L) g/dL      Hematocrit 34.1 (L) %      MCV 95.5 fL      MCH 28.0 pg      MCHC 29.3 (L) g/dL      RDW 15.1 (H) %      RDW-SD 52.5 fl      MPV 9.7 fL      Platelets 252 10*3/mm3      Neutrophil % 83.3 (H) %      Lymphocyte % 10.5 (L) %      Monocyte % 4.1 (L) %      Eosinophil % 2.0 %      Basophil % 0.1 %      Immature Grans % 0.3 %      Neutrophils, Absolute 7.24 10*3/mm3      Lymphocytes, Absolute 0.91 10*3/mm3      Monocytes, Absolute 0.36 10*3/mm3      Eosinophils, Absolute 0.17 10*3/mm3       Basophils, Absolute 0.01 10*3/mm3      Immature Grans, Absolute 0.03 10*3/mm3     Urinalysis With / Culture If Indicated - Urine, Clean Catch [[id number]]  (Abnormal) Collected:  05/03/18 2212    Specimen:  Urine from Urine, Clean Catch Updated:  05/03/18 2231     Color, UA Yellow     Appearance, UA Clear     pH, UA 6.5     Specific Gravity, UA 1.012     Glucose, UA Negative     Ketones, UA Negative     Bilirubin, UA Negative     Blood, UA Negative     Protein,  mg/dL (2+) (A)     Leuk Esterase, UA Trace (A)     Nitrite, UA Negative     Urobilinogen, UA 0.2 E.U./dL    Urinalysis, Microscopic Only - Urine, Clean Catch [[id number]]  (Abnormal) Collected:  05/03/18 2212    Specimen:  Urine from Urine, Clean Catch Updated:  05/03/18 2307     RBC, UA 0-2 /HPF      WBC, UA 3-5 (A) /HPF      Bacteria, UA Trace (A) /HPF      Squamous Epithelial Cells, UA 3-6 (A) /HPF      Hyaline Casts, UA 0-2 /LPF      Methodology Manual Light Microscopy    Urine Culture - Urine, Urine, Clean Catch [[id number]] Collected:  05/03/18 2212    Specimen:  Urine from Urine, Clean Catch Updated:  05/03/18 2224          I ordered the above labs and reviewed the results    RADIOLOGY  CT Abdomen Pelvis Without Contrast   Final Result   1.  Renal findings as discussed, follow-up will be needed.   2. Short segment mild proximal sigmoid colitis, likely acute   diverticulitis. Suggest endoscopic follow-up.                   This study was performed with techniques to keep radiation doses as low   as reasonably achievable (ALARA). Individualized dose reduction   techniques using automated exposure control or adjustment of mA and/or   kV according to the patient size were employed.        This report was finalized on 5/3/2018 11:34 PM by Mk Buck M.D.               I ordered the above noted radiological studies. Interpreted by radiologist. Reviewed by me in PACS.       PROCEDURES  Procedures      PROGRESS AND CONSULTS  ED Course     2103 Ordered  UA and blood work for further evaluation. Also ordered IVF for hydration.     2236 Ordered CT Abdomen Pelvis for further evaluation.    0026 Rechecked pt who still complains of some pain. Discussed imaging results that show diverticulitis. Also discussed the plan for discharge. Advised the pt to f/u with her PCP. Pt understands and agrees with the plan, all questions answered.    0034 Pt will be started on Flagyl Only d/t pt being allergic to several antibiotics.    MEDICAL DECISION MAKING  Results were reviewed/discussed with the patient and they were also made aware of online access. Pt also made aware that some labs, such as cultures, will not be resulted during ER visit and follow up with PMD is necessary.     MDM  Number of Diagnoses or Management Options     Amount and/or Complexity of Data Reviewed  Clinical lab tests: reviewed and ordered (Creatinine - 2.06 and lipase - 122)  Tests in the radiology section of CPT®: reviewed and ordered (CT Abdomen Pelvis shows diverticulitis.)  Decide to obtain previous medical records or to obtain history from someone other than the patient: yes  Review and summarize past medical records: yes (Pt had no pertinent recent ED visits. Pt has a hx of pancreatitis and diverticulitis.)  Independent visualization of images, tracings, or specimens: yes    Patient Progress  Patient progress: stable         DIAGNOSIS  Final diagnoses:   Diverticulitis of large intestine without perforation or abscess without bleeding       DISPOSITION  DISCHARGE    Patient discharged in stable condition.    Reviewed implications of results, diagnosis, meds, responsibility to follow up, warning signs and symptoms of possible worsening, potential complications and reasons to return to ER, including any new or worsening symptoms.    Patient/Family voiced understanding of above instructions.    Discussed plan for discharge, as there is no emergent indication for admission. Patient referred to primary care  provider for BP management due to today's BP. Pt/family is agreeable and understands need for follow up and repeat testing.  Pt is aware that discharge does not mean that nothing is wrong but it indicates no emergency is present that requires admission and they must continue care with follow-up as given below or physician of their choice.     FOLLOW-UP  Nicki Hidalgo MD  [address]  [address]  [place] [address]  [phone]    Schedule an appointment as soon as possible for a visit       [facility] Emergency Department  [address]  [place] [address] [phone]    If symptoms worsen         Medication List      New Prescriptions    metroNIDAZOLE 500 MG tablet  Commonly known as:  FLAGYL  Take 1 tablet by mouth 4 (Four) Times a Day.        Changed    insulin aspart 100 UNIT/ML solution pen-injector sc pen  Commonly known as:  NOVOLOG FLEXPEN  6 units before each meal with sliding scale with a max of 100 units daily  What changed:  additional instructions              Latest Documented Vital Signs:  As of 1:32 AM  BP- 180/90 HR- 79 Temp- 97.8 °F (36.6 °C) (Tympanic) O2 sat- 92%    --  Documentation assistance provided by leah Marte for Dr. Pal.  Information recorded by the leah was done at my direction and has been verified and validated by me.     Denisse Marte  05/04/18 0036       Robin Pal MD  05/04/18 0133

## 2018-05-04 NOTE — ED NOTES
Patient presents to ED with c/o LLQ abdominal pain and tenderness that started 1 week ago with generalized fatigue and weakness x 2 days. Patient states hx of diverticulitis as well as UTI. Pt denies chest pain, weakness in extremities, blurred vision, loss in control of bowel and/or bladder and numbness/tingling of extremities. Pt is alert and oriented X4, PERRLA, respirations are even and unlabored, chest rise and fall is equal in expansion.  Pt does not appear to be in distress at this time     Deepika Cabral RN  05/03/18 9946

## 2018-05-05 LAB
BACTERIA SPEC AEROBE CULT: NORMAL
BACTERIA SPEC AEROBE CULT: NORMAL

## 2018-05-07 ENCOUNTER — HOSPITAL ENCOUNTER (OUTPATIENT)
Dept: ORTHOPEDIC SURGERY | Facility: CLINIC | Age: 73
Discharge: HOME | End: 2018-05-07
Attending: PODIATRIST | Admitting: PODIATRIST

## 2018-05-14 ENCOUNTER — HOSPITAL ENCOUNTER (OUTPATIENT)
Dept: PREADMISSION TESTING | Facility: HOSPITAL | Age: 73
Discharge: HOME | End: 2018-05-14
Attending: PODIATRIST | Admitting: PODIATRIST

## 2018-05-14 LAB
ANION GAP SERPL CALC-SCNC: 13.9 MMOL/L (ref 10–20)
BACTERIA SPEC AEROBE CULT: NORMAL
BASOPHILS # BLD AUTO: 0 10*3/UL (ref 0–0.2)
BASOPHILS NFR BLD AUTO: 1 % (ref 0–2)
BUN SERPL-MCNC: 31 MG/DL (ref 8–20)
BUN/CREAT SERPL: 13.5 (ref 5.4–26.2)
CALCIUM SERPL-MCNC: 8.9 MG/DL (ref 8.9–10.3)
CHLORIDE SERPL-SCNC: 104 MMOL/L (ref 101–111)
CONV CO2: 25 MMOL/L (ref 22–32)
CREAT UR-MCNC: 2.3 MG/DL (ref 0.4–1)
DIFFERENTIAL METHOD BLD: (no result)
EOSINOPHIL # BLD AUTO: 0.1 10*3/UL (ref 0–0.3)
EOSINOPHIL # BLD AUTO: 1 % (ref 0–3)
ERYTHROCYTE [DISTWIDTH] IN BLOOD BY AUTOMATED COUNT: 15.8 % (ref 11.5–14.5)
GLUCOSE SERPL-MCNC: 196 MG/DL (ref 65–99)
HCT VFR BLD AUTO: 31 % (ref 35–49)
HGB BLD-MCNC: 9.9 G/DL (ref 12–15)
LYMPHOCYTES # BLD AUTO: 0.8 10*3/UL (ref 0.8–4.8)
LYMPHOCYTES NFR BLD AUTO: 10 % (ref 18–42)
Lab: NORMAL
MCH RBC QN AUTO: 28.2 PG (ref 26–32)
MCHC RBC AUTO-ENTMCNC: 31.9 G/DL (ref 32–36)
MCV RBC AUTO: 88.3 FL (ref 80–94)
MICRO REPORT STATUS: NORMAL
MONOCYTES # BLD AUTO: 0.4 10*3/UL (ref 0.1–1.3)
MONOCYTES NFR BLD AUTO: 6 % (ref 2–11)
NEUTROPHILS # BLD AUTO: 6.1 10*3/UL (ref 2.3–8.6)
NEUTROPHILS NFR BLD AUTO: 82 % (ref 50–75)
NRBC BLD AUTO-RTO: 0 /100{WBCS}
NRBC/RBC NFR BLD MANUAL: 0 10*3/UL
PLATELET # BLD AUTO: 328 10*3/UL (ref 150–450)
PMV BLD AUTO: 7.1 FL (ref 7.4–10.4)
POTASSIUM SERPL-SCNC: 4.9 MMOL/L (ref 3.6–5.1)
RBC # BLD AUTO: 3.51 10*6/UL (ref 4–5.4)
SODIUM SERPL-SCNC: 138 MMOL/L (ref 136–144)
SPECIMEN SOURCE: NORMAL
WBC # BLD AUTO: 7.4 10*3/UL (ref 4.5–11.5)

## 2018-05-18 ENCOUNTER — HOSPITAL ENCOUNTER (OUTPATIENT)
Dept: PREOP | Facility: HOSPITAL | Age: 73
Setting detail: HOSPITAL OUTPATIENT SURGERY
Discharge: HOME | End: 2018-05-18
Attending: PODIATRIST | Admitting: PODIATRIST

## 2018-05-18 LAB — GLUCOSE BLD-MCNC: 71 MG/DL (ref 70–105)

## 2018-06-06 ENCOUNTER — OFFICE VISIT (OUTPATIENT)
Dept: GASTROENTEROLOGY | Facility: CLINIC | Age: 73
End: 2018-06-06

## 2018-06-06 VITALS
WEIGHT: 193 LBS | HEIGHT: 63 IN | SYSTOLIC BLOOD PRESSURE: 150 MMHG | DIASTOLIC BLOOD PRESSURE: 82 MMHG | TEMPERATURE: 98.2 F | BODY MASS INDEX: 34.2 KG/M2

## 2018-06-06 DIAGNOSIS — K21.9 GASTROESOPHAGEAL REFLUX DISEASE WITHOUT ESOPHAGITIS: ICD-10-CM

## 2018-06-06 DIAGNOSIS — R93.3 ABNORMAL UGI SERIES: ICD-10-CM

## 2018-06-06 DIAGNOSIS — R13.12 OROPHARYNGEAL DYSPHAGIA: ICD-10-CM

## 2018-06-06 DIAGNOSIS — D12.6 ADENOMATOUS POLYP OF COLON, UNSPECIFIED PART OF COLON: ICD-10-CM

## 2018-06-06 DIAGNOSIS — Z87.19 HISTORY OF DIVERTICULITIS: ICD-10-CM

## 2018-06-06 DIAGNOSIS — R19.7 DIARRHEA, UNSPECIFIED TYPE: ICD-10-CM

## 2018-06-06 DIAGNOSIS — R93.3 ABNORMAL CT SCAN, SMALL BOWEL: Primary | ICD-10-CM

## 2018-06-06 PROCEDURE — 99214 OFFICE O/P EST MOD 30 MIN: CPT | Performed by: INTERNAL MEDICINE

## 2018-06-06 RX ORDER — THIAMINE MONONITRATE (VIT B1) 100 MG
100 TABLET ORAL
COMMUNITY
Start: 2018-05-17 | End: 2019-05-17

## 2018-06-06 RX ORDER — RANITIDINE 150 MG/1
150 CAPSULE ORAL 2 TIMES DAILY
Qty: 60 CAPSULE | Refills: 11 | Status: SHIPPED | OUTPATIENT
Start: 2018-06-06 | End: 2018-06-06 | Stop reason: SDUPTHER

## 2018-06-06 RX ORDER — RANITIDINE 150 MG/1
CAPSULE ORAL
Qty: 180 CAPSULE | Refills: 3 | Status: SHIPPED | OUTPATIENT
Start: 2018-06-06 | End: 2019-07-16 | Stop reason: SDUPTHER

## 2018-06-06 NOTE — PROGRESS NOTES
Chief Complaint   Patient presents with   • Heartburn   • Difficulty Swallowing       Ya Paz is a  72 y.o. female here for a follow up visit for dysphagia, abnormal CT small bowel, diarrhea.  She was recently evaluated as an inpatient for an abnormal CT which showed an enlarged knuckle of small bowel with some inflammatory change.  She has had a repeat CT for other reasons (recent diverticulitis)- no mention of abnormality.      She reports frequent loose bowel movements.  She use to struggle with constipation and in the past she has taken Amitiza.  She no longer takes this medication.  She has fecal urgency especially after eating.  She currently is taking no medication for this.    Her acid reflux and heartburn continued to be a problem.  She is taking ranitidine 300 mg once daily but feels like she needs something twice daily.  She has a cough at times worse after eating.  She had an upper GI study in the hospital which showed moderate gastroesophageal reflux during the course of the study.    She has a history of a prior vertical banded gastroplasty.  Her recent EGD done prior to her hospitalization showed that her outlet from her pouch was stenosed and she was emptying preferentially into the remnant stomach at an anastomotic defect.  She was evaluated by Dr. Carrizales all she was an inpatient and he did mention that this could be dilated.  Fixing the anastomotic defect would be more involved given the fact that she's had multiple other surgeries and she is not interested in this procedure.  She is however interested in having the dilation procedure.    She also complains of dysphagia sometimes after eating which causes her to cough and choke.  She will sometimes cough it up and then have to swallow it again.  She does not appear to have her had a video fluoroscopic swallow evaluation.   HPI  Past Medical History:   Diagnosis Date   • Anxiety    • Clostridium difficile infection 11/2016    and in 2005    • Diabetes mellitus    • Disease of thyroid gland    • GERD (gastroesophageal reflux disease)    • Hypertension    • Migraine    • Pancreatitis    • Tachycardia    • TIA (transient ischemic attack)      Past Surgical History:   Procedure Laterality Date   • APPENDECTOMY     • CARDIAC CATHETERIZATION     •  SECTION     • CHOLECYSTECTOMY     • COLONOSCOPY      normal per pt, Dr. Reagan   • COLONOSCOPY N/A 3/1/2017    multiple polyps, tics, IH, scar in transverse colon   • ENDOSCOPY N/A 3/1/2017    gastroplasty, nodular mucosa in gastric antrum, nodule in duodenum   • ENDOSCOPY N/A 2018    banded gastroplasty found, non intact appearance   • GASTRIC BYPASS     • HERNIA REPAIR     • LUNG SURGERY      mass LL   • THYROID SURGERY         Current Outpatient Prescriptions:   •  ALPRAZolam (XANAX) 0.5 MG tablet, Take 0.5 mg by mouth 3 (Three) Times a Day As Needed., Disp: , Rfl:   •  aspirin 81 MG EC tablet, Take 81 mg by mouth Daily., Disp: , Rfl:   •  calcium acetate (PHOSLO) 667 MG capsule, Take 1,334 mg by mouth 3 (Three) Times a Day With Meals., Disp: , Rfl:   •  Cholecalciferol (VITAMIN D) 1000 UNITS tablet, Take 1,000 Units by mouth 2 (Two) Times a Day., Disp: , Rfl:   •  ferrous sulfate 325 (65 FE) MG tablet, Take 1 tablet by mouth Every Other Day., Disp: 15 tablet, Rfl: 0  •  folic acid (FOLVITE) 1 MG tablet, TAKE ONE TABLET BY MOUTH ONCE DAILY, Disp: , Rfl:   •  hydrALAZINE (APRESOLINE) 50 MG tablet, Take 1 tablet by mouth 3 (Three) Times a Day., Disp: 90 tablet, Rfl: 0  •  HYDROcodone-acetaminophen (NORCO) 5-325 MG per tablet, Take 1 tablet by mouth Every 6 (Six) Hours As Needed for Moderate Pain ., Disp: , Rfl:   •  hydrOXYzine (ATARAX) 25 MG tablet, Take 25 mg by mouth At Night As Needed for Itching (1-3 tablets every night at bedtime for itching)., Disp: , Rfl:   •  insulin aspart (NOVOLOG FLEXPEN) 100 UNIT/ML solution pen-injector sc pen, 6 units before each meal with sliding scale with a  "max of 100 units daily (Patient taking differently: 7 units before each meal with sliding scale with a max of 100 units daily per patient), Disp: 5 pen, Rfl: 4  •  Insulin Glargine (TOUJEO SOLOSTAR) 300 UNIT/ML solution pen-injector, Inject 15 Units under the skin Daily With Breakfast. Adjust dose according to accucheck , Disp: , Rfl:   •  Insulin Pen Needle 32G X 4 MM misc, Use to inject insulin 4 times daily, Disp: 120 each, Rfl: 5  •  levothyroxine (SYNTHROID, LEVOTHROID) 125 MCG tablet, Take 125 mcg by mouth Daily., Disp: , Rfl:   •  lisinopril (PRINIVIL,ZESTRIL) 10 MG tablet, Take 20 mg by mouth Daily., Disp: , Rfl:   •  metoprolol succinate XL (TOPROL-XL) 50 MG 24 hr tablet, Take 1 tablet by mouth Daily., Disp: 30 tablet, Rfl: 0  •  predniSONE (DELTASONE) 5 MG tablet, Take PO daily: 20mg x1week then 10mg x1week then resume 5mg, Disp: 56 tablet, Rfl: 0  •  sucralfate (CARAFATE) 1 GM/10ML suspension, Take 10 mL by mouth 4 (Four) Times a Day With Meals & at Bedtime., Disp: 840 mL, Rfl: 5  •  thiamine (VITAMIN B-1) 100 MG tablet, Take 100 mg by mouth., Disp: , Rfl:   •  hydrochlorothiazide (HYDRODIURIL) 25 MG tablet, Take 1 tablet by mouth Daily., Disp: 30 tablet, Rfl: 0  •  metroNIDAZOLE (FLAGYL) 500 MG tablet, Take 1 tablet by mouth 4 (Four) Times a Day., Disp: 40 tablet, Rfl: 0  •  ranitidine (ZANTAC) 150 MG capsule, Take 1 capsule by mouth 2 (Two) Times a Day., Disp: 60 capsule, Rfl: 11  PRN Meds:.  Allergies   Allergen Reactions   • Augmentin [Amoxicillin-Pot Clavulanate] Rash     rash  **tolerates cephalosporins**   **no reason to give aztreonam in the future**   • Dapsone Other (See Comments)     Pt states she \"quit producing blood.\"   • Levaquin [Levofloxacin] Hives   • Sulfa Antibiotics      Sister went blind with taking and was advised not to take.  Sister went blind with taking and was advised not to take.   • Tetracycline Hives and Itching   • Tetracyclines & Related      Social History     Social " History   • Marital status:      Spouse name: N/A   • Number of children: N/A   • Years of education: N/A     Occupational History   • Not on file.     Social History Main Topics   • Smoking status: Never Smoker   • Smokeless tobacco: Never Used   • Alcohol use No   • Drug use: No   • Sexual activity: Defer     Other Topics Concern   • Not on file     Social History Narrative   • No narrative on file     Family History   Problem Relation Age of Onset   • Pancreatitis Brother    • Ulcerative colitis Son    • Crohn's disease Grandchild      Review of Systems   Constitutional: Positive for appetite change and unexpected weight change.   Gastrointestinal: Positive for diarrhea. Negative for abdominal pain and blood in stool.   Musculoskeletal: Positive for arthralgias and gait problem.   All other systems reviewed and are negative.    Vitals:    06/06/18 1251   BP: 150/82   Temp: 98.2 °F (36.8 °C)     1    06/06/18  1251   Weight: 87.5 kg (193 lb)     Physical Exam   Constitutional: She appears well-developed and well-nourished.   HENT:   Head: Normocephalic and atraumatic.   Eyes: No scleral icterus.   Pulmonary/Chest: Effort normal.   Abdominal: She exhibits no distension.   Neurological: She is alert.   Skin: Skin is warm and dry.   Psychiatric: She has a normal mood and affect.     No images are attached to the encounter.  Diagnoses and all orders for this visit:    Abnormal CT scan, small bowel  -     FL small bowel follow through; Future    Abnormal UGI series  -     Ambulatory Referral to Bariatric Surgery    Oropharyngeal dysphagia  -     Ambulatory Referral to Speech Therapy    Gastroesophageal reflux disease without esophagitis    Diarrhea, unspecified type    Adenomatous polyp of colon, unspecified part of colon    History of diverticulitis    Other orders  -     thiamine (VITAMIN B-1) 100 MG tablet; Take 100 mg by mouth.  -     calcium acetate (PHOSLO) 667 MG capsule; Take 1,334 mg by mouth 3 (Three)  Times a Day With Meals.  -     Discontinue: ranitidine (ZANTAC) 150 MG capsule; Take 1 capsule by mouth 2 (Two) Times a Day.  -     ranitidine (ZANTAC) 150 MG capsule; Take 1 capsule by mouth 2 (Two) Times a Day.    Plan-  She has multiple issues today that we addressed-  - We'll get a video fluoroscopic swallow evaluation to workup her oropharyngeal complaints  -We will change her from ranitidine 300 mg daily at bedtime to 150 mg twice a day to improve her acid control  -I've asked her to start fiber once daily to bulk her stools and improve her regularity-she can increase this to twice a day if needed and plan  -we'll repeat imaging of her small intestine to follow-up on abnormality seen on her CAT scan in April.  Given her chronic kidney disease we'll get a small bowel follow-through.  -We will refer her to see Dr. Carrizales as an outpatient to consider dilation of her gastric pouch

## 2018-06-14 ENCOUNTER — HOSPITAL ENCOUNTER (OUTPATIENT)
Dept: ORTHOPEDIC SURGERY | Facility: CLINIC | Age: 73
Discharge: HOME | End: 2018-06-14
Attending: PODIATRIST | Admitting: PODIATRIST

## 2018-06-19 ENCOUNTER — HOSPITAL ENCOUNTER (OUTPATIENT)
Dept: GENERAL RADIOLOGY | Facility: HOSPITAL | Age: 73
Discharge: HOME | End: 2018-06-19
Attending: INTERNAL MEDICINE | Admitting: INTERNAL MEDICINE

## 2018-06-19 DIAGNOSIS — R93.3 ABNORMAL CT SCAN, SMALL BOWEL: ICD-10-CM

## 2018-06-19 PROCEDURE — 74250 X-RAY XM SM INT 1CNTRST STD: CPT

## 2018-06-19 PROCEDURE — A9270 NON-COVERED ITEM OR SERVICE: HCPCS | Performed by: INTERNAL MEDICINE

## 2018-06-19 PROCEDURE — 63710000001 BARIUM SULFATE 96 % RECONSTITUTED SUSPENSION: Performed by: INTERNAL MEDICINE

## 2018-06-19 RX ADMIN — BARIUM SULFATE 183 ML: 960 POWDER, FOR SUSPENSION ORAL at 09:00

## 2018-06-19 RX ADMIN — BARIUM SULFATE 120 ML: 960 POWDER, FOR SUSPENSION ORAL at 10:00

## 2018-06-19 RX ADMIN — BARIUM SULFATE 183 ML: 960 POWDER, FOR SUSPENSION ORAL at 09:15

## 2018-06-21 ENCOUNTER — TELEPHONE (OUTPATIENT)
Dept: GASTROENTEROLOGY | Facility: CLINIC | Age: 73
End: 2018-06-21

## 2018-06-21 NOTE — TELEPHONE ENCOUNTER
Called pt and pt reports that she is still having lots of burning in her throat and it is making her very nervous. She is also asking about the results of the sbft.  Advised would send message to Dr Santizo. Pt verb understanding.

## 2018-06-21 NOTE — TELEPHONE ENCOUNTER
----- Message from Melissa Alexander sent at 6/21/2018 11:10 AM EDT -----  Regarding: PT CALLED - TEST RESULTS  Contact: [phone]  PT HAVING TROUBLE SWALLOWING. PLEASE CALL TODAY IF POSSIBLE.

## 2018-06-22 NOTE — TELEPHONE ENCOUNTER
Called pt and advised per Dr Santizo that the sbft shows the defedt in the staple line from her previous surgery as per other recent evaluation.  There is no significant sm bowel abnormality- the abnormal sm bowel seen on previous scan may be related to reconnnection from her gastric bypass but does not appear obstructed.  She thinks that seeing Dr Carrizales to discuss dilation of the anastomosis will help the most with her symptoms.     Pt verb understanding and reports she has not yet seen Dr Carrizales .  She states she received paperwork from them and will call to make appt.

## 2018-08-16 PROBLEM — I10 ESSENTIAL HYPERTENSION: Status: ACTIVE | Noted: 2018-08-16

## 2018-08-16 PROBLEM — E03.9 HYPOTHYROIDISM: Status: ACTIVE | Noted: 2018-08-16

## 2018-10-02 ENCOUNTER — OFFICE VISIT (OUTPATIENT)
Dept: BARIATRICS/WEIGHT MGMT | Facility: CLINIC | Age: 73
End: 2018-10-02

## 2018-10-02 VITALS
DIASTOLIC BLOOD PRESSURE: 69 MMHG | TEMPERATURE: 98.1 F | HEIGHT: 63 IN | BODY MASS INDEX: 34.64 KG/M2 | RESPIRATION RATE: 16 BRPM | WEIGHT: 195.5 LBS | SYSTOLIC BLOOD PRESSURE: 142 MMHG | HEART RATE: 65 BPM

## 2018-10-02 DIAGNOSIS — R13.19 ESOPHAGEAL DYSPHAGIA: ICD-10-CM

## 2018-10-02 DIAGNOSIS — R12 HEARTBURN: ICD-10-CM

## 2018-10-02 DIAGNOSIS — K21.9 GASTROESOPHAGEAL REFLUX DISEASE, ESOPHAGITIS PRESENCE NOT SPECIFIED: Primary | ICD-10-CM

## 2018-10-02 DIAGNOSIS — R53.82 CHRONIC FATIGUE: ICD-10-CM

## 2018-10-02 DIAGNOSIS — D50.8 OTHER IRON DEFICIENCY ANEMIA: ICD-10-CM

## 2018-10-02 PROCEDURE — 99214 OFFICE O/P EST MOD 30 MIN: CPT | Performed by: SURGERY

## 2018-10-02 NOTE — PROGRESS NOTES
[facility]  [address]  [facility] [id number] [phone]  [address]  [facility] [id number] [phone]  Dept: [phone]  10/2/2018      Ya Paz.  [id number]  [id number]  1945  female      Chief Complaint   Patient presents with   • Consult     OTONIEL- VGD 1984         Post-Op Bariatric Surgery:   Ya Paz is status post Comment VBG procedure, performed on 1984 at [facility].    HPI:   Today's weight is 88.7 kg (195 lb 8 oz) pounds, today's BMI is Body mass index is 34.64 kg/m²..  her greatest weight loss from surgery was 110 pounds. The patient reports an unwanted weight gain of 20 pounds.  [unfilled] denies fever, chills, chest pain, SOA, melena, hematochezia, hematemesis, dysuria, frequency, hematuria, jaundice or abdominal pain.    Ya Paz is a  72 y.o. female here for a follow up visit for dysphagia and UGI.  She was recently evaluated as an inpatient for an abnormal CT which showed an enlarged knuckle of small bowel with some inflammatory change.  She has had a repeat CT for other reasons and no mention of abnormality.   She reports frequent loose bowel movements.  She use to struggle with constipation and in the past she has taken Amitiza.  She no longer takes this medication. Her acid reflux and heartburn continued to be a problem.  She is taking ranitidine 300 mg once daily but feels like she needs something twice daily.  She has a cough at times worse after eating.  She had an upper GI study in the hospital which showed moderate gastroesophageal reflux with breakdown of the VBG staple line. Her recent EGD done prior to her hospitalization showed that her outlet from her pouch was stenosed and she was emptying preferentially into the remnant stomach at an anastomotic defect.       Diet and Exercise:   Diet history reviewed and discussed with the patient. Weight loss/gains to  date discussed with the patient. She reports eating 2 meals per day, a typical portion size of 1 cup, eating 3 snacks per day, drinking 2 or more 8-oz. glasses of water per day, no carbonated beverage consumption and exercising regularly.     Supplements:  Vitamin D, calcium, B1     Review of Systems   Constitutional: Positive for fatigue.   Gastrointestinal: Positive for nausea and vomiting.   Musculoskeletal: Positive for arthralgias and back pain.   All other systems reviewed and are negative.      Patient Active Problem List   Diagnosis   • Diverticulitis of large intestine without perforation or abscess without bleeding   • ARF (acute renal failure) (CMS/[facility])   • CKD (chronic kidney disease) stage 3, GFR 30-59 ml/min (CMS/HCC)   • Proteinuria   • Diverticulitis   • Hypervitaminosis D   • Postsurgical hypothyroidism   • Chronic fatigue   • Diabetes mellitus type 2, uncontrolled, with complications (CMS/HCC)   • Heartburn   • Dysphagia   • Chest pain   • Abdominal mass   • Pseudogout of hip, left   • Iron deficiency anemia   • Essential hypertension   • GERD (gastroesophageal reflux disease)   • Hypothyroidism       Past Medical History:   Diagnosis Date   • Anxiety    • Clostridium difficile infection 2016    and in    • Diabetes mellitus (CMS/HCC)    • Disease of thyroid gland    • GERD (gastroesophageal reflux disease)    • Hypertension    • Migraine    • Pancreatitis    • Tachycardia    • TIA (transient ischemic attack)        Past Surgical History:   Procedure Laterality Date   • APPENDECTOMY     • CARDIAC CATHETERIZATION     •  SECTION     • CHOLECYSTECTOMY     • COLONOSCOPY      normal per ptDr. Reagan   • COLONOSCOPY N/A 3/1/2017    multiple polyps, tics, IH, scar in transverse colon   • ENDOSCOPY N/A 3/1/2017    gastroplasty, nodular mucosa in gastric antrum, nodule in duodenum   • ENDOSCOPY N/A 2018    banded gastroplasty found, non intact appearance   • GASTRIC BYPASS     •  "HERNIA REPAIR     • LUNG SURGERY      mass LL   • THYROID SURGERY         Allergies   Allergen Reactions   • Augmentin [Amoxicillin-Pot Clavulanate] Rash     rash  **tolerates cephalosporins**   **no reason to give aztreonam in the future**   • Dapsone Other (See Comments)     Pt states she \"quit producing blood.\"   • Levaquin [Levofloxacin] Hives   • Sulfa Antibiotics      Sister went blind with taking and was advised not to take.  Sister went blind with taking and was advised not to take.   • Tetracycline Hives and Itching   • Tetracyclines & Related          Current Outpatient Prescriptions:   •  ALPRAZolam (XANAX) 0.5 MG tablet, Take 0.5 mg by mouth 3 (Three) Times a Day As Needed., Disp: , Rfl:   •  aspirin 81 MG EC tablet, Take 81 mg by mouth Daily., Disp: , Rfl:   •  calcium acetate (PHOSLO) 667 MG capsule, Take 1,334 mg by mouth 3 (Three) Times a Day With Meals., Disp: , Rfl:   •  Cholecalciferol (VITAMIN D) 1000 UNITS tablet, Take 1,000 Units by mouth 2 (Two) Times a Day., Disp: , Rfl:   •  ferrous sulfate 325 (65 FE) MG tablet, Take 1 tablet by mouth Every Other Day., Disp: 15 tablet, Rfl: 0  •  folic acid (FOLVITE) 1 MG tablet, TAKE ONE TABLET BY MOUTH ONCE DAILY, Disp: , Rfl:   •  hydrALAZINE (APRESOLINE) 50 MG tablet, Take 1 tablet by mouth 3 (Three) Times a Day., Disp: 90 tablet, Rfl: 0  •  HYDROcodone-acetaminophen (NORCO) 5-325 MG per tablet, Take 1 tablet by mouth Every 6 (Six) Hours As Needed for Moderate Pain ., Disp: , Rfl:   •  hydrOXYzine (ATARAX) 25 MG tablet, Take 25 mg by mouth At Night As Needed for Itching (1-3 tablets every night at bedtime for itching)., Disp: , Rfl:   •  insulin aspart (NOVOLOG FLEXPEN) 100 UNIT/ML solution pen-injector sc pen, 6 units before each meal with sliding scale with a max of 100 units daily (Patient taking differently: 7 units before each meal with sliding scale with a max of 100 units daily per patient), Disp: 5 pen, Rfl: 4  •  Insulin Glargine (TOUJEO " SOLOSTAR) 300 UNIT/ML solution pen-injector, Inject 15 Units under the skin Daily With Breakfast. Adjust dose according to accucheck , Disp: , Rfl:   •  Insulin Pen Needle 32G X 4 MM misc, Use to inject insulin 4 times daily, Disp: 120 each, Rfl: 5  •  levothyroxine (SYNTHROID, LEVOTHROID) 125 MCG tablet, Take 125 mcg by mouth Daily., Disp: , Rfl:   •  lisinopril (PRINIVIL,ZESTRIL) 10 MG tablet, Take 20 mg by mouth Daily., Disp: , Rfl:   •  metoprolol succinate XL (TOPROL-XL) 50 MG 24 hr tablet, Take 1 tablet by mouth Daily., Disp: 30 tablet, Rfl: 0  •  predniSONE (DELTASONE) 5 MG tablet, Take PO daily: 20mg x1week then 10mg x1week then resume 5mg, Disp: 56 tablet, Rfl: 0  •  ranitidine (ZANTAC) 150 MG capsule, TAKE 1 CAPSULE BY MOUTH TWICE DAILY, Disp: 180 capsule, Rfl: 3  •  thiamine (VITAMIN B-1) 100 MG tablet, Take 100 mg by mouth., Disp: , Rfl:     Social History     Social History   • Marital status:      Spouse name: N/A   • Number of children: N/A   • Years of education: N/A     Occupational History   • Not on file.     Social History Main Topics   • Smoking status: Never Smoker   • Smokeless tobacco: Never Used   • Alcohol use Yes   • Drug use: No   • Sexual activity: Defer     Other Topics Concern   • Not on file     Social History Narrative   • No narrative on file       Family History   Problem Relation Age of Onset   • Pancreatitis Brother    • Cancer Brother    • Ulcerative colitis Son    • Crohn's disease Grandchild    • Cancer Mother    • Cancer Maternal Grandmother        The following portions of the patient's history were reviewed and updated as appropriate: allergies, current medications, past family history, past medical history, past social history, past surgical history and problem list.    Vitals:    10/02/18 1507   BP: 142/69   Pulse: 65   Resp: 16   Temp: 98.1 °F (36.7 °C)       Physical Exam   Constitutional: She is oriented to person, place, and time. She appears well-nourished.    HENT:   Head: Normocephalic and atraumatic.   Mouth/Throat: Oropharynx is clear and moist.   Eyes: Pupils are equal, round, and reactive to light. Conjunctivae and EOM are normal. No scleral icterus.   Neck: Normal range of motion. Neck supple. No thyromegaly present.   Cardiovascular: Normal rate and regular rhythm.    Pulmonary/Chest: Effort normal and breath sounds normal.   Abdominal: Soft. Bowel sounds are normal. She exhibits no distension. There is no tenderness. There is no rebound and no guarding.   Musculoskeletal: Normal range of motion.   Lymphadenopathy:     She has no cervical adenopathy.   Neurological: She is alert and oriented to person, place, and time. No cranial nerve deficit. Coordination normal.   Skin: Skin is warm and dry. No erythema.   Psychiatric: She has a normal mood and affect. Her behavior is normal.   Vitals reviewed.          Assessment:   Ya Paz has severe obesity with multiple co-morbidities who would like to transfer her bariatric care to us and participate in our bariatric program.      Encounter Diagnoses   Name Primary?   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  Patient main complaint is severe burning sensation in her esophagus and not really related to eating.  She is on ranitidine 3 mg daily without much help.  She has had upper endoscopy earlier in the year but was unable to get through the stricture at that time.  She's also had 2 CAT scan of the abdomen which did show a area of small bowel but on last one no issues seen.  Her main complaint is the heartburn symptoms.  We had a long discussion over her bariatric procedure and what the upper GI showing.  We will  go ahead and proceed with upper endoscopy with dilatation of the VBG stricture.The risks and benefits of the procedure were discussed with the patient in detail and all questions were answered.  Possibility of perforation, bleeding, aspiration, anoxic brain injury, respiratory and/or cardiac arrest and death were discussed.  Consent will be signed and witnessed.  I instructed her to make sure that she does not eat at least 3-4 hours before going to bed and keep the head of the bed elevated.    Recommended patient be sure to eat at least three meals per day all with high lean protein, vegetables and fruit. Be sure to limit/cut back on daily simple carbohydrate intake. Discussed with the patient the recommended amount of water per day to intake. Reviewed vitamin requirements. Be sure to do routine exercise including both cardio and strength training. Recommended patient to see our dietician to go into more detail regarding diet changes. Also discussed BMR testing.    Instructions / Recommendations: dietary counseling recommended, recommended a daily protein intake of  grams, vitamin supplements recommended, recommended exercising at least 150 minutes per week, behavior modifications recommended and instructed to call the office for concerns, questions, or problems.    The patient was instructed to follow up in after endoscopy .     The patient was counseled regarding diet, exercise and the surgical procedures available. Our bariatric manual was given to the patient and was discussed in detail. Dietician appointment was highly recommended as well as attending support groups.  Total time of encounter was over 45 minutes and 40 minutes was spent counseling.

## 2018-10-08 ENCOUNTER — HOSPITAL ENCOUNTER (OUTPATIENT)
Dept: LAB | Facility: HOSPITAL | Age: 73
Discharge: HOME | End: 2018-10-08
Attending: INTERNAL MEDICINE | Admitting: INTERNAL MEDICINE

## 2018-10-08 LAB
ANION GAP SERPL CALC-SCNC: 13.9 MMOL/L (ref 10–20)
BASOPHILS # BLD AUTO: 0 10*3/UL (ref 0–0.2)
BASOPHILS NFR BLD AUTO: 0 % (ref 0–2)
BILIRUB UR QL STRIP: NEGATIVE MG/DL
BUN SERPL-MCNC: 51 MG/DL (ref 8–20)
BUN/CREAT SERPL: 21.3 (ref 5.4–26.2)
CALCIUM SERPL-MCNC: 9 MG/DL (ref 8.9–10.3)
CASTS URNS QL MICRO: ABNORMAL /[LPF]
CHLORIDE SERPL-SCNC: 105 MMOL/L (ref 101–111)
COLOR UR: YELLOW
CONV BACTERIA IN URINE MICRO: NEGATIVE
CONV CLARITY OF URINE: CLEAR
CONV CO2: 25 MMOL/L (ref 22–32)
CONV HYALINE CASTS IN URINE MICRO: 0 /[LPF] (ref 0–5)
CONV PROTEIN IN URINE BY AUTOMATED TEST STRIP: 100 MG/DL
CONV SMALL ROUND CELLS: ABNORMAL /[HPF]
CONV UROBILINOGEN IN URINE BY AUTOMATED TEST STRIP: 0.2 MG/DL
CREAT 24H UR-MCNC: 70.2 MG/DL
CREAT UR-MCNC: 2.4 MG/DL (ref 0.4–1)
CULTURE INDICATED?: ABNORMAL
DIFFERENTIAL METHOD BLD: (no result)
EOSINOPHIL # BLD AUTO: 0.1 10*3/UL (ref 0–0.3)
EOSINOPHIL # BLD AUTO: 1 % (ref 0–3)
ERYTHROCYTE [DISTWIDTH] IN BLOOD BY AUTOMATED COUNT: 15.3 % (ref 11.5–14.5)
GLUCOSE SERPL-MCNC: 169 MG/DL (ref 65–99)
GLUCOSE UR QL: NEGATIVE MG/DL
HCT VFR BLD AUTO: 33.1 % (ref 35–49)
HGB BLD-MCNC: 10.7 G/DL (ref 12–15)
HGB UR QL STRIP: NEGATIVE
KETONES UR QL STRIP: NEGATIVE MG/DL
LEUKOCYTE ESTERASE UR QL STRIP: NEGATIVE
LYMPHOCYTES # BLD AUTO: 0.9 10*3/UL (ref 0.8–4.8)
LYMPHOCYTES NFR BLD AUTO: 9 % (ref 18–42)
MCH RBC QN AUTO: 30.1 PG (ref 26–32)
MCHC RBC AUTO-ENTMCNC: 32.4 G/DL (ref 32–36)
MCV RBC AUTO: 92.9 FL (ref 80–94)
MONOCYTES # BLD AUTO: 0.4 10*3/UL (ref 0.1–1.3)
MONOCYTES NFR BLD AUTO: 4 % (ref 2–11)
NEUTROPHILS # BLD AUTO: 8 10*3/UL (ref 2.3–8.6)
NEUTROPHILS NFR BLD AUTO: 86 % (ref 50–75)
NITRITE UR QL STRIP: NEGATIVE
NRBC BLD AUTO-RTO: 0 /100{WBCS}
NRBC/RBC NFR BLD MANUAL: 0 10*3/UL
PH UR STRIP.AUTO: 5.5 [PH] (ref 4.5–8)
PLATELET # BLD AUTO: 295 10*3/UL (ref 150–450)
PMV BLD AUTO: 7.4 FL (ref 7.4–10.4)
POTASSIUM SERPL-SCNC: 5.9 MMOL/L (ref 3.6–5.1)
PROT UR-MCNC: 53 MG/DL
PROT/CREAT UR: 0.8 MG/MG (ref 0–22)
RBC # BLD AUTO: 3.56 10*6/UL (ref 4–5.4)
RBC #/AREA URNS HPF: 1 /[HPF] (ref 0–3)
SODIUM SERPL-SCNC: 138 MMOL/L (ref 136–144)
SP GR UR: 1.01 (ref 1–1.03)
SPERM URNS QL MICRO: ABNORMAL /[HPF]
SQUAMOUS SPT QL MICRO: 3 /[HPF] (ref 0–5)
UNIDENT CRYS URNS QL MICRO: ABNORMAL /[HPF]
WBC # BLD AUTO: 9.4 10*3/UL (ref 4.5–11.5)
WBC #/AREA URNS HPF: 2 /[HPF] (ref 0–5)
YEAST SPEC QL WET PREP: ABNORMAL /[HPF]

## 2018-10-09 LAB — 25(OH)D3 SERPL-MCNC: 52 NG/ML (ref 30–100)

## 2018-10-16 ENCOUNTER — OUTSIDE FACILITY SERVICE (OUTPATIENT)
Dept: BARIATRICS/WEIGHT MGMT | Facility: CLINIC | Age: 73
End: 2018-10-16

## 2018-10-16 PROCEDURE — 43249 ESOPH EGD DILATION <30 MM: CPT | Performed by: SURGERY

## 2018-11-08 ENCOUNTER — TELEPHONE (OUTPATIENT)
Dept: GASTROENTEROLOGY | Facility: CLINIC | Age: 73
End: 2018-11-08

## 2018-11-08 NOTE — TELEPHONE ENCOUNTER
----- Message from Melissa Alexander sent at 11/8/2018 11:24 AM EST -----  Regarding: DIVERTICULITIS FLARE  Contact: [phone]  PT ASK IF SHE COULD GET SOMETHING CALLED INTO [facility] ON [address].

## 2018-11-08 NOTE — TELEPHONE ENCOUNTER
Call to pt.  States thinks having diverticulitis flare.  Yesterday morning developed diarrhea - has continued until today.  States too many to count - now watery yellow.  Denies blood, mucous, fever, abd pain.  Reports feels bloated.  Does not think ate anything unusual.  Asking for rx.    Update/request to DR Santizo.

## 2018-11-09 NOTE — TELEPHONE ENCOUNTER
Called pt and advised per Dr Santizo that symptoms not typical of diverticulits and she recommends stool studies and increase hydration.     Pt verb understanding and states she is feeling much better and is no longer having diarrhea.  Pt made f/u appt for 01/08 at 3pm with Dr Santizo and states she will call us back if diarrhea returns.  Advised would update  Dr Santizo.

## 2018-11-16 ENCOUNTER — PREP FOR SURGERY (OUTPATIENT)
Dept: OTHER | Facility: HOSPITAL | Age: 73
End: 2018-11-16

## 2018-11-16 ENCOUNTER — HOSPITAL ENCOUNTER (OUTPATIENT)
Facility: HOSPITAL | Age: 73
Setting detail: HOSPITAL OUTPATIENT SURGERY
End: 2018-11-16
Attending: SURGERY | Admitting: SURGERY

## 2018-11-16 ENCOUNTER — OFFICE VISIT (OUTPATIENT)
Dept: BARIATRICS/WEIGHT MGMT | Facility: CLINIC | Age: 73
End: 2018-11-16

## 2018-11-16 VITALS
TEMPERATURE: 98.4 F | DIASTOLIC BLOOD PRESSURE: 61 MMHG | WEIGHT: 193.5 LBS | RESPIRATION RATE: 18 BRPM | SYSTOLIC BLOOD PRESSURE: 132 MMHG | HEART RATE: 68 BPM | BODY MASS INDEX: 34.29 KG/M2 | HEIGHT: 63 IN

## 2018-11-16 DIAGNOSIS — I10 ESSENTIAL HYPERTENSION: ICD-10-CM

## 2018-11-16 DIAGNOSIS — Z98.84 HISTORY OF BARIATRIC SURGERY: Primary | ICD-10-CM

## 2018-11-16 DIAGNOSIS — R13.10 DYSPHAGIA, UNSPECIFIED TYPE: Primary | ICD-10-CM

## 2018-11-16 DIAGNOSIS — K21.9 GASTROESOPHAGEAL REFLUX DISEASE WITHOUT ESOPHAGITIS: ICD-10-CM

## 2018-11-16 DIAGNOSIS — IMO0002 DIABETES MELLITUS TYPE 2, UNCONTROLLED, WITH COMPLICATIONS: ICD-10-CM

## 2018-11-16 DIAGNOSIS — R53.82 CHRONIC FATIGUE: ICD-10-CM

## 2018-11-16 DIAGNOSIS — R13.19 ESOPHAGEAL DYSPHAGIA: ICD-10-CM

## 2018-11-16 PROCEDURE — 99214 OFFICE O/P EST MOD 30 MIN: CPT | Performed by: SURGERY

## 2018-11-16 RX ORDER — SODIUM CHLORIDE, SODIUM LACTATE, POTASSIUM CHLORIDE, CALCIUM CHLORIDE 600; 310; 30; 20 MG/100ML; MG/100ML; MG/100ML; MG/100ML
30 INJECTION, SOLUTION INTRAVENOUS CONTINUOUS
Status: CANCELLED | OUTPATIENT
Start: 2018-11-16

## 2018-11-16 NOTE — PROGRESS NOTES
[facility]AT[facility] BARIATRIC SURGERY  [address]  [facility] [address] [phone]  [address]  [facility] [address] [phone]  Dept: [phone]  11/16/2018      Ya Paz.  [id number]  [id number]  1945  female      Chief Complaint   Patient presents with   • Follow-up     EGD follow up       BH Post-Op Bariatric Surgery:   Ya Paz is status post VBG procedure, performed on 1984 status post upper endoscopy with the patient of the VBG stricture 4 weeks ago     HPI:   Today's weight is 87.8 kg (193 lb 8 oz) pounds, today's BMI is Body mass index is 34.29 kg/m²., she has a  loss of 2 pounds since the last visit and her weight loss since surgery is  pounds. The patient reports a decreased portion size and loss of appetite.      Ya Paz denies nausea, vomiting, dysphagia, abdominal pain or heartburn.     Diet and Exercise: Diet history reviewed and discussed with the patient. Weight loss/gains to date discussed with the patient. The patient states they are eating unknown grams of protein per day. She reports eating 2 meals per day, a typical portion size of 1 cup, eating 2 snacks per day, drinking 4 or more 8-oz. glasses of water per day, no carbonated beverage consumption and exercising regularly.     Patient feeling much better and no dysphagia.  She states that her heartburn is almost completely resolved but does have symptoms occasionally.  She has been having diarrhea intermittently for the past couple weeks.  She has talked to her gastroenterology office and has a follow-up appointment with them for further evaluation.  She denies any chest pain shortness of air intermittent lower abdominal pain with the diarrhea.  She denies melena hematochezia.    Supplements: Multivitamin with iron, vitamin D, calcium, B1.     Review of Systems   Gastrointestinal: Positive for diarrhea.   Musculoskeletal: Positive for  arthralgias and back pain.   All other systems reviewed and are negative.      Patient Active Problem List   Diagnosis   • Diverticulitis of large intestine without perforation or abscess without bleeding   • ARF (acute renal failure) (CMS/[facility])   • CKD (chronic kidney disease) stage 3, GFR 30-59 ml/min (CMS/[facility])   • Proteinuria   • Diverticulitis   • Hypervitaminosis D   • Postsurgical hypothyroidism   • Chronic fatigue   • Diabetes mellitus type 2, uncontrolled, with complications (CMS/[facility])   • Heartburn   • Dysphagia   • Chest pain   • Abdominal mass   • Pseudogout of hip, left   • Iron deficiency anemia   • Essential hypertension   • GERD (gastroesophageal reflux disease)   • Hypothyroidism   • History of bariatric surgery       Past Medical History:   Diagnosis Date   • Anxiety    • Clostridium difficile infection 11/2016    and in 2005   • Diabetes mellitus (CMS/[facility])    • Disease of thyroid gland    • GERD (gastroesophageal reflux disease)    • Hypertension    • Migraine    • Pancreatitis    • Tachycardia    • TIA (transient ischemic attack)        The following portions of the patient's history were reviewed and updated as appropriate: allergies, current medications, past family history, past medical history, past social history, past surgical history and problem list.    Vitals:    11/16/18 0949   BP: 132/61   Pulse: 68   Resp: 18   Temp: 98.4 °F (36.9 °C)       Physical Exam   Constitutional: She is oriented to person, place, and time. She appears well-nourished.   HENT:   Head: Normocephalic and atraumatic.   Mouth/Throat: Oropharynx is clear and moist.   Eyes: Conjunctivae and EOM are normal. Pupils are equal, round, and reactive to light. No scleral icterus.   Neck: Normal range of motion. Neck supple. No thyromegaly present.   Cardiovascular: Normal rate and regular rhythm.   Pulmonary/Chest: Effort normal and breath sounds normal.   Abdominal: Soft. Bowel sounds are normal. She exhibits no distension  and no mass. There is no tenderness. There is no rebound and no guarding. No hernia.   Musculoskeletal: Normal range of motion.   Lymphadenopathy:     She has no cervical adenopathy.   Neurological: She is alert and oriented to person, place, and time. No cranial nerve deficit. Coordination normal.   Skin: Skin is warm and dry. No erythema.   Psychiatric: She has a normal mood and affect. Her behavior is normal.   Vitals reviewed.        Assessment:   Post-op, the patient status post vertical banded gastroplasty 1984 and status post EGD with dilatation of the VBG stricture 4 weeks ago.  Patient doing much better without any dysphagia does have occasional heartburn.  Also having diarrhea which she will follow-up with her gastroenterology office for further evaluation.  She is afebrile and hemodynamically stable.  Her abdomen was benign on exam today.  She is drinking plenty of fluids.  We decided to go ahead and proceed with repeat endoscopy with repeat dilatation to help keep the stricture opened up long-term.  We'll follow-up after repeat endoscopy.The risks and benefits of the procedure were discussed with the patient in detail and all questions were answered.  Possibility of perforation, bleeding, aspiration, anoxic brain injury, respiratory and/or cardiac arrest and death were discussed.  Consent will be signed and witnessed..     Encounter Diagnoses   Name Primary?   • History of bariatric surgery Yes   • Gastroesophageal reflux disease without esophagitis    • Esophageal dysphagia    • Diabetes mellitus type 2, uncontrolled, with complications (CMS/HCC)    • Essential hypertension    • Chronic fatigue        Plan:     Encouraged patient to be sure to get plenty of lean protein per day through small frequent meals all with a protein source.   Activity restrictions: none.   Recommended patient be sure to get at least 70 grams of protein per day by eating small, frequent meals all with high lean protein choices.  Be sure to limit/cut back on daily carbohydrate intake. Discussed with the patient the recommended amount of water per day to intake- half of body weight in ounces. Reviewed vitamin requirements. Be sure to do routine exercise, 150 minutes per week minimum, including both cardio and strength training.     Instructions / Recommendations: dietary counseling recommended, recommended a daily protein intake of  grams, vitamin supplement(s) recommended, recommended exercising at least 150 minutes per week, behavior modifications recommended and instructed to call the office for concerns, questions, or problems.     The patient was instructed to follow up in after endoscopy .     The patient was counseled regarding. Total time spent face to face was 25 minutes and 20 minutes was spent counseling.

## 2018-11-20 ENCOUNTER — TELEPHONE (OUTPATIENT)
Dept: GASTROENTEROLOGY | Facility: CLINIC | Age: 73
End: 2018-11-20

## 2018-11-20 DIAGNOSIS — R19.7 DIARRHEA, UNSPECIFIED TYPE: Primary | ICD-10-CM

## 2018-11-20 NOTE — TELEPHONE ENCOUNTER
Called pt and she reports that she started having diarrhea 2 wks ago.  She states she is having 6-8 watery stools per day with no control.  Pt denies having been on any antibx.  Pt denies a fever and chills.  Pt states she began taking metamucil on Friday.  Pt denies seeing any blood in her stools. She is asking what can she do or take.  Advised pt DR Santizo is out of the office but will send message to Karen ROPER.  Also advised pt to make sure she is drinking plenty of fluid to stay hydrated. Pt verb understanding.

## 2018-11-20 NOTE — TELEPHONE ENCOUNTER
----- Message from Melissa Alexander sent at 11/20/2018 11:03 AM EST -----  Regarding: DIARRHEA FOR 2 WKS  Contact: [phone]  PLEASE CALL.

## 2018-11-20 NOTE — TELEPHONE ENCOUNTER
Call from pt.  Advise per M Reese  to have lab work and stool studies.    Can take imodium otc as needed.  Continue bland diet.  Agree with increased fluids to stay hydrated.  If symptoms worsen, recommend go to ER for immediate eval.    Pt verb understanding.  Lab appt scheduled for 11/21 @ 10am.  Order placed for GI PCR, fecal lactoferrin, cbc, cmp, amylase, lipase.  (stool kit at ).

## 2018-11-20 NOTE — TELEPHONE ENCOUNTER
Would have her come by and  stool kit and get labs done (GI PCR and fecal lactoferrin, CBC, CMP and lipase, amylase).     She can take some imodium OTC as needed. Continue bland diet. Agree with increased fluids to stay hydrated. If symptoms worsen recommend she go to ER for immediate evaluation. Thanks.

## 2018-11-26 ENCOUNTER — HOSPITAL ENCOUNTER (OUTPATIENT)
Dept: LAB | Facility: HOSPITAL | Age: 73
Discharge: HOME | End: 2018-11-26

## 2018-11-26 LAB
ALBUMIN SERPL-MCNC: 3.2 G/DL (ref 3.5–4.8)
ALBUMIN/GLOB SERPL: 1 {RATIO} (ref 1–1.7)
ALP SERPL-CCNC: 56 IU/L (ref 32–91)
ALT SERPL-CCNC: 13 IU/L (ref 14–54)
AMYLASE SERPL-CCNC: 36 U/L (ref 36–128)
ANION GAP SERPL CALC-SCNC: 11.5 MMOL/L (ref 10–20)
AST SERPL-CCNC: 13 IU/L (ref 15–41)
BASOPHILS # BLD AUTO: 0 10*3/UL (ref 0–0.2)
BASOPHILS NFR BLD AUTO: 0 % (ref 0–2)
BILIRUB SERPL-MCNC: 0.4 MG/DL (ref 0.3–1.2)
BUN SERPL-MCNC: 44 MG/DL (ref 8–20)
BUN/CREAT SERPL: 20 (ref 5.4–26.2)
CALCIUM SERPL-MCNC: 8.8 MG/DL (ref 8.9–10.3)
CHLORIDE SERPL-SCNC: 108 MMOL/L (ref 101–111)
CONV CO2: 21 MMOL/L (ref 22–32)
CONV TOTAL PROTEIN: 6.3 G/DL (ref 6.1–7.9)
CREAT UR-MCNC: 2.2 MG/DL (ref 0.4–1)
DIFFERENTIAL METHOD BLD: (no result)
EOSINOPHIL # BLD AUTO: 0.1 10*3/UL (ref 0–0.3)
EOSINOPHIL # BLD AUTO: 1 % (ref 0–3)
ERYTHROCYTE [DISTWIDTH] IN BLOOD BY AUTOMATED COUNT: 15.6 % (ref 11.5–14.5)
GLOBULIN UR ELPH-MCNC: 3.1 G/DL (ref 2.5–3.8)
GLUCOSE SERPL-MCNC: 172 MG/DL (ref 65–99)
HCT VFR BLD AUTO: 31.5 % (ref 35–49)
HGB BLD-MCNC: 10.3 G/DL (ref 12–15)
LIPASE SERPL-CCNC: 21 U/L (ref 22–51)
LYMPHOCYTES # BLD AUTO: 0.7 10*3/UL (ref 0.8–4.8)
LYMPHOCYTES NFR BLD AUTO: 10 % (ref 18–42)
MCH RBC QN AUTO: 30.5 PG (ref 26–32)
MCHC RBC AUTO-ENTMCNC: 32.6 G/DL (ref 32–36)
MCV RBC AUTO: 93.5 FL (ref 80–94)
MONOCYTES # BLD AUTO: 0.3 10*3/UL (ref 0.1–1.3)
MONOCYTES NFR BLD AUTO: 4 % (ref 2–11)
NEUTROPHILS # BLD AUTO: 6.7 10*3/UL (ref 2.3–8.6)
NEUTROPHILS NFR BLD AUTO: 85 % (ref 50–75)
NRBC BLD AUTO-RTO: 0 /100{WBCS}
NRBC/RBC NFR BLD MANUAL: 0 10*3/UL
PLATELET # BLD AUTO: 364 10*3/UL (ref 150–450)
PMV BLD AUTO: 7 FL (ref 7.4–10.4)
POTASSIUM SERPL-SCNC: 4.5 MMOL/L (ref 3.6–5.1)
RBC # BLD AUTO: 3.37 10*6/UL (ref 4–5.4)
SODIUM SERPL-SCNC: 136 MMOL/L (ref 136–144)
WBC # BLD AUTO: 7.8 10*3/UL (ref 4.5–11.5)

## 2018-11-27 ENCOUNTER — HOSPITAL ENCOUNTER (OUTPATIENT)
Dept: LAB | Facility: HOSPITAL | Age: 73
Discharge: HOME | End: 2018-11-27

## 2018-11-27 PROCEDURE — 87507 IADNA-DNA/RNA PROBE TQ 12-25: CPT

## 2018-11-28 ENCOUNTER — LAB REQUISITION (OUTPATIENT)
Dept: LAB | Facility: HOSPITAL | Age: 73
End: 2018-11-28

## 2018-11-28 DIAGNOSIS — Z00.00 ROUTINE GENERAL MEDICAL EXAMINATION AT A HEALTH CARE FACILITY: ICD-10-CM

## 2018-11-28 LAB
ADV 40+41 DNA STL QL NAA+NON-PROBE: NOT DETECTED
ASTRO TYP 1-8 RNA STL QL NAA+NON-PROBE: NOT DETECTED
C CAYETANENSIS DNA STL QL NAA+NON-PROBE: NOT DETECTED
CAMPY SP DNA.DIARRHEA STL QL NAA+PROBE: NOT DETECTED
CRYPTOSP STL CULT: NOT DETECTED
E COLI DNA SPEC QL NAA+PROBE: NOT DETECTED
E HISTOLYT AG STL-ACNC: NOT DETECTED
EAEC PAA PLAS AGGR+AATA ST NAA+NON-PRB: NOT DETECTED
EC STX1 + STX2 GENES STL NAA+PROBE: NOT DETECTED
EPEC EAE GENE STL QL NAA+NON-PROBE: NOT DETECTED
ETEC LTA+ST1A+ST1B TOX ST NAA+NON-PROBE: NOT DETECTED
G LAMBLIA DNA SPEC QL NAA+PROBE: NOT DETECTED
Lab: NORMAL
Lab: NORMAL
NOROVIRUS GI+II RNA STL QL NAA+NON-PROBE: NOT DETECTED
P SHIGELLOIDES DNA STL QL NAA+NON-PROBE: NOT DETECTED
REF LAB TEST METHOD: NORMAL
RV RNA STL NAA+PROBE: NOT DETECTED
SALMONELLA DNA SPEC QL NAA+PROBE: NOT DETECTED
SAPO I+II+IV+V RNA STL QL NAA+NON-PROBE: NOT DETECTED
SHIGELLA SP+EIEC IPAH STL QL NAA+PROBE: NOT DETECTED
SPECIMEN SOURCE: NORMAL
V CHOLERAE DNA SPEC QL NAA+PROBE: NOT DETECTED
VIBRIO DNA SPEC NAA+PROBE: NOT DETECTED
YERSINIA STL CULT: NOT DETECTED

## 2018-11-29 ENCOUNTER — TELEPHONE (OUTPATIENT)
Dept: GASTROENTEROLOGY | Facility: CLINIC | Age: 73
End: 2018-11-29

## 2018-11-29 NOTE — TELEPHONE ENCOUNTER
Called pt and advised per Karen ROPER that her labs show that she is dehydrated and needs to be drinking a lot more water.  Her stools still pending.      Pt verb understanding . Pt states she drinks at least 3 16oz webster a day and she is always dehydrated.  Advised would update Karen ROPER.

## 2018-11-29 NOTE — TELEPHONE ENCOUNTER
----- Message from DENY Marroquin sent at 11/29/2018  1:44 PM EST -----  Please call patient and let her know I looked at her labs and it showed that she was dehydrated. She needs to be drinking a lot more water. Stool studies still pending. Thanks.

## 2018-12-04 ENCOUNTER — TRANSCRIBE ORDERS (OUTPATIENT)
Dept: ADMINISTRATIVE | Facility: HOSPITAL | Age: 73
End: 2018-12-04

## 2018-12-05 ENCOUNTER — TELEPHONE (OUTPATIENT)
Dept: GASTROENTEROLOGY | Facility: CLINIC | Age: 73
End: 2018-12-05

## 2018-12-05 NOTE — TELEPHONE ENCOUNTER
Id have her come by and get a stool kit (C-diff and fecal lactoferrin) and labs (CBC and CMP). Thanks. If she's just miserable she can always go to the ER and be evaluated. Thanks.
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
## 2018-12-05 NOTE — TELEPHONE ENCOUNTER
Pt called and reports that her diarrhea returned this am.  She states she has gone 7x this am and they are all watery.  She has taken 5 imodiums.  She states her stool water is black in color and smells horrible.  Pt states she is having lots of urgency too. She is afraid she has cdiff again.   Pt denies a fever and chills.  Advised pt that the stools studies done were normal from 11/27, but I did not see a cdiff.  Advised pt would send message to Karen ROPER.  Pt verb understanding.

## 2018-12-12 ENCOUNTER — TELEPHONE (OUTPATIENT)
Dept: GASTROENTEROLOGY | Facility: CLINIC | Age: 73
End: 2018-12-12

## 2018-12-12 NOTE — TELEPHONE ENCOUNTER
----- Message from Silverio Chambers sent at 12/12/2018 11:13 AM EST -----  Regarding: Diarrhea  Contact: [phone]  Would like to discuss symptoms.

## 2018-12-12 NOTE — TELEPHONE ENCOUNTER
Called pt and advised per Karen NP that yes she can continue the pepto bismol .  She can take the liquid or chewables up to tid for the diarrhea.    Advised her to call us next week with an update. Pt verb understanding.

## 2018-12-12 NOTE — TELEPHONE ENCOUNTER
Yes she can continue the pepto-bismol. She can do the liquid or the chewables up to TID for the diarrhea. Have her call us next week with update. Thanks.
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
## 2018-12-29 ENCOUNTER — APPOINTMENT (OUTPATIENT)
Dept: CT IMAGING | Facility: HOSPITAL | Age: 73
End: 2018-12-29

## 2018-12-29 ENCOUNTER — HOSPITAL ENCOUNTER (EMERGENCY)
Facility: HOSPITAL | Age: 73
Discharge: HOME | End: 2018-12-29
Attending: EMERGENCY MEDICINE | Admitting: EMERGENCY MEDICINE

## 2018-12-29 VITALS
HEART RATE: 76 BPM | TEMPERATURE: 99.4 F | OXYGEN SATURATION: 93 % | DIASTOLIC BLOOD PRESSURE: 71 MMHG | WEIGHT: 189 LBS | RESPIRATION RATE: 16 BRPM | HEIGHT: 63 IN | SYSTOLIC BLOOD PRESSURE: 192 MMHG | BODY MASS INDEX: 33.49 KG/M2

## 2018-12-29 DIAGNOSIS — A09 DIARRHEA OF INFECTIOUS ORIGIN: ICD-10-CM

## 2018-12-29 DIAGNOSIS — N18.9 CHRONIC KIDNEY DISEASE, UNSPECIFIED CKD STAGE: ICD-10-CM

## 2018-12-29 DIAGNOSIS — A04.72 C. DIFFICILE COLITIS: Primary | ICD-10-CM

## 2018-12-29 LAB
ADV 40+41 DNA STL QL NAA+NON-PROBE: NOT DETECTED
ALBUMIN SERPL-MCNC: 3.8 G/DL (ref 3.5–5.2)
ALBUMIN/GLOB SERPL: 1 G/DL
ALP SERPL-CCNC: 71 U/L (ref 39–117)
ALT SERPL W P-5'-P-CCNC: 12 U/L (ref 1–33)
ANION GAP SERPL CALCULATED.3IONS-SCNC: 13.6 MMOL/L
AST SERPL-CCNC: 20 U/L (ref 1–32)
ASTRO TYP 1-8 RNA STL QL NAA+NON-PROBE: NOT DETECTED
BASOPHILS # BLD AUTO: 0.01 10*3/MM3 (ref 0–0.2)
BASOPHILS NFR BLD AUTO: 0.1 % (ref 0–1.5)
BILIRUB SERPL-MCNC: 0.3 MG/DL (ref 0.1–1.2)
BUN BLD-MCNC: 43 MG/DL (ref 8–23)
BUN/CREAT SERPL: 20.7 (ref 7–25)
C CAYETANENSIS DNA STL QL NAA+NON-PROBE: NOT DETECTED
C DIFF TOX GENS STL QL NAA+PROBE: POSITIVE
CALCIUM SPEC-SCNC: 10 MG/DL (ref 8.6–10.5)
CAMPY SP DNA.DIARRHEA STL QL NAA+PROBE: NOT DETECTED
CHLORIDE SERPL-SCNC: 103 MMOL/L (ref 98–107)
CO2 SERPL-SCNC: 22.4 MMOL/L (ref 22–29)
CREAT BLD-MCNC: 2.08 MG/DL (ref 0.57–1)
CRYPTOSP STL CULT: NOT DETECTED
DEPRECATED RDW RBC AUTO: 53.7 FL (ref 37–54)
E COLI DNA SPEC QL NAA+PROBE: NOT DETECTED
E HISTOLYT AG STL-ACNC: NOT DETECTED
EAEC PAA PLAS AGGR+AATA ST NAA+NON-PRB: NOT DETECTED
EC STX1 + STX2 GENES STL NAA+PROBE: NOT DETECTED
EOSINOPHIL # BLD AUTO: 0.06 10*3/MM3 (ref 0–0.7)
EOSINOPHIL NFR BLD AUTO: 0.5 % (ref 0.3–6.2)
EPEC EAE GENE STL QL NAA+NON-PROBE: NOT DETECTED
ERYTHROCYTE [DISTWIDTH] IN BLOOD BY AUTOMATED COUNT: 14.5 % (ref 11.7–13)
ETEC LTA+ST1A+ST1B TOX ST NAA+NON-PROBE: NOT DETECTED
G LAMBLIA DNA SPEC QL NAA+PROBE: NOT DETECTED
GFR SERPL CREATININE-BSD FRML MDRD: 23 ML/MIN/1.73
GLOBULIN UR ELPH-MCNC: 3.9 GM/DL
GLUCOSE BLD-MCNC: 171 MG/DL (ref 65–99)
HCT VFR BLD AUTO: 38.8 % (ref 35.6–45.5)
HGB BLD-MCNC: 11.7 G/DL (ref 11.9–15.5)
HOLD SPECIMEN: NORMAL
HOLD SPECIMEN: NORMAL
IMM GRANULOCYTES # BLD AUTO: 0.04 10*3/MM3 (ref 0–0.03)
IMM GRANULOCYTES NFR BLD AUTO: 0.3 % (ref 0–0.5)
LIPASE SERPL-CCNC: 58 U/L (ref 13–60)
LYMPHOCYTES # BLD AUTO: 0.67 10*3/MM3 (ref 0.9–4.8)
LYMPHOCYTES NFR BLD AUTO: 5.4 % (ref 19.6–45.3)
MCH RBC QN AUTO: 30.6 PG (ref 26.9–32)
MCHC RBC AUTO-ENTMCNC: 30.2 G/DL (ref 32.4–36.3)
MCV RBC AUTO: 101.6 FL (ref 80.5–98.2)
MONOCYTES # BLD AUTO: 0.76 10*3/MM3 (ref 0.2–1.2)
MONOCYTES NFR BLD AUTO: 6.1 % (ref 5–12)
NEUTROPHILS # BLD AUTO: 10.96 10*3/MM3 (ref 1.9–8.1)
NEUTROPHILS NFR BLD AUTO: 87.6 % (ref 42.7–76)
NOROVIRUS GI+II RNA STL QL NAA+NON-PROBE: NOT DETECTED
P SHIGELLOIDES DNA STL QL NAA+NON-PROBE: NOT DETECTED
PLATELET # BLD AUTO: 295 10*3/MM3 (ref 140–500)
PMV BLD AUTO: 9.2 FL (ref 6–12)
POTASSIUM BLD-SCNC: 5.3 MMOL/L (ref 3.5–5.2)
PROT SERPL-MCNC: 7.7 G/DL (ref 6–8.5)
RBC # BLD AUTO: 3.82 10*6/MM3 (ref 3.9–5.2)
RV RNA STL NAA+PROBE: NOT DETECTED
SALMONELLA DNA SPEC QL NAA+PROBE: NOT DETECTED
SAPO I+II+IV+V RNA STL QL NAA+NON-PROBE: NOT DETECTED
SHIGELLA SP+EIEC IPAH STL QL NAA+PROBE: NOT DETECTED
SODIUM BLD-SCNC: 139 MMOL/L (ref 136–145)
V CHOLERAE DNA SPEC QL NAA+PROBE: NOT DETECTED
VIBRIO DNA SPEC NAA+PROBE: NOT DETECTED
WBC NRBC COR # BLD: 12.5 10*3/MM3 (ref 4.5–10.7)
WHOLE BLOOD HOLD SPECIMEN: NORMAL
WHOLE BLOOD HOLD SPECIMEN: NORMAL
YERSINIA STL CULT: NOT DETECTED

## 2018-12-29 PROCEDURE — 87493 C DIFF AMPLIFIED PROBE: CPT | Performed by: NURSE PRACTITIONER

## 2018-12-29 PROCEDURE — 74176 CT ABD & PELVIS W/O CONTRAST: CPT

## 2018-12-29 PROCEDURE — 87507 IADNA-DNA/RNA PROBE TQ 12-25: CPT | Performed by: NURSE PRACTITIONER

## 2018-12-29 PROCEDURE — 80053 COMPREHEN METABOLIC PANEL: CPT | Performed by: NURSE PRACTITIONER

## 2018-12-29 PROCEDURE — 85025 COMPLETE CBC W/AUTO DIFF WBC: CPT | Performed by: NURSE PRACTITIONER

## 2018-12-29 PROCEDURE — 99283 EMERGENCY DEPT VISIT LOW MDM: CPT

## 2018-12-29 PROCEDURE — 83690 ASSAY OF LIPASE: CPT | Performed by: NURSE PRACTITIONER

## 2018-12-29 RX ADMIN — SODIUM CHLORIDE 1000 ML: 9 INJECTION, SOLUTION INTRAVENOUS at 21:09

## 2019-01-08 ENCOUNTER — OFFICE VISIT (OUTPATIENT)
Dept: GASTROENTEROLOGY | Facility: CLINIC | Age: 74
End: 2019-01-08

## 2019-01-08 VITALS
WEIGHT: 191.8 LBS | TEMPERATURE: 98.8 F | SYSTOLIC BLOOD PRESSURE: 132 MMHG | HEIGHT: 63 IN | DIASTOLIC BLOOD PRESSURE: 78 MMHG | BODY MASS INDEX: 33.98 KG/M2

## 2019-01-08 DIAGNOSIS — A04.72 COLITIS, CLOSTRIDIUM DIFFICILE: Primary | ICD-10-CM

## 2019-01-08 DIAGNOSIS — D64.9 ANEMIA, UNSPECIFIED TYPE: ICD-10-CM

## 2019-01-08 DIAGNOSIS — R14.0 GASSINESS: ICD-10-CM

## 2019-01-08 PROCEDURE — 99213 OFFICE O/P EST LOW 20 MIN: CPT | Performed by: INTERNAL MEDICINE

## 2019-01-08 NOTE — PROGRESS NOTES
Chief Complaint   Patient presents with   • Diarrhea       Ya Paz is a  73 y.o. female here for a follow up visit for diarrhea, c diff.     She has a history of a prior vertical banded gastroplasty.  Her recent EGD done prior to her hospitalization showed that her outlet from her pouch was stenosed and she was emptying preferentially into the remnant stomach at an anastomotic defect.  She was evaluated by Dr. Carrizales all she was an inpatient and he did mention that this could be dilated.  Fixing the anastomotic defect would be more involved given the fact that she's had multiple other surgeries and she is not interested in this procedure.  She underwent dilation of the stenosis and it has helped tremendously (Dr Carrizales).    She tested positive for c diff on  - on vancomycin .  Now having consistency and not having diarrhea.She feels bloated and sore in midabdomen.  This is her third bout.  She had not been on antibx.  No blood in stool.  She c/o gas that preceded the c diff.  She  Has CKI and recently labs showed mild anemia and leukocytosis.  She has lost 52 lbs since the spring - she dos not have a great appetite.    HPI  Past Medical History:   Diagnosis Date   • Anxiety    • Clostridium difficile infection 2016    and in    • Diabetes mellitus (CMS/HCC)    • Disease of thyroid gland    • GERD (gastroesophageal reflux disease)    • Hypertension    • Migraine    • Pancreatitis    • Tachycardia    • TIA (transient ischemic attack)      Past Surgical History:   Procedure Laterality Date   • APPENDECTOMY     • CARDIAC CATHETERIZATION     •  SECTION     • CHOLECYSTECTOMY     • COLONOSCOPY      normal per pt, Dr. Reagan   • COLONOSCOPY N/A 3/1/2017    multiple polyps, tics, IH, scar in transverse colon   • ENDOSCOPY N/A 3/1/2017    gastroplasty, nodular mucosa in gastric antrum, nodule in duodenum   • ENDOSCOPY N/A 2018    banded gastroplasty found, non intact appearance   •  GASTRIC BYPASS     • HERNIA REPAIR     • LUNG SURGERY      mass LL   • THYROID SURGERY         Current Outpatient Medications:   •  ALPRAZolam (XANAX) 0.5 MG tablet, Take 0.5 mg by mouth 3 (Three) Times a Day As Needed., Disp: , Rfl:   •  aspirin 81 MG EC tablet, Take 81 mg by mouth Daily., Disp: , Rfl:   •  calcium acetate (PHOSLO) 667 MG capsule, Take 1,334 mg by mouth 3 (Three) Times a Day With Meals., Disp: , Rfl:   •  Cholecalciferol (VITAMIN D) 1000 UNITS tablet, Take 1,000 Units by mouth 2 (Two) Times a Day., Disp: , Rfl:   •  ferrous sulfate 325 (65 FE) MG tablet, Take 1 tablet by mouth Every Other Day., Disp: 15 tablet, Rfl: 0  •  folic acid (FOLVITE) 1 MG tablet, TAKE ONE TABLET BY MOUTH ONCE DAILY, Disp: , Rfl:   •  hydrALAZINE (APRESOLINE) 50 MG tablet, Take 1 tablet by mouth 3 (Three) Times a Day., Disp: 90 tablet, Rfl: 0  •  HYDROcodone-acetaminophen (NORCO) 5-325 MG per tablet, Take 1 tablet by mouth Every 6 (Six) Hours As Needed for Moderate Pain ., Disp: , Rfl:   •  hydrOXYzine (ATARAX) 25 MG tablet, Take 25 mg by mouth At Night As Needed for Itching (1-3 tablets every night at bedtime for itching)., Disp: , Rfl:   •  insulin aspart (NOVOLOG FLEXPEN) 100 UNIT/ML solution pen-injector sc pen, 6 units before each meal with sliding scale with a max of 100 units daily (Patient taking differently: 7 units before each meal with sliding scale with a max of 100 units daily per patient), Disp: 5 pen, Rfl: 4  •  Insulin Glargine (TOUJEO SOLOSTAR) 300 UNIT/ML solution pen-injector, Inject 15 Units under the skin Daily With Breakfast. Adjust dose according to accucheck , Disp: , Rfl:   •  Insulin Pen Needle 32G X 4 MM misc, Use to inject insulin 4 times daily, Disp: 120 each, Rfl: 5  •  levothyroxine (SYNTHROID, LEVOTHROID) 125 MCG tablet, Take 125 mcg by mouth Daily., Disp: , Rfl:   •  lisinopril (PRINIVIL,ZESTRIL) 10 MG tablet, Take 20 mg by mouth Daily., Disp: , Rfl:   •  metoprolol succinate XL (TOPROL-XL)  "50 MG 24 hr tablet, Take 1 tablet by mouth Daily., Disp: 30 tablet, Rfl: 0  •  predniSONE (DELTASONE) 5 MG tablet, Take PO daily: 20mg x1week then 10mg x1week then resume 5mg, Disp: 56 tablet, Rfl: 0  •  ranitidine (ZANTAC) 150 MG capsule, TAKE 1 CAPSULE BY MOUTH TWICE DAILY, Disp: 180 capsule, Rfl: 3  •  thiamine (VITAMIN B-1) 100 MG tablet, Take 100 mg by mouth., Disp: , Rfl:   •  vancomycin 50 MG/ML solution oral solution, Take 2.5 mL by mouth Every 6 (Six) Hours for 14 days., Disp: 140 mL, Rfl: 0  PRN Meds:.  Allergies   Allergen Reactions   • Dapsone Other (See Comments)     Pt states she \"quit producing blood.\"  Quit producing blood   • Augmentin [Amoxicillin-Pot Clavulanate] Rash     rash  **tolerates cephalosporins**   **no reason to give aztreonam in the future**   • Sulfa Antibiotics      Sister went blind with taking and was advised not to take.  Sister went blind with taking and was advised not to take.   • Tetracycline Hives and Itching   • Tetracyclines & Related    • Levofloxacin Hives and Rash     Social History     Socioeconomic History   • Marital status:      Spouse name: Not on file   • Number of children: Not on file   • Years of education: Not on file   • Highest education level: Not on file   Social Needs   • Financial resource strain: Not on file   • Food insecurity - worry: Not on file   • Food insecurity - inability: Not on file   • Transportation needs - medical: Not on file   • Transportation needs - non-medical: Not on file   Occupational History   • Not on file   Tobacco Use   • Smoking status: Never Smoker   • Smokeless tobacco: Never Used   Substance and Sexual Activity   • Alcohol use: Yes   • Drug use: No   • Sexual activity: Defer   Other Topics Concern   • Not on file   Social History Narrative   • Not on file     Family History   Problem Relation Age of Onset   • Pancreatitis Brother    • Cancer Brother    • Ulcerative colitis Son    • Crohn's disease Grandchild    • " Cancer Mother    • Cancer Maternal Grandmother      Review of Systems   Constitutional: Positive for appetite change and unexpected weight change.   Gastrointestinal: Positive for abdominal pain and diarrhea. Negative for blood in stool.   Musculoskeletal: Positive for arthralgias.   All other systems reviewed and are negative.    Vitals:    01/08/19 1503   BP: 132/78   Temp: 98.8 °F (37.1 °C)         01/08/19  1503   Weight: 87 kg (191 lb 12.8 oz)     Physical Exam   Constitutional: She appears well-developed and well-nourished.   HENT:   Head: Normocephalic and atraumatic.   Eyes: No scleral icterus.   Abdominal: Soft. She exhibits no distension and no mass. There is tenderness.   Epigastric pain   Neurological: She is alert.   Skin: Skin is warm and dry.   Psychiatric: She has a normal mood and affect.     No images are attached to the encounter.  Diagnoses and all orders for this visit:    Colitis, Clostridium difficile    Anemia, unspecified type  -     CBC & Differential    Gassiness         Plan:  - complete vancomycin  - start probiotic after completion  Of her vanc  - increase fluid consumption  - trial IBgard  - repeat labs today  - f/u prn

## 2019-01-09 ENCOUNTER — TELEPHONE (OUTPATIENT)
Dept: GASTROENTEROLOGY | Facility: CLINIC | Age: 74
End: 2019-01-09

## 2019-01-09 LAB
BASOPHILS # BLD AUTO: 0.01 10*3/MM3 (ref 0–0.2)
BASOPHILS NFR BLD AUTO: 0.1 % (ref 0–1.5)
EOSINOPHIL # BLD AUTO: 0.13 10*3/MM3 (ref 0–0.7)
EOSINOPHIL NFR BLD AUTO: 1.1 % (ref 0.3–6.2)
ERYTHROCYTE [DISTWIDTH] IN BLOOD BY AUTOMATED COUNT: 14 % (ref 11.7–13)
HCT VFR BLD AUTO: 34.8 % (ref 35.6–45.5)
HGB BLD-MCNC: 10.3 G/DL (ref 11.9–15.5)
IMM GRANULOCYTES # BLD AUTO: 0.03 10*3/MM3 (ref 0–0.03)
IMM GRANULOCYTES NFR BLD AUTO: 0.2 % (ref 0–0.5)
LYMPHOCYTES # BLD AUTO: 1.07 10*3/MM3 (ref 0.9–4.8)
LYMPHOCYTES NFR BLD AUTO: 8.8 % (ref 19.6–45.3)
MCH RBC QN AUTO: 29.7 PG (ref 26.9–32)
MCHC RBC AUTO-ENTMCNC: 29.6 G/DL (ref 32.4–36.3)
MCV RBC AUTO: 100.3 FL (ref 80.5–98.2)
MONOCYTES # BLD AUTO: 0.59 10*3/MM3 (ref 0.2–1.2)
MONOCYTES NFR BLD AUTO: 4.8 % (ref 5–12)
NEUTROPHILS # BLD AUTO: 10.38 10*3/MM3 (ref 1.9–8.1)
NEUTROPHILS NFR BLD AUTO: 85 % (ref 42.7–76)
PLATELET # BLD AUTO: 331 10*3/MM3 (ref 140–500)
RBC # BLD AUTO: 3.47 10*6/MM3 (ref 3.9–5.2)
WBC # BLD AUTO: 12.21 10*3/MM3 (ref 4.5–10.7)

## 2019-01-09 NOTE — TELEPHONE ENCOUNTER
Mildly anemic and wbc ct still elevated - repeat cbc in 2 weeks once antibx completed - increase fluids

## 2019-01-10 NOTE — TELEPHONE ENCOUNTER
Returned pt's call and advised per Dr Santizo that she is mildly anemic and wbc ct still elevated.  She recommends to repeat cbc in 2 wks once antibx have been completed. Also advised to increase fluids.   Pt verb understanding and will call back to make lab appt.

## 2019-01-17 ENCOUNTER — PREP FOR SURGERY (OUTPATIENT)
Dept: OTHER | Facility: HOSPITAL | Age: 74
End: 2019-01-17

## 2019-01-17 DIAGNOSIS — K31.2 GASTRIC HOURGLASS STRICTURE OR STENOSIS: Primary | ICD-10-CM

## 2019-01-17 RX ORDER — SODIUM CHLORIDE, SODIUM LACTATE, POTASSIUM CHLORIDE, CALCIUM CHLORIDE 600; 310; 30; 20 MG/100ML; MG/100ML; MG/100ML; MG/100ML
30 INJECTION, SOLUTION INTRAVENOUS CONTINUOUS
Status: CANCELLED | OUTPATIENT
Start: 2019-02-12

## 2019-01-18 PROBLEM — K31.2 GASTRIC HOURGLASS STRICTURE OR STENOSIS: Status: ACTIVE | Noted: 2019-01-18

## 2019-03-05 ENCOUNTER — OUTSIDE FACILITY SERVICE (OUTPATIENT)
Dept: BARIATRICS/WEIGHT MGMT | Facility: CLINIC | Age: 74
End: 2019-03-05

## 2019-03-05 PROCEDURE — 43245 EGD DILATE STRICTURE: CPT | Performed by: SURGERY

## 2019-03-06 ENCOUNTER — TELEPHONE (OUTPATIENT)
Dept: BARIATRICS/WEIGHT MGMT | Facility: CLINIC | Age: 74
End: 2019-03-06

## 2019-03-06 NOTE — TELEPHONE ENCOUNTER
Patient called complaining of lower left abdominal/hip pain and wondering if due to EGD performed yesterday. Informed patient that it wouldn't have been caused by the EGD unless it was due to how she was laying during the procedure.    Told her to contact her PCP if the pain does not improve.

## 2019-03-07 ENCOUNTER — HOSPITAL ENCOUNTER (INPATIENT)
Facility: HOSPITAL | Age: 74
LOS: 3 days | Discharge: HOME HEALTH | End: 2019-03-10
Attending: EMERGENCY MEDICINE | Admitting: SURGERY

## 2019-03-07 ENCOUNTER — APPOINTMENT (OUTPATIENT)
Dept: CT IMAGING | Facility: HOSPITAL | Age: 74
End: 2019-03-07

## 2019-03-07 DIAGNOSIS — R10.84 GENERALIZED ABDOMINAL PAIN: Primary | ICD-10-CM

## 2019-03-07 DIAGNOSIS — K66.8 PNEUMOPERITONEUM: ICD-10-CM

## 2019-03-07 PROBLEM — R10.9 ABDOMINAL PAIN: Status: ACTIVE | Noted: 2019-03-07

## 2019-03-07 LAB
ALBUMIN SERPL-MCNC: 3.6 G/DL (ref 3.5–5.2)
ALBUMIN/GLOB SERPL: 1 G/DL
ALP SERPL-CCNC: 56 U/L (ref 39–117)
ALT SERPL W P-5'-P-CCNC: 15 U/L (ref 1–33)
ANION GAP SERPL CALCULATED.3IONS-SCNC: 13.3 MMOL/L
AST SERPL-CCNC: 22 U/L (ref 1–32)
BASOPHILS # BLD AUTO: 0.03 10*3/MM3 (ref 0–0.2)
BASOPHILS NFR BLD AUTO: 0.3 % (ref 0–1.5)
BILIRUB SERPL-MCNC: 0.2 MG/DL (ref 0.1–1.2)
BILIRUB UR QL STRIP: NEGATIVE
BUN BLD-MCNC: 33 MG/DL (ref 8–23)
BUN/CREAT SERPL: 16.2 (ref 7–25)
CALCIUM SPEC-SCNC: 9.4 MG/DL (ref 8.6–10.5)
CHLORIDE SERPL-SCNC: 99 MMOL/L (ref 98–107)
CLARITY UR: CLEAR
CO2 SERPL-SCNC: 24.7 MMOL/L (ref 22–29)
COLOR UR: YELLOW
CREAT BLD-MCNC: 2.04 MG/DL (ref 0.57–1)
D-LACTATE SERPL-SCNC: 1.2 MMOL/L (ref 0.5–2)
DEPRECATED RDW RBC AUTO: 47.3 FL (ref 37–54)
EOSINOPHIL # BLD AUTO: 0.14 10*3/MM3 (ref 0–0.4)
EOSINOPHIL NFR BLD AUTO: 1.5 % (ref 0.3–6.2)
ERYTHROCYTE [DISTWIDTH] IN BLOOD BY AUTOMATED COUNT: 13.3 % (ref 12.3–15.4)
GFR SERPL CREATININE-BSD FRML MDRD: 24 ML/MIN/1.73
GLOBULIN UR ELPH-MCNC: 3.6 GM/DL
GLUCOSE BLD-MCNC: 182 MG/DL (ref 65–99)
GLUCOSE BLDC GLUCOMTR-MCNC: 171 MG/DL (ref 70–130)
GLUCOSE BLDC GLUCOMTR-MCNC: 66 MG/DL (ref 70–130)
GLUCOSE BLDC GLUCOMTR-MCNC: 76 MG/DL (ref 70–130)
GLUCOSE BLDC GLUCOMTR-MCNC: 96 MG/DL (ref 70–130)
GLUCOSE UR STRIP-MCNC: NEGATIVE MG/DL
HBA1C MFR BLD: 7 % (ref 4.8–5.6)
HCT VFR BLD AUTO: 35.1 % (ref 34–46.6)
HGB BLD-MCNC: 10.7 G/DL (ref 12–15.9)
HGB UR QL STRIP.AUTO: NEGATIVE
IMM GRANULOCYTES # BLD AUTO: 0.05 10*3/MM3 (ref 0–0.05)
IMM GRANULOCYTES NFR BLD AUTO: 0.5 % (ref 0–0.5)
KETONES UR QL STRIP: NEGATIVE
LEUKOCYTE ESTERASE UR QL STRIP.AUTO: NEGATIVE
LIPASE SERPL-CCNC: 28 U/L (ref 13–60)
LYMPHOCYTES # BLD AUTO: 1.78 10*3/MM3 (ref 0.7–3.1)
LYMPHOCYTES NFR BLD AUTO: 18.8 % (ref 19.6–45.3)
MCH RBC QN AUTO: 29.2 PG (ref 26.6–33)
MCHC RBC AUTO-ENTMCNC: 30.5 G/DL (ref 31.5–35.7)
MCV RBC AUTO: 95.9 FL (ref 79–97)
MONOCYTES # BLD AUTO: 0.65 10*3/MM3 (ref 0.1–0.9)
MONOCYTES NFR BLD AUTO: 6.9 % (ref 5–12)
NEUTROPHILS # BLD AUTO: 6.8 10*3/MM3 (ref 1.4–7)
NEUTROPHILS NFR BLD AUTO: 72 % (ref 42.7–76)
NITRITE UR QL STRIP: NEGATIVE
NRBC BLD AUTO-RTO: 0 /100 WBC (ref 0–0)
PH UR STRIP.AUTO: 8 [PH] (ref 5–8)
PLATELET # BLD AUTO: 326 10*3/MM3 (ref 140–450)
PMV BLD AUTO: 9.6 FL (ref 6–12)
POTASSIUM BLD-SCNC: 5.1 MMOL/L (ref 3.5–5.2)
PROT SERPL-MCNC: 7.2 G/DL (ref 6–8.5)
PROT UR QL STRIP: NEGATIVE
RBC # BLD AUTO: 3.66 10*6/MM3 (ref 3.77–5.28)
SODIUM BLD-SCNC: 137 MMOL/L (ref 136–145)
SP GR UR STRIP: 1.01 (ref 1–1.03)
UROBILINOGEN UR QL STRIP: NORMAL
WBC NRBC COR # BLD: 9.45 10*3/MM3 (ref 3.4–10.8)

## 2019-03-07 PROCEDURE — 71250 CT THORAX DX C-: CPT

## 2019-03-07 PROCEDURE — 85025 COMPLETE CBC W/AUTO DIFF WBC: CPT | Performed by: PHYSICIAN ASSISTANT

## 2019-03-07 PROCEDURE — 25010000002 MORPHINE PER 10 MG: Performed by: NURSE PRACTITIONER

## 2019-03-07 PROCEDURE — 93005 ELECTROCARDIOGRAM TRACING: CPT | Performed by: NURSE PRACTITIONER

## 2019-03-07 PROCEDURE — 25010000002 HYDROMORPHONE PER 4 MG: Performed by: EMERGENCY MEDICINE

## 2019-03-07 PROCEDURE — 25010000002 CEFEPIME PER 500 MG: Performed by: PHYSICIAN ASSISTANT

## 2019-03-07 PROCEDURE — 83690 ASSAY OF LIPASE: CPT | Performed by: PHYSICIAN ASSISTANT

## 2019-03-07 PROCEDURE — 82962 GLUCOSE BLOOD TEST: CPT

## 2019-03-07 PROCEDURE — 93010 ELECTROCARDIOGRAM REPORT: CPT | Performed by: INTERNAL MEDICINE

## 2019-03-07 PROCEDURE — 74176 CT ABD & PELVIS W/O CONTRAST: CPT

## 2019-03-07 PROCEDURE — 25010000002 ONDANSETRON PER 1 MG: Performed by: SURGERY

## 2019-03-07 PROCEDURE — 25010000002 MORPHINE PER 10 MG: Performed by: EMERGENCY MEDICINE

## 2019-03-07 PROCEDURE — 0 DIATRIZOATE MEGLUMINE & SODIUM PER 1 ML: Performed by: EMERGENCY MEDICINE

## 2019-03-07 PROCEDURE — 81003 URINALYSIS AUTO W/O SCOPE: CPT | Performed by: PHYSICIAN ASSISTANT

## 2019-03-07 PROCEDURE — 25010000002 ONDANSETRON PER 1 MG: Performed by: EMERGENCY MEDICINE

## 2019-03-07 PROCEDURE — 83036 HEMOGLOBIN GLYCOSYLATED A1C: CPT | Performed by: HOSPITALIST

## 2019-03-07 PROCEDURE — 83605 ASSAY OF LACTIC ACID: CPT | Performed by: PHYSICIAN ASSISTANT

## 2019-03-07 PROCEDURE — 25010000002 HYDROMORPHONE PER 4 MG: Performed by: SURGERY

## 2019-03-07 PROCEDURE — 99285 EMERGENCY DEPT VISIT HI MDM: CPT

## 2019-03-07 PROCEDURE — 99222 1ST HOSP IP/OBS MODERATE 55: CPT | Performed by: SURGERY

## 2019-03-07 PROCEDURE — 80053 COMPREHEN METABOLIC PANEL: CPT | Performed by: PHYSICIAN ASSISTANT

## 2019-03-07 RX ORDER — HYDROMORPHONE HYDROCHLORIDE 1 MG/ML
0.5 INJECTION, SOLUTION INTRAMUSCULAR; INTRAVENOUS; SUBCUTANEOUS ONCE
Status: COMPLETED | OUTPATIENT
Start: 2019-03-07 | End: 2019-03-07

## 2019-03-07 RX ORDER — MORPHINE SULFATE 2 MG/ML
2 INJECTION, SOLUTION INTRAMUSCULAR; INTRAVENOUS ONCE
Status: COMPLETED | OUTPATIENT
Start: 2019-03-07 | End: 2019-03-07

## 2019-03-07 RX ORDER — ENALAPRILAT 2.5 MG/2ML
1.25 INJECTION INTRAVENOUS EVERY 6 HOURS
Status: DISCONTINUED | OUTPATIENT
Start: 2019-03-07 | End: 2019-03-07

## 2019-03-07 RX ORDER — ONDANSETRON 2 MG/ML
4 INJECTION INTRAMUSCULAR; INTRAVENOUS ONCE
Status: COMPLETED | OUTPATIENT
Start: 2019-03-07 | End: 2019-03-07

## 2019-03-07 RX ORDER — LEVOTHYROXINE SODIUM ANHYDROUS 100 UG/5ML
75 INJECTION, POWDER, LYOPHILIZED, FOR SOLUTION INTRAVENOUS
Status: DISCONTINUED | OUTPATIENT
Start: 2019-03-08 | End: 2019-03-09

## 2019-03-07 RX ORDER — PROPRANOLOL HYDROCHLORIDE 40 MG/1
40 TABLET ORAL 3 TIMES DAILY
COMMUNITY
End: 2019-06-18 | Stop reason: HOSPADM

## 2019-03-07 RX ORDER — NALOXONE HCL 0.4 MG/ML
0.4 VIAL (ML) INJECTION
Status: DISCONTINUED | OUTPATIENT
Start: 2019-03-07 | End: 2019-03-07

## 2019-03-07 RX ORDER — SODIUM CHLORIDE 0.9 % (FLUSH) 0.9 %
10 SYRINGE (ML) INJECTION AS NEEDED
Status: DISCONTINUED | OUTPATIENT
Start: 2019-03-07 | End: 2019-03-10 | Stop reason: HOSPADM

## 2019-03-07 RX ORDER — INSULIN GLARGINE 100 [IU]/ML
5 INJECTION, SOLUTION SUBCUTANEOUS NIGHTLY
Status: DISCONTINUED | OUTPATIENT
Start: 2019-03-07 | End: 2019-03-07

## 2019-03-07 RX ORDER — FAMOTIDINE 10 MG/ML
20 INJECTION, SOLUTION INTRAVENOUS EVERY 12 HOURS SCHEDULED
Status: DISCONTINUED | OUTPATIENT
Start: 2019-03-07 | End: 2019-03-09 | Stop reason: DRUGHIGH

## 2019-03-07 RX ORDER — SODIUM CHLORIDE, SODIUM LACTATE, POTASSIUM CHLORIDE, CALCIUM CHLORIDE 600; 310; 30; 20 MG/100ML; MG/100ML; MG/100ML; MG/100ML
125 INJECTION, SOLUTION INTRAVENOUS CONTINUOUS
Status: DISCONTINUED | OUTPATIENT
Start: 2019-03-07 | End: 2019-03-08

## 2019-03-07 RX ORDER — NICOTINE POLACRILEX 4 MG
15 LOZENGE BUCCAL
Status: DISCONTINUED | OUTPATIENT
Start: 2019-03-07 | End: 2019-03-10 | Stop reason: HOSPADM

## 2019-03-07 RX ORDER — SODIUM CHLORIDE 0.9 % (FLUSH) 0.9 %
1-10 SYRINGE (ML) INJECTION AS NEEDED
Status: DISCONTINUED | OUTPATIENT
Start: 2019-03-07 | End: 2019-03-10 | Stop reason: HOSPADM

## 2019-03-07 RX ORDER — ALPRAZOLAM 0.5 MG/1
0.5 TABLET ORAL 3 TIMES DAILY PRN
Status: DISCONTINUED | OUTPATIENT
Start: 2019-03-07 | End: 2019-03-10 | Stop reason: HOSPADM

## 2019-03-07 RX ORDER — NITROGLYCERIN 0.4 MG/1
TABLET SUBLINGUAL
Status: DISPENSED
Start: 2019-03-07 | End: 2019-03-07

## 2019-03-07 RX ORDER — ONDANSETRON 2 MG/ML
4 INJECTION INTRAMUSCULAR; INTRAVENOUS EVERY 6 HOURS PRN
Status: DISCONTINUED | OUTPATIENT
Start: 2019-03-07 | End: 2019-03-10 | Stop reason: HOSPADM

## 2019-03-07 RX ORDER — HYDROMORPHONE HYDROCHLORIDE 1 MG/ML
0.5 INJECTION, SOLUTION INTRAMUSCULAR; INTRAVENOUS; SUBCUTANEOUS
Status: DISCONTINUED | OUTPATIENT
Start: 2019-03-07 | End: 2019-03-07

## 2019-03-07 RX ORDER — MORPHINE SULFATE 2 MG/ML
2 INJECTION, SOLUTION INTRAMUSCULAR; INTRAVENOUS
Status: DISCONTINUED | OUTPATIENT
Start: 2019-03-07 | End: 2019-03-10 | Stop reason: HOSPADM

## 2019-03-07 RX ORDER — SODIUM CHLORIDE 0.9 % (FLUSH) 0.9 %
3 SYRINGE (ML) INJECTION EVERY 12 HOURS SCHEDULED
Status: DISCONTINUED | OUTPATIENT
Start: 2019-03-07 | End: 2019-03-10 | Stop reason: HOSPADM

## 2019-03-07 RX ORDER — HYDRALAZINE HYDROCHLORIDE 20 MG/ML
10 INJECTION INTRAMUSCULAR; INTRAVENOUS EVERY 6 HOURS PRN
Status: DISCONTINUED | OUTPATIENT
Start: 2019-03-07 | End: 2019-03-08

## 2019-03-07 RX ORDER — DEXTROSE MONOHYDRATE 25 G/50ML
25 INJECTION, SOLUTION INTRAVENOUS
Status: DISCONTINUED | OUTPATIENT
Start: 2019-03-07 | End: 2019-03-10 | Stop reason: HOSPADM

## 2019-03-07 RX ADMIN — DEXTROSE MONOHYDRATE 25 G: 25 INJECTION, SOLUTION INTRAVENOUS at 21:56

## 2019-03-07 RX ADMIN — MORPHINE SULFATE 2 MG: 2 INJECTION, SOLUTION INTRAMUSCULAR; INTRAVENOUS at 11:11

## 2019-03-07 RX ADMIN — ONDANSETRON 4 MG: 2 INJECTION INTRAMUSCULAR; INTRAVENOUS at 18:55

## 2019-03-07 RX ADMIN — HYDROMORPHONE HYDROCHLORIDE 0.5 MG: 1 INJECTION, SOLUTION INTRAMUSCULAR; INTRAVENOUS; SUBCUTANEOUS at 05:00

## 2019-03-07 RX ADMIN — MORPHINE SULFATE 2 MG: 2 INJECTION, SOLUTION INTRAMUSCULAR; INTRAVENOUS at 08:17

## 2019-03-07 RX ADMIN — ALPRAZOLAM 0.5 MG: 0.5 TABLET ORAL at 20:58

## 2019-03-07 RX ADMIN — METOPROLOL TARTRATE 5 MG: 5 INJECTION INTRAVENOUS at 17:45

## 2019-03-07 RX ADMIN — ONDANSETRON 4 MG: 2 INJECTION INTRAMUSCULAR; INTRAVENOUS at 03:45

## 2019-03-07 RX ADMIN — SODIUM CHLORIDE, POTASSIUM CHLORIDE, SODIUM LACTATE AND CALCIUM CHLORIDE 125 ML/HR: 600; 310; 30; 20 INJECTION, SOLUTION INTRAVENOUS at 20:14

## 2019-03-07 RX ADMIN — HYDROMORPHONE HYDROCHLORIDE 0.5 MG: 1 INJECTION, SOLUTION INTRAMUSCULAR; INTRAVENOUS; SUBCUTANEOUS at 17:49

## 2019-03-07 RX ADMIN — FAMOTIDINE 20 MG: 10 INJECTION INTRAVENOUS at 20:58

## 2019-03-07 RX ADMIN — SODIUM CHLORIDE, POTASSIUM CHLORIDE, SODIUM LACTATE AND CALCIUM CHLORIDE 125 ML/HR: 600; 310; 30; 20 INJECTION, SOLUTION INTRAVENOUS at 12:20

## 2019-03-07 RX ADMIN — SODIUM CHLORIDE, PRESERVATIVE FREE 3 ML: 5 INJECTION INTRAVENOUS at 20:58

## 2019-03-07 RX ADMIN — SODIUM CHLORIDE 1000 ML: 9 INJECTION, SOLUTION INTRAVENOUS at 03:45

## 2019-03-07 RX ADMIN — HYDROMORPHONE HYDROCHLORIDE 0.5 MG: 1 INJECTION, SOLUTION INTRAMUSCULAR; INTRAVENOUS; SUBCUTANEOUS at 03:45

## 2019-03-07 RX ADMIN — DIATRIZOATE MEGLUMINE AND DIATRIZOATE SODIUM 30 ML: 600; 100 SOLUTION ORAL; RECTAL at 05:28

## 2019-03-07 RX ADMIN — CEFEPIME HYDROCHLORIDE 2 G: 2 INJECTION, POWDER, FOR SOLUTION INTRAVENOUS at 07:28

## 2019-03-07 NOTE — CONSULTS
Adult Nutrition  Assessment/PES    Patient Name:  Ya Paz  YOB: 1945  MRN: [id number]  Admit Date:  3/7/2019    Assessment Date:  3/7/2019    Nutrition assessment triggered by MST score-6 and RN consult. Patient NPO. Surgery consulted.  Patient reported poor appetite for awhile now. Will follow up for plan of care and nutritional needs.    Reason for Assessment     Row Name 03/07/19 1121          Reason for Assessment    Reason For Assessment  identified at risk by screening criteria;nurse/nurse practitioner consult     Diagnosis   Primary Problem:  Abdominal pain; diverticulosis, DM, GERD     Identified At Risk by Screening Criteria  MST SCORE 2+;reduced oral intake over the last month         Nutrition/Diet History     Row Name 03/07/19 1121          Nutrition/Diet History    Typical Food/Fluid Intake  poor appetite for awhile now per patient         Anthropometrics     Row Name 03/07/19 1121 03/07/19 0343       Anthropometrics    Weight    89.4 kg (197 lb)       Body Mass Index (BMI)    BMI Assessment  BMI 30-34.9: obesity grade I         Labs/Tests/Procedures/Meds     Row Name 03/07/19 1121          Labs/Procedures/Meds    Lab Results Reviewed  reviewed, pertinent        Diagnostic Tests/Procedures    Diagnostic Test/Procedure Reviewed  reviewed, pertinent        Medications    Pertinent Medications Reviewed  reviewed, pertinent         Physical Findings     Row Name 03/07/19 1121          Physical Findings    Overall Physical Appearance  B=20         Estimated/Assessed Needs     Row Name 03/07/19 1121          Calculation Measurements    Weight Used For Calculations  89.4 kg (197 lb 1.5 oz)        Estimated/Assessed Needs    Additional Documentation  KCAL/KG (Group);Protein Requirements (Group);Fluid Requirements (Group)        KCAL/KG    14 Kcal/Kg (kcal)  1251.6     15 Kcal/Kg (kcal)  1341     18 Kcal/Kg (kcal)  1609.2     20 Kcal/Kg (kcal)  1788     25 Kcal/Kg (kcal)  2235     30  Kcal/Kg (kcal)  2682     35 Kcal/Kg (kcal)  3129     40 Kcal/Kg (kcal)  3576     45 Kcal/Kg (kcal)  4023     50 Kcal/Kg (kcal)  4470     kcal/kg (Specify)  -- 1089-0303        Kouts-St. Jeor Equation    RMR (Kouts-St. Jeor Equation)  1373.13        Protein Requirements    Est Protein Requirement Amount (gms/kg)  1.1 gm protein     Estimated Protein Requirements (gms/day)  98.34        Fluid Requirements    Estimated Fluid Requirements (mL/day)  2200     RDA Method (mL)  2200     Meaghan-Segar Method (over 20 kg)  3288         Nutrition Prescription Ordered     Row Name 03/07/19 1122          Nutrition Prescription PO    Current PO Diet  NPO         Evaluation of Received Nutrient/Fluid Intake     Row Name 03/07/19 1121          Calculation Measurements    Weight Used For Calculations  89.4 kg (197 lb 1.5 oz)         Evaluation of Prescribed Nutrient/Fluid Intake     Row Name 03/07/19 1121          Calculation Measurements    Weight Used For Calculations  89.4 kg (197 lb 1.5 oz)             Problem/Interventions:  Problem 1     Row Name 03/07/19 1122          Nutrition Diagnoses Problem 1    Problem 1  Inadequate Nutrient Intake     Etiology (related to)  MNT for Treatment/Condition     Signs/Symptoms (evidenced by)  NPO;Report of Mnimal PO Intake                 Intervention Goal     Row Name 03/07/19 1122          Intervention Goal    General  Maintain nutrition;Meet nutritional needs for age/condition     PO  Tolerate PO;Advance diet     Weight  Maintain weight         Nutrition Intervention     Row Name 03/07/19 1122          Nutrition Intervention    RD/Tech Action  Follow Tx progress;Care plan reviewd           Education/Evaluation     Row Name 03/07/19 1122          Education    Education  Will Instruct as appropriate        Monitor/Evaluation    Monitor  Per protocol           Electronically signed by:  Shira Perrin RD  03/07/19 11:23 AM

## 2019-03-07 NOTE — ED PROVIDER NOTES
Pt presents to the ED c/o LLQ abd pain that has been progressively worsening for the past 2 days. She denies nausea, vomiting, or any other sx. Hx of diverticulitis. On exam, Pt is resting comfortably, in no distress, and without focal neurologic deficit. Cardiovascular exam is within normal limits and without murmur. Tenderness to the LUQ, RLQ, and LLQ without rebound or guarding. Plan for labs and CT.     Attestation:  The EDWIN and I have discussed this patient's history, physical exam, and treatment plan.  I have reviewed the documentation and personally had a face to face interaction with the patient. I affirm the documentation and agree with the treatment and plan.  The attached note describes my personal findings.      Documentation assistance provided by leah Mahan for Dr Howell. Information recorded by the scribe was done at my direction and has been verified and validated by me.     Юлия Mahan  03/07/19 6150       Stevenson Howell MD  03/10/19 7798

## 2019-03-07 NOTE — H&P
Patient Care Team:  Nicki Hidalgo MD as PCP - General (Internal Medicine)    Chief complaint: Abdominal pain    Subjective     History of Present Illness     The patient is a very pleasant 73-year-old white female who has had a previous vertical banded gastroplasty for weight loss surgery.  She had no problems for many years after the surgery until recently when she developed dysphagia and reflux type symptoms.  She had an EGD in 2017 that showed a stenosis of the banded area with a breakdown of the staple line with emptying preferentially into the remnant stomach at the anastomotic defect.  Apparently reparative surgery was discussed with her in April 2018 but she was not interested in the major abdominal surgery.  She underwent an EGD with dilatation of the VBG stricture around October 2018 and had improvement in symptoms of dysphagia.  She did well for several months and then developed recurrent symptoms of GERD indicating a repeat dilatation of the VBG.  She underwent an EGD with dilatation of the stricture on 3/5/2019.  She states she began having abdominal pain after that procedure that progressively worsened.  The pain was in the epigastrium and left upper quadrant.  She did not have any nausea or vomiting.  There has been no diarrhea or constipation.  She had no associated chest pain no shortness of breath.  She presented to the emergency room based on the progressively worsening abdominal pain and had normal labs but a CT scan of the abdomen and pelvis did show free air.    Review of Systems   Constitutional: Positive for appetite change. Negative for activity change, fatigue and fever.   HENT: Negative for trouble swallowing and voice change.    Respiratory: Negative for chest tightness and shortness of breath.    Cardiovascular: Negative for chest pain and palpitations.   Gastrointestinal: Positive for abdominal pain. Negative for blood in stool, constipation, diarrhea, nausea and  vomiting.   Endocrine: Negative for cold intolerance and heat intolerance.   Genitourinary: Negative for dysuria and flank pain.   Neurological: Negative for dizziness and light-headedness.   Hematological: Negative for adenopathy. Does not bruise/bleed easily.   Psychiatric/Behavioral: Negative for agitation and confusion.        Past Medical History:   Diagnosis Date   • Anxiety    • Clostridium difficile infection 2016    and in    • Diabetes mellitus (CMS/HCC)    • Disease of thyroid gland    • GERD (gastroesophageal reflux disease)    • Hypertension    • Migraine    • Pancreatitis    • Tachycardia    • TIA (transient ischemic attack)      Past Surgical History:   Procedure Laterality Date   • APPENDECTOMY     • CARDIAC CATHETERIZATION     •  SECTION     • CHOLECYSTECTOMY     • COLONOSCOPY      normal per pt, Dr. Reagan   • COLONOSCOPY N/A 3/1/2017    multiple polyps, tics, IH, scar in transverse colon   • ENDOSCOPY N/A 3/1/2017    gastroplasty, nodular mucosa in gastric antrum, nodule in duodenum   • ENDOSCOPY N/A 2018    banded gastroplasty found, non intact appearance   • GASTRIC BYPASS     • HERNIA REPAIR     • LUNG SURGERY      mass LL   • THYROID SURGERY       Family History   Problem Relation Age of Onset   • Pancreatitis Brother    • Cancer Brother    • Ulcerative colitis Son    • Crohn's disease Grandchild    • Cancer Mother    • Cancer Maternal Grandmother      Social History     Tobacco Use   • Smoking status: Never Smoker   • Smokeless tobacco: Never Used   Substance Use Topics   • Alcohol use: Yes   • Drug use: No     Allergies:  Dapsone; Augmentin [amoxicillin-pot clavulanate]; Sulfa antibiotics; Tetracycline; Tetracyclines & related; and Levofloxacin    Objective      Vital Signs  Temp:  [96.1 °F (35.6 °C)-97.6 °F (36.4 °C)] 97.6 °F (36.4 °C)  Heart Rate:  [53-68] 56  Resp:  [16-20] 16  BP: (138-206)/(58-93) 175/62    Physical Exam   Constitutional: She is oriented to  person, place, and time. She appears well-developed and well-nourished.  Non-toxic appearance. No distress.   HENT:   Head: Normocephalic and atraumatic.   Eyes: EOM are normal. No scleral icterus. Right eye exhibits normal extraocular motion. Left eye exhibits normal extraocular motion.   Neck: Normal range of motion. Neck supple. No JVD present. No tracheal deviation present.   Cardiovascular: Normal rate and regular rhythm.   Pulmonary/Chest: Effort normal and breath sounds normal. No respiratory distress. She exhibits no tenderness.   Abdominal: Soft. Normal appearance and bowel sounds are normal. She exhibits no distension. There is no hepatosplenomegaly. There is tenderness (Moderately tender) in the epigastric area and left upper quadrant. There is no rebound and no guarding. No hernia.   Lymphadenopathy:        Right: No supraclavicular adenopathy present.        Left: No supraclavicular adenopathy present.   Neurological: She is alert and oriented to person, place, and time.   Skin: Skin is warm and dry.   Psychiatric: She has a normal mood and affect. Her behavior is normal. Judgment and thought content normal.       Results Review:   I reviewed the patient's new clinical results.      Assessment/Plan       Abdominal pain      Assessment & Plan     1.  Perforated viscus: The patient recently had an EGD with dilatation of a VBG stricture.  She developed abdominal pain and now has free air suggesting a perforation at the dilatation site.  She has abdominal pain but no evidence of peritonitis.  Her lab work is normal and she is not tachycardic nor toxic.  Treatment options were carefully discussed with her.  She understands that the most definitive management is operative but it would entail a large surgery and she would like to avoid this if possible.  Based on the fact that she has a benign abdomen with no sepsis or hemodynamic instability, a conservative approach is reasonable.  She has been admitted and  will be treated with IV fluids and strict n.p.o. status.  She has been started on antibiotics and will be closely followed.    2.  Hypertension: The patient has hypertension that is managed by several oral medications but she is now n.p.o.  Medical consultation has been requested to help manage hypertension and other medical issues.    The patient was discussed with Dr. Carrizales who is the admitting and I am covering for him.    I discussed the patients findings and my recommendations with patient and nursing staff    Roberth Julian Jr., MD  03/07/19  11:49 AM

## 2019-03-07 NOTE — ED NOTES
CT notified pt started drinking oral contrast. Pt instructed to let staff know when she is finished.     Gris Worthy, RN  03/07/19 0602

## 2019-03-07 NOTE — PROGRESS NOTES
Discharge Planning Assessment  [facility]     Patient Name: Ya Paz  MRN: [id number]  Today's Date: 3/7/2019    Admit Date: 3/7/2019    Discharge Needs Assessment     Row Name 03/07/19 1447       Living Environment    Lives With  spouse    Name(s) of Who Lives With Patient  Lambert Paz     Current Living Arrangements  home/apartment/condo    Potentially Unsafe Housing Conditions  other (see comments) no concerns     Primary Care Provided by  self    Provides Primary Care For  no one    Family Caregiver if Needed  spouse    Quality of Family Relationships  helpful;involved;supportive    Able to Return to Prior Arrangements  yes       Resource/Environmental Concerns    Resource/Environmental Concerns  none    Transportation Concerns  car, none       Transition Planning    Patient/Family Anticipates Transition to  home    Patient/Family Anticipated Services at Transition  none    Transportation Anticipated  family or friend will provide       Discharge Needs Assessment    Readmission Within the Last 30 Days  no previous admission in last 30 days    Concerns to be Addressed  no discharge needs identified;denies needs/concerns at this time    Equipment Currently Used at Home  none    Anticipated Changes Related to Illness  none    Equipment Needed After Discharge  none        Discharge Plan     Row Name 03/07/19 1448       Plan    Plan  Home; follow for skilled needs     Patient/Family in Agreement with Plan  yes    Plan Comments  CCP met with patient at bedside. CCP role explained and discharge planning discussed. Face sheet verified and IMM noted. Patient's PCP is Dr. Hidalgo. Patient lives with her spouse, with no interior/exterior steps. Patient has grab bar present in her bathroom. Patient is independent with her ADLS and does not use DME. Patient has not used home health or been to SNF. Patient's preferred pharmacy is [facility] in [place]; patient denies having trouble affording her medications.  Patient does not anticipate needing home health or PT. CCP provided HH/SNF list. Patient has transportation home. CCP will follow for skilled needs and follow for IV abx. Suzanna Valencia CSW           Destination      No service coordination in this encounter.      Durable Medical Equipment      No service coordination in this encounter.      Dialysis/Infusion      No service coordination in this encounter.      Home Medical Care      No service coordination in this encounter.      Community Resources      No service coordination in this encounter.          Demographic Summary     Row Name 03/07/19 1447       General Information    Admission Type  inpatient    Arrived From  emergency department    Required Notices Provided  Important Message from Medicare    Referral Source  admission list    Reason for Consult  discharge planning    Preferred Language  English     Used During This Interaction  no        Functional Status     Row Name 03/07/19 1447       Functional Status    Usual Activity Tolerance  good    Current Activity Tolerance  good       Functional Status, IADL    Medications  independent    Meal Preparation  independent    Housekeeping  independent    Laundry  independent    Shopping  independent       Mental Status    General Appearance WDL  WDL       Mental Status Summary    Recent Changes in Mental Status/Cognitive Functioning  no changes        Psychosocial    No documentation.       Abuse/Neglect    No documentation.       Legal    No documentation.       Substance Abuse    No documentation.       Patient Forms     Row Name 03/07/19 5554       Patient Forms    Provider Choice List  Delivered    Delivered to  Patient    Method of delivery  In person            MOSES Rogers

## 2019-03-07 NOTE — CONSULTS
Name: Ya Paz ADMIT: 3/7/2019   : 1945  PCP: Nicki Hidalgo MD    MRN: [id number] LOS: 0 days   AGE/SEX: 73 y.o. female  ROOM: E4/     Chief Complaint   Patient presents with   • Abdominal Pain     left sided, started Tuesday after scope performed to stretch esophagus, GI Dr. Santizo, pt has hx of diverticulitis, pt denies N/V/D and fever, pt states gallbladder and appendix removed       Subjective   Patient is a 73 y.o. female who presents to [facility] with the above chief complaint.  She has a history of a gastric band and did recently develop some dysphasia symptoms.  She had an EGD and had a dilation done of a stricture near the band site on .  Following the procedure she began to have abdominal pain is been progressively worsening.  Her pain is mainly in the epigastric area and left upper quadrant.  She denies any nausea vomiting or shortness of breath associated with this pain.  In the emergency room a CT scan of the abdomen pelvis showed free air within the abdominal cavity she was admitted to the surgical service for further assistance and management of this issue.  At home she has hypertension and hypothyroidism is on medications for this.  She takes metoprolol, propranolol, and hydralazine for her blood pressure.  She is been on these medications for quite some time with no recent changes to her blood pressure medications noted.  She denies any history of atrial fibrillation or heart failure in the past.  She been on the same dose of Synthroid for about a year now.  Has had no recent adjustments and last T4 was 1.44 in 2018.  We have been consulted for assistance with her home medications given the fact that she is currently n.p.o. mother is unable to take any of her oral medications while here in the hospital while working up and evaluating this perforated viscus.    History of Present Illness    Past Medical History:   Diagnosis Date   •  Anxiety    • Clostridium difficile infection 2016    and in    • Diabetes mellitus (CMS/HCC)    • Disease of thyroid gland    • GERD (gastroesophageal reflux disease)    • Hypertension    • Migraine    • Pancreatitis    • Tachycardia    • TIA (transient ischemic attack)      Past Surgical History:   Procedure Laterality Date   • APPENDECTOMY     • CARDIAC CATHETERIZATION     •  SECTION     • CHOLECYSTECTOMY     • COLONOSCOPY      normal per pt, Dr. Reagan   • COLONOSCOPY N/A 3/1/2017    multiple polyps, tics, IH, scar in transverse colon   • ENDOSCOPY N/A 3/1/2017    gastroplasty, nodular mucosa in gastric antrum, nodule in duodenum   • ENDOSCOPY N/A 2018    banded gastroplasty found, non intact appearance   • GASTRIC BYPASS     • HERNIA REPAIR     • LUNG SURGERY      mass LL   • THYROID SURGERY       Family History   Problem Relation Age of Onset   • Pancreatitis Brother    • Cancer Brother    • Ulcerative colitis Son    • Crohn's disease Grandchild    • Cancer Mother    • Cancer Maternal Grandmother      Social History     Tobacco Use   • Smoking status: Never Smoker   • Smokeless tobacco: Never Used   Substance Use Topics   • Alcohol use: Yes   • Drug use: No     Medications Prior to Admission   Medication Sig Dispense Refill Last Dose   • ALPRAZolam (XANAX) 0.5 MG tablet Take 0.5 mg by mouth 3 (Three) Times a Day As Needed.   Taking   • Cholecalciferol (VITAMIN D) 1000 UNITS tablet Take 1,000 Units by mouth Daily.   Taking   • ferrous sulfate 325 (65 FE) MG tablet Take 1 tablet by mouth Every Other Day. (Patient taking differently: Take 325 mg by mouth Daily With Breakfast. Once a week on saturday) 15 tablet 0 Taking   • folic acid (FOLVITE) 1 MG tablet TAKE ONE TABLET BY MOUTH ONCE DAILY   Taking   • hydrALAZINE (APRESOLINE) 50 MG tablet Take 1 tablet by mouth 3 (Three) Times a Day. (Patient taking differently: Take 100 mg by mouth 2 (Two) Times a Day.) 90 tablet 0 Patient Taking  Differently at Unknown time   • HYDROcodone-acetaminophen (NORCO) 5-325 MG per tablet Take 1 tablet by mouth Every 6 (Six) Hours As Needed for Moderate Pain .   Taking   • hydrOXYzine (ATARAX) 25 MG tablet Take 25 mg by mouth At Night As Needed for Itching (1-3 tablets every night at bedtime for itching).   Taking   • insulin aspart (NOVOLOG FLEXPEN) 100 UNIT/ML solution pen-injector sc pen 6 units before each meal with sliding scale with a max of 100 units daily (Patient taking differently: As Needed. 7 units before each meal with sliding scale with a max of 100 units daily per patient) 5 pen 4 Patient Taking Differently at Unknown time   • Insulin Glargine (TOUJEO SOLOSTAR) 300 UNIT/ML solution pen-injector Inject 14 Units under the skin into the appropriate area as directed Daily With Breakfast. Adjust dose according to accucheck    Taking   • Insulin Pen Needle 32G X 4 MM misc Use to inject insulin 4 times daily 120 each 5 Taking   • levothyroxine (SYNTHROID, LEVOTHROID) 125 MCG tablet Take 125 mcg by mouth Daily.   Taking   • predniSONE (DELTASONE) 5 MG tablet Take PO daily: 20mg x1week then 10mg x1week then resume 5mg (Patient taking differently: Take 5 mg by mouth Daily. Take PO daily: 20mg x1week then 10mg x1week then resume 5mg) 56 tablet 0 Patient Taking Differently at Unknown time   • propranolol (INDERAL) 40 MG tablet Take 40 mg by mouth 3 (Three) Times a Day.      • ranitidine (ZANTAC) 150 MG capsule TAKE 1 CAPSULE BY MOUTH TWICE DAILY 180 capsule 3 Taking   • aspirin 81 MG EC tablet Take 81 mg by mouth Daily.   Taking   • calcium acetate (PHOSLO) 667 MG capsule Take 1,334 mg by mouth 3 (Three) Times a Day With Meals.   Taking   • lisinopril (PRINIVIL,ZESTRIL) 10 MG tablet Take 20 mg by mouth Daily.   Taking   • metoprolol succinate XL (TOPROL-XL) 50 MG 24 hr tablet Take 1 tablet by mouth Daily. 30 tablet 0 Taking   • thiamine (VITAMIN B-1) 100 MG tablet Take 100 mg by mouth.   Taking     Allergies:   Dapsone; Augmentin [amoxicillin-pot clavulanate]; Sulfa antibiotics; Tetracycline; Tetracyclines & related; and Levofloxacin    Review of Systems   Constitutional: Negative for chills, fatigue and fever.   HENT: Negative for congestion and rhinorrhea.    Eyes: Negative for photophobia, redness and visual disturbance.   Respiratory: Negative for cough, shortness of breath and wheezing.    Cardiovascular: Negative for chest pain, palpitations and leg swelling.   Gastrointestinal: Positive for abdominal distention and abdominal pain. Negative for diarrhea, nausea and vomiting.   Endocrine: Negative for polydipsia, polyphagia and polyuria.   Genitourinary: Negative for difficulty urinating, dysuria and hematuria.   Musculoskeletal: Negative for arthralgias, joint swelling and neck stiffness.   Skin: Negative for pallor and rash.   Allergic/Immunologic: Negative for environmental allergies and immunocompromised state.   Neurological: Negative for dizziness, weakness and numbness.   Hematological: Negative for adenopathy. Does not bruise/bleed easily.   Psychiatric/Behavioral: Negative for agitation, behavioral problems and confusion.        Objective    Vital Signs  Temp:  [96.1 °F (35.6 °C)-98.2 °F (36.8 °C)] 98.2 °F (36.8 °C)  Heart Rate:  [53-68] 55  Resp:  [16-20] 16  BP: (138-206)/(58-93) 152/60  SpO2:  [95 %-100 %] 98 %  on   ;   Device (Oxygen Therapy): room air  Body mass index is 34.9 kg/m².    Physical Exam   Constitutional: She is oriented to person, place, and time. She appears well-developed and well-nourished.   HENT:   Head: Normocephalic and atraumatic.   Eyes: Conjunctivae and EOM are normal. No scleral icterus.   Neck: Neck supple. No JVD present.   Cardiovascular: Normal rate, regular rhythm and normal heart sounds.   Pulmonary/Chest: Effort normal and breath sounds normal.   Abdominal: Soft. Bowel sounds are normal. There is tenderness.   Musculoskeletal: Normal range of motion. She exhibits no edema.    Neurological: She is alert and oriented to person, place, and time.   Skin: Skin is warm and dry.   Psychiatric: She has a normal mood and affect. Her behavior is normal.   Nursing note and vitals reviewed.      Results Review:   I reviewed the patient's new clinical results.  Results from last 7 days   Lab Units 03/07/19  0343   WBC 10*3/mm3 9.45   HEMOGLOBIN g/dL 10.7*   PLATELETS 10*3/mm3 326     Results from last 7 days   Lab Units 03/07/19  0343   SODIUM mmol/L 137   POTASSIUM mmol/L 5.1   CHLORIDE mmol/L 99   CO2 mmol/L 24.7   BUN mg/dL 33*   CREATININE mg/dL 2.04*   GLUCOSE mg/dL 182*   ALBUMIN g/dL 3.60   BILIRUBIN mg/dL 0.2   ALK PHOS U/L 56   AST (SGOT) U/L 22   ALT (SGPT) U/L 15   Estimated Creatinine Clearance: 26.4 mL/min (A) (by C-G formula based on SCr of 2.04 mg/dL (H)).        Invalid input(s): LDLCALC  Results from last 7 days   Lab Units 03/07/19  0523   NITRITE UA  Negative     CT Chest Without Contrast   Final Result   1. There is no extravasated oral contrast from the stomach or proximal   small bowel. The gastrojejunostomy appears thickened and may be the   source of the rupture. A gastric origin is suspected, but no definite   extravasated oral contrast is seen.   2. Extensive colonic diverticulosis without convincing evidence for   diverticulitis.   3. No acute abnormality is seen within the chest.       Discussed with ABDOUL North.       This report was finalized on 3/7/2019 10:56 AM by Dr. Heaven Wolfe M.D.          CT Abdomen Pelvis Without Contrast   Final Result   1. There is no extravasated oral contrast from the stomach or proximal   small bowel. The gastrojejunostomy appears thickened and may be the   source of the rupture. A gastric origin is suspected, but no definite   extravasated oral contrast is seen.   2. Extensive colonic diverticulosis without convincing evidence for   diverticulitis.   3. No acute abnormality is seen within the chest.       Discussed with Casey  ABDOUL Sparks.       This report was finalized on 3/7/2019 10:56 AM by Dr. Heaven Wolfe M.D.          CT Abdomen Pelvis Without Contrast   Final Result   1.  Multifocal pneumoperitoneum. The source is not identified with   absolute certainty by noncontrast imaging but the abundance of air near   the stomach would certainly raise suspicion of an upper GI source such   as peptic ulcer disease. Suggest surgical consultation and upper   endoscopy.   2. Constipation and extensive colonic diverticulosis without convincing   colitis by noncontrast imaging.   3. Renal lesions as discussed. These will require follow-up..   4. Persistent annular narrowing of the transverse colon, a mass lesion   not excluded. Follow-up endoscopy recommended                   This report was finalized on 3/7/2019 4:47 AM by Mk Buck M.D.            Assessment/Plan       Abdominal pain      Assessment & Plan  1.  Hypertension: Her blood pressure is managed by Dr. Taylor in the outpatient setting.  She is currently on metoprolol hydralazine and propranolol.  We will give IV Lopressor every 6 hours and as needed IV hydralazine for blood pressure control.  I placed hold parameters on the IV metoprolol.  2.  Hypothyroidism: Decrease Synthroid to 75 mcg and give IV for now.  When she is able to take p.o. can increase the dose back to her home dose and give orally.  We can check a TSH and T4 while here.  She was due to have this done a few months ago.  3.  Perforated viscus: Surgery is currently planning conservative management.  Continue n.p.o. status continue IV fluids monitor abdominal exams serially.  4.  Diabetes type 2: At home she takes Toujeo and scheduled NovoLog.  We will have her basal insulin since she will be n.p.o. and give as Lantus on the hospital formulary.  We will also plan to q. 6-hour Accu-Cheks with sliding scale insulin as needed.  5.  Gastroesophageal reflux disease: At home she is on Zantac twice a day.  We will place her  on IV Pepcid while here in the hospital.  6.  Chronic kidney disease stage IV: She is managed in the outpatient setting by Dr. Taylor.  I have consulted him for further assistance at the patient's request.    I discussed the patients findings and my recommendations with patient, family and nursing staff.    Christopher Mercedes MD  [facility]  03/07/19  4:15 PM

## 2019-03-07 NOTE — PLAN OF CARE
Problem: Patient Care Overview  Goal: Plan of Care Review  Outcome: Ongoing (interventions implemented as appropriate)   03/07/19 3212   Coping/Psychosocial   Plan of Care Reviewed With patient   OTHER   Outcome Summary adm from er. ivf's infusing. remains npo. med for c/o pain. safety maintained. sr per tele. renal to see in am.

## 2019-03-07 NOTE — ED PROVIDER NOTES
EMERGENCY DEPARTMENT ENCOUNTER    Room Number:  32/32  Date seen:  3/7/2019  Time seen: 3:23 AM  PCP: Nicki Hidalgo MD Dr. Briley, GI    HPI:  Chief complaint: Abd pain  Context:Ya Paz is a 73 y.o. female who presents to the ED with c/o LLQ abd pain, that has begun radiating to her LUQ, began 2 days ago, and has progressively worsened. She states the pain is currently a 10/10 in severity. She also c/o chills, but denies  N/V/D, cough, chest pain, fever, and blood in stool. She had a EGD to stretch her esophagus on 3/5/19, Dr Carrizales. Hx of diverticulitis. She last had anything PO 6 hours ago.    Onset: Gradual  Location: LLQ abd  Radiation: Radiates to her LUQ  Duration: Began 2 days ago  Timing: Constant  Severity: Currently 10/10 in severity    ALLERGIES  Dapsone; Augmentin [amoxicillin-pot clavulanate]; Sulfa antibiotics; Tetracycline; Tetracyclines & related; and Levofloxacin    PAST MEDICAL HISTORY  Active Ambulatory Problems     Diagnosis Date Noted   • Diverticulitis of large intestine without perforation or abscess without bleeding 11/10/2016   • ARF (acute renal failure) (CMS/[facility]) 11/11/2016   • CKD (chronic kidney disease) stage 3, GFR 30-59 ml/min (CMS/[facility]) 11/11/2016   • Proteinuria 11/11/2016   • Diverticulitis 11/11/2016   • Hypervitaminosis D 04/07/2017   • Postsurgical hypothyroidism 04/07/2017   • Chronic fatigue 04/07/2017   • Diabetes mellitus type 2, uncontrolled, with complications (CMS/[facility]) 04/07/2017   • Heartburn 01/19/2018   • Dysphagia 01/19/2018   • Chest pain 04/08/2018   • Abdominal mass 04/08/2018   • Pseudogout of hip, left 04/09/2018   • Iron deficiency anemia 04/10/2018   • Essential hypertension 08/16/2018   • GERD (gastroesophageal reflux disease) 05/07/2015   • Hypothyroidism 08/16/2018   • History of bariatric surgery 11/16/2018   • 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     Resolved Ambulatory Problems     Diagnosis Date Noted   • No Resolved  Ambulatory Problems     Past Medical History:   Diagnosis Date   • Anxiety    • Clostridium difficile infection 2016   • Diabetes mellitus (CMS/HCC)    • Disease of thyroid gland    • GERD (gastroesophageal reflux disease)    • Hypertension    • Migraine    • Pancreatitis    • Tachycardia    • TIA (transient ischemic attack)        PAST SURGICAL HISTORY  Past Surgical History:   Procedure Laterality Date   • APPENDECTOMY     • CARDIAC CATHETERIZATION     •  SECTION     • CHOLECYSTECTOMY     • COLONOSCOPY      normal per pt, Dr. Reagan   • COLONOSCOPY N/A 3/1/2017    multiple polyps, tics, IH, scar in transverse colon   • ENDOSCOPY N/A 3/1/2017    gastroplasty, nodular mucosa in gastric antrum, nodule in duodenum   • ENDOSCOPY N/A 2018    banded gastroplasty found, non intact appearance   • GASTRIC BYPASS     • HERNIA REPAIR     • LUNG SURGERY      mass LL   • THYROID SURGERY         FAMILY HISTORY  Family History   Problem Relation Age of Onset   • Pancreatitis Brother    • Cancer Brother    • Ulcerative colitis Son    • Crohn's disease Grandchild    • Cancer Mother    • Cancer Maternal Grandmother        SOCIAL HISTORY  Social History     Socioeconomic History   • Marital status:      Spouse name: Not on file   • Number of children: Not on file   • Years of education: Not on file   • Highest education level: Not on file   Social Needs   • Financial resource strain: Not on file   • Food insecurity - worry: Not on file   • Food insecurity - inability: Not on file   • Transportation needs - medical: Not on file   • Transportation needs - non-medical: Not on file   Occupational History   • Not on file   Tobacco Use   • Smoking status: Never Smoker   • Smokeless tobacco: Never Used   Substance and Sexual Activity   • Alcohol use: Yes   • Drug use: No   • Sexual activity: Defer   Other Topics Concern   • Not on file   Social History Narrative   • Not on file       REVIEW OF SYSTEMS  Review of  Systems   Constitutional: Positive for chills. Negative for fever.   HENT: Negative for sore throat.    Eyes: Negative.    Respiratory: Negative for cough and shortness of breath.    Cardiovascular: Negative for chest pain.   Gastrointestinal: Positive for abdominal pain (LLQ, that radiates to her LUQ, and has progressively worsened). Negative for blood in stool, diarrhea, nausea and vomiting.   Genitourinary: Negative for dysuria.   Musculoskeletal: Negative for neck pain.   Skin: Negative for rash.   Neurological: Negative for weakness, numbness and headaches.   Hematological: Negative.    Psychiatric/Behavioral: Negative.    All other systems reviewed and are negative.      PHYSICAL EXAM  ED Triage Vitals [03/07/19 0259]   Temp Heart Rate Resp BP SpO2   96.1 °F (35.6 °C) 68 16 -- 98 %      Temp src Heart Rate Source Patient Position BP Location FiO2 (%)   Tympanic Monitor -- -- --     Physical Exam   Constitutional: She is oriented to person, place, and time. She appears distressed (mild).   HENT:   Head: Normocephalic and atraumatic.   Eyes: EOM are normal. Pupils are equal, round, and reactive to light.   Neck: Normal range of motion. Neck supple.   Cardiovascular: Normal rate, regular rhythm and normal heart sounds.   Pulmonary/Chest: Effort normal and breath sounds normal. No respiratory distress.   Abdominal: Soft. There is tenderness in the left lower quadrant. There is guarding (LLQ). There is no rebound.   Musculoskeletal: Normal range of motion. She exhibits no edema.   Neurological: She is alert and oriented to person, place, and time. She has normal sensation and normal strength.   Skin: Skin is warm and dry. No rash noted.   Psychiatric: Mood and affect normal.   Nursing note and vitals reviewed.      LAB RESULTS  Recent Results (from the past 24 hour(s))   Comprehensive Metabolic Panel    Collection Time: 03/07/19  3:43 AM   Result Value Ref Range    Glucose 182 (H) 65 - 99 mg/dL    BUN 33 (H) 8 - 23  mg/dL    Creatinine 2.04 (H) 0.57 - 1.00 mg/dL    Sodium 137 136 - 145 mmol/L    Potassium 5.1 3.5 - 5.2 mmol/L    Chloride 99 98 - 107 mmol/L    CO2 24.7 22.0 - 29.0 mmol/L    Calcium 9.4 8.6 - 10.5 mg/dL    Total Protein 7.2 6.0 - 8.5 g/dL    Albumin 3.60 3.50 - 5.20 g/dL    ALT (SGPT) 15 1 - 33 U/L    AST (SGOT) 22 1 - 32 U/L    Alkaline Phosphatase 56 39 - 117 U/L    Total Bilirubin 0.2 0.1 - 1.2 mg/dL    eGFR Non African Amer 24 (L) >60 mL/min/1.73    Globulin 3.6 gm/dL    A/G Ratio 1.0 g/dL    BUN/Creatinine Ratio 16.2 7.0 - 25.0    Anion Gap 13.3 mmol/L   Lipase    Collection Time: 03/07/19  3:43 AM   Result Value Ref Range    Lipase 28 13 - 60 U/L   Lactic Acid, Plasma    Collection Time: 03/07/19  3:43 AM   Result Value Ref Range    Lactate 1.2 0.5 - 2.0 mmol/L   CBC Auto Differential    Collection Time: 03/07/19  3:43 AM   Result Value Ref Range    WBC 9.45 3.40 - 10.80 10*3/mm3    RBC 3.66 (L) 3.77 - 5.28 10*6/mm3    Hemoglobin 10.7 (L) 12.0 - 15.9 g/dL    Hematocrit 35.1 34.0 - 46.6 %    MCV 95.9 79.0 - 97.0 fL    MCH 29.2 26.6 - 33.0 pg    MCHC 30.5 (L) 31.5 - 35.7 g/dL    RDW 13.3 12.3 - 15.4 %    RDW-SD 47.3 37.0 - 54.0 fl    MPV 9.6 6.0 - 12.0 fL    Platelets 326 140 - 450 10*3/mm3    Neutrophil % 72.0 42.7 - 76.0 %    Lymphocyte % 18.8 (L) 19.6 - 45.3 %    Monocyte % 6.9 5.0 - 12.0 %    Eosinophil % 1.5 0.3 - 6.2 %    Basophil % 0.3 0.0 - 1.5 %    Immature Grans % 0.5 0.0 - 0.5 %    Neutrophils, Absolute 6.80 1.40 - 7.00 10*3/mm3    Lymphocytes, Absolute 1.78 0.70 - 3.10 10*3/mm3    Monocytes, Absolute 0.65 0.10 - 0.90 10*3/mm3    Eosinophils, Absolute 0.14 0.00 - 0.40 10*3/mm3    Basophils, Absolute 0.03 0.00 - 0.20 10*3/mm3    Immature Grans, Absolute 0.05 0.00 - 0.05 10*3/mm3    nRBC 0.0 0.0 - 0.0 /100 WBC   Urinalysis With Microscopic If Indicated (No Culture) - Urine, Clean Catch    Collection Time: 03/07/19  5:23 AM   Result Value Ref Range    Color, UA Yellow Yellow, Straw    Appearance, UA  Clear Clear    pH, UA 8.0 5.0 - 8.0    Specific Gravity, UA 1.007 1.005 - 1.030    Glucose, UA Negative Negative    Ketones, UA Negative Negative    Bilirubin, UA Negative Negative    Blood, UA Negative Negative    Protein, UA Negative Negative    Leuk Esterase, UA Negative Negative    Nitrite, UA Negative Negative    Urobilinogen, UA 0.2 E.U./dL 0.2 - 1.0 E.U./dL       I ordered the above labs and reviewed the results    RADIOLOGY  CT Abdomen Pelvis Without Contrast   Final Result   1.  Multifocal pneumoperitoneum. The source is not identified with   absolute certainty by noncontrast imaging but the abundance of air near   the stomach would certainly raise suspicion of an upper GI source such   as peptic ulcer disease. Suggest surgical consultation and upper   endoscopy.   2. Constipation and extensive colonic diverticulosis without convincing   colitis by noncontrast imaging.   3. Renal lesions as discussed. These will require follow-up..   4. Persistent annular narrowing of the transverse colon, a mass lesion   not excluded. Follow-up endoscopy recommended                   This report was finalized on 3/7/2019 4:47 AM by Mk Buck M.D.          CT Chest Without Contrast    (Results Pending)   CT Abdomen Pelvis Without Contrast    (Results Pending)       I ordered the above noted radiological studies and reviewed the images on the PACS system.  Spoke with Dr. Buck regarding CT/MRI scan results    MEDICATIONS GIVEN IN ER  Medications   sodium chloride 0.9 % flush 10 mL (not administered)   cefepime (MAXIPIME) 2 g/100 mL 0.9% NS (mbp) (not administered)   morphine injection 2 mg (not administered)   sodium chloride 0.9 % bolus 1,000 mL (0 mL Intravenous Stopped 3/7/19 4163)   HYDROmorphone (DILAUDID) injection 0.5 mg (0.5 mg Intravenous Given 3/7/19 6581)   ondansetron (ZOFRAN) injection 4 mg (4 mg Intravenous Given 3/7/19 6705)   HYDROmorphone (DILAUDID) injection 0.5 mg (0.5 mg Intravenous Given 3/7/19  0500)   diatrizoate meglumine-sodium (GASTROGRAFIN) 66-10 % solution 30 mL (30 mL Oral Given 3/7/19 0528)       PROCEDURES  Critical Care  Performed by: Casey Sparks III, PA  Authorized by: Stevenson Howell MD     Critical care provider statement:     Critical care time (minutes):  50    Critical care time was exclusive of:  Separately billable procedures and treating other patients    Critical care was necessary to treat or prevent imminent or life-threatening deterioration of the following conditions: Free air in the abd.    Critical care was time spent personally by me on the following activities:  Blood draw for specimens, development of treatment plan with patient or surrogate, discussions with consultants, evaluation of patient's response to treatment, examination of patient, obtaining history from patient or surrogate, ordering and performing treatments and interventions, ordering and review of laboratory studies, ordering and review of radiographic studies, pulse oximetry, re-evaluation of patient's condition and review of old charts    I assumed direction of critical care for this patient from another provider in my specialty: no                PROGRESS AND CONSULTS    Progress Notes:  0304 Ordered CBC, lactic, UA, lipase, and CMP for further evaluation. Ordered dilaudid for pain, zofran for nausea, and IVF for hydration.    0337 Ordered dilaudid for pain and zofran for nausea.    0407 Ordered CT abd/pel for further evaluation.    0430 Reviewed pt's history and workup with Dr. Howell.  After a bedside evaluation; Dr. Howell agrees with the plan of care.    0447 Placed call to surgery for admission.    0448 Ordered dilaudid for pain.    0459 Discussed pt with Dr. Rg, surgery, who requests that an additional CT abd/pel be obtained and done with oral contrast. He then would like Dr. Carrizales to be consulted due to recent EGD.    0511 Ordered repeat CT abd/pel with oral contrast for further  "evaluation.    0624 Placed call to Dr. Carrizales for consult.    0650 Discussed findings with Dr Carrizaels to admit/obs to his services and start 3d Gen Cephalosporin.  He will evaluate patient.    0700  Patient resting quietly and in no acute distress at this time.  VSS.       Disposition vitals:  BP (!) 181/61 Comment: ABDOUL Weinstein notified, states no orders at this time  Pulse 57   Temp 96.1 °F (35.6 °C) (Tympanic)   Resp 18   Ht 160 cm (63\")   Wt 89.4 kg (197 lb)   SpO2 95%   BMI 34.90 kg/m²       DIAGNOSIS  Final diagnoses:   Generalized abdominal pain   Pneumoperitoneum       DISPOSITION  ADMISSION    Discussed treatment plan and reason for admission with pt/family and admitting physician.  Pt/family voiced understanding of the plan for admission for further testing/treatment as needed.     Documentation assistance provided by leah Sparks III, PA for Js Sparks PA-C.  Information recorded by the leah was done at my direction and has been verified and validated by me.     Josy Green  03/07/19 0630       Casey Sparks III, PA  03/07/19 0716       Casey Sparks III, PA  03/07/19 2012    "

## 2019-03-08 LAB
ANION GAP SERPL CALCULATED.3IONS-SCNC: 10 MMOL/L
BASOPHILS # BLD AUTO: 0.02 10*3/MM3 (ref 0–0.2)
BASOPHILS NFR BLD AUTO: 0.3 % (ref 0–1.5)
BUN BLD-MCNC: 26 MG/DL (ref 8–23)
BUN/CREAT SERPL: 15.5 (ref 7–25)
CALCIUM SPEC-SCNC: 8.7 MG/DL (ref 8.6–10.5)
CHLORIDE SERPL-SCNC: 105 MMOL/L (ref 98–107)
CO2 SERPL-SCNC: 24 MMOL/L (ref 22–29)
CREAT BLD-MCNC: 1.68 MG/DL (ref 0.57–1)
DEPRECATED RDW RBC AUTO: 50.3 FL (ref 37–54)
EOSINOPHIL # BLD AUTO: 0.21 10*3/MM3 (ref 0–0.4)
EOSINOPHIL NFR BLD AUTO: 3.6 % (ref 0.3–6.2)
ERYTHROCYTE [DISTWIDTH] IN BLOOD BY AUTOMATED COUNT: 13.4 % (ref 12.3–15.4)
GFR SERPL CREATININE-BSD FRML MDRD: 30 ML/MIN/1.73
GLUCOSE BLD-MCNC: 59 MG/DL (ref 65–99)
GLUCOSE BLDC GLUCOMTR-MCNC: 155 MG/DL (ref 70–130)
GLUCOSE BLDC GLUCOMTR-MCNC: 61 MG/DL (ref 70–130)
GLUCOSE BLDC GLUCOMTR-MCNC: 70 MG/DL (ref 70–130)
GLUCOSE BLDC GLUCOMTR-MCNC: 75 MG/DL (ref 70–130)
GLUCOSE BLDC GLUCOMTR-MCNC: 88 MG/DL (ref 70–130)
HCT VFR BLD AUTO: 30.2 % (ref 34–46.6)
HCT VFR BLD AUTO: 31.8 % (ref 34–46.6)
HGB BLD-MCNC: 8.9 G/DL (ref 12–15.9)
HGB BLD-MCNC: 9.4 G/DL (ref 12–15.9)
IMM GRANULOCYTES # BLD AUTO: 0.02 10*3/MM3 (ref 0–0.05)
IMM GRANULOCYTES NFR BLD AUTO: 0.3 % (ref 0–0.5)
LYMPHOCYTES # BLD AUTO: 1.63 10*3/MM3 (ref 0.7–3.1)
LYMPHOCYTES NFR BLD AUTO: 28.2 % (ref 19.6–45.3)
MCH RBC QN AUTO: 29.7 PG (ref 26.6–33)
MCHC RBC AUTO-ENTMCNC: 29.5 G/DL (ref 31.5–35.7)
MCV RBC AUTO: 100.7 FL (ref 79–97)
MONOCYTES # BLD AUTO: 0.49 10*3/MM3 (ref 0.1–0.9)
MONOCYTES NFR BLD AUTO: 8.5 % (ref 5–12)
NEUTROPHILS # BLD AUTO: 3.41 10*3/MM3 (ref 1.4–7)
NEUTROPHILS NFR BLD AUTO: 59.1 % (ref 42.7–76)
NRBC BLD AUTO-RTO: 0 /100 WBC (ref 0–0)
PLATELET # BLD AUTO: 249 10*3/MM3 (ref 140–450)
PMV BLD AUTO: 9.2 FL (ref 6–12)
POTASSIUM BLD-SCNC: 4.4 MMOL/L (ref 3.5–5.2)
RBC # BLD AUTO: 3 10*6/MM3 (ref 3.77–5.28)
SODIUM BLD-SCNC: 139 MMOL/L (ref 136–145)
WBC NRBC COR # BLD: 5.78 10*3/MM3 (ref 3.4–10.8)

## 2019-03-08 PROCEDURE — 25010000002 CEFEPIME PER 500 MG: Performed by: SURGERY

## 2019-03-08 PROCEDURE — 25010000002 HYDRALAZINE PER 20 MG: Performed by: HOSPITALIST

## 2019-03-08 PROCEDURE — 85014 HEMATOCRIT: CPT | Performed by: SURGERY

## 2019-03-08 PROCEDURE — 25010000002 HYDRALAZINE PER 20 MG: Performed by: INTERNAL MEDICINE

## 2019-03-08 PROCEDURE — 80048 BASIC METABOLIC PNL TOTAL CA: CPT | Performed by: SURGERY

## 2019-03-08 PROCEDURE — 99231 SBSQ HOSP IP/OBS SF/LOW 25: CPT | Performed by: SURGERY

## 2019-03-08 PROCEDURE — 85018 HEMOGLOBIN: CPT | Performed by: SURGERY

## 2019-03-08 PROCEDURE — 25010000002 MORPHINE PER 10 MG: Performed by: SURGERY

## 2019-03-08 PROCEDURE — 82962 GLUCOSE BLOOD TEST: CPT

## 2019-03-08 PROCEDURE — 85025 COMPLETE CBC W/AUTO DIFF WBC: CPT | Performed by: SURGERY

## 2019-03-08 RX ORDER — HYDRALAZINE HYDROCHLORIDE 20 MG/ML
20 INJECTION INTRAMUSCULAR; INTRAVENOUS EVERY 6 HOURS
Status: DISCONTINUED | OUTPATIENT
Start: 2019-03-08 | End: 2019-03-09

## 2019-03-08 RX ORDER — DEXTROSE, SODIUM CHLORIDE, SODIUM LACTATE, POTASSIUM CHLORIDE, AND CALCIUM CHLORIDE 5; .6; .31; .03; .02 G/100ML; G/100ML; G/100ML; G/100ML; G/100ML
125 INJECTION, SOLUTION INTRAVENOUS CONTINUOUS
Status: DISCONTINUED | OUTPATIENT
Start: 2019-03-08 | End: 2019-03-08

## 2019-03-08 RX ORDER — DEXTROSE MONOHYDRATE 50 MG/ML
75 INJECTION, SOLUTION INTRAVENOUS CONTINUOUS
Status: DISCONTINUED | OUTPATIENT
Start: 2019-03-08 | End: 2019-03-10 | Stop reason: HOSPADM

## 2019-03-08 RX ADMIN — METOPROLOL TARTRATE 5 MG: 5 INJECTION INTRAVENOUS at 00:06

## 2019-03-08 RX ADMIN — MORPHINE SULFATE 2 MG: 2 INJECTION, SOLUTION INTRAMUSCULAR; INTRAVENOUS at 22:48

## 2019-03-08 RX ADMIN — HYDRALAZINE HYDROCHLORIDE 20 MG: 20 INJECTION INTRAMUSCULAR; INTRAVENOUS at 20:29

## 2019-03-08 RX ADMIN — METOPROLOL TARTRATE 5 MG: 5 INJECTION INTRAVENOUS at 05:39

## 2019-03-08 RX ADMIN — HYDRALAZINE HYDROCHLORIDE 10 MG: 20 INJECTION INTRAMUSCULAR; INTRAVENOUS at 14:32

## 2019-03-08 RX ADMIN — SODIUM CHLORIDE, SODIUM LACTATE, POTASSIUM CHLORIDE, CALCIUM CHLORIDE AND DEXTROSE MONOHYDRATE 125 ML/HR: 5; 600; 310; 30; 20 INJECTION, SOLUTION INTRAVENOUS at 08:14

## 2019-03-08 RX ADMIN — LEVOTHYROXINE SODIUM ANHYDROUS 75 MCG: 100 INJECTION, POWDER, LYOPHILIZED, FOR SOLUTION INTRAVENOUS at 05:39

## 2019-03-08 RX ADMIN — SODIUM CHLORIDE, POTASSIUM CHLORIDE, SODIUM LACTATE AND CALCIUM CHLORIDE 125 ML/HR: 600; 310; 30; 20 INJECTION, SOLUTION INTRAVENOUS at 04:37

## 2019-03-08 RX ADMIN — MORPHINE SULFATE 2 MG: 2 INJECTION, SOLUTION INTRAMUSCULAR; INTRAVENOUS at 12:17

## 2019-03-08 RX ADMIN — DEXTROSE MONOHYDRATE 25 G: 25 INJECTION, SOLUTION INTRAVENOUS at 06:00

## 2019-03-08 RX ADMIN — FAMOTIDINE 20 MG: 10 INJECTION INTRAVENOUS at 08:14

## 2019-03-08 RX ADMIN — CEFEPIME HYDROCHLORIDE 2 G: 2 INJECTION, POWDER, FOR SOLUTION INTRAVENOUS at 05:39

## 2019-03-08 RX ADMIN — HYDRALAZINE HYDROCHLORIDE 10 MG: 20 INJECTION INTRAMUSCULAR; INTRAVENOUS at 08:14

## 2019-03-08 RX ADMIN — FAMOTIDINE 20 MG: 10 INJECTION INTRAVENOUS at 20:29

## 2019-03-08 RX ADMIN — METOPROLOL TARTRATE 5 MG: 5 INJECTION INTRAVENOUS at 12:18

## 2019-03-08 RX ADMIN — SODIUM CHLORIDE, PRESERVATIVE FREE 3 ML: 5 INJECTION INTRAVENOUS at 20:30

## 2019-03-08 RX ADMIN — METOPROLOL TARTRATE 5 MG: 5 INJECTION INTRAVENOUS at 17:33

## 2019-03-08 RX ADMIN — DEXTROSE MONOHYDRATE 75 ML/HR: 50 INJECTION, SOLUTION INTRAVENOUS at 12:17

## 2019-03-08 RX ADMIN — SODIUM CHLORIDE, PRESERVATIVE FREE 3 ML: 5 INJECTION INTRAVENOUS at 08:21

## 2019-03-08 NOTE — PROGRESS NOTES
Name: Ya Paz ADMIT: 3/7/2019   : 1945  PCP: Nicki Hidalgo MD    MRN: [id number] LOS: 1 days   AGE/SEX: 73 y.o. female  ROOM: [facility]   Subjective   CC: abdominal pain  No acute events.  Patient is now having minimal pain.  No n/v.  She is asking about her prednisone for her RA.     Objective   Vital Signs  Temp:  [97.6 °F (36.4 °C)-98.2 °F (36.8 °C)] 98.2 °F (36.8 °C)  Heart Rate:  [52-69] 69  Resp:  [16-20] 16  BP: (152-188)/(57-79) 174/72  SpO2:  [95 %-98 %] 98 %  on   ;   Device (Oxygen Therapy): room air  Body mass index is 34.9 kg/m².    Physical Exam   Constitutional: She is oriented to person, place, and time. No distress.   HENT:   Head: Normocephalic and atraumatic.   Mouth/Throat: Oropharynx is clear and moist.   Eyes: Conjunctivae and EOM are normal. Pupils are equal, round, and reactive to light.   Neck: Normal range of motion. Neck supple.   Cardiovascular: Normal rate, regular rhythm and intact distal pulses.   Pulmonary/Chest: Effort normal and breath sounds normal.   Abdominal: Soft. Bowel sounds are normal. There is tenderness (minimal). There is no rebound and no guarding.   Musculoskeletal: She exhibits no edema or tenderness.   Neurological: She is alert and oriented to person, place, and time.   Skin: Skin is warm and dry. She is not diaphoretic.   Psychiatric: She has a normal mood and affect. Her behavior is normal.   Nursing note and vitals reviewed.      Results Review:       I reviewed the patient's new clinical results.  Results from last 7 days   Lab Units 19  1158 19  0455 19  0343   WBC 10*3/mm3  --  5.78 9.45   HEMOGLOBIN g/dL 9.4* 8.9* 10.7*   PLATELETS 10*3/mm3  --  249 326     Results from last 7 days   Lab Units 19  0455 19  0343   SODIUM mmol/L 139 137   POTASSIUM mmol/L 4.4 5.1   CHLORIDE mmol/L 105 99   CO2 mmol/L 24.0 24.7   BUN mg/dL 26* 33*   CREATININE mg/dL 1.68* 2.04*   GLUCOSE mg/dL 59* 182*   Estimated  Creatinine Clearance: 32.1 mL/min (A) (by C-G formula based on SCr of 1.68 mg/dL (H)).  Results from last 7 days   Lab Units 03/07/19  0343   ALBUMIN g/dL 3.60   BILIRUBIN mg/dL 0.2   ALK PHOS U/L 56   AST (SGOT) U/L 22   ALT (SGPT) U/L 15     Results from last 7 days   Lab Units 03/08/19  0455 03/07/19  0343   CALCIUM mg/dL 8.7 9.4   ALBUMIN g/dL  --  3.60     Results from last 7 days   Lab Units 03/07/19  0343   LACTATE mmol/L 1.2         cefepime 2 g Intravenous Q24H   famotidine 20 mg Intravenous Q12H   insulin lispro 0-9 Units Subcutaneous 4x Daily With Meals & Nightly   levothyroxine sodium 75 mcg Intravenous Q AM   metoprolol tartrate 5 mg Intravenous Q6H   sodium chloride 3 mL Intravenous Q12H       dextrose 75 mL/hr Last Rate: 75 mL/hr (03/08/19 1217)   NPO Diet NPO Except: Sips With Meds      Assessment/Plan      Active Hospital Problems    Diagnosis  POA   • Abdominal pain [R10.9]  Yes      Resolved Hospital Problems   No resolved problems to display.   Abdominal Pain  - free air on CT scan, likely related to perforated viscus, had recent dilation of VBG stricture  - improving with conservative management  - mgmt per primary team, NPO and on abx    Type 2 DM  - off of basal insulin  - on IV dextrose with ssi coverage    Hypothyroidism  - on IV synthroid until she can take PO    GERD   - on IV pepcid while NPO    HTN  - on IV metoprolol and PRN hydralazine  - nephrology managing    CKD stage IV  - nephrology following, appreciate recs    Rheumatoid Arthritis  - on 5mg prednisone daily chronically, currently held  - no e/o adrenal insufficiency  - can restart when okay with surgery    Mk Solo MD  [facility]  03/08/19  3:05 PM

## 2019-03-08 NOTE — CONSULTS
[facility]                                                                                               Nephrology Initial Consult Note    Patient Identification:  Name: Ya Paz MRN: [id number]  Age: 73 y.o. : 1945  Sex: female  Date:3/8/2019    Requesting Physician: As per consult order.  Reason for Consultation: Chronic kidney disease/acute kidney injury  Information from:patient/ family/ chart      History of Present Illness: This is a 73 y.o. year old female who is very well-known to me from the office setting and from previous hospitalizations, she presented to the hospital yesterday after complications from an EGD that was done for dilatation for a stricture.  Main complaint is epigastric pain, CT scan of the abdomen and pelvis showed free intra-abdominal air and she was admitted for surgical management, right now she is n.p.o., conservative management is favored at this time with IV fluids.  Blood pressure has been elevated as she has been off all oral medications, currently on IV as needed medications only.  She denies any fevers or chills, headache, cough.  Pain is improved by n.p.o. status, worsened by food, quality described as a burning/tearing sensation with no radiation, moderate to severe in intensity, sudden onset, no associated nausea vomiting diarrhea constipation fevers chills shortness of breath.      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]   • History of bariatric surgery [Z98.84]   • Essential hypertension [I10]   • Hypothyroidism [E03.9]   • Iron deficiency anemia [D50.9]   • Pseudogout of hip, left [M11.252]   • Chest pain [R07.9]   • Abdominal mass [R19.00]   • Heartburn [R12]   • Dysphagia  [R13.10]   • Hypervitaminosis D [E67.3]   • Postsurgical hypothyroidism [E89.0]   • Chronic fatigue [R53.82]   • Diabetes mellitus type 2, uncontrolled, with complications (CMS/[facility]) [E11.8, E11.65]   • ARF (acute renal failure) (CMS/[facility]) [N17.9]   • CKD (chronic kidney disease) stage 3, GFR 30-59 ml/min (CMS/[facility]) [N18.3]   • Proteinuria [R80.9]   • Diverticulitis [K57.92]   • Diverticulitis of large intestine without perforation or abscess without bleeding [K57.32]   • GERD (gastroesophageal reflux disease) [K21.9]       Past Medical History:  Past Medical History:   Diagnosis Date   • Anxiety    • Clostridium difficile infection 2016    and in    • Diabetes mellitus (CMS/[facility])    • Disease of thyroid gland    • GERD (gastroesophageal reflux disease)    • Hypertension    • Migraine    • Pancreatitis    • Tachycardia    • TIA (transient ischemic attack)        Past Surgical History:  Past Surgical History:   Procedure Laterality Date   • APPENDECTOMY     • CARDIAC CATHETERIZATION     •  SECTION     • CHOLECYSTECTOMY     • COLONOSCOPY      normal per pt, Dr. Reagan   • COLONOSCOPY N/A 3/1/2017    multiple polyps, tics, IH, scar in transverse colon   • ENDOSCOPY N/A 3/1/2017    gastroplasty, nodular mucosa in gastric antrum, nodule in duodenum   • ENDOSCOPY N/A 2018    banded gastroplasty found, non intact appearance   • GASTRIC BYPASS     • HERNIA REPAIR     • LUNG SURGERY      mass LL   • THYROID SURGERY          Home Meds:   Medications Prior to Admission   Medication Sig Dispense Refill Last Dose   • ALPRAZolam (XANAX) 0.5 MG tablet Take 0.5 mg by mouth 3 (Three) Times a Day As Needed.   Taking   • Cholecalciferol (VITAMIN D) 1000 UNITS tablet Take 1,000 Units by mouth Daily.   Taking   • ferrous sulfate 325 (65 FE) MG tablet Take 1 tablet by mouth Every Other Day. (Patient taking differently: Take 325 mg by mouth Daily With Breakfast. Once a week on saturday) 15 tablet 0 Taking   • folic  acid (FOLVITE) 1 MG tablet TAKE ONE TABLET BY MOUTH ONCE DAILY   Taking   • hydrALAZINE (APRESOLINE) 50 MG tablet Take 1 tablet by mouth 3 (Three) Times a Day. (Patient taking differently: Take 100 mg by mouth 2 (Two) Times a Day.) 90 tablet 0 Patient Taking Differently at Unknown time   • HYDROcodone-acetaminophen (NORCO) 5-325 MG per tablet Take 1 tablet by mouth Every 6 (Six) Hours As Needed for Moderate Pain .   Taking   • hydrOXYzine (ATARAX) 25 MG tablet Take 25 mg by mouth At Night As Needed for Itching (1-3 tablets every night at bedtime for itching).   Taking   • insulin aspart (NOVOLOG FLEXPEN) 100 UNIT/ML solution pen-injector sc pen 6 units before each meal with sliding scale with a max of 100 units daily (Patient taking differently: As Needed. 7 units before each meal with sliding scale with a max of 100 units daily per patient) 5 pen 4 Patient Taking Differently at Unknown time   • Insulin Glargine (TOUJEO SOLOSTAR) 300 UNIT/ML solution pen-injector Inject 14 Units under the skin into the appropriate area as directed Daily With Breakfast. Adjust dose according to accucheck    Taking   • Insulin Pen Needle 32G X 4 MM misc Use to inject insulin 4 times daily 120 each 5 Taking   • levothyroxine (SYNTHROID, LEVOTHROID) 125 MCG tablet Take 125 mcg by mouth Daily.   Taking   • predniSONE (DELTASONE) 5 MG tablet Take PO daily: 20mg x1week then 10mg x1week then resume 5mg (Patient taking differently: Take 5 mg by mouth Daily. Take PO daily: 20mg x1week then 10mg x1week then resume 5mg) 56 tablet 0 Patient Taking Differently at Unknown time   • propranolol (INDERAL) 40 MG tablet Take 40 mg by mouth 3 (Three) Times a Day.      • ranitidine (ZANTAC) 150 MG capsule TAKE 1 CAPSULE BY MOUTH TWICE DAILY 180 capsule 3 Taking   • aspirin 81 MG EC tablet Take 81 mg by mouth Daily.   Taking   • calcium acetate (PHOSLO) 667 MG capsule Take 1,334 mg by mouth 3 (Three) Times a Day With Meals.   Taking   • lisinopril  (PRINIVIL,ZESTRIL) 10 MG tablet Take 20 mg by mouth Daily.   Taking   • metoprolol succinate XL (TOPROL-XL) 50 MG 24 hr tablet Take 1 tablet by mouth Daily. 30 tablet 0 Taking   • thiamine (VITAMIN B-1) 100 MG tablet Take 100 mg by mouth.   Taking       Current Meds:   Current Facility-Administered Medications   Medication Dose Route Frequency Provider Last Rate Last Dose   • ALPRAZolam (XANAX) tablet 0.5 mg  0.5 mg Oral TID PRN Babar Oliver MD   0.5 mg at 03/07/19 2058   • cefepime (MAXIPIME) 2 g/100 mL 0.9% NS (mbp)  2 g Intravenous Q24H Roberth Julian Jr.,  mL/hr at 03/08/19 0539 2 g at 03/08/19 0539   • dextrose (D50W) 25 g/ 50mL Intravenous Solution 25 g  25 g Intravenous Q15 Min PRN Christopher Mercedes MD   25 g at 03/08/19 0600   • dextrose (GLUTOSE) oral gel 15 g  15 g Oral Q15 Min PRN Christopher Mercedes MD       • dextrose 5 % and lactated Ringer's infusion  125 mL/hr Intravenous Continuous Tai Karimi  mL/hr at 03/08/19 0814 125 mL/hr at 03/08/19 0814   • famotidine (PEPCID) injection 20 mg  20 mg Intravenous Q12H Christopher Mercedes MD   20 mg at 03/08/19 0814   • glucagon (human recombinant) (GLUCAGEN DIAGNOSTIC) injection 1 mg  1 mg Subcutaneous PRN Christopher Mercedes MD       • hydrALAZINE (APRESOLINE) injection 10 mg  10 mg Intravenous Q6H PRN Christopher Mercedes MD   10 mg at 03/08/19 0814   • insulin lispro (humaLOG) injection 0-9 Units  0-9 Units Subcutaneous 4x Daily With Meals & Nightly Christopher Mercedes MD       • levothyroxine sodium injection 75 mcg  75 mcg Intravenous Q AM Christopher Mercedes MD   75 mcg at 03/08/19 0539   • metoprolol tartrate (LOPRESSOR) injection 5 mg  5 mg Intravenous Q6H Christopher Mercedes MD   5 mg at 03/08/19 3228   • morphine injection 2 mg  2 mg Intravenous Q2H PRN Babar Oliver MD       • ondansetron (ZOFRAN) injection 4 mg  4 mg Intravenous Q6H PRN Roberth Julian Jr., MD   4 mg at 03/07/19 5255   • sodium chloride 0.9  "% flush 1-10 mL  1-10 mL Intravenous PRN Roberth Julian Jr., MD       • sodium chloride 0.9 % flush 10 mL  10 mL Intravenous PRN Casey Sparks III, PA       • sodium chloride 0.9 % flush 3 mL  3 mL Intravenous Q12H Roberth Julian Jr., MD   3 mL at 03/08/19 0821       Allergies:  Allergies   Allergen Reactions   • Dapsone Other (See Comments)     Pt states she \"quit producing blood.\"  Quit producing blood   • Augmentin [Amoxicillin-Pot Clavulanate] Rash     rash  **tolerates cephalosporins**   **no reason to give aztreonam in the future**   • Sulfa Antibiotics      Sister went blind with taking and was advised not to take.  Sister went blind with taking and was advised not to take.   • Tetracycline Hives and Itching   • Tetracyclines & Related    • Levofloxacin Hives and Rash       Social History:   Social History     Socioeconomic History   • Marital status:      Spouse name: Not on file   • Number of children: Not on file   • Years of education: Not on file   • Highest education level: Not on file   Tobacco Use   • Smoking status: Never Smoker   • Smokeless tobacco: Never Used   Substance and Sexual Activity   • Alcohol use: Yes   • Drug use: No   • Sexual activity: Defer        Family History:  Family History   Problem Relation Age of Onset   • Pancreatitis Brother    • Cancer Brother    • Ulcerative colitis Son    • Crohn's disease Grandchild    • Cancer Mother    • Cancer Maternal Grandmother         Review of Systems: as per HPI, in addition:    General:      No specific weakness / fatigue,                       No fevers / chills                       no weight loss  HEENT:       no dysphagia / odynophagia  Neck:           normal range of motion, no swelling  Respiratory: no cough / congestion                      No shortness of air                       No wheezing  CV:              No chest pain                       No palpitations  Abdomen/GI: no nausea / vomiting                      " "No diarrhea / constipation                     Complains of abdominal pain  :             no dysuria / urinary frequency                       No urgency, normal output  Endocrine:   no polyuria / polydipsia,                      No heat or cold intolerance  Skin:           no rashes or skin breakdown   Vascular:   No edema                     No claudication  Psych:        no depression/ anxiety  Neuro:        no focal weakness, no seizures  Musculoskeletal: no joint pain or deformities      Physical Exam:  Vitals:   Temp (24hrs), Av.9 °F (36.6 °C), Min:97.6 °F (36.4 °C), Max:98.2 °F (36.8 °C)    /68   Pulse 64   Temp 97.6 °F (36.4 °C) (Oral)   Resp 16   Ht 160 cm (63\")   Wt 89.4 kg (197 lb)   SpO2 98%   BMI 34.90 kg/m²   Intake/Output:     Intake/Output Summary (Last 24 hours) at 3/8/2019 0859  Last data filed at 3/8/2019 0600  Gross per 24 hour   Intake 1262 ml   Output --   Net 1262 ml        Wt Readings from Last 1 Encounters:   19 0343 89.4 kg (197 lb)       Exam:    General Appearance:  Awake, alert, oriented x3, no acute distress  Well-appearing, obese   Head and Face:  Normocephalic, atraumatic, mucus membranes moist, oropharynx clear   Eyes:  No icterus, pupils equal round and reactive to light, extraocular movements intact    ENMT: Moist mucosa, tongue symmetric    Neck: Supple  no jugular venous distention  no thyromegaly   Pulmonary:  Respiratory effort: Normal  Auscultation of lungs: Clear bilaterally  No wheezes  No rhonchi  Good air movement, good expansion   Chest wall:  No tenderness or deformity   Cardiovascular:  Auscultation of the heart: Normal rhythm, no murmurs  No edema of extremities    Abdomen:  Abdomen: soft, non-tender, normal bowel sounds all four quadrants, no masses   Liver and spleen: no hepatosplenomegaly   Musculoskeletal: Digits and nails: normal  Normal range of motion  No joint swelling or gross deformities    Skin: Skin inspection: color normal, no " visible rashes or lesions  Skin palpation: texture, turgor normal, no palpable lesions   Lymphatic:  no cervical lymphadenopathy    Psychiatric: Judgement and insight: normal  Orientation to person place and time: normal  Mood and affect: normal       DATA:  Radiology and Labs:  The following labs independently reviewed by me  Old records independently reviewed showing stage III chronic kidney disease, baseline creatinine around 1.6  The following radiologic studies independently viewed by me, findings CT abdomen and pelvis with findings as described above  Interval notes, chart personally reviewed by me.   New problems include perforated esophagus after EGD, acute kidney injury  Discussed with patient and her nurse at bedside    Risk/ complexity of medical care/ medical decision making  High with 2 new problems          Labs:   Recent Results (from the past 24 hour(s))   POC Glucose Once    Collection Time: 03/07/19 11:19 AM   Result Value Ref Range    Glucose 96 70 - 130 mg/dL   POC Glucose Once    Collection Time: 03/07/19  4:03 PM   Result Value Ref Range    Glucose 76 70 - 130 mg/dL   Hemoglobin A1c    Collection Time: 03/07/19  5:27 PM   Result Value Ref Range    Hemoglobin A1C 7.00 (H) 4.80 - 5.60 %   POC Glucose Once    Collection Time: 03/07/19  9:53 PM   Result Value Ref Range    Glucose 66 (L) 70 - 130 mg/dL   POC Glucose Once    Collection Time: 03/07/19 10:23 PM   Result Value Ref Range    Glucose 171 (H) 70 - 130 mg/dL   Basic Metabolic Panel    Collection Time: 03/08/19  4:55 AM   Result Value Ref Range    Glucose 59 (L) 65 - 99 mg/dL    BUN 26 (H) 8 - 23 mg/dL    Creatinine 1.68 (H) 0.57 - 1.00 mg/dL    Sodium 139 136 - 145 mmol/L    Potassium 4.4 3.5 - 5.2 mmol/L    Chloride 105 98 - 107 mmol/L    CO2 24.0 22.0 - 29.0 mmol/L    Calcium 8.7 8.6 - 10.5 mg/dL    eGFR Non African Amer 30 (L) >60 mL/min/1.73    BUN/Creatinine Ratio 15.5 7.0 - 25.0    Anion Gap 10.0 mmol/L   CBC Auto Differential     Collection Time: 03/08/19  4:55 AM   Result Value Ref Range    WBC 5.78 3.40 - 10.80 10*3/mm3    RBC 3.00 (L) 3.77 - 5.28 10*6/mm3    Hemoglobin 8.9 (L) 12.0 - 15.9 g/dL    Hematocrit 30.2 (L) 34.0 - 46.6 %    .7 (H) 79.0 - 97.0 fL    MCH 29.7 26.6 - 33.0 pg    MCHC 29.5 (L) 31.5 - 35.7 g/dL    RDW 13.4 12.3 - 15.4 %    RDW-SD 50.3 37.0 - 54.0 fl    MPV 9.2 6.0 - 12.0 fL    Platelets 249 140 - 450 10*3/mm3    Neutrophil % 59.1 42.7 - 76.0 %    Lymphocyte % 28.2 19.6 - 45.3 %    Monocyte % 8.5 5.0 - 12.0 %    Eosinophil % 3.6 0.3 - 6.2 %    Basophil % 0.3 0.0 - 1.5 %    Immature Grans % 0.3 0.0 - 0.5 %    Neutrophils, Absolute 3.41 1.40 - 7.00 10*3/mm3    Lymphocytes, Absolute 1.63 0.70 - 3.10 10*3/mm3    Monocytes, Absolute 0.49 0.10 - 0.90 10*3/mm3    Eosinophils, Absolute 0.21 0.00 - 0.40 10*3/mm3    Basophils, Absolute 0.02 0.00 - 0.20 10*3/mm3    Immature Grans, Absolute 0.02 0.00 - 0.05 10*3/mm3    nRBC 0.0 0.0 - 0.0 /100 WBC   POC Glucose Once    Collection Time: 03/08/19  5:53 AM   Result Value Ref Range    Glucose 61 (L) 70 - 130 mg/dL   POC Glucose Once    Collection Time: 03/08/19  6:34 AM   Result Value Ref Range    Glucose 155 (H) 70 - 130 mg/dL       Radiology:  Imaging Results (last 24 hours)     Procedure Component Value Units Date/Time    CT Chest Without Contrast [[id number]] Collected:  03/07/19 0915     Updated:  03/07/19 1059    Narrative:       CT CHEST, ABDOMEN, AND PELVIS WITHOUT CONTRAST.     HISTORY: 73-year-old female with free air. Evaluate for site of  perforation. Gastric bypass, cholecystectomy, appendectomy in the past.     TECHNIQUE: Radiation dose reduction techniques were utilized, including  automated exposure control and exposure modulation based on body size.   Oral contrast was given. 3 mm images were obtained through the chest,  abdomen, and pelvis without the administration of IV contrast. Compared  with a CT of the abdomen and pelvis performed earlier without  oral  contrast. Also compared with prior from 12/29/2018.     FINDINGS:  CHEST: There are mild atelectatic changes at the lower lung fields.  There are no acute appearing airspace consolidations and there are no  pleural or pericardial effusions. There is no lymphadenopathy within the  chest. The pulmonary arteries are enlarged with the main pulmonary  artery measuring 3.2 cm transversely.     ABDOMEN/PELVIS: No definite extravasation of oral contrast is seen  surrounding the stomach or the gastrojejunostomy. The gastrojejunostomy  appears thickened. There is a moderate volume of free air in the  nondependent aspect of the abdomen, predominantly the upper abdomen, but  there is also air which extends inferiorly along the left paracolic  gutter to the level of the iliac crests. There is a small posterior  gastric fundal diverticulum. There is some air along the duodenum, but  no extravasated oral contrast is seen. There is no bowel ileus or  obstruction. There is extensive sigmoid diverticulosis and there is  diverticulosis throughout the entire colon without convincing evidence  for diverticulitis. Noncontrasted liver, spleen, pancreas, and adrenals  appear unremarkable. There are multiple variable size cysts and  hyperdense renal lesions which are stable.  Noncontrasted uterus and  adnexa appear unremarkable. There are moderately extensive  atherosclerotic changes throughout the abdominal aorta without  aneurysmal dilatation.       Impression:       1. There is no extravasated oral contrast from the stomach or proximal  small bowel. The gastrojejunostomy appears thickened and may be the  source of the rupture. A gastric origin is suspected, but no definite  extravasated oral contrast is seen.  2. Extensive colonic diverticulosis without convincing evidence for  diverticulitis.  3. No acute abnormality is seen within the chest.     Discussed with ABDOUL North.     This report was finalized on 3/7/2019 10:56 AM  by Dr. Heaven Wolfe M.D.       CT Abdomen Pelvis Without Contrast [[id number]] Collected:  03/07/19 0915     Updated:  03/07/19 1059    Narrative:       CT CHEST, ABDOMEN, AND PELVIS WITHOUT CONTRAST.     HISTORY: 73-year-old female with free air. Evaluate for site of  perforation. Gastric bypass, cholecystectomy, appendectomy in the past.     TECHNIQUE: Radiation dose reduction techniques were utilized, including  automated exposure control and exposure modulation based on body size.   Oral contrast was given. 3 mm images were obtained through the chest,  abdomen, and pelvis without the administration of IV contrast. Compared  with a CT of the abdomen and pelvis performed earlier without oral  contrast. Also compared with prior from 12/29/2018.     FINDINGS:  CHEST: There are mild atelectatic changes at the lower lung fields.  There are no acute appearing airspace consolidations and there are no  pleural or pericardial effusions. There is no lymphadenopathy within the  chest. The pulmonary arteries are enlarged with the main pulmonary  artery measuring 3.2 cm transversely.     ABDOMEN/PELVIS: No definite extravasation of oral contrast is seen  surrounding the stomach or the gastrojejunostomy. The gastrojejunostomy  appears thickened. There is a moderate volume of free air in the  nondependent aspect of the abdomen, predominantly the upper abdomen, but  there is also air which extends inferiorly along the left paracolic  gutter to the level of the iliac crests. There is a small posterior  gastric fundal diverticulum. There is some air along the duodenum, but  no extravasated oral contrast is seen. There is no bowel ileus or  obstruction. There is extensive sigmoid diverticulosis and there is  diverticulosis throughout the entire colon without convincing evidence  for diverticulitis. Noncontrasted liver, spleen, pancreas, and adrenals  appear unremarkable. There are multiple variable size cysts and  hyperdense renal  lesions which are stable.  Noncontrasted uterus and  adnexa appear unremarkable. There are moderately extensive  atherosclerotic changes throughout the abdominal aorta without  aneurysmal dilatation.       Impression:       1. There is no extravasated oral contrast from the stomach or proximal  small bowel. The gastrojejunostomy appears thickened and may be the  source of the rupture. A gastric origin is suspected, but no definite  extravasated oral contrast is seen.  2. Extensive colonic diverticulosis without convincing evidence for  diverticulitis.  3. No acute abnormality is seen within the chest.     Discussed with ABDOUL North.     This report was finalized on 3/7/2019 10:56 AM by Dr. Heaven Wolfe M.D.                  ASSESSMENT:   Acute kidney injury, prerenal, improved today with fluids  Chronic kidney disease stage III, stable today and near baseline  Uncontrolled hypertension, currently off all oral medications  Hypothyroidism  Perforated esophagus status post EGD  Type 2 diabetes mellitus  GERD  Abdominal pain  Anemia, in part due to chronic kidney disease      PLAN:   Renal function is stable, near baseline  We will change IV fluids to D5W only to provide some nutrition while n.p.o., I will see her blood pressure improves without the IV saline  Continue current IV antihypertensive regimen for now  If blood pressure remains poorly controlled could schedule the IV hydralazine or possibly add clonidine but her pulse is already in the 60s so I would need to be cautious with that.      Continue to monitor electrolytes and volume closely, avoid IV contrast and nephrotoxic medications       I appreciate the opportunity to participate in this patient's care.  Please call with any questions or concerns.       Tank Taylor M.D  [facility]  Office phone number: [phone]  Answering service phone number: [phone]        3/8/2019        Dictation via Dragon dictation software

## 2019-03-08 NOTE — PROGRESS NOTES
Chief Complaint:    Perforated viscus    Subjective:    The patient is feeling better today.  Her abdominal pain has improved but she continues to have symptoms in the left upper quadrant.    Objective:    Temp:  [97.6 °F (36.4 °C)-98.2 °F (36.8 °C)] 97.6 °F (36.4 °C)  Heart Rate:  [52-64] 64  Resp:  [16-20] 16  BP: (152-188)/(57-79) 188/63    Physical Exam   Constitutional: She is cooperative. She does not appear ill. No distress.   Pulmonary/Chest: Effort normal. No respiratory distress.   Abdominal: Soft. There is tenderness (Moderately tender) in the left upper quadrant.   Neurological: She is alert.       Results:    WBC remains normal at 5.78 with a normal differential.  H/H has decreased to 8.9/30.2.  BUN and creatinine have improved with BUN 26 and creatinine 1.68.    Assessment/Plan:    The patient has a perforation related to a recent dilatation of a VBG stricture.  She is clinically improving with conservative management suggesting the perforation has sealed.  She will be continued on strict n.p.o. status with IV antibiotics.    Her H/H has decreased which is likely dilutional but some degree of bleeding could be present at the site of the perforation.  Her H/H will be followed.    Roberth Julian Jr., M.D.

## 2019-03-08 NOTE — PLAN OF CARE
Problem: Patient Care Overview  Goal: Plan of Care Review  Outcome: Ongoing (interventions implemented as appropriate)   03/08/19 0121   Coping/Psychosocial   Plan of Care Reviewed With patient   OTHER   Outcome Summary Pt NPO, no c/o pain or nausea this shift; IVF infusing; Xanax ordered for anxiety, given x 1; no s/s of distress    Plan of Care Review   Progress no change   Coping/Psychosocial   Patient Agreement with Plan of Care agrees       Problem: Pain, Acute (Adult)  Goal: Identify Related Risk Factors and Signs and Symptoms  Outcome: Ongoing (interventions implemented as appropriate)    Goal: Acceptable Pain Control/Comfort Level  Outcome: Ongoing (interventions implemented as appropriate)      Problem: Nausea/Vomiting (Adult)  Goal: Identify Related Risk Factors and Signs and Symptoms  Outcome: Ongoing (interventions implemented as appropriate)    Goal: Adequate Hydration  Outcome: Ongoing (interventions implemented as appropriate)

## 2019-03-08 NOTE — PROGRESS NOTES
Nephrology Note    Consult received and chart reviewed. Full consult note to follow.     Patient is a 74 yo WF with CKD3, followed by Dr. Taylor, with history of vertical banded gastroplasty for weight loss. She had a esophageal dilation on 3/5/19. She started having abdominal pain that has progressed prompting evaluation. CT scan was done showed free air in abdominal cavity. She has been admitted for evaluation and treatment.    She has CKD4, followed by Dr. Taylor. She was last seen in clinic in November 2018.   Her baseline Cr is around 2.0-2.2. She does have chronic hyperkalemia as well.     1. CKD4  2. Hyperkalemia- mild  3. HTN  4. Perforated viscus   5. DM    Renal function at baseline  K mild. Currently NPO  Per surgery's note, conservative management for now. On IVF.   Monitor K while on LR with her low eGFR and borderline hyperkalemia.  Consider changing to NS.   BP improved. Continue current regimen.   Renally dose medications  Avid nephrotoxins.   Will follow    Thanks for referral.     Bev Newell MD  [facility].

## 2019-03-08 NOTE — PLAN OF CARE
Problem: Patient Care Overview  Goal: Plan of Care Review  Outcome: Ongoing (interventions implemented as appropriate)   03/08/19 1646   OTHER   Outcome Summary Pt vitals stable.Meds adjusted for bp. Prn meds for x 1. Per pt she is feeling better. Iv fluids changed. Will continue to monitor   Plan of Care Review   Progress improving     Goal: Individualization and Mutuality  Outcome: Ongoing (interventions implemented as appropriate)    Goal: Discharge Needs Assessment  Outcome: Ongoing (interventions implemented as appropriate)    Goal: Interprofessional Rounds/Family Conf  Outcome: Ongoing (interventions implemented as appropriate)      Problem: Pain, Acute (Adult)  Goal: Identify Related Risk Factors and Signs and Symptoms  Outcome: Outcome(s) achieved Date Met: 03/08/19    Goal: Acceptable Pain Control/Comfort Level  Outcome: Ongoing (interventions implemented as appropriate)      Problem: Nausea/Vomiting (Adult)  Goal: Identify Related Risk Factors and Signs and Symptoms  Outcome: Outcome(s) achieved Date Met: 03/08/19    Goal: Symptom Relief  Outcome: Ongoing (interventions implemented as appropriate)    Goal: Adequate Hydration  Outcome: Ongoing (interventions implemented as appropriate)

## 2019-03-08 NOTE — PROGRESS NOTES
Continued Stay Note  [facility]     Patient Name: Ya Paz  MRN: [id number]  Today's Date: 3/8/2019    Admit Date: 3/7/2019    Discharge Plan     Row Name 03/08/19 1501       Plan    Plan  Home with [facility] and [facility] if needed.    Plan Comments  Patient declined needing SNF but agreed to [facility] referral and [facility] if antibiotics are needed at discharge; call to Pily with [facility] . LEV Anrdews        Discharge Codes    No documentation.             Tnana Leone RN

## 2019-03-09 ENCOUNTER — APPOINTMENT (OUTPATIENT)
Dept: GENERAL RADIOLOGY | Facility: HOSPITAL | Age: 74
End: 2019-03-09

## 2019-03-09 LAB
ANION GAP SERPL CALCULATED.3IONS-SCNC: 11.6 MMOL/L
BUN BLD-MCNC: 17 MG/DL (ref 8–23)
BUN/CREAT SERPL: 10.2 (ref 7–25)
CALCIUM SPEC-SCNC: 8.9 MG/DL (ref 8.6–10.5)
CHLORIDE SERPL-SCNC: 101 MMOL/L (ref 98–107)
CO2 SERPL-SCNC: 24.4 MMOL/L (ref 22–29)
CREAT BLD-MCNC: 1.67 MG/DL (ref 0.57–1)
GFR SERPL CREATININE-BSD FRML MDRD: 30 ML/MIN/1.73
GLUCOSE BLD-MCNC: 124 MG/DL (ref 65–99)
GLUCOSE BLDC GLUCOMTR-MCNC: 102 MG/DL (ref 70–130)
GLUCOSE BLDC GLUCOMTR-MCNC: 122 MG/DL (ref 70–130)
GLUCOSE BLDC GLUCOMTR-MCNC: 126 MG/DL (ref 70–130)
GLUCOSE BLDC GLUCOMTR-MCNC: 181 MG/DL (ref 70–130)
POTASSIUM BLD-SCNC: 4.1 MMOL/L (ref 3.5–5.2)
SODIUM BLD-SCNC: 137 MMOL/L (ref 136–145)

## 2019-03-09 PROCEDURE — 25010000002 HYDRALAZINE PER 20 MG: Performed by: INTERNAL MEDICINE

## 2019-03-09 PROCEDURE — 74241: CPT

## 2019-03-09 PROCEDURE — 80048 BASIC METABOLIC PNL TOTAL CA: CPT | Performed by: INTERNAL MEDICINE

## 2019-03-09 PROCEDURE — 63710000001 INSULIN LISPRO (HUMAN) PER 5 UNITS: Performed by: HOSPITALIST

## 2019-03-09 PROCEDURE — 82962 GLUCOSE BLOOD TEST: CPT

## 2019-03-09 PROCEDURE — 63710000001 PREDNISONE PER 5 MG: Performed by: INTERNAL MEDICINE

## 2019-03-09 PROCEDURE — 0 DIATRIZOATE MEGLUMINE & SODIUM PER 1 ML: Performed by: SURGERY

## 2019-03-09 PROCEDURE — 25010000002 CEFEPIME PER 500 MG: Performed by: SURGERY

## 2019-03-09 PROCEDURE — 25010000002 ONDANSETRON PER 1 MG: Performed by: SURGERY

## 2019-03-09 RX ORDER — FAMOTIDINE 10 MG/ML
10 INJECTION, SOLUTION INTRAVENOUS EVERY 12 HOURS SCHEDULED
Status: DISCONTINUED | OUTPATIENT
Start: 2019-03-09 | End: 2019-03-09

## 2019-03-09 RX ORDER — PREDNISONE 1 MG/1
5 TABLET ORAL DAILY
Status: DISCONTINUED | OUTPATIENT
Start: 2019-03-09 | End: 2019-03-10 | Stop reason: HOSPADM

## 2019-03-09 RX ORDER — LEVOTHYROXINE SODIUM 0.12 MG/1
125 TABLET ORAL
Status: DISCONTINUED | OUTPATIENT
Start: 2019-03-10 | End: 2019-03-10 | Stop reason: HOSPADM

## 2019-03-09 RX ORDER — DIPHENHYDRAMINE HYDROCHLORIDE 50 MG/ML
25 INJECTION INTRAMUSCULAR; INTRAVENOUS EVERY 4 HOURS PRN
Status: DISCONTINUED | OUTPATIENT
Start: 2019-03-09 | End: 2019-03-10 | Stop reason: HOSPADM

## 2019-03-09 RX ORDER — HYDRALAZINE HYDROCHLORIDE 50 MG/1
50 TABLET, FILM COATED ORAL 3 TIMES DAILY
Status: DISCONTINUED | OUTPATIENT
Start: 2019-03-09 | End: 2019-03-10 | Stop reason: HOSPADM

## 2019-03-09 RX ORDER — METOPROLOL SUCCINATE 50 MG/1
50 TABLET, EXTENDED RELEASE ORAL
Status: DISCONTINUED | OUTPATIENT
Start: 2019-03-10 | End: 2019-03-10 | Stop reason: HOSPADM

## 2019-03-09 RX ORDER — FAMOTIDINE 20 MG/1
20 TABLET, FILM COATED ORAL 2 TIMES DAILY
Status: DISCONTINUED | OUTPATIENT
Start: 2019-03-09 | End: 2019-03-10 | Stop reason: HOSPADM

## 2019-03-09 RX ADMIN — FAMOTIDINE 20 MG: 20 TABLET, FILM COATED ORAL at 20:25

## 2019-03-09 RX ADMIN — HYDRALAZINE HYDROCHLORIDE 50 MG: 50 TABLET, FILM COATED ORAL at 20:25

## 2019-03-09 RX ADMIN — HYDRALAZINE HYDROCHLORIDE 20 MG: 20 INJECTION INTRAMUSCULAR; INTRAVENOUS at 02:09

## 2019-03-09 RX ADMIN — INSULIN LISPRO 2 UNITS: 100 INJECTION, SOLUTION INTRAVENOUS; SUBCUTANEOUS at 21:20

## 2019-03-09 RX ADMIN — SODIUM CHLORIDE, PRESERVATIVE FREE 3 ML: 5 INJECTION INTRAVENOUS at 08:46

## 2019-03-09 RX ADMIN — FAMOTIDINE 20 MG: 10 INJECTION INTRAVENOUS at 08:46

## 2019-03-09 RX ADMIN — METOPROLOL TARTRATE 5 MG: 5 INJECTION INTRAVENOUS at 05:11

## 2019-03-09 RX ADMIN — HYDRALAZINE HYDROCHLORIDE 20 MG: 20 INJECTION INTRAMUSCULAR; INTRAVENOUS at 08:45

## 2019-03-09 RX ADMIN — PREDNISONE 5 MG: 5 TABLET ORAL at 18:06

## 2019-03-09 RX ADMIN — DIATRIZOATE MEGLUMINE AND DIATRIZOATE SODIUM 60 ML: 660; 100 LIQUID ORAL; RECTAL at 13:48

## 2019-03-09 RX ADMIN — DEXTROSE MONOHYDRATE 75 ML/HR: 50 INJECTION, SOLUTION INTRAVENOUS at 02:09

## 2019-03-09 RX ADMIN — ALPRAZOLAM 0.5 MG: 0.5 TABLET ORAL at 10:22

## 2019-03-09 RX ADMIN — METOPROLOL TARTRATE 5 MG: 5 INJECTION INTRAVENOUS at 12:49

## 2019-03-09 RX ADMIN — HYDRALAZINE HYDROCHLORIDE 20 MG: 20 INJECTION INTRAMUSCULAR; INTRAVENOUS at 14:45

## 2019-03-09 RX ADMIN — METOPROLOL TARTRATE 5 MG: 5 INJECTION INTRAVENOUS at 00:08

## 2019-03-09 RX ADMIN — CEFEPIME HYDROCHLORIDE 2 G: 2 INJECTION, POWDER, FOR SOLUTION INTRAVENOUS at 05:11

## 2019-03-09 RX ADMIN — SODIUM CHLORIDE, PRESERVATIVE FREE 3 ML: 5 INJECTION INTRAVENOUS at 20:27

## 2019-03-09 RX ADMIN — ONDANSETRON 4 MG: 2 INJECTION INTRAMUSCULAR; INTRAVENOUS at 09:22

## 2019-03-09 RX ADMIN — LEVOTHYROXINE SODIUM ANHYDROUS 75 MCG: 100 INJECTION, POWDER, LYOPHILIZED, FOR SOLUTION INTRAVENOUS at 05:11

## 2019-03-09 NOTE — PLAN OF CARE
Problem: Patient Care Overview  Goal: Plan of Care Review  Outcome: Ongoing (interventions implemented as appropriate)   03/09/19 4063   Coping/Psychosocial   Plan of Care Reviewed With patient   OTHER   Outcome Summary Upper GI performed today. Diet started and PO meds restarted. VSS, will continue to monitor.    Plan of Care Review   Progress no change     Goal: Individualization and Mutuality  Outcome: Ongoing (interventions implemented as appropriate)    Goal: Discharge Needs Assessment  Outcome: Ongoing (interventions implemented as appropriate)    Goal: Interprofessional Rounds/Family Conf  Outcome: Ongoing (interventions implemented as appropriate)      Problem: Pain, Acute (Adult)  Goal: Acceptable Pain Control/Comfort Level  Outcome: Ongoing (interventions implemented as appropriate)      Problem: Nausea/Vomiting (Adult)  Goal: Symptom Relief  Outcome: Ongoing (interventions implemented as appropriate)    Goal: Adequate Hydration  Outcome: Ongoing (interventions implemented as appropriate)

## 2019-03-09 NOTE — NURSING NOTE
Pt refusing heart monitor at this moment. Educated pt about the importance of monitoring her heart and that she will be refusing. Pt is alert and oriented. Pt aware and does not want the heart monitor on at this moment. Will reeducate and attempt to place monitor again soon.

## 2019-03-09 NOTE — PROGRESS NOTES
LOS: 2 days   Patient Care Team:  Nicki Hidalgo MD as PCP - General (Internal Medicine)    Chief Complaint:  Arthritis pain    Interval History:   Patient Denies:  N/v/cp/soa/abd pain  History taken from: Patient      Objective     Vital Signs  Temp:  [94.4 °F (34.7 °C)-98.3 °F (36.8 °C)] 98.1 °F (36.7 °C)  Heart Rate:  [65-78] 66  Resp:  [16] 16  BP: (130-200)/(60-77) 152/70      Physical Exam:   Heart: RR   Lungs:  CTA B   Abd:  Soft and nd; mild tenderness luq; no guarding, rebound   Ext:  No clubbing, cyanosis, edema     Results Review:     I reviewed the patient's new clinical results.    Lab Results (last 24 hours)     Procedure Component Value Units Date/Time    POC Glucose Once [[id number]]  (Normal) Collected:  03/09/19 1059    Specimen:  Blood Updated:  03/09/19 1100     Glucose 122 mg/dL     Basic Metabolic Panel [[id number]]  (Abnormal) Collected:  03/09/19 0929    Specimen:  Blood Updated:  03/09/19 1022     Glucose 124 mg/dL      BUN 17 mg/dL      Creatinine 1.67 mg/dL      Sodium 137 mmol/L      Potassium 4.1 mmol/L      Chloride 101 mmol/L      CO2 24.4 mmol/L      Calcium 8.9 mg/dL      eGFR Non African Amer 30 mL/min/1.73      BUN/Creatinine Ratio 10.2     Anion Gap 11.6 mmol/L     Narrative:       The MDRD GFR formula is only valid for adults with stable renal function between ages 18 and 70.    POC Glucose Once [[id number]]  (Normal) Collected:  03/09/19 0559    Specimen:  Blood Updated:  03/09/19 0600     Glucose 102 mg/dL     POC Glucose Once [[id number]]  (Normal) Collected:  03/08/19 2051    Specimen:  Blood Updated:  03/08/19 2052     Glucose 88 mg/dL     POC Glucose Once [[id number]]  (Normal) Collected:  03/08/19 1552    Specimen:  Blood Updated:  03/08/19 1605     Glucose 70 mg/dL     Hemoglobin & Hematocrit, Blood [[id number]]  (Abnormal) Collected:  03/08/19 1158    Specimen:  Blood Updated:  03/08/19 1214     Hemoglobin 9.4 g/dL      Hematocrit 31.8 %            Assessment/Plan:    Abdominal pain      73 y.o. female with history of non-divided Dedra-en-Y gastric bypass status post EGD with dilatation last Tuesday with perforation that is been treated conservatively.  Patient remains afebrile and hemodynamically stable except swings in blood pressure elevation.  Patient denies any abdominal pain today and abdomen is fairly benign on exam.  We will proceed with upper GI with Gastrografin rule out leak.  If negative then will proceed with p.o. including medications.        Raheel Carrizales MD  Medical Director [facility]  [facility]    03/09/19

## 2019-03-09 NOTE — PROGRESS NOTES
" LOS: 2 days   Patient Care Team:  Nicki Hidalgo MD as PCP - General (Internal Medicine)    Chief Complaint: JOSÉ LUIS/CKD      History of Present Illness  73 y.o. year old female who is very well-known to me from the office setting and from previous hospitalizations, she presented to the hospital yesterday after complications from an EGD that was done for dilatation for a stricture.  Main complaint is epigastric pain, CT scan of the abdomen and pelvis showed free intra-abdominal air and she was admitted for surgical management      Subjective  Patient feeling nauseated today  Denies sob or chest pain.     History taken from: patient chart    Objective     Vital Sign Min/Max for last 24 hours  Temp  Min: 94.4 °F (34.7 °C)  Max: 98.3 °F (36.8 °C)   BP  Min: 130/70  Max: 200/77   Pulse  Min: 65  Max: 78   Resp  Min: 16  Max: 16   SpO2  Min: 94 %  Max: 94 %   No Data Recorded   No Data Recorded     Flowsheet Rows      First Filed Value   Admission Height  160 cm (63\") Documented at 03/07/2019 0259   Admission Weight  89.4 kg (197 lb) Documented at 03/07/2019 0343          No intake/output data recorded.  I/O last 3 completed shifts:  In: 3237 [I.V.:3137; IV Piggyback:100]  Out: 700 [Urine:700]    Objective  Physical Examination: General appearance - alert, well appearing, and in no distress  Mental status - alert, oriented to person, place, and time  Eyes - pupils equal and reactive, extraocular eye movements intact  Chest - clear to auscultation, no wheezes, rales or rhonchi, symmetric air entry  Heart - normal rate, regular rhythm, normal S1, S2, no murmurs, rubs, clicks or gallops  Abdomen - soft, nontender, nondistended, no masses or organomegaly  Neurological - alert, oriented, normal speech, no focal findings or movement disorder noted  Extremities - peripheral pulses normal, no pedal edema, no clubbing or cyanosis    Results Review:     I reviewed the patient's new clinical results.    WBC WBC   Date " Value Ref Range Status   03/08/2019 5.78 3.40 - 10.80 10*3/mm3 Final   03/07/2019 9.45 3.40 - 10.80 10*3/mm3 Final      HGB Hemoglobin   Date Value Ref Range Status   03/08/2019 9.4 (L) 12.0 - 15.9 g/dL Final   03/08/2019 8.9 (L) 12.0 - 15.9 g/dL Final   03/07/2019 10.7 (L) 12.0 - 15.9 g/dL Final      HCT Hematocrit   Date Value Ref Range Status   03/08/2019 31.8 (L) 34.0 - 46.6 % Final   03/08/2019 30.2 (L) 34.0 - 46.6 % Final   03/07/2019 35.1 34.0 - 46.6 % Final      Platlets No results found for: LABPLAT   MCV MCV   Date Value Ref Range Status   03/08/2019 100.7 (H) 79.0 - 97.0 fL Final   03/07/2019 95.9 79.0 - 97.0 fL Final          Sodium Sodium   Date Value Ref Range Status   03/08/2019 139 136 - 145 mmol/L Final   03/07/2019 137 136 - 145 mmol/L Final      Potassium Potassium   Date Value Ref Range Status   03/08/2019 4.4 3.5 - 5.2 mmol/L Final   03/07/2019 5.1 3.5 - 5.2 mmol/L Final     Comment:     Specimen hemolyzed.  Results may be affected.      Chloride Chloride   Date Value Ref Range Status   03/08/2019 105 98 - 107 mmol/L Final   03/07/2019 99 98 - 107 mmol/L Final      CO2 CO2   Date Value Ref Range Status   03/08/2019 24.0 22.0 - 29.0 mmol/L Final   03/07/2019 24.7 22.0 - 29.0 mmol/L Final      BUN BUN   Date Value Ref Range Status   03/08/2019 26 (H) 8 - 23 mg/dL Final   03/07/2019 33 (H) 8 - 23 mg/dL Final      Creatinine Creatinine   Date Value Ref Range Status   03/08/2019 1.68 (H) 0.57 - 1.00 mg/dL Final   03/07/2019 2.04 (H) 0.57 - 1.00 mg/dL Final      Calcium Calcium   Date Value Ref Range Status   03/08/2019 8.7 8.6 - 10.5 mg/dL Final   03/07/2019 9.4 8.6 - 10.5 mg/dL Final      PO4 No results found for: CAPO4   Albumin Albumin   Date Value Ref Range Status   03/07/2019 3.60 3.50 - 5.20 g/dL Final      Magnesium No results found for: MG   Uric Acid No results found for: URICACID     Medication Review:     Current Facility-Administered Medications:   •  ALPRAZolam (XANAX) tablet 0.5 mg, 0.5  mg, Oral, TID PRN, Babar Oliver MD, 0.5 mg at 03/07/19 2058  •  cefepime (MAXIPIME) 2 g/100 mL 0.9% NS (mbp), 2 g, Intravenous, Q24H, Roberth Julian Jr., MD, Last Rate: 200 mL/hr at 03/09/19 0511, 2 g at 03/09/19 0511  •  dextrose (D50W) 25 g/ 50mL Intravenous Solution 25 g, 25 g, Intravenous, Q15 Min PRN, Christopher Mercedes MD, 25 g at 03/08/19 0600  •  dextrose (D5W) 5 % infusion, 75 mL/hr, Intravenous, Continuous, Tank Taylor MD, Last Rate: 75 mL/hr at 03/09/19 0209, 75 mL/hr at 03/09/19 0209  •  dextrose (GLUTOSE) oral gel 15 g, 15 g, Oral, Q15 Min PRN, Christopher Mercedes MD  •  famotidine (PEPCID) injection 20 mg, 20 mg, Intravenous, Q12H, Christopher Mercedes MD, 20 mg at 03/08/19 2029  •  glucagon (human recombinant) (GLUCAGEN DIAGNOSTIC) injection 1 mg, 1 mg, Subcutaneous, PRN, Christopher Mercedes MD  •  hydrALAZINE (APRESOLINE) injection 20 mg, 20 mg, Intravenous, Q6H, Tank Taylor MD, 20 mg at 03/09/19 0209  •  insulin lispro (humaLOG) injection 0-9 Units, 0-9 Units, Subcutaneous, 4x Daily With Meals & Nightly, Christopher Mercedes MD  •  levothyroxine sodium injection 75 mcg, 75 mcg, Intravenous, Q AM, Christopher Mercedes MD, 75 mcg at 03/09/19 0511  •  metoprolol tartrate (LOPRESSOR) injection 5 mg, 5 mg, Intravenous, Q6H, Christopher Mercedes MD, 5 mg at 03/09/19 0511  •  morphine injection 2 mg, 2 mg, Intravenous, Q2H PRN, Babar Oliver MD, 2 mg at 03/08/19 2248  •  ondansetron (ZOFRAN) injection 4 mg, 4 mg, Intravenous, Q6H PRN, Roberth Julian Jr., MD, 4 mg at 03/07/19 7635  •  sodium chloride 0.9 % flush 1-10 mL, 1-10 mL, Intravenous, PRN, Roberth Julian Jr., MD  •  [COMPLETED] Insert peripheral IV, , , Once **AND** sodium chloride 0.9 % flush 10 mL, 10 mL, Intravenous, PRN, Casey Sparks III, PA  •  sodium chloride 0.9 % flush 3 mL, 3 mL, Intravenous, Q12H, Roberth Julian Jr., MD, 3 mL at 03/08/19 2030    Assessment/Plan       Abdominal  pain      Assessment & Plan  Acute kidney injury, prerenal, improved today with fluids  Chronic kidney disease stage III, stable today and near baseline  Uncontrolled hypertension, currently off all oral medications  Hypothyroidism  Perforated esophagus status post EGD  Type 2 diabetes mellitus  GERD  Abdominal pain  Anemia, in part due to chronic kidney disease        PLAN:   Labs pending this am   Continue current IV antihypertensive regimen for now  BP better this am. Has had pikes. Would confirm manually  Will continue to monitor      Bev Newell MD  03/09/19  7:30 AM

## 2019-03-09 NOTE — PLAN OF CARE
Problem: Patient Care Overview  Goal: Plan of Care Review  Outcome: Ongoing (interventions implemented as appropriate)   03/09/19 0238   Coping/Psychosocial   Plan of Care Reviewed With patient   OTHER   Outcome Summary VSS. SBP 140s-170s manually. Scheduled hydralazine and metoprolol given. PRN IV morphine given for c/o pain. IVF continues. Blood sugar stable. No acute changes. Will cont to monitor.   Plan of Care Review   Progress improving   Coping/Psychosocial   Patient Agreement with Plan of Care agrees     Goal: Individualization and Mutuality  Outcome: Ongoing (interventions implemented as appropriate)    Goal: Discharge Needs Assessment  Outcome: Ongoing (interventions implemented as appropriate)    Goal: Interprofessional Rounds/Family Conf  Outcome: Ongoing (interventions implemented as appropriate)      Problem: Pain, Acute (Adult)  Goal: Identify Related Risk Factors and Signs and Symptoms  Outcome: Outcome(s) achieved Date Met: 03/09/19    Goal: Acceptable Pain Control/Comfort Level  Outcome: Ongoing (interventions implemented as appropriate)      Problem: Nausea/Vomiting (Adult)  Goal: Identify Related Risk Factors and Signs and Symptoms  Outcome: Outcome(s) achieved Date Met: 03/09/19    Goal: Symptom Relief  Outcome: Ongoing (interventions implemented as appropriate)    Goal: Adequate Hydration  Outcome: Ongoing (interventions implemented as appropriate)

## 2019-03-09 NOTE — PROGRESS NOTES
Name: Ya Paz ADMIT: 3/7/2019   : 1945  PCP: Nicki Hidalgo MD    MRN: [id number] LOS: 2 days   AGE/SEX: 73 y.o. female  ROOM: [facility]   Subjective   CC: abdominal pain  No acute events. Overall patient is feeling much better.  She is having minimal discomfort.  Remains NPO.  No f/c/n/v/d.    Objective   Vital Signs  Temp:  [94.4 °F (34.7 °C)-98.3 °F (36.8 °C)] 98.1 °F (36.7 °C)  Heart Rate:  [65-78] 66  Resp:  [16] 16  BP: (130-200)/(60-77) 152/70  SpO2:  [94 %-95 %] 95 %  on   ;   Device (Oxygen Therapy): room air  Body mass index is 34.9 kg/m².    Physical Exam   Constitutional: She is oriented to person, place, and time. No distress.   HENT:   Head: Normocephalic and atraumatic.   Mouth/Throat: Oropharynx is clear and moist.   Eyes: Conjunctivae and EOM are normal. Pupils are equal, round, and reactive to light.   Neck: Normal range of motion. Neck supple.   Cardiovascular: Normal rate, regular rhythm and intact distal pulses.   Pulmonary/Chest: Effort normal and breath sounds normal.   Abdominal: Soft. Bowel sounds are normal. There is no tenderness. There is no rebound and no guarding.   Musculoskeletal: She exhibits no edema or tenderness.   Neurological: She is alert and oriented to person, place, and time.   Skin: Skin is warm and dry. She is not diaphoretic.   Psychiatric: She has a normal mood and affect. Her behavior is normal.   Nursing note and vitals reviewed.      Results Review:       I reviewed the patient's new clinical results.  Results from last 7 days   Lab Units 19  1158 19  0455 19  0343   WBC 10*3/mm3  --  5.78 9.45   HEMOGLOBIN g/dL 9.4* 8.9* 10.7*   PLATELETS 10*3/mm3  --  249 326     Results from last 7 days   Lab Units 19  0929 19  0455 19  0343   SODIUM mmol/L 137 139 137   POTASSIUM mmol/L 4.1 4.4 5.1   CHLORIDE mmol/L 101 105 99   CO2 mmol/L 24.4 24.0 24.7   BUN mg/dL 17 26* 33*   CREATININE mg/dL 1.67* 1.68* 2.04*    GLUCOSE mg/dL 124* 59* 182*   Estimated Creatinine Clearance: 32.3 mL/min (A) (by C-G formula based on SCr of 1.67 mg/dL (H)).  Results from last 7 days   Lab Units 03/07/19  0343   ALBUMIN g/dL 3.60   BILIRUBIN mg/dL 0.2   ALK PHOS U/L 56   AST (SGOT) U/L 22   ALT (SGPT) U/L 15     Results from last 7 days   Lab Units 03/09/19  0929 03/08/19  0455 03/07/19  0343   CALCIUM mg/dL 8.9 8.7 9.4   ALBUMIN g/dL  --   --  3.60     Results from last 7 days   Lab Units 03/07/19  0343   LACTATE mmol/L 1.2         cefepime 2 g Intravenous Q24H   famotidine 20 mg Intravenous Q12H   hydrALAZINE 20 mg Intravenous Q6H   insulin lispro 0-9 Units Subcutaneous 4x Daily With Meals & Nightly   levothyroxine sodium 75 mcg Intravenous Q AM   metoprolol tartrate 5 mg Intravenous Q6H   sodium chloride 3 mL Intravenous Q12H       dextrose 75 mL/hr Last Rate: 75 mL/hr (03/09/19 0209)   NPO Diet NPO Except: Sips With Meds      Assessment/Plan      Active Hospital Problems    Diagnosis  POA   • Abdominal pain [R10.9]  Yes      Resolved Hospital Problems   No resolved problems to display.   Abdominal Pain  - free air on CT scan, likely related to perforated viscus, had recent dilation of VBG stricture  - improving with conservative management-plans for UGI with gastrografin noted  - mgmt per primary team, NPO and on abx    Type 2 DM  - off of basal insulin  - on IV dextrose with ssi coverage    Hypothyroidism  - on IV synthroid until she can take PO    GERD   - on IV pepcid while NPO    HTN  - on IV metoprolol and PRN hydralazine  - nephrology managing    CKD stage IV  - nephrology following, appreciate recs    Rheumatoid Arthritis  - on 5mg prednisone daily chronically, currently held  - no e/o adrenal insufficiency  - can restart when okay with surgery    Mk Solo MD  [facility]  03/09/19  12:44 PM

## 2019-03-10 VITALS
HEIGHT: 63 IN | RESPIRATION RATE: 16 BRPM | WEIGHT: 197 LBS | OXYGEN SATURATION: 97 % | DIASTOLIC BLOOD PRESSURE: 61 MMHG | HEART RATE: 68 BPM | TEMPERATURE: 98.1 F | BODY MASS INDEX: 34.91 KG/M2 | SYSTOLIC BLOOD PRESSURE: 164 MMHG

## 2019-03-10 PROBLEM — R10.9 ABDOMINAL PAIN: Status: RESOLVED | Noted: 2019-03-07 | Resolved: 2019-03-10

## 2019-03-10 LAB
ANION GAP SERPL CALCULATED.3IONS-SCNC: 10.8 MMOL/L
BUN BLD-MCNC: 18 MG/DL (ref 8–23)
BUN/CREAT SERPL: 11.5 (ref 7–25)
CALCIUM SPEC-SCNC: 8.8 MG/DL (ref 8.6–10.5)
CHLORIDE SERPL-SCNC: 103 MMOL/L (ref 98–107)
CO2 SERPL-SCNC: 23.2 MMOL/L (ref 22–29)
CREAT BLD-MCNC: 1.56 MG/DL (ref 0.57–1)
GFR SERPL CREATININE-BSD FRML MDRD: 33 ML/MIN/1.73
GLUCOSE BLD-MCNC: 156 MG/DL (ref 65–99)
GLUCOSE BLDC GLUCOMTR-MCNC: 163 MG/DL (ref 70–130)
GLUCOSE BLDC GLUCOMTR-MCNC: 173 MG/DL (ref 70–130)
POTASSIUM BLD-SCNC: 4.2 MMOL/L (ref 3.5–5.2)
SODIUM BLD-SCNC: 137 MMOL/L (ref 136–145)

## 2019-03-10 PROCEDURE — 63710000001 PREDNISONE PER 5 MG: Performed by: INTERNAL MEDICINE

## 2019-03-10 PROCEDURE — 82962 GLUCOSE BLOOD TEST: CPT

## 2019-03-10 PROCEDURE — 25010000002 CEFEPIME PER 500 MG: Performed by: SURGERY

## 2019-03-10 PROCEDURE — 63710000001 INSULIN LISPRO (HUMAN) PER 5 UNITS: Performed by: HOSPITALIST

## 2019-03-10 PROCEDURE — 80048 BASIC METABOLIC PNL TOTAL CA: CPT | Performed by: INTERNAL MEDICINE

## 2019-03-10 RX ADMIN — PREDNISONE 5 MG: 5 TABLET ORAL at 08:18

## 2019-03-10 RX ADMIN — CEFEPIME HYDROCHLORIDE 2 G: 2 INJECTION, POWDER, FOR SOLUTION INTRAVENOUS at 05:11

## 2019-03-10 RX ADMIN — INSULIN LISPRO 2 UNITS: 100 INJECTION, SOLUTION INTRAVENOUS; SUBCUTANEOUS at 08:17

## 2019-03-10 RX ADMIN — FAMOTIDINE 20 MG: 20 TABLET, FILM COATED ORAL at 08:18

## 2019-03-10 RX ADMIN — METOPROLOL SUCCINATE 50 MG: 50 TABLET, FILM COATED, EXTENDED RELEASE ORAL at 08:18

## 2019-03-10 RX ADMIN — HYDRALAZINE HYDROCHLORIDE 50 MG: 50 TABLET, FILM COATED ORAL at 08:18

## 2019-03-10 RX ADMIN — SODIUM CHLORIDE, PRESERVATIVE FREE 3 ML: 5 INJECTION INTRAVENOUS at 08:18

## 2019-03-10 RX ADMIN — LEVOTHYROXINE SODIUM 125 MCG: 125 TABLET ORAL at 05:11

## 2019-03-10 RX ADMIN — DEXTROSE MONOHYDRATE 75 ML/HR: 50 INJECTION, SOLUTION INTRAVENOUS at 08:23

## 2019-03-10 NOTE — PLAN OF CARE
Problem: Patient Care Overview  Goal: Plan of Care Review  Outcome: Ongoing (interventions implemented as appropriate)   03/10/19 0305   Coping/Psychosocial   Plan of Care Reviewed With patient   OTHER   Outcome Summary VSS. No c/o pain. No nausea. IVF cont'd. Abx given. Will cont to monitor.   Plan of Care Review   Progress improving   Coping/Psychosocial   Patient Agreement with Plan of Care agrees     Goal: Individualization and Mutuality  Outcome: Ongoing (interventions implemented as appropriate)    Goal: Discharge Needs Assessment  Outcome: Ongoing (interventions implemented as appropriate)    Goal: Interprofessional Rounds/Family Conf  Outcome: Ongoing (interventions implemented as appropriate)      Problem: Pain, Acute (Adult)  Goal: Identify Related Risk Factors and Signs and Symptoms  Outcome: Outcome(s) achieved Date Met: 03/10/19    Goal: Acceptable Pain Control/Comfort Level  Outcome: Ongoing (interventions implemented as appropriate)      Problem: Nausea/Vomiting (Adult)  Goal: Identify Related Risk Factors and Signs and Symptoms  Outcome: Outcome(s) achieved Date Met: 03/10/19    Goal: Symptom Relief  Outcome: Ongoing (interventions implemented as appropriate)    Goal: Adequate Hydration  Outcome: Ongoing (interventions implemented as appropriate)

## 2019-03-10 NOTE — PROGRESS NOTES
Name: Ya Paz ADMIT: 3/7/2019   : 1945  PCP: Nicki Hidalgo MD    MRN: [id number] LOS: 3 days   AGE/SEX: 73 y.o. female  ROOM: [facility]   Subjective   CC: abdominal pain  No acute events. Overall patient is feeling much better.  She is having no discomfort.  Tolerating PO.  No f/c/n/v/d.    Objective   Vital Signs  Temp:  [98.1 °F (36.7 °C)-98.7 °F (37.1 °C)] 98.1 °F (36.7 °C)  Heart Rate:  [62-69] 68  Resp:  [16] 16  BP: (156-177)/(61-95) 164/61  SpO2:  [97 %] 97 %  on   ;   Device (Oxygen Therapy): room air  Body mass index is 34.9 kg/m².    Physical Exam   Constitutional: She is oriented to person, place, and time. No distress.   HENT:   Head: Normocephalic and atraumatic.   Mouth/Throat: Oropharynx is clear and moist.   Eyes: Conjunctivae and EOM are normal. Pupils are equal, round, and reactive to light.   Neck: Normal range of motion. Neck supple.   Cardiovascular: Normal rate, regular rhythm and intact distal pulses.   Pulmonary/Chest: Effort normal and breath sounds normal.   Abdominal: Soft. Bowel sounds are normal. There is no tenderness. There is no rebound and no guarding.   Musculoskeletal: She exhibits no edema or tenderness.   Neurological: She is alert and oriented to person, place, and time.   Skin: Skin is warm and dry. She is not diaphoretic.   Psychiatric: She has a normal mood and affect. Her behavior is normal.   Nursing note and vitals reviewed.      Results Review:       I reviewed the patient's new clinical results.  Results from last 7 days   Lab Units 19  1158 19  0455 19  0343   WBC 10*3/mm3  --  5.78 9.45   HEMOGLOBIN g/dL 9.4* 8.9* 10.7*   PLATELETS 10*3/mm3  --  249 326     Results from last 7 days   Lab Units 03/10/19  0638 19  0929 19  0455 19  0343   SODIUM mmol/L 137 137 139 137   POTASSIUM mmol/L 4.2 4.1 4.4 5.1   CHLORIDE mmol/L 103 101 105 99   CO2 mmol/L 23.2 24.4 24.0 24.7   BUN mg/dL 18 17 26* 33*   CREATININE  mg/dL 1.56* 1.67* 1.68* 2.04*   GLUCOSE mg/dL 156* 124* 59* 182*   Estimated Creatinine Clearance: 34.6 mL/min (A) (by C-G formula based on SCr of 1.56 mg/dL (H)).  Results from last 7 days   Lab Units 03/07/19  0343   ALBUMIN g/dL 3.60   BILIRUBIN mg/dL 0.2   ALK PHOS U/L 56   AST (SGOT) U/L 22   ALT (SGPT) U/L 15     Results from last 7 days   Lab Units 03/10/19  0638 03/09/19  0929 03/08/19  0455 03/07/19  0343   CALCIUM mg/dL 8.8 8.9 8.7 9.4   ALBUMIN g/dL  --   --   --  3.60     Results from last 7 days   Lab Units 03/07/19  0343   LACTATE mmol/L 1.2         cefepime 2 g Intravenous Q24H   famotidine 20 mg Oral BID   hydrALAZINE 50 mg Oral TID   insulin lispro 0-9 Units Subcutaneous 4x Daily With Meals & Nightly   levothyroxine 125 mcg Oral Q AM   metoprolol succinate XL 50 mg Oral Q24H   predniSONE 5 mg Oral Daily   sodium chloride 3 mL Intravenous Q12H       dextrose 75 mL/hr Last Rate: 75 mL/hr (03/10/19 0823)   Diet Bariatric; Stage 2 Full Liquid Sugar Free (No Carbonation, No Straw)      Assessment/Plan      Active Hospital Problems   No active problems to display.      Resolved Hospital Problems    Diagnosis Date Resolved POA   • Abdominal pain [R10.9] 03/10/2019 Yes   Abdominal Pain  - free air on CT scan, likely related to perforated viscus, had recent dilation of VBG stricture  - much improved with conservative management, tolerating PO  - mgmt per primary team    Type 2 DM  - off basal insulin, covering with ssi  - doing well off of IV dextrose  - I spoke with the patient and instructed her to go back on her sliding scale novolog and hold her toujeo for now until her oral intake returns to baseline-she can restart this if her blood glucose levels are consistently greater than 150    Hypothyroidism  - restart oral synthroid    GERD   - on pepcid    HTN  - on oral metoprolol and hydralazine, no lisinopril at discharge  - nephrology managing    CKD stage IV  - nephrology following, appreciate  recs    Rheumatoid Arthritis  - on 5mg prednisone daily chronically, restarted as this has been cleared with surgery  - no e/o adrenal insufficiency    Disposition  Home today per primary team.  Okay for discharge from medicine standpoint.    Mk Solo MD  [facility]  03/10/19  12:44 PM

## 2019-03-10 NOTE — PROGRESS NOTES
" LOS: 3 days   Patient Care Team:  Nicki Hidalgo MD as PCP - General (Internal Medicine)    Chief Complaint: JOSÉ LUIS/CKD      History of Present Illness  73 y.o. year old female who is very well-known to me from the office setting and from previous hospitalizations, she presented to the hospital yesterday after complications from an EGD that was done for dilatation for a stricture.  Main complaint is epigastric pain, CT scan of the abdomen and pelvis showed free intra-abdominal air and she was admitted for surgical management      Subjective  Patient feeling better today  Denies sob or chest pain.     History taken from: patient chart    Objective     Vital Sign Min/Max for last 24 hours  Temp  Min: 98.1 °F (36.7 °C)  Max: 98.7 °F (37.1 °C)   BP  Min: 156/70  Max: 177/71   Pulse  Min: 62  Max: 69   Resp  Min: 16  Max: 16   SpO2  Min: 97 %  Max: 97 %   No Data Recorded   No Data Recorded     Flowsheet Rows      First Filed Value   Admission Height  160 cm (63\") Documented at 03/07/2019 0259   Admission Weight  89.4 kg (197 lb) Documented at 03/07/2019 0343          I/O this shift:  In: 294 [I.V.:200; IV Piggyback:94]  Out: -   I/O last 3 completed shifts:  In: 4426 [I.V.:4426]  Out: 1110 [Urine:1110]    Objective:  Vital signs: (most recent): Blood pressure 156/70, pulse 68, temperature 98.7 °F (37.1 °C), temperature source Oral, resp. rate 16, height 160 cm (63\"), weight 89.4 kg (197 lb), SpO2 97 %.            Physical Examination: General appearance - alert, well appearing, and in no distress  Mental status - alert, oriented to person, place, and time  Eyes - pupils equal and reactive, extraocular eye movements intact  Chest - clear to auscultation, no wheezes, rales or rhonchi, symmetric air entry  Heart - normal rate, regular rhythm, normal S1, S2, no murmurs, rubs, clicks or gallops  Abdomen - soft, nontender, nondistended, no masses or organomegaly  Neurological - alert, oriented, normal speech, no " focal findings or movement disorder noted  Extremities - peripheral pulses normal, no pedal edema, no clubbing or cyanosis    Results Review:     I reviewed the patient's new clinical results.    WBC WBC   Date Value Ref Range Status   03/08/2019 5.78 3.40 - 10.80 10*3/mm3 Final      HGB Hemoglobin   Date Value Ref Range Status   03/08/2019 9.4 (L) 12.0 - 15.9 g/dL Final   03/08/2019 8.9 (L) 12.0 - 15.9 g/dL Final      HCT Hematocrit   Date Value Ref Range Status   03/08/2019 31.8 (L) 34.0 - 46.6 % Final   03/08/2019 30.2 (L) 34.0 - 46.6 % Final      Platlets No results found for: LABPLAT   MCV MCV   Date Value Ref Range Status   03/08/2019 100.7 (H) 79.0 - 97.0 fL Final          Sodium Sodium   Date Value Ref Range Status   03/10/2019 137 136 - 145 mmol/L Final   03/09/2019 137 136 - 145 mmol/L Final   03/08/2019 139 136 - 145 mmol/L Final      Potassium Potassium   Date Value Ref Range Status   03/10/2019 4.2 3.5 - 5.2 mmol/L Final   03/09/2019 4.1 3.5 - 5.2 mmol/L Final   03/08/2019 4.4 3.5 - 5.2 mmol/L Final      Chloride Chloride   Date Value Ref Range Status   03/10/2019 103 98 - 107 mmol/L Final   03/09/2019 101 98 - 107 mmol/L Final   03/08/2019 105 98 - 107 mmol/L Final      CO2 CO2   Date Value Ref Range Status   03/10/2019 23.2 22.0 - 29.0 mmol/L Final   03/09/2019 24.4 22.0 - 29.0 mmol/L Final   03/08/2019 24.0 22.0 - 29.0 mmol/L Final      BUN BUN   Date Value Ref Range Status   03/10/2019 18 8 - 23 mg/dL Final   03/09/2019 17 8 - 23 mg/dL Final   03/08/2019 26 (H) 8 - 23 mg/dL Final      Creatinine Creatinine   Date Value Ref Range Status   03/10/2019 1.56 (H) 0.57 - 1.00 mg/dL Final   03/09/2019 1.67 (H) 0.57 - 1.00 mg/dL Final   03/08/2019 1.68 (H) 0.57 - 1.00 mg/dL Final      Calcium Calcium   Date Value Ref Range Status   03/10/2019 8.8 8.6 - 10.5 mg/dL Final   03/09/2019 8.9 8.6 - 10.5 mg/dL Final   03/08/2019 8.7 8.6 - 10.5 mg/dL Final      PO4 No results found for: CAPO4   Albumin No results  found for: ALBUMIN   Magnesium No results found for: MG   Uric Acid No results found for: URICACID     Medication Review:     Current Facility-Administered Medications:   •  ALPRAZolam (XANAX) tablet 0.5 mg, 0.5 mg, Oral, TID PRN, Babar Oliver MD, 0.5 mg at 03/09/19 1022  •  cefepime (MAXIPIME) 2 g/100 mL 0.9% NS (mbp), 2 g, Intravenous, Q24H, Roberth Julian Jr., MD, Last Rate: 200 mL/hr at 03/10/19 0511, 2 g at 03/10/19 0511  •  dextrose (D50W) 25 g/ 50mL Intravenous Solution 25 g, 25 g, Intravenous, Q15 Min PRN, Christopher Mercedes MD, 25 g at 03/08/19 0600  •  dextrose (D5W) 5 % infusion, 75 mL/hr, Intravenous, Continuous, Tank Taylor MD, Last Rate: 75 mL/hr at 03/10/19 0823, 75 mL/hr at 03/10/19 0823  •  dextrose (GLUTOSE) oral gel 15 g, 15 g, Oral, Q15 Min PRN, Christopher Mercedes MD  •  diphenhydrAMINE (BENADRYL) injection 25 mg, 25 mg, Intravenous, Q4H PRN, Raheel Carrizales Jr., MD  •  famotidine (PEPCID) tablet 20 mg, 20 mg, Oral, BID, Mk Solo MD, 20 mg at 03/10/19 0818  •  glucagon (human recombinant) (GLUCAGEN DIAGNOSTIC) injection 1 mg, 1 mg, Subcutaneous, PRN, Christopher Mercedes MD  •  hydrALAZINE (APRESOLINE) tablet 50 mg, 50 mg, Oral, TID, Mk Solo MD, 50 mg at 03/10/19 0818  •  HYDROcodone-acetaminophen (HYCET) 7.5-325 MG/15ML solution 15 mL, 15 mL, Oral, Q4H PRN, Raheel Carrizales Jr., MD  •  insulin lispro (humaLOG) injection 0-9 Units, 0-9 Units, Subcutaneous, 4x Daily With Meals & Nightly, Christopher Mercedes MD, 2 Units at 03/10/19 0817  •  levothyroxine (SYNTHROID, LEVOTHROID) tablet 125 mcg, 125 mcg, Oral, Q AM, Mk Solo MD, 125 mcg at 03/10/19 0511  •  metoprolol succinate XL (TOPROL-XL) 24 hr tablet 50 mg, 50 mg, Oral, Q24H, Mk Solo MD, 50 mg at 03/10/19 0818  •  morphine injection 2 mg, 2 mg, Intravenous, Q2H PRN, Babar Oliver MD, 2 mg at 03/08/19 2248  •  ondansetron (ZOFRAN) injection 4 mg, 4 mg, Intravenous, Q6H  PRN, Roberth Julian Jr., MD, 4 mg at 03/09/19 0922  •  predniSONE (DELTASONE) tablet 5 mg, 5 mg, Oral, Daily, Mk Solo MD, 5 mg at 03/10/19 0818  •  sodium chloride 0.9 % flush 1-10 mL, 1-10 mL, Intravenous, PRN, Roberth Julian Jr., MD  •  [COMPLETED] Insert peripheral IV, , , Once **AND** sodium chloride 0.9 % flush 10 mL, 10 mL, Intravenous, PRN, Casey Sparks III, PA  •  sodium chloride 0.9 % flush 3 mL, 3 mL, Intravenous, Q12H, Roberth Julian Jr., MD, 3 mL at 03/10/19 0818    Assessment/Plan       Abdominal pain      Assessment & Plan  Acute kidney injury, prerenal, improved today with fluids  Chronic kidney disease stage III, stable today and near baseline  Uncontrolled hypertension, currently off all oral medications  Hypothyroidism  Perforated esophagus status post EGD  Type 2 diabetes mellitus  GERD  Abdominal pain  Anemia, in part due to chronic kidney disease        PLAN:   Renal function stable at baseline  Continue current IV antihypertensive regimen for now  BP better  Will continue to monitor      Bev Newell MD  03/10/19  8:57 AM

## 2019-03-10 NOTE — PROGRESS NOTES
LOS: 3 days   Patient Care Team:  Nicki Hidalgo MD as PCP - General (Internal Medicine)    Chief Complaint: Joint pain but better    Interval History:   Patient Denies: Abdominal pain, nausea, vomiting, chest pain, shortness of air  History taken from: Patient      Objective     Vital Signs  Temp:  [98.1 °F (36.7 °C)-98.7 °F (37.1 °C)] 98.1 °F (36.7 °C)  Heart Rate:  [62-69] 68  Resp:  [16] 16  BP: (156-177)/(70-95) 163/72      Physical Exam:   Heart: RR   Lungs:  CTA B   Abd: Soft and nontender nondistended, normal bowel sounds   Ext:  No cyanosis, edema     Results Review:     I reviewed the patient's new clinical results.    Lab Results (last 24 hours)     Procedure Component Value Units Date/Time    Basic Metabolic Panel [[id number]]  (Abnormal) Collected:  03/10/19 0638    Specimen:  Blood Updated:  03/10/19 0754     Glucose 156 mg/dL      BUN 18 mg/dL      Creatinine 1.56 mg/dL      Sodium 137 mmol/L      Potassium 4.2 mmol/L      Chloride 103 mmol/L      CO2 23.2 mmol/L      Calcium 8.8 mg/dL      eGFR Non African Amer 33 mL/min/1.73      BUN/Creatinine Ratio 11.5     Anion Gap 10.8 mmol/L     Narrative:       The MDRD GFR formula is only valid for adults with stable renal function between ages 18 and 70.    POC Glucose Once [[id number]]  (Abnormal) Collected:  03/10/19 0621    Specimen:  Blood Updated:  03/10/19 0622     Glucose 163 mg/dL     POC Glucose Once [[id number]]  (Abnormal) Collected:  03/09/19 2103    Specimen:  Blood Updated:  03/09/19 2105     Glucose 181 mg/dL     POC Glucose Once [[id number]]  (Normal) Collected:  03/09/19 1602    Specimen:  Blood Updated:  03/09/19 1603     Glucose 126 mg/dL     POC Glucose Once [[id number]]  (Normal) Collected:  03/09/19 1059    Specimen:  Blood Updated:  03/09/19 1100     Glucose 122 mg/dL     Basic Metabolic Panel [[id number]]  (Abnormal) Collected:  03/09/19 0929    Specimen:  Blood Updated:  03/09/19 1022     Glucose 124 mg/dL      BUN 17 mg/dL       Creatinine 1.67 mg/dL      Sodium 137 mmol/L      Potassium 4.1 mmol/L      Chloride 101 mmol/L      CO2 24.4 mmol/L      Calcium 8.9 mg/dL      eGFR Non African Amer 30 mL/min/1.73      BUN/Creatinine Ratio 10.2     Anion Gap 11.6 mmol/L     Narrative:       The MDRD GFR formula is only valid for adults with stable renal function between ages 18 and 70.          Assessment/Plan:    Abdominal pain      73 y.o. 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  Patient remains afebrile and hemodynamically stable and abdomen is nontender and benign on exam today.  Patient tolerating stage II bariatric diet without any abdominal pain.  Patient has resumed her home medications and tolerating those well.  Plan for discharge home today if okay with medical team.  Appreciate everybody's help      Raheel Carrizales MD  Medical Director [facility]  [facility]    03/10/19

## 2019-03-11 ENCOUNTER — READMISSION MANAGEMENT (OUTPATIENT)
Dept: CALL CENTER | Facility: HOSPITAL | Age: 74
End: 2019-03-11

## 2019-03-11 NOTE — PROGRESS NOTES
Case Management Discharge Note    Final Note: Pt discharged home with [facility] [place] to follow. Transported home by family in private auto    Destination      No service has been selected for the patient.      Durable Medical Equipment      No service has been selected for the patient.      Dialysis/Infusion      No service has been selected for the patient.      Home Medical Care      No service has been selected for the patient.      Community Resources      No service has been selected for the patient.             Final Discharge Disposition Code: 06 - home with home health care

## 2019-03-12 NOTE — OUTREACH NOTE
Prep Survey      Responses   Facility patient discharged from?  [facility]   Is patient eligible?  Yes   Discharge diagnosis  Abdominal pain dilitation of gastrojejunostomy stricture   Does the patient have one of the following disease processes/diagnoses(primary or secondary)?  Other   Does the patient have Home health ordered?  Yes   What is the Home health agency?   [facility]   Is there a DME ordered?  No   Prep survey completed?  Yes          Bárbara Eckert RN

## 2019-03-14 ENCOUNTER — READMISSION MANAGEMENT (OUTPATIENT)
Dept: CALL CENTER | Facility: HOSPITAL | Age: 74
End: 2019-03-14

## 2019-03-14 NOTE — OUTREACH NOTE
Medical Week 1 Survey      Responses   Facility patient discharged from?  [facility]   Does the patient have one of the following disease processes/diagnoses(primary or secondary)?  Other   Is there a successful TCM telephone encounter documented?  No   Week 1 attempt successful?  Yes   Call start time  1131   Call end time  1136   Discharge diagnosis  Abdominal pain dilitation of gastrojejunostomy stricture   Meds reviewed with patient/caregiver?  Yes   Is the patient having any side effects they believe may be caused by any medication additions or changes?  No   Does the patient have all medications ordered at discharge?  Yes   Is the patient taking all medications as directed (includes completed medication regime)?  Yes   Does the patient have a primary care provider?   Yes   Does the patient have an appointment with their PCP within 7 days of discharge?  Yes   Has the patient kept scheduled appointments due by today?  Yes   Has home health visited the patient within 72 hours of discharge?  N/A   Home health comments  no HH ordered   Psychosocial issues?  No   Comments  states digestion is WNL's, blood glucoses stable   Did the patient receive a copy of their discharge instructions?  Yes   Nursing interventions  Reviewed instructions with patient   What is the patient's perception of their health status since discharge?  Improving   Is the patient/caregiver able to teach back signs and symptoms related to disease process for when to call PCP?  Yes   Is the patient/caregiver able to teach back signs and symptoms related to disease process for when to call 911?  Yes   Is the patient/caregiver able to teach back the hierarchy of who to call/visit for symptoms/problems? PCP, Specialist, Home health nurse, Urgent Care, ED, 911  Yes   Additional teach back comments  pt states has no questions   Week 1 call completed?  Yes   Graduated  Yes   Did the patient feel the follow up calls were helpful during their recovery  period?  Yes   Was the number of calls appropriate?  Yes   Graduated/Revoked comments  pt states meeting all goals, no need for further contact          Steph Wagner RN

## 2019-05-28 ENCOUNTER — HOSPITAL ENCOUNTER (OUTPATIENT)
Dept: LAB | Facility: HOSPITAL | Age: 74
Discharge: HOME | End: 2019-05-28
Attending: INTERNAL MEDICINE | Admitting: INTERNAL MEDICINE

## 2019-05-28 LAB
ANION GAP SERPL CALC-SCNC: 16.6 MMOL/L (ref 10–20)
BASOPHILS # BLD AUTO: 0.1 10*3/UL (ref 0–0.2)
BASOPHILS NFR BLD AUTO: 1 % (ref 0–2)
BILIRUB UR QL STRIP: NEGATIVE MG/DL
BUN SERPL-MCNC: 43 MG/DL (ref 8–20)
BUN/CREAT SERPL: 22.6 (ref 5.4–26.2)
CALCIUM SERPL-MCNC: 9.4 MG/DL (ref 8.9–10.3)
CASTS URNS QL MICRO: ABNORMAL /[LPF]
CHLORIDE SERPL-SCNC: 104 MMOL/L (ref 101–111)
COLOR UR: YELLOW
CONV BACTERIA IN URINE MICRO: NEGATIVE
CONV CLARITY OF URINE: ABNORMAL
CONV CO2: 21 MMOL/L (ref 22–32)
CONV HYALINE CASTS IN URINE MICRO: 3 /[LPF] (ref 0–5)
CONV PROTEIN IN URINE BY AUTOMATED TEST STRIP: 30 MG/DL
CONV SMALL ROUND CELLS: ABNORMAL /[HPF]
CONV UROBILINOGEN IN URINE BY AUTOMATED TEST STRIP: 0.2 MG/DL
CREAT UR-MCNC: 1.9 MG/DL (ref 0.4–1)
CULTURE INDICATED?: ABNORMAL
DIFFERENTIAL METHOD BLD: (no result)
EOSINOPHIL # BLD AUTO: 0.1 10*3/UL (ref 0–0.3)
EOSINOPHIL # BLD AUTO: 1 % (ref 0–3)
ERYTHROCYTE [DISTWIDTH] IN BLOOD BY AUTOMATED COUNT: 14.9 % (ref 11.5–14.5)
GLUCOSE SERPL-MCNC: 155 MG/DL (ref 65–99)
GLUCOSE UR QL: NEGATIVE MG/DL
HCT VFR BLD AUTO: 32.1 % (ref 35–49)
HGB BLD-MCNC: 10.2 G/DL (ref 12–15)
HGB UR QL STRIP: NEGATIVE
KETONES UR QL STRIP: NEGATIVE MG/DL
LEUKOCYTE ESTERASE UR QL STRIP: NEGATIVE
LYMPHOCYTES # BLD AUTO: 1.2 10*3/UL (ref 0.8–4.8)
LYMPHOCYTES NFR BLD AUTO: 11 % (ref 18–42)
MCH RBC QN AUTO: 29.3 PG (ref 26–32)
MCHC RBC AUTO-ENTMCNC: 31.7 G/DL (ref 32–36)
MCV RBC AUTO: 92.5 FL (ref 80–94)
MONOCYTES # BLD AUTO: 0.4 10*3/UL (ref 0.1–1.3)
MONOCYTES NFR BLD AUTO: 4 % (ref 2–11)
NEUTROPHILS # BLD AUTO: 8.8 10*3/UL (ref 2.3–8.6)
NEUTROPHILS NFR BLD AUTO: 83 % (ref 50–75)
NITRITE UR QL STRIP: NEGATIVE
NRBC BLD AUTO-RTO: 0 /100{WBCS}
NRBC/RBC NFR BLD MANUAL: 0 10*3/UL
PH UR STRIP.AUTO: 5.5 [PH] (ref 4.5–8)
PLATELET # BLD AUTO: 337 10*3/UL (ref 150–450)
PMV BLD AUTO: 8.1 FL (ref 7.4–10.4)
POTASSIUM SERPL-SCNC: 5.6 MMOL/L (ref 3.6–5.1)
RBC # BLD AUTO: 3.47 10*6/UL (ref 4–5.4)
RBC #/AREA URNS HPF: 1 /[HPF] (ref 0–3)
SODIUM SERPL-SCNC: 136 MMOL/L (ref 136–144)
SP GR UR: 1.01 (ref 1–1.03)
SPERM URNS QL MICRO: ABNORMAL /[HPF]
SQUAMOUS SPT QL MICRO: 9 /[HPF] (ref 0–5)
UNIDENT CRYS URNS QL MICRO: ABNORMAL /[HPF]
WBC # BLD AUTO: 10.5 10*3/UL (ref 4.5–11.5)
WBC #/AREA URNS HPF: 3 /[HPF] (ref 0–5)
YEAST SPEC QL WET PREP: ABNORMAL /[HPF]

## 2019-05-29 ENCOUNTER — CONVERSION ENCOUNTER (OUTPATIENT)
Dept: CARDIOLOGY | Facility: CLINIC | Age: 74
End: 2019-05-29

## 2019-06-04 VITALS
DIASTOLIC BLOOD PRESSURE: 74 MMHG | WEIGHT: 185 LBS | SYSTOLIC BLOOD PRESSURE: 142 MMHG | HEIGHT: 63 IN | BODY MASS INDEX: 32.78 KG/M2 | HEART RATE: 64 BPM

## 2019-06-06 NOTE — PROGRESS NOTES
Visit Type:  Follow-up Visit  Referring Provider:  Moise Gage MD  Primary Provider:  Mia Keene    CC:  sob.    History of Present Illness:  I am pleased to see Mrs. cisneros in my office today as a follow-up.    As you know, patient is 73-year-old white female whose past medical history significant for hypertension, hyperlipidemia, diabetes mellitus, CKD, who came today for follow-up    In the 2017, patient was admitted at [facility] with a symptom of chest pain.  She underwent stress test which showed no myocardial ischemia or infarction.  In 2017, she was admitted with symptom of slurring of speech and   possibility of TIA was considered. CT scan of head was negative.  Echocardiogram showed EF of 55-60% and no significant valvular heart disease.  Possibility of complicated migraine was considered.  Patient was advised to start Topamax but she did not want   it. .    Patient came today for follow-up .  Patient patient reports symptom of palpitation described as a skipping of the heartbeat.  Her symptoms are more often at nighttime.  Patient complain of occasional chest pain with these premature contraction.  Patient   denies any symptom of angina or the or PND.    I would recommend to proceed with Holter monitor to quantitate the frequency of PVCs.  I would discontinue propanolol and I would recommend to start Lopressor 25 mg twice a day.  Echocardiogram would be done.      Past Medical History:     Reviewed history from 2017 and no changes required:        Hypertension        Hypothyroidism        Diabetes, Type 2        rheumatoid arthritis        Stage III renal failure        palpatations    Past Surgical History:     Reviewed history from 2017 and no changes required:        Back                hernia        Knee        stomach bypass        Heart cath     Current Allergies (reviewed today):  LEVAQUIN (Critical)  * DAPSONE (Critical)  TETRACYCLINE  (Critical)  AMOXICILLIN (AMOXICILLIN) (Critical)  SULFA (Critical)  * BACTRIM DS (Critical)    Current Medications (including medications started today):   METOPROLOL TARTRATE 25 MG ORAL TABLET (METOPROLOL TARTRATE) take 1 tablet bid  HYDROCHLOROTHIAZIDE 12.5 MG ORAL TABLET (HYDROCHLOROTHIAZIDE) Take one (1) tablet by mouth daily.  HYDRALAZINE  MG ORAL TABLET (HYDRALAZINE HCL) Take one (1) tablet by mouth twice daily.  TOUJEO SOLOSTAR SOLUTION PEN-INJECTOR (INSULIN GLARGINE SOPN) as directed  HYDROXYZINE HCL 25 MG ORAL TABLET (HYDROXYZINE HCL) as needed  ATORVASTATIN CALCIUM 10 MG ORAL TABLET (ATORVASTATIN CALCIUM) qd  HYDROCODONE-ACETAMINOPHEN 5-325 MG ORAL TABLET (HYDROCODONE-ACETAMINOPHEN) as needed  CALTRATE 600 TABLET (CALCIUM CARBONATE TABS) Take one (1) tablet by mouth twice daily.  VITAMIN D CAPSULE (CHOLECALCIFEROL CAPS) Take one (1) tablet by mouth daily.  FOLIC ACID TABLET (FOLIC ACID TABS) Take one (1) tablet by mouth daily.  PREDNISONE 5 MG ORAL TABLET (PREDNISONE) Take one (1) tablet by mouth daily.  SYNTHROID 125 MCG ORAL TABLET (LEVOTHYROXINE SODIUM) qd    Family History Summary:      Reviewed history Last on 06/14/2018 and no changes required:05/29/2019  Mother - Has No family history of clinical finding - Entered On: 9/28/2017      Social History:     Reviewed history from 06/04/2018 and no changes required:        Patient has never smoked.        Passive Smoke: N        Alcohol Use: N        Drug Use: N        HIV/High Risk: N        Regular Exercise: N        Hx Domestic Abuse: N        Orthodox Affecting Care: N                  Review of Systems   General: No fatigue or tiredness  Eyes: No redness  Ear/Nose/Throat: No discharge  Cardiovascular: Palpitation  Respiratory: Mild shortness of breath  Gastrointestinal: No nausea or vomiting  Genitourinary: No Bleeding  Musculoskeletal: No arthralgia or myalgia  Skin: No rash  Neurologic: No numbness or tingling  Psychiatric: No anxiety or  depression  Hematologic/Lymphatic: No abnormal bleeding      Vital Signs:    Patient Profile:    73 Years Old Female  Height:     63 inches  Weight:     185 pounds  BMI:        32.77     Pulse rate: 64 / minute  BP Sittin / 74    Patient has a risk of falls? No    Problems: Active problems were reviewed with the patient during this visit.  Medications: Medications were reviewed with the patient during this visit.  Allergies: Allergies were reviewed with the patient during this visit.          Physical Exam    General:      well developed, well nourished, in no acute distress.    Head:      normocephalic and atraumatic.    Neck:      no masses, thyromegaly, or abnormal cervical nodes.    Chest Wall:      no deformities   Lungs:      clear bilaterally to auscultation.    Heart:      non-displaced PMI, chest non-tender; regular rate and rhythm, S1, S2 without murmurs, rubs, or gallops  Abdomen:       normal bowel sounds;   Pulses:      pulses normal in upper extremities.    Extremities:      no clubbing, cyanosis, edema,   Neurologic:      no focal deficits,   Psych:      alert and cooperative; normal mood and affect; normal attention span and concentration.      Diabetes Management Exam:      Foot Exam (with socks and/or shoes not present):        Pulses:           pulses normal in upper extremities.        Blood Pressure:  Today's BP: 142/74 mm Hg            Impression & Recommendations:    Problem # 1:  PALPITATIONS (ICD-785.1) (ZFQ05-F42.2)  Start Holter monitor and echocardiogram.  The following medications were removed from the medication list:     Propranolol Hcl Er Capsule Extended Release 24 Hour (Propranolol hcl xr24h-cap) ..... Take one (1) tablet by mouth daily.     Adult Aspirin Ec Low Strength 81 Mg Oral Tablet Delayed Release (Aspirin) ..... Qd    Her updated medication list for this problem includes:     Metoprolol Tartrate 25 Mg Oral Tablet (Metoprolol tartrate) ..... Take 1 tablet  bid    Orders:  Ofc Vst, Est Level IV (78386)      Problem # 2:  Hypertension (ICD-401.9) ([id number])  I would discontinue propanolol and start metoprolol  The following medications were removed from the medication list:     Propranolol Hcl Er Capsule Extended Release 24 Hour (Propranolol hcl xr24h-cap) ..... Take one (1) tablet by mouth daily.     Adult Aspirin Ec Low Strength 81 Mg Oral Tablet Delayed Release (Aspirin) ..... Qd    Her updated medication list for this problem includes:     Metoprolol Tartrate 25 Mg Oral Tablet (Metoprolol tartrate) ..... Take 1 tablet bid     Hydrochlorothiazide 12.5 Mg Oral Tablet (Hydrochlorothiazide) ..... Take one (1) tablet by mouth daily.     Hydralazine Hcl 100 Mg Oral Tablet (Hydralazine hcl) ..... Take one (1) tablet by mouth twice daily.    Orders:  EKG (In House) (20802)  Ofc Vst, Est Level IV (64094)      Problem # 3:  Diabetes mellitus (ICD-250.00) (ZIB09-M41.9)  patient is advised to follow your office for management and care of diabetes mellitus. However, patient is counseled for diet modification and compliance for diabetic treatment.    The following medications were removed from the medication list:     Adult Aspirin Ec Low Strength 81 Mg Oral Tablet Delayed Release (Aspirin) ..... Qd     Novolog Solution (Insulin aspart soln)    Her updated medication list for this problem includes:     Toujeo Solostar Solution Pen-injector (Insulin glargine sopn) ..... As directed    Orders:  Ofc Vst, Est Level IV (06100)      Medications Added to Medication List This Visit:  1)  Metoprolol Tartrate 25 Mg Oral Tablet (Metoprolol tartrate) .... Take 1 tablet bid  2)  Hydrochlorothiazide 12.5 Mg Oral Tablet (Hydrochlorothiazide) .... Take one (1) tablet by mouth daily.  3)  Propranolol Hcl Er Capsule Extended Release 24 Hour (Propranolol hcl xr24h-cap) .... Take one (1) tablet by mouth daily.  4)  Hydralazine Hcl 100 Mg Oral Tablet (Hydralazine hcl) .... Take one (1) tablet by  mouth twice daily.  5)  Hydroxyzine Hcl 25 Mg Oral Tablet (Hydroxyzine hcl) .... As needed  6)  Hydrocodone-acetaminophen 5-325 Mg Oral Tablet (Hydrocodone-acetaminophen) .... As needed  7)  Caltrate 600 Tablet (Calcium carbonate tabs) .... Take one (1) tablet by mouth twice daily.  8)  Vitamin D Capsule (Cholecalciferol caps) .... Take one (1) tablet by mouth daily.  9)  Folic Acid Tablet (Folic acid tabs) .... Take one (1) tablet by mouth daily.  10)  Prednisone 5 Mg Oral Tablet (Prednisone) .... Take one (1) tablet by mouth daily.      Patient Instructions:  1)  Advised to reduce the total intake of fats, especially to choose foods that are low in saturated fat.  2)  Limit intake of Sodium (Salt).  3)  Discussed importance of regular exercise and recommended starting or continuing a regular exercise program for good health.  4)  The patient was encouraged to lose weight for better health.                Medication Administration    Orders Added:  1)  EKG (In House) [84546]  2)  Ofc Vst, Est Level IV [34553]  ]      Electronically signed by Nazario Tejeda MD on 05/29/2019 at 4:50 PM  ________________________________________________________________________       Disclaimer: Converted Note message may not contain all data elements that existed in the legacy source system. Please see [email or web address] [facility] for the original note details.

## 2019-06-13 NOTE — PROCEDURES
phyllisg rpt : cardiac science      Imported By: Thalia Viera 6/11/2019 8:06:32 AM    _____________________________________________________________________    External Attachment:      Type: Image      Comment:  External Document      Signed before import by Nazario Tejeda MD  Filed automatically on 06/11/2019 at 8:07 AM  ________________________________________________________________________       Disclaimer: Converted Note message may not contain all data elements that existed in the legacy source system. Please see [facility] System for the original note details.

## 2019-06-14 ENCOUNTER — HOSPITAL ENCOUNTER (OUTPATIENT)
Dept: CARDIOLOGY | Facility: HOSPITAL | Age: 74
Discharge: HOME | End: 2019-06-14
Attending: INTERNAL MEDICINE | Admitting: INTERNAL MEDICINE

## 2019-06-16 ENCOUNTER — HOSPITAL ENCOUNTER (OUTPATIENT)
Facility: HOSPITAL | Age: 74
Setting detail: OBSERVATION
Discharge: HOME | End: 2019-06-18
Attending: EMERGENCY MEDICINE | Admitting: INTERNAL MEDICINE

## 2019-06-16 DIAGNOSIS — R41.82 ALTERED MENTAL STATUS, UNSPECIFIED ALTERED MENTAL STATUS TYPE: Primary | ICD-10-CM

## 2019-06-16 LAB
BASOPHILS # BLD AUTO: 0 10*3/MM3 (ref 0–0.2)
BASOPHILS NFR BLD AUTO: 0.2 % (ref 0–1.5)
DEPRECATED RDW RBC AUTO: 46.8 FL (ref 37–54)
EOSINOPHIL # BLD AUTO: 0.1 10*3/MM3 (ref 0–0.4)
EOSINOPHIL NFR BLD AUTO: 1.1 % (ref 0.3–6.2)
ERYTHROCYTE [DISTWIDTH] IN BLOOD BY AUTOMATED COUNT: 14.5 % (ref 12.3–15.4)
HCT VFR BLD AUTO: 34.2 % (ref 34–46.6)
HGB BLD-MCNC: 11.1 G/DL (ref 12–15.9)
LYMPHOCYTES # BLD AUTO: 0.9 10*3/MM3 (ref 0.7–3.1)
LYMPHOCYTES NFR BLD AUTO: 11 % (ref 19.6–45.3)
MCH RBC QN AUTO: 29.5 PG (ref 26.6–33)
MCHC RBC AUTO-ENTMCNC: 32.5 G/DL (ref 31.5–35.7)
MCV RBC AUTO: 90.7 FL (ref 79–97)
MONOCYTES # BLD AUTO: 0.4 10*3/MM3 (ref 0.1–0.9)
MONOCYTES NFR BLD AUTO: 4.7 % (ref 5–12)
NEUTROPHILS NFR BLD AUTO: 6.6 10*3/MM3 (ref 1.7–7)
NEUTROPHILS NFR BLD AUTO: 83 % (ref 42.7–76)
NRBC BLD AUTO-RTO: 0 /100 WBC (ref 0–0.2)
PLATELET # BLD AUTO: 296 10*3/MM3 (ref 140–450)
PMV BLD AUTO: 7.6 FL (ref 6–12)
RBC # BLD AUTO: 3.77 10*6/MM3 (ref 3.77–5.28)
WBC NRBC COR # BLD: 8 10*3/MM3 (ref 3.4–10.8)

## 2019-06-16 PROCEDURE — 82140 ASSAY OF AMMONIA: CPT | Performed by: EMERGENCY MEDICINE

## 2019-06-16 PROCEDURE — 93005 ELECTROCARDIOGRAM TRACING: CPT | Performed by: EMERGENCY MEDICINE

## 2019-06-16 PROCEDURE — 87040 BLOOD CULTURE FOR BACTERIA: CPT | Performed by: EMERGENCY MEDICINE

## 2019-06-16 PROCEDURE — 80307 DRUG TEST PRSMV CHEM ANLYZR: CPT | Performed by: EMERGENCY MEDICINE

## 2019-06-16 PROCEDURE — 85730 THROMBOPLASTIN TIME PARTIAL: CPT | Performed by: EMERGENCY MEDICINE

## 2019-06-16 PROCEDURE — 84484 ASSAY OF TROPONIN QUANT: CPT | Performed by: EMERGENCY MEDICINE

## 2019-06-16 PROCEDURE — 99285 EMERGENCY DEPT VISIT HI MDM: CPT

## 2019-06-16 PROCEDURE — 83690 ASSAY OF LIPASE: CPT | Performed by: EMERGENCY MEDICINE

## 2019-06-16 PROCEDURE — 80053 COMPREHEN METABOLIC PANEL: CPT | Performed by: EMERGENCY MEDICINE

## 2019-06-16 PROCEDURE — 85610 PROTHROMBIN TIME: CPT | Performed by: EMERGENCY MEDICINE

## 2019-06-16 PROCEDURE — 85025 COMPLETE CBC W/AUTO DIFF WBC: CPT | Performed by: EMERGENCY MEDICINE

## 2019-06-16 PROCEDURE — 84443 ASSAY THYROID STIM HORMONE: CPT | Performed by: EMERGENCY MEDICINE

## 2019-06-16 RX ORDER — SODIUM CHLORIDE 0.9 % (FLUSH) 0.9 %
10 SYRINGE (ML) INJECTION AS NEEDED
Status: DISCONTINUED | OUTPATIENT
Start: 2019-06-16 | End: 2019-06-18 | Stop reason: HOSPADM

## 2019-06-17 ENCOUNTER — HOSPITAL ENCOUNTER (OUTPATIENT)
Dept: MRI IMAGING | Facility: HOSPITAL | Age: 74
Setting detail: OBSERVATION
Discharge: HOME | End: 2019-06-17

## 2019-06-17 ENCOUNTER — HOSPITAL ENCOUNTER (EMERGENCY)
Dept: GENERAL RADIOLOGY | Facility: HOSPITAL | Age: 74
Discharge: HOME | End: 2019-06-17

## 2019-06-17 ENCOUNTER — HOSPITAL ENCOUNTER (EMERGENCY)
Dept: CT IMAGING | Facility: HOSPITAL | Age: 74
Discharge: HOME | End: 2019-06-17

## 2019-06-17 PROBLEM — R41.82 ALTERED MENTAL STATUS: Status: ACTIVE | Noted: 2019-06-17

## 2019-06-17 LAB
ALBUMIN SERPL-MCNC: 3.3 G/DL (ref 3.5–4.8)
ALBUMIN/GLOB SERPL: 0.9 G/DL (ref 1–1.7)
ALP SERPL-CCNC: 58 U/L (ref 32–91)
ALT SERPL W P-5'-P-CCNC: 17 U/L (ref 14–54)
AMMONIA BLD-SCNC: 11 UMOL/L (ref 9–35)
AMPHET+METHAMPHET UR QL: NEGATIVE
ANION GAP SERPL CALCULATED.3IONS-SCNC: 12 MMOL/L (ref 10–20)
APAP SERPL-MCNC: <10 MCG/ML (ref 10–30)
APTT PPP: 23.4 SECONDS (ref 24–31)
AST SERPL-CCNC: 20 U/L (ref 15–41)
BACTERIA UR QL AUTO: ABNORMAL /HPF
BARBITURATES UR QL SCN: NEGATIVE
BENZODIAZ UR QL SCN: NEGATIVE
BILIRUB SERPL-MCNC: 0.7 MG/DL (ref 0.3–1.2)
BILIRUB UR QL STRIP: NEGATIVE
BNP SERPL-MCNC: 692 PG/ML
BUN BLD-MCNC: 29 MG/DL (ref 8–20)
BUN SERPL-MCNC: 31 MG/DL (ref 8–20)
BUN SERPL-MCNC: 35 MG/DL (ref 8–20)
BUN/CREAT SERPL: 16.3 (ref 5.4–26.2)
BUN/CREAT SERPL: 17.5 (ref 5.4–26.2)
BUN/CREAT SERPL: 18.1 (ref 5.4–26.2)
CALCIUM SPEC-SCNC: 8.7 MG/DL (ref 8.9–10.3)
CALCIUM SPEC-SCNC: 8.7 MG/DL (ref 8.9–10.3)
CALCIUM SPEC-SCNC: 8.9 MG/DL (ref 8.9–10.3)
CANNABINOIDS SERPL QL: NEGATIVE
CHLORIDE SERPL-SCNC: 101 MMOL/L (ref 101–111)
CHLORIDE SERPL-SCNC: 101 MMOL/L (ref 101–111)
CHLORIDE SERPL-SCNC: 103 MMOL/L (ref 101–111)
CLARITY UR: CLEAR
CO2 SERPL-SCNC: 23 MMOL/L (ref 22–32)
CO2 SERPL-SCNC: 23 MMOL/L (ref 22–32)
CO2 SERPL-SCNC: 24 MMOL/L (ref 22–32)
COCAINE UR QL: NEGATIVE
COLOR UR: YELLOW
CREAT BLD-MCNC: 1.6 MG/DL (ref 0.4–1)
CREAT SERPL-MCNC: 1.9 MG/DL (ref 0.4–1)
CREAT SERPL-MCNC: 2 MG/DL (ref 0.4–1)
CREAT UR-MCNC: 33.2 MG/DL
ETHANOL UR QL: <0.01 %
GFR SERPL CREATININE-BSD FRML MDRD: 24 ML/MIN/1.73
GFR SERPL CREATININE-BSD FRML MDRD: 26 ML/MIN/1.73
GFR SERPL CREATININE-BSD FRML MDRD: 32 ML/MIN/1.73
GLOBULIN UR ELPH-MCNC: 3.8 GM/DL (ref 2.5–3.8)
GLUCOSE BLD-MCNC: 162 MG/DL (ref 65–99)
GLUCOSE BLDC GLUCOMTR-MCNC: 109 MG/DL (ref 70–105)
GLUCOSE BLDC GLUCOMTR-MCNC: 126 MG/DL (ref 70–105)
GLUCOSE BLDC GLUCOMTR-MCNC: 160 MG/DL (ref 70–105)
GLUCOSE BLDC GLUCOMTR-MCNC: 81 MG/DL (ref 70–105)
GLUCOSE SERPL-MCNC: 173 MG/DL (ref 65–99)
GLUCOSE SERPL-MCNC: 96 MG/DL (ref 65–99)
GLUCOSE UR STRIP-MCNC: NEGATIVE MG/DL
HGB UR QL STRIP.AUTO: NEGATIVE
HYALINE CASTS UR QL AUTO: ABNORMAL /LPF
INR PPP: 0.9 (ref 0.9–1.1)
KETONES UR QL STRIP: NEGATIVE
LEUKOCYTE ESTERASE UR QL STRIP.AUTO: NEGATIVE
LIPASE SERPL-CCNC: 28 U/L (ref 22–51)
METHADONE UR QL SCN: NEGATIVE
NITRITE UR QL STRIP: NEGATIVE
OPIATES UR QL: NEGATIVE
PCP UR QL SCN: NEGATIVE
PH UR STRIP.AUTO: 6 [PH] (ref 5–8)
POTASSIUM BLD-SCNC: 4.9 MMOL/L (ref 3.6–5.1)
POTASSIUM SERPL-SCNC: 4.2 MMOL/L (ref 3.6–5.1)
POTASSIUM SERPL-SCNC: 5.1 MMOL/L (ref 3.6–5.1)
PROT SERPL-MCNC: 7.1 G/DL (ref 6.1–7.9)
PROT UR QL STRIP: ABNORMAL
PROTHROMBIN TIME: 9.4 SECONDS (ref 9.6–11.7)
RBC # UR STRIP: ABNORMAL /HPF
REF LAB TEST METHOD: ABNORMAL
SALICYLATES SERPL-MCNC: <4 MG/DL (ref 0–30)
SODIUM BLD-SCNC: 137 MMOL/L (ref 136–144)
SODIUM SERPL-SCNC: 136 MMOL/L (ref 136–144)
SODIUM SERPL-SCNC: 138 MMOL/L (ref 136–144)
SP GR UR STRIP: 1.01 (ref 1–1.03)
SQUAMOUS #/AREA URNS HPF: ABNORMAL /HPF
TROPONIN I SERPL-MCNC: <0.03 NG/ML (ref 0–0.03)
TSH SERPL DL<=0.05 MIU/L-ACNC: 2.14 MIU/ML (ref 0.34–5.6)
UROBILINOGEN UR QL STRIP: ABNORMAL
WBC # UR STRIP: ABNORMAL /HPF

## 2019-06-17 PROCEDURE — G0378 HOSPITAL OBSERVATION PER HR: HCPCS

## 2019-06-17 PROCEDURE — 71045 X-RAY EXAM CHEST 1 VIEW: CPT

## 2019-06-17 PROCEDURE — 80307 DRUG TEST PRSMV CHEM ANLYZR: CPT | Performed by: EMERGENCY MEDICINE

## 2019-06-17 PROCEDURE — 83880 ASSAY OF NATRIURETIC PEPTIDE: CPT | Performed by: INTERNAL MEDICINE

## 2019-06-17 PROCEDURE — 82962 GLUCOSE BLOOD TEST: CPT

## 2019-06-17 PROCEDURE — 25010000002 LORAZEPAM PER 2 MG: Performed by: INTERNAL MEDICINE

## 2019-06-17 PROCEDURE — 96376 TX/PRO/DX INJ SAME DRUG ADON: CPT

## 2019-06-17 PROCEDURE — 82570 ASSAY OF URINE CREATININE: CPT | Performed by: EMERGENCY MEDICINE

## 2019-06-17 PROCEDURE — 99218 PR INITIAL OBSERVATION CARE/DAY 30 MINUTES: CPT | Performed by: NURSE PRACTITIONER

## 2019-06-17 PROCEDURE — 93005 ELECTROCARDIOGRAM TRACING: CPT | Performed by: INTERNAL MEDICINE

## 2019-06-17 PROCEDURE — 70551 MRI BRAIN STEM W/O DYE: CPT

## 2019-06-17 PROCEDURE — 96375 TX/PRO/DX INJ NEW DRUG ADDON: CPT

## 2019-06-17 PROCEDURE — 81001 URINALYSIS AUTO W/SCOPE: CPT | Performed by: EMERGENCY MEDICINE

## 2019-06-17 PROCEDURE — 80048 BASIC METABOLIC PNL TOTAL CA: CPT | Performed by: INTERNAL MEDICINE

## 2019-06-17 PROCEDURE — 63710000001 PREDNISONE PER 5 MG: Performed by: NURSE PRACTITIONER

## 2019-06-17 PROCEDURE — 70450 CT HEAD/BRAIN W/O DYE: CPT

## 2019-06-17 PROCEDURE — 96374 THER/PROPH/DIAG INJ IV PUSH: CPT

## 2019-06-17 PROCEDURE — 25010000002 HYDRALAZINE PER 20 MG: Performed by: NURSE PRACTITIONER

## 2019-06-17 PROCEDURE — 25010000002 HYDRALAZINE PER 20 MG: Performed by: EMERGENCY MEDICINE

## 2019-06-17 PROCEDURE — 80048 BASIC METABOLIC PNL TOTAL CA: CPT | Performed by: NURSE PRACTITIONER

## 2019-06-17 RX ORDER — HYDRALAZINE HYDROCHLORIDE 20 MG/ML
10 INJECTION INTRAMUSCULAR; INTRAVENOUS ONCE
Status: COMPLETED | OUTPATIENT
Start: 2019-06-17 | End: 2019-06-17

## 2019-06-17 RX ORDER — METOPROLOL TARTRATE 50 MG/1
100 TABLET, FILM COATED ORAL EVERY 12 HOURS SCHEDULED
Status: DISCONTINUED | OUTPATIENT
Start: 2019-06-17 | End: 2019-06-18 | Stop reason: HOSPADM

## 2019-06-17 RX ORDER — HYDRALAZINE HYDROCHLORIDE 25 MG/1
100 TABLET, FILM COATED ORAL 2 TIMES DAILY
Status: DISCONTINUED | OUTPATIENT
Start: 2019-06-17 | End: 2019-06-18 | Stop reason: HOSPADM

## 2019-06-17 RX ORDER — SODIUM CHLORIDE 0.9 % (FLUSH) 0.9 %
3 SYRINGE (ML) INJECTION EVERY 12 HOURS SCHEDULED
Status: DISCONTINUED | OUTPATIENT
Start: 2019-06-17 | End: 2019-06-18 | Stop reason: HOSPADM

## 2019-06-17 RX ORDER — HYDRALAZINE HYDROCHLORIDE 100 MG/1
100 TABLET, FILM COATED ORAL 2 TIMES DAILY
COMMUNITY
End: 2019-08-28

## 2019-06-17 RX ORDER — SODIUM CHLORIDE 9 MG/ML
75 INJECTION, SOLUTION INTRAVENOUS CONTINUOUS
Status: DISCONTINUED | OUTPATIENT
Start: 2019-06-17 | End: 2019-06-18 | Stop reason: HOSPADM

## 2019-06-17 RX ORDER — BACLOFEN 10 MG/1
10 TABLET ORAL 3 TIMES DAILY PRN
COMMUNITY
End: 2019-08-28

## 2019-06-17 RX ORDER — FERROUS SULFATE 325(65) MG
325 TABLET ORAL WEEKLY
COMMUNITY

## 2019-06-17 RX ORDER — HYDRALAZINE HYDROCHLORIDE 20 MG/ML
20 INJECTION INTRAMUSCULAR; INTRAVENOUS EVERY 6 HOURS PRN
Status: DISCONTINUED | OUTPATIENT
Start: 2019-06-17 | End: 2019-06-18 | Stop reason: HOSPADM

## 2019-06-17 RX ORDER — THIAMINE HCL 100 MG
100 TABLET ORAL DAILY
Status: DISCONTINUED | OUTPATIENT
Start: 2019-06-17 | End: 2019-06-18 | Stop reason: HOSPADM

## 2019-06-17 RX ORDER — METOPROLOL TARTRATE 100 MG/1
100 TABLET ORAL 2 TIMES DAILY
COMMUNITY
End: 2019-08-28

## 2019-06-17 RX ORDER — INSULIN GLARGINE 100 [IU]/ML
14 INJECTION, SOLUTION SUBCUTANEOUS DAILY
Status: DISCONTINUED | OUTPATIENT
Start: 2019-06-17 | End: 2019-06-18 | Stop reason: HOSPADM

## 2019-06-17 RX ORDER — SODIUM CHLORIDE 0.9 % (FLUSH) 0.9 %
3-10 SYRINGE (ML) INJECTION AS NEEDED
Status: DISCONTINUED | OUTPATIENT
Start: 2019-06-17 | End: 2019-06-17

## 2019-06-17 RX ORDER — FERROUS SULFATE TAB EC 324 MG (65 MG FE EQUIVALENT) 324 (65 FE) MG
324 TABLET DELAYED RESPONSE ORAL DAILY
Status: DISCONTINUED | OUTPATIENT
Start: 2019-06-17 | End: 2019-06-18 | Stop reason: HOSPADM

## 2019-06-17 RX ORDER — DULOXETIN HYDROCHLORIDE 30 MG/1
30 CAPSULE, DELAYED RELEASE ORAL DAILY
COMMUNITY
End: 2019-09-25

## 2019-06-17 RX ORDER — SODIUM CHLORIDE 0.9 % (FLUSH) 0.9 %
3 SYRINGE (ML) INJECTION EVERY 12 HOURS SCHEDULED
Status: DISCONTINUED | OUTPATIENT
Start: 2019-06-17 | End: 2019-06-17

## 2019-06-17 RX ORDER — FAMOTIDINE 20 MG/1
20 TABLET, FILM COATED ORAL DAILY
Status: DISCONTINUED | OUTPATIENT
Start: 2019-06-17 | End: 2019-06-18 | Stop reason: HOSPADM

## 2019-06-17 RX ORDER — HYDROCHLOROTHIAZIDE 12.5 MG/1
12.5 TABLET ORAL DAILY
COMMUNITY
End: 2019-08-28

## 2019-06-17 RX ORDER — THIAMINE MONONITRATE (VIT B1) 100 MG
100 TABLET ORAL DAILY
COMMUNITY

## 2019-06-17 RX ORDER — PREDNISONE 1 MG/1
5 TABLET ORAL DAILY
COMMUNITY

## 2019-06-17 RX ORDER — LORAZEPAM 2 MG/ML
1 INJECTION INTRAMUSCULAR ONCE
Status: COMPLETED | OUTPATIENT
Start: 2019-06-17 | End: 2019-06-17

## 2019-06-17 RX ORDER — ATORVASTATIN CALCIUM 10 MG/1
10 TABLET, FILM COATED ORAL DAILY
Status: DISCONTINUED | OUTPATIENT
Start: 2019-06-17 | End: 2019-06-18 | Stop reason: HOSPADM

## 2019-06-17 RX ORDER — SODIUM CHLORIDE 0.9 % (FLUSH) 0.9 %
3-10 SYRINGE (ML) INJECTION AS NEEDED
Status: DISCONTINUED | OUTPATIENT
Start: 2019-06-17 | End: 2019-06-18 | Stop reason: HOSPADM

## 2019-06-17 RX ORDER — FOLIC ACID 1 MG/1
1000 TABLET ORAL DAILY
Status: DISCONTINUED | OUTPATIENT
Start: 2019-06-17 | End: 2019-06-18 | Stop reason: HOSPADM

## 2019-06-17 RX ORDER — MELATONIN
1000 DAILY
Status: DISCONTINUED | OUTPATIENT
Start: 2019-06-17 | End: 2019-06-18 | Stop reason: HOSPADM

## 2019-06-17 RX ORDER — ATORVASTATIN CALCIUM 10 MG/1
10 TABLET, FILM COATED ORAL DAILY
COMMUNITY

## 2019-06-17 RX ORDER — PREDNISONE 1 MG/1
5 TABLET ORAL DAILY
Status: DISCONTINUED | OUTPATIENT
Start: 2019-06-17 | End: 2019-06-18 | Stop reason: HOSPADM

## 2019-06-17 RX ORDER — LEVOTHYROXINE SODIUM 0.12 MG/1
125 TABLET ORAL
Status: DISCONTINUED | OUTPATIENT
Start: 2019-06-17 | End: 2019-06-18 | Stop reason: HOSPADM

## 2019-06-17 RX ADMIN — HYDRALAZINE HYDROCHLORIDE 100 MG: 25 TABLET, FILM COATED ORAL at 09:39

## 2019-06-17 RX ADMIN — LORAZEPAM 1 MG: 2 INJECTION INTRAMUSCULAR; INTRAVENOUS at 18:06

## 2019-06-17 RX ADMIN — DICLOFENAC SODIUM 2 G: 10 GEL TOPICAL at 06:33

## 2019-06-17 RX ADMIN — HYDRALAZINE HYDROCHLORIDE 20 MG: 20 INJECTION INTRAMUSCULAR; INTRAVENOUS at 06:33

## 2019-06-17 RX ADMIN — MELATONIN 1000 UNITS: at 09:39

## 2019-06-17 RX ADMIN — HYDRALAZINE HYDROCHLORIDE 10 MG: 20 INJECTION INTRAMUSCULAR; INTRAVENOUS at 02:10

## 2019-06-17 RX ADMIN — METOPROLOL TARTRATE 100 MG: 50 TABLET, FILM COATED ORAL at 20:19

## 2019-06-17 RX ADMIN — ATORVASTATIN CALCIUM 10 MG: 10 TABLET, FILM COATED ORAL at 09:39

## 2019-06-17 RX ADMIN — Medication 100 MG: at 09:39

## 2019-06-17 RX ADMIN — SODIUM CHLORIDE 75 ML/HR: 900 INJECTION, SOLUTION INTRAVENOUS at 06:34

## 2019-06-17 RX ADMIN — FOLIC ACID 1000 MCG: 1 TABLET ORAL at 09:39

## 2019-06-17 RX ADMIN — HYDRALAZINE HYDROCHLORIDE 100 MG: 25 TABLET, FILM COATED ORAL at 20:19

## 2019-06-17 RX ADMIN — LEVOTHYROXINE SODIUM 125 MCG: 125 TABLET ORAL at 06:33

## 2019-06-17 RX ADMIN — SODIUM CHLORIDE, PRESERVATIVE FREE 3 ML: 5 INJECTION INTRAVENOUS at 09:42

## 2019-06-17 RX ADMIN — FAMOTIDINE 20 MG: 20 TABLET, FILM COATED ORAL at 09:39

## 2019-06-17 RX ADMIN — METOPROLOL TARTRATE 100 MG: 50 TABLET, FILM COATED ORAL at 09:39

## 2019-06-17 RX ADMIN — SODIUM CHLORIDE, PRESERVATIVE FREE 3 ML: 5 INJECTION INTRAVENOUS at 20:21

## 2019-06-17 RX ADMIN — PREDNISONE 5 MG: 5 TABLET ORAL at 09:39

## 2019-06-17 NOTE — H&P
[facility] HOSPITALIST     Nicki Hidalgo MD    CHIEF COMPLAINT:     Confusion     HISTORY OF PRESENT ILLNESS:    Ms. Paz is a 73 year old  female with a past medical history significant for chronic HTN, Type 2 DM, s/p thyroidectomy, chronic diarrhea with a history of C-diff, TIA, prior back surgery, CKD Stage 3, s/p perforated viscus with prior EGD with dilatation of a VBG stricture, and s/p LLL mass.  She reports to the ER with her  who provides the history.  He states that most of the day she has been extremely fatigued and was sleepy.  He states he attempted to give her blood pressure medication, but she did not want to take it because her systolic pressure was not above 140.  She states she routinely holds her medications if her blood pressure is 130 systolic. He states he called a nurse who advised she may be having a stroke, so he brought her to the ER.      Labs in the ER showed a creatinine of 2.0, negative UDS, Ammonia 11. CT of the head was negative for any acute findings.  Blood pressure was in the 200's systolic.  She received Labetalol in the ER.     PCP: Nicki Hidalgo  Cardiologist: Nikhil  Nephrology: Daily       Past Medical History:   Diagnosis Date   • Anxiety    • Clostridium difficile infection 2016    and in    • Diabetes mellitus (CMS/HCC)    • Disease of thyroid gland    • GERD (gastroesophageal reflux disease)    • Hypertension    • Migraine    • Pancreatitis    • Tachycardia    • TIA (transient ischemic attack)      Past Surgical History:   Procedure Laterality Date   • APPENDECTOMY     • CARDIAC CATHETERIZATION     •  SECTION     • CHOLECYSTECTOMY     • COLONOSCOPY      normal per pt, Dr. Reagan   • COLONOSCOPY N/A 3/1/2017    multiple polyps, tics, IH, scar in transverse colon   • ENDOSCOPY N/A 3/1/2017    gastroplasty, nodular mucosa in gastric antrum, nodule in duodenum   • ENDOSCOPY N/A 2018    banded gastroplasty  "found, non intact appearance   • GASTRIC BYPASS     • HERNIA REPAIR     • LUNG SURGERY      mass LL   • THYROID SURGERY       Family History   Problem Relation Age of Onset   • Pancreatitis Brother    • Cancer Brother    • Ulcerative colitis Son    • Crohn's disease Grandchild    • Cancer Mother    • Cancer Maternal Grandmother      Social History     Tobacco Use   • Smoking status: Never Smoker   • Smokeless tobacco: Never Used   Substance Use Topics   • Alcohol use: No     Frequency: Never   • Drug use: No       (Not in a hospital admission)  Allergies:  Amoxicillin; Dapsone; Levofloxacin; Sulfa antibiotics; Sulfamethoxazole-trimethoprim; Tetracycline; Amoxicillin-pot clavulanate; Sertraline; and Tetracyclines & related      There is no immunization history on file for this patient.        REVIEW OF SYSTEMS:     Review of Systems   Neurological:        Confusion   Psychiatric/Behavioral: Depression: .appen.       Vital Signs  Temp:  [97.7 °F (36.5 °C)] 97.7 °F (36.5 °C)  Heart Rate:  [53-60] 53  Resp:  [14-20] 15  BP: (190-200)/(70-90) 199/74    Flowsheet Rows      First Filed Value   Admission Height  167.6 cm (66\") Documented at 06/16/2019 2256   Admission Weight  81.6 kg (180 lb) Documented at 06/16/2019 2256           Physical Exam:    Physical Exam   Constitutional: She appears well-developed and well-nourished.   HENT:   Head: Normocephalic.   Oral mucosa dry   Eyes: Pupils are equal, round, and reactive to light.   Cardiovascular: Normal rate and regular rhythm.   Pulmonary/Chest: Effort normal and breath sounds normal.   Abdominal: Soft.   Musculoskeletal: Normal range of motion.   Neurological: She is alert.   Skin: Skin is warm and dry.           Results Review:    I reviewed the patient's new clinical results.  Lab Results (most recent)     Procedure Component Value Units Date/Time    Acetaminophen Level [[id number]] Collected:  06/16/19 2339    Specimen:  Blood Updated:  06/17/19 0123    Salicylate " Level [[id number]]  (Normal) Collected:  06/16/19 2339    Specimen:  Blood Updated:  06/17/19 0044     Salicylate <4.0 mg/dL     Comprehensive Metabolic Panel [[id number]]  (Abnormal) Collected:  06/16/19 2339    Specimen:  Blood Updated:  06/17/19 0043     Glucose 173 mg/dL      BUN 35 mg/dL      Creatinine 2.00 mg/dL      Sodium 136 mmol/L      Potassium 5.1 mmol/L      Comment: Specimen hemolyzed.  Results may be affected.        Chloride 101 mmol/L      CO2 23.0 mmol/L      Calcium 8.9 mg/dL      Total Protein 7.1 g/dL      Albumin 3.30 g/dL      ALT (SGPT) 17 U/L      Comment: Specimen hemolyzed.  Results may be affected.        AST (SGOT) 20 U/L      Comment: Specimen hemolyzed.  Results may be affected.        Alkaline Phosphatase 58 U/L      Total Bilirubin 0.7 mg/dL      Comment: Specimen hemolyzed.  Results may be affected.        eGFR Non African Amer 24 mL/min/1.73      Globulin 3.8 gm/dL      A/G Ratio 0.9 g/dL      BUN/Creatinine Ratio 17.5     Anion Gap 12.0 mmol/L     Narrative:       The MDRD GFR formula is only valid for adults with stable renal function between ages 18 and 70.    Urine Drug Screen - Urine, Catheter [[id number]] Collected:  06/17/19 0003    Specimen:  Urine, Catheter Updated:  06/17/19 0042     Barbiturates Screen, Urine Negative     Benzodiazepine Screen, Urine Negative     Cocaine Screen, Urine Negative     Opiate Screen Negative     THC, Screen, Urine Negative     Methadone Screen, Urine Negative     Amphetamine Screen, Urine Negative     Creatinine, Urine 33.2 mg/dL      Phencyclidine (PCP), Urine Negative    Narrative:       All urine drugs of abuse requests without chain of custody are for medical screening purposes only.  False positives are possible.      TSH [[id number]]  (Normal) Collected:  06/16/19 2339    Specimen:  Blood Updated:  06/17/19 0040     TSH 2.140 mIU/mL      Comment: Results may be falsely decreased if patient taking Biotin.       Lipase [[id number]]  (Normal)  Collected:  06/16/19 2339    Specimen:  Blood Updated:  06/17/19 0039     Lipase 28 U/L     Ethanol [[id number]]  (Normal) Collected:  06/16/19 2339    Specimen:  Blood Updated:  06/17/19 0039     Ethanol % <0.010 %     Narrative:       Plasma Ethanol Clinical Symptoms:    ETOH (%)               Clinical Symptom  .01-.05              No apparent influence  .03-.12              Euphoria, Diminished judgment and attention   .09-.25              Impaired comprehension, Muscle incoordination  .18-.30              Confusion, Staggered gait, Slurred speech  .25-.40              Markedly decreased response to stimuli, unable to stand or                        walk, vomitting, sleep or stupor  .35-.50              Comatose, Anesthesia, Subnormal body temperature      Ammonia [[id number]]  (Normal) Collected:  06/16/19 2339    Specimen:  Blood Updated:  06/17/19 0019     Ammonia 11 umol/L     Troponin [[id number]]  (Normal) Collected:  06/16/19 2339    Specimen:  Blood Updated:  06/17/19 0017     Troponin I <0.030 ng/mL     Narrative:       Troponin I Reference Range:    0.00-0.03  Negative.  Repeat testing in 4-6 hours if clinically indicated.    0.04-0.29  Suspicious for myocardial injury. Serial measurements and clinical  correlation may be necessary to confirm or exclude diagnosis of acute  coronary syndrome.  Repeat testing in 4-6 hours if indicated.     >0.29 Consistent with myocardial injury.  Recommend clinical and laboratory correlation.     Results my be falsely decreased if patient taking Biotin.     Urinalysis With Microscopic If Indicated (No Culture) - Urine, Catheter [[id number]]  (Abnormal) Collected:  06/17/19 0003    Specimen:  Urine, Catheter Updated:  06/17/19 0016     Color, UA Yellow     Appearance, UA Clear     pH, UA 6.0     Specific Gravity, UA 1.007     Glucose, UA Negative     Ketones, UA Negative     Bilirubin, UA Negative     Blood, UA Negative     Protein,  mg/dL (2+)     Leuk Esterase, UA  Negative     Nitrite, UA Negative     Urobilinogen, UA 0.2 E.U./dL    Urinalysis, Microscopic Only - Urine, Catheter [[id number]]  (Abnormal) Collected:  06/17/19 0003    Specimen:  Urine, Catheter Updated:  06/17/19 0016     RBC, UA 0-2 /HPF      WBC, UA None Seen /HPF      Bacteria, UA None Seen /HPF      Squamous Epithelial Cells, UA None Seen /HPF      Hyaline Casts, UA None Seen /LPF      Methodology Automated Microscopy    Protime-INR [[id number]]  (Abnormal) Collected:  06/16/19 2339    Specimen:  Blood Updated:  06/17/19 0008     Protime 9.4 Seconds      INR 0.90    aPTT [[id number]]  (Abnormal) Collected:  06/16/19 2339    Specimen:  Blood Updated:  06/17/19 0008     PTT 23.4 seconds     Blood Culture - Blood, Arm, Left [[id number]] Collected:  06/16/19 2356    Specimen:  Blood from Arm, Left Updated:  06/16/19 2357    CBC & Differential [[id number]] Collected:  06/16/19 2339    Specimen:  Blood Updated:  06/16/19 2355    Narrative:       The following orders were created for panel order CBC & Differential.  Procedure                               Abnormality         Status                     ---------                               -----------         ------                     CBC Auto Differential[[id number]]        Abnormal            Final result                 Please view results for these tests on the individual orders.    CBC Auto Differential [[id number]]  (Abnormal) Collected:  06/16/19 2339    Specimen:  Blood Updated:  06/16/19 2355     WBC 8.00 10*3/mm3      RBC 3.77 10*6/mm3      Hemoglobin 11.1 g/dL      Hematocrit 34.2 %      MCV 90.7 fL      MCH 29.5 pg      MCHC 32.5 g/dL      RDW 14.5 %      RDW-SD 46.8 fl      MPV 7.6 fL      Platelets 296 10*3/mm3      Neutrophil % 83.0 %      Lymphocyte % 11.0 %      Monocyte % 4.7 %      Eosinophil % 1.1 %      Basophil % 0.2 %      Neutrophils, Absolute 6.60 10*3/mm3      Lymphocytes, Absolute 0.90 10*3/mm3      Monocytes, Absolute 0.40 10*3/mm3       Eosinophils, Absolute 0.10 10*3/mm3      Basophils, Absolute 0.00 10*3/mm3      nRBC 0.0 /100 WBC     Blood Culture - Blood, Arm, Right [[id number]] Collected:  06/16/19 2339    Specimen:  Blood from Arm, Right Updated:  06/16/19 2349          Imaging Results (most recent)     Procedure Component Value Units Date/Time    CT Head Without Contrast [[id number]] Collected:  06/16/19 2355     Updated:  06/17/19 0205    Narrative:       EXAMINATION: CT HEAD WITHOUT CONTRAST    DATE: 6/17/2019 1:24 AM    INDICATION: Confusion, delirium    COMPARISON: None available at the time this dictation.    TECHNIQUE: Noncontrast imaging obtained from the vertex to the skull base.  CT dose lowering techniques were used, to include: automated exposure control, adjustment for patient size, and or use of iterative reconstruction.?    FINDINGS:    Soft Tissues: No significant soft tissue abnormality.    Skull: No underlying skull fracture or radiopaque foreign body.    Sinuses: Paranasal sinuses are clear.    Mastoids: Mastoid air cells are clear.     Globes and Orbits: Globes and orbits are intact.    Brain: No acute hemorrhage.  No midline shift, masses, or mass effect.  No evidence of acute infarct by noncontrast CT.    Ventricles and Cisterns: Ventricular size and configuration is within normal limits. Basal cisterns are patent. No abnormal extra-axial fluid collection.    Senescent Changes: Small remote appearing lacunar infarct within the right lentiform region.        Impression:           1. No acute intracranial abnormality.    2. Small remote appearing lacunar infarct in the right lentiform region.      Electronically signed by:  Jon Bauer M.D.    6/17/2019 12:04 AM    XR Chest 1 View [[id number]] Updated:  06/17/19 0152        reviewed    ECG/EMG Results (most recent)     Procedure Component Value Units Date/Time    ECG 12 Lead [[id number]] Collected:  06/16/19 2334     Updated:  06/16/19 2336    Narrative:       HEART RATE=  55  bpm  RR Interval= 1100  ms  OR Interval= 220  ms  P Horizontal Axis= 8  deg  P Front Axis= 21  deg  QRSD Interval= 91  ms  QT Interval= 470  ms  QRS Axis= -25  deg  T Wave Axis= 65  deg  - ABNORMAL ECG -  Sinus bradycardia  Ventricular bigeminy  Prolonged OR interval  Electronically Signed By:   Date and Time of Study: 2019-06-16 23:34:41        reviewed    CT Head Without Contrast  Narrative: EXAMINATION: CT HEAD WITHOUT CONTRAST    DATE: 6/17/2019 1:24 AM    INDICATION: Confusion, delirium    COMPARISON: None available at the time this dictation.    TECHNIQUE: Noncontrast imaging obtained from the vertex to the skull base.  CT dose lowering techniques were used, to include: automated exposure control, adjustment for patient size, and or use of iterative reconstruction.?    FINDINGS:    Soft Tissues: No significant soft tissue abnormality.    Skull: No underlying skull fracture or radiopaque foreign body.    Sinuses: Paranasal sinuses are clear.    Mastoids: Mastoid air cells are clear.     Globes and Orbits: Globes and orbits are intact.    Brain: No acute hemorrhage.  No midline shift, masses, or mass effect.  No evidence of acute infarct by noncontrast CT.    Ventricles and Cisterns: Ventricular size and configuration is within normal limits. Basal cisterns are patent. No abnormal extra-axial fluid collection.    Senescent Changes: Small remote appearing lacunar infarct within the right lentiform region.  Impression: 1. No acute intracranial abnormality.    2. Small remote appearing lacunar infarct in the right lentiform region.    Electronically signed by:  Jon Bauer M.D.    6/17/2019 12:04 AM      Assessment/Plan     Encephalopathy secondary to hypertensive emergency with polypharmacy  -She reports that some days she does not take her blood pressure medication even if her systolic pressure is in the 130's.  Advised her that she should be taking her blood pressure medication every day and not skipping  "doses.  Likely she needs some adjustment in her doses so that she has control all day long.  Her  states that her blood pressure is \"all over the place.\"  Systolic pressures in the ER were in the 200's. Advised her that continued elevation in her blood pressure can cause worsening renal failure, heart attack, stroke, and other end organ damage. She is also on Baclofen, Xanax, Hydrocodone, and Atarax.   -Continue home Metoprolol and Hydralazine  -Hold Propranolol, unsure why she is on two BB  -PRN IV Hydralazine for sys >150    CKD Stage 3 with prerenal failure   -Baseline creatinine around 1.5-1.6, today is 2.0.  She is complaining of being extremely dry and her oral mucosa is dry.  Likely some prerenal failure with some dehydration and diuretic use.   -Gentle hydration, NS at 75mL/hr  -Hold HCTZ  -Monitor creatinine     Insulin dependent diabetes  -Accu checks AC/HS  -Continue home insulin  -SSI if indicated     Chronic pain   -Hold Hydrocodone for now due to mentation   -Diclofenac topical cream to shoulder for pain     Chronic depression and anxiety  -Hold Cymbalta and Xanax for now due to mentation     Hypothyroidism s/p thyroidectomy  -TSH 2.1  -Continue Levothyroxine     GERD  -Continue PPI     Hyperlipidemia  -Continue statin     History of chronic diarrhea and C-diff  -She reports she completed po Vancomycin a couple months ago     DVT Prophylaxis  -Bilateral SCD's     Disposition    Observation admission for management of blood pressure and mentation     I discussed the patients findings and my recommendations with patient and  at the bedside.     DENY Garcia  06/17/19  2:14 AM    Time: More than 50% of time spent in counseling and coordination of care:  Total face-to-face/floor time 15 min.  Time spent in counseling 15 min. Counseling included the following topics: Blood pressure control and polypharmacy      I have reviewed the notes, assessments, and/or procedures performed " "Gabriel Gonzalez-DENY Evans I concur with her/his documentation of Ya Paz.      Ms. Paz is a 73 year old  female with a past medical history significant for chronic HTN, Type 2 DM, s/p thyroidectomy, chronic diarrhea with a history of C-diff, TIA, prior back surgery, CKD Stage 3, s/p perforated viscus with prior EGD with dilatation of a VBG stricture, and s/p LLL mass.  She reports to the ER with her  who provides the history.  He states that most of the day she has been extremely fatigued and was sleepy.      She is awake alert and oriented x3  Neck is supple no JVD no bruit  Lungs clear to auscultation  Heart regular rhythm normal S1-S2  Abdomen soft nontender distended  Extremities no edema  Encephalopathy secondary to hypertensive emergency with polypharmacy  -She reports that some days she does not take her blood pressure medication even if her systolic pressure is in the 130's.  Advised her that she should be taking her blood pressure medication every day and not skipping doses.  Likely she needs some adjustment in her doses so that she has control all day long.  Her  states that her blood pressure is \"all over the place.\"  Systolic pressures in the ER were in the 200's. Advised her that continued elevation in her blood pressure can cause worsening renal failure, heart attack, stroke, and other end organ damage. She is also on Baclofen, Xanax, Hydrocodone, and Atarax.   -Continue home Metoprolol and Hydralazine  -Hold Propranolol, unsure why she is on two BB  -PRN IV Hydralazine for sys >150  -----------------------------------------------------------------------------------------------------------------------------------------        "

## 2019-06-17 NOTE — NURSING NOTE
This RN completed admission assessment and head to toe assessment in conjunction with patient's assigned MAI Mcintosh. This RN is not assuming care of pt and is not assigned to this patient. MAI Mcintosh is aware of all that is charted on initial admission assessment and will also chart her own assessment. Provider Latasha Gonzalez-Nathan in room at time of assessment and admission questions and is aware of all patient stated. Provider told MAI Mcintosh that she will add in orders for pt including pain management orders. PT states shoulder pain is chronic and is not associated with chest pain and does not radiate. Provider Libia in room when pt reported pain. Pt states her last bowel movement was 6/16. Bowel sounds present in all quads.

## 2019-06-17 NOTE — ED PROVIDER NOTES
"Subjective   Patient is a poor historian, has no complaints and tells me she's fine.  On ROS, patient reluctantly admits to some left sided pleuritic chest pain earlier, resolved.  Patient admits to fatigue.  Patient is oriented but slow to answer.  Per nurse, per EMS,  Per family, patient has been laying in bed for the past 24 hours with confusion.            Review of Systems   Constitutional: Positive for fatigue.   Cardiovascular: Positive for chest pain.   Psychiatric/Behavioral: Positive for confusion.   All other systems reviewed and are negative.      Past Medical History:   Diagnosis Date   • Anxiety    • Clostridium difficile infection 2016    and in    • Diabetes mellitus (CMS/[facility])    • Disease of thyroid gland    • GERD (gastroesophageal reflux disease)    • Hypertension    • Migraine    • Pancreatitis    • Tachycardia    • TIA (transient ischemic attack)        Allergies   Allergen Reactions   • Amoxicillin Unknown (See Comments)   • Dapsone Other (See Comments)     Pt states she \"quit producing blood.\"  Quit producing blood   • Levofloxacin Hives and Rash   • Sulfa Antibiotics Unknown (See Comments)     Sister went blind with taking and was advised not to take.  Sister went blind with taking and was advised not to take.   • Sulfamethoxazole-Trimethoprim Unknown (See Comments)   • Tetracycline Hives and Itching   • Amoxicillin-Pot Clavulanate Rash     rash  **tolerates cephalosporins**   **no reason to give aztreonam in the future**  Rash in mouth   • Sertraline Unknown (See Comments)     tremors   • Tetracyclines & Related        Past Surgical History:   Procedure Laterality Date   • APPENDECTOMY     • CARDIAC CATHETERIZATION     •  SECTION     • CHOLECYSTECTOMY     • COLONOSCOPY      normal per pt, Dr. Reagan   • COLONOSCOPY N/A 3/1/2017    multiple polyps, tics, IH, scar in transverse colon   • ENDOSCOPY N/A 3/1/2017    gastroplasty, nodular mucosa in gastric antrum, nodule in " duodenum   • ENDOSCOPY N/A 2/14/2018    banded gastroplasty found, non intact appearance   • GASTRIC BYPASS     • HERNIA REPAIR     • LUNG SURGERY      mass LL   • THYROID SURGERY         Family History   Problem Relation Age of Onset   • Pancreatitis Brother    • Cancer Brother    • Ulcerative colitis Son    • Crohn's disease Grandchild    • Cancer Mother    • Cancer Maternal Grandmother        Social History     Socioeconomic History   • Marital status:      Spouse name: Not on file   • Number of children: Not on file   • Years of education: Not on file   • Highest education level: Not on file   Tobacco Use   • Smoking status: Never Smoker   • Smokeless tobacco: Never Used   Substance and Sexual Activity   • Alcohol use: No     Frequency: Never   • Drug use: No   • Sexual activity: Defer           Objective   Physical Exam   Constitutional: She is oriented to person, place, and time. She appears well-developed and well-nourished.   HENT:   Head: Normocephalic and atraumatic.   Mouth/Throat: Oropharynx is clear and moist.   Eyes: EOM are normal. Pupils are equal, round, and reactive to light.   Neck: Normal range of motion. Neck supple.   Cardiovascular: Normal rate, regular rhythm, normal heart sounds and intact distal pulses.   Pulmonary/Chest: Effort normal and breath sounds normal.   Abdominal: Soft. Bowel sounds are normal. There is no tenderness.   Musculoskeletal: Normal range of motion.   Neurological: She is alert and oriented to person, place, and time. She has normal strength. No cranial nerve deficit or sensory deficit.   Skin: Skin is warm and dry.   Psychiatric: She has a normal mood and affect. Her behavior is normal.       Procedures           ED Course  ED Course as of Jun 17 0230   Sun Jun 16, 2019   2341 EKG interpretation:  Sinus bradycardia with PVCs, no st elevation, rate 55  [JR]      ED Course User Index  [JR] Raheel Kothari MD                  [facility]  Number of Diagnoses or Management  Options  Altered mental status, unspecified altered mental status type:   Diagnosis management comments: Mild hypertensive encephalopathy vs occult cva, will admit for further evaluation        Final diagnoses:   Altered mental status, unspecified altered mental status type            Raheel Kothari MD  06/17/19 8282

## 2019-06-17 NOTE — PLAN OF CARE
Problem: Hypertensive Disease/Crisis (Arterial) (Adult)  Goal: Signs and Symptoms of Listed Potential Problems Will be Absent, Minimized or Managed (Hypertensive Disease/Crisis)  Outcome: Ongoing (interventions implemented as appropriate)

## 2019-06-17 NOTE — PROGRESS NOTES
Discharge Planning Assessment  [facility]     Patient Name: Ya Paz  MRN: [id number]  Today's Date: 6/17/2019    Admit Date: 6/16/2019    Discharge Needs Assessment     Row Name 06/17/19 1629       Discharge Needs Assessment    Equipment Currently Used at Home  -- Patient has a walker and cane at home     Row Name 06/17/19 1625       Living Environment    Lives With  spouse    Current Living Arrangements  home/apartment/condo    Primary Care Provided by  self       Resource/Environmental Concerns    Transportation Concerns  car, none       Transition Planning    Patient/Family Anticipates Transition to  home        Discharge Plan     Row Name 06/17/19 1632       Plan    Plan  Anticipate routine d/c to home         Destination      No service coordination in this encounter.      Durable Medical Equipment      No service coordination in this encounter.      Dialysis/Infusion      No service coordination in this encounter.      Home Medical Care      No service coordination in this encounter.      Therapy      No service coordination in this encounter.      Community Resources      No service coordination in this encounter.          Demographic Summary    No documentation.       Functional Status    No documentation.       Psychosocial    No documentation.       Abuse/Neglect    No documentation.       Legal    No documentation.       Substance Abuse    No documentation.       Patient Forms    No documentation.           Daisha Joshi RN

## 2019-06-18 VITALS
RESPIRATION RATE: 14 BRPM | BODY MASS INDEX: 32.5 KG/M2 | WEIGHT: 183.42 LBS | OXYGEN SATURATION: 96 % | TEMPERATURE: 98.3 F | SYSTOLIC BLOOD PRESSURE: 136 MMHG | DIASTOLIC BLOOD PRESSURE: 43 MMHG | HEIGHT: 63 IN | HEART RATE: 69 BPM

## 2019-06-18 PROBLEM — R41.82 ALTERED MENTAL STATUS: Status: RESOLVED | Noted: 2019-06-17 | Resolved: 2019-06-18

## 2019-06-18 LAB
GLUCOSE BLDC GLUCOMTR-MCNC: 197 MG/DL (ref 70–105)
GLUCOSE BLDC GLUCOMTR-MCNC: 65 MG/DL (ref 70–105)

## 2019-06-18 PROCEDURE — 63710000001 INSULIN GLARGINE PER 5 UNITS: Performed by: NURSE PRACTITIONER

## 2019-06-18 PROCEDURE — 82962 GLUCOSE BLOOD TEST: CPT

## 2019-06-18 PROCEDURE — 25010000002 HYDRALAZINE PER 20 MG: Performed by: NURSE PRACTITIONER

## 2019-06-18 PROCEDURE — 96376 TX/PRO/DX INJ SAME DRUG ADON: CPT

## 2019-06-18 PROCEDURE — G0378 HOSPITAL OBSERVATION PER HR: HCPCS

## 2019-06-18 PROCEDURE — 63710000001 PREDNISONE PER 5 MG: Performed by: NURSE PRACTITIONER

## 2019-06-18 PROCEDURE — 99217 PR OBSERVATION CARE DISCHARGE MANAGEMENT: CPT | Performed by: INTERNAL MEDICINE

## 2019-06-18 PROCEDURE — 96361 HYDRATE IV INFUSION ADD-ON: CPT

## 2019-06-18 RX ADMIN — FOLIC ACID 1000 MCG: 1 TABLET ORAL at 09:24

## 2019-06-18 RX ADMIN — ATORVASTATIN CALCIUM 10 MG: 10 TABLET, FILM COATED ORAL at 09:10

## 2019-06-18 RX ADMIN — PREDNISONE 5 MG: 5 TABLET ORAL at 09:10

## 2019-06-18 RX ADMIN — FERROUS SULFATE TAB EC 324 MG (65 MG FE EQUIVALENT) 324 MG: 324 (65 FE) TABLET DELAYED RESPONSE at 09:10

## 2019-06-18 RX ADMIN — SODIUM CHLORIDE, PRESERVATIVE FREE 10 ML: 5 INJECTION INTRAVENOUS at 09:14

## 2019-06-18 RX ADMIN — INSULIN GLARGINE 14 UNITS: 100 INJECTION, SOLUTION SUBCUTANEOUS at 09:18

## 2019-06-18 RX ADMIN — FAMOTIDINE 20 MG: 20 TABLET, FILM COATED ORAL at 09:10

## 2019-06-18 RX ADMIN — LEVOTHYROXINE SODIUM 125 MCG: 125 TABLET ORAL at 06:01

## 2019-06-18 RX ADMIN — MELATONIN 1000 UNITS: at 09:10

## 2019-06-18 RX ADMIN — METOPROLOL TARTRATE 100 MG: 50 TABLET, FILM COATED ORAL at 09:10

## 2019-06-18 RX ADMIN — Medication 100 MG: at 09:14

## 2019-06-18 RX ADMIN — HYDRALAZINE HYDROCHLORIDE 100 MG: 25 TABLET, FILM COATED ORAL at 09:17

## 2019-06-18 RX ADMIN — HYDRALAZINE HYDROCHLORIDE 20 MG: 20 INJECTION INTRAMUSCULAR; INTRAVENOUS at 06:01

## 2019-06-18 RX ADMIN — SODIUM CHLORIDE 75 ML/HR: 900 INJECTION, SOLUTION INTRAVENOUS at 05:58

## 2019-06-18 NOTE — DISCHARGE SUMMARY
"  Date of Admission: 6/16/2019    Date of Discharge:  6/18/2019    Length of stay:  LOS: 1 days     Discharge Diagnosis: Change in mental status/acute metabolic encephalopathy    Presenting Problem/  Encephalopathy secondary to hypertensive emergency with polypharmacy  -She reports that some days she does not take her blood pressure medication even if her systolic pressure is in the 130's.  Advised her that she should be taking her blood pressure medication every day and not skipping doses.  Likely she needs some adjustment in her doses so that she has control all day long.  Her  states that her blood pressure is \"all over the place.\"  Systolic pressures in the ER were in the 200's. Advised her that continued elevation in her blood pressure can cause worsening renal failure, heart attack, stroke, and other end organ damage. She is also on Baclofen, Xanax, Hydrocodone, and Atarax.   -Continue home Metoprolol and Hydralazine  -Hold Propranolol, unsure why she is on two BB     CKD Stage 3 with prerenal failure   -Baseline creatinine around 1.5-1.6, today is 2.0.  She is complaining of being extremely dry and her oral mucosa is dry.  Likely some prerenal failure with some dehydration and diuretic use.   -Gentle hydration, NS at 75mL/hr  -Hold HCTZ  -Monitor creatinine      Insulin dependent diabetes  -Accu checks AC/HS  -Continue home insulin     Chronic pain   -Hold Hydrocodone for now due to mentation   -Diclofenac topical cream to shoulder for pain      Chronic depression and anxiety  -Hold  Xanax for now due to mentation /Cymbalta     Hypothyroidism s/p thyroidectomy  -TSH 2.1  -Continue Levothyroxine      GERD  -Continue PPI      Hyperlipidemia  -Continue statin      History of chronic diarrhea and C-diff  -She reports she completed po Vancomycin a couple months ago                History of Present Illness  Ms. Paz is a 73 year old  female with a past medical history significant for chronic HTN, " Type 2 DM, s/p thyroidectomy, chronic diarrhea with a history of C-diff, TIA, prior back surgery, CKD Stage 3, s/p perforated viscus with prior EGD with dilatation of a VBG stricture, and s/p LLL mass.  She reports to the ER with her  who provides the history.  He states that most of the day she has been extremely fatigued and was sleepy.  He states he attempted to give her blood pressure medication, but she did not want to take it because her systolic pressure was not above 140.  She states she routinely holds her medications if her blood pressure is 130 systolic. He states he called a nurse who advised she may be having a stroke, so he brought her to the ER.       Labs in the ER showed a creatinine of 2.0, negative UDS, Ammonia 11. CT of the head was negative for any acute findings.  Blood pressure was in the 200's systolic.  She received Labetalol in the ER.      PCP: Nicki Hidalgo  Cardiologist: Nikhil  Nephrology: Daily             Active Hospital Problems   No active problems to display.      Resolved Hospital Problems    Diagnosis Date Resolved POA   • Altered mental status [R41.82] 2019 Yes        Hospital Course  She was admitted to the medical floor, she was given IV fluids, home meds were held.  Patient condition improved, MRI was negative, will discharge patient home.  Place a Holter prior to discharge    Past Medical History:     Past Medical History:   Diagnosis Date   • Anxiety    • Clostridium difficile infection 2016    and in    • Diabetes mellitus (CMS/HCC)    • Disease of thyroid gland    • GERD (gastroesophageal reflux disease)    • Hypertension    • Migraine    • Pancreatitis    • Tachycardia    • TIA (transient ischemic attack)        Past Surgical History:     Past Surgical History:   Procedure Laterality Date   • APPENDECTOMY     • CARDIAC CATHETERIZATION     •  SECTION     • CHOLECYSTECTOMY     • COLONOSCOPY      normal per pt, Dr. Reagan   • COLONOSCOPY N/A  3/1/2017    multiple polyps, tics, IH, scar in transverse colon   • ENDOSCOPY N/A 3/1/2017    gastroplasty, nodular mucosa in gastric antrum, nodule in duodenum   • ENDOSCOPY N/A 2/14/2018    banded gastroplasty found, non intact appearance   • GASTRIC BYPASS     • HERNIA REPAIR     • LUNG SURGERY      mass LL   • THYROID SURGERY         Social History:   Social History     Socioeconomic History   • Marital status:      Spouse name: Not on file   • Number of children: Not on file   • Years of education: Not on file   • Highest education level: Not on file   Tobacco Use   • Smoking status: Never Smoker   • Smokeless tobacco: Never Used   Substance and Sexual Activity   • Alcohol use: No     Frequency: Never   • Drug use: No   • Sexual activity: Defer       Procedures Performed         Consults:   Consults     Date and Time Order Name Status Description    6/17/2019 0207 IP Consult to Internal Medicine Completed           Pertinent Test Results:     Lab Results (most recent)     Procedure Component Value Units Date/Time    POC Glucose Once [[id number]]  (Abnormal) Collected:  06/18/19 1107    Specimen:  Blood Updated:  06/18/19 1109     Glucose 197 mg/dL      Comment: Serial Number: [id number]:  [id number]       POC Glucose Once [[id number]]  (Abnormal) Collected:  06/18/19 0718    Specimen:  Blood Updated:  06/18/19 0720     Glucose 65 mg/dL      Comment: Serial Number: [id number]:  [id number]       Blood Culture - Blood, Arm, Left [[id number]] Collected:  06/16/19 2356    Specimen:  Blood from Arm, Left Updated:  06/18/19 0000     Blood Culture No growth at 24 hours    Blood Culture - Blood, Arm, Right [[id number]] Collected:  06/16/19 2339    Specimen:  Blood from Arm, Right Updated:  06/18/19 0000     Blood Culture No growth at 24 hours    Basic Metabolic Panel [[id number]]  (Abnormal) Collected:  06/17/19 1655    Specimen:  Blood Updated:  06/17/19 1803     Glucose 162 mg/dL      BUN 29 mg/dL       Creatinine 1.60 mg/dL      Sodium 137 mmol/L      Potassium 4.9 mmol/L      Chloride 101 mmol/L      CO2 24.0 mmol/L      Calcium 8.7 mg/dL      eGFR Non African Amer 32 mL/min/1.73      BUN/Creatinine Ratio 18.1     Anion Gap 12.0 mmol/L     Narrative:       The MDRD GFR formula is only valid for adults with stable renal function between ages 18 and 70.    BNP [[id number]]  (Abnormal) Collected:  06/17/19 1655    Specimen:  Blood Updated:  06/17/19 1746     .0 pg/mL      Comment: Results may be falsely decreased if patient taking Biotin.       Basic Metabolic Panel [[id number]]  (Abnormal) Collected:  06/17/19 0454    Specimen:  Blood Updated:  06/17/19 0621     Glucose 96 mg/dL      BUN 31 mg/dL      Creatinine 1.90 mg/dL      Sodium 138 mmol/L      Potassium 4.2 mmol/L      Chloride 103 mmol/L      CO2 23.0 mmol/L      Calcium 8.7 mg/dL      eGFR Non African Amer 26 mL/min/1.73      BUN/Creatinine Ratio 16.3     Anion Gap 12.0 mmol/L     Narrative:       The MDRD GFR formula is only valid for adults with stable renal function between ages 18 and 70.    Acetaminophen Level [[id number]]  (Abnormal) Collected:  06/16/19 2339    Specimen:  Blood Updated:  06/17/19 0241     Acetaminophen <10.0 mcg/mL      Comment: Specimen hemolyzed.  Results may be affected.       Narrative:       Acetaminophen Therapeutic Range  5-20 ug/mL      Hours after ingestion            Toxic Value    4 Hours                           150 ug/mL    8 Hours                            70 ug/mL   12 Hours                            40 ug/mL   16 Hours                            20 ug/mL    These values apply to a single ingestion only.     Salicylate Level [[id number]]  (Normal) Collected:  06/16/19 2339    Specimen:  Blood Updated:  06/17/19 0044     Salicylate <4.0 mg/dL     Comprehensive Metabolic Panel [[id number]]  (Abnormal) Collected:  06/16/19 2339    Specimen:  Blood Updated:  06/17/19 0043     Glucose 173 mg/dL      BUN 35 mg/dL       Creatinine 2.00 mg/dL      Sodium 136 mmol/L      Potassium 5.1 mmol/L      Comment: Specimen hemolyzed.  Results may be affected.        Chloride 101 mmol/L      CO2 23.0 mmol/L      Calcium 8.9 mg/dL      Total Protein 7.1 g/dL      Albumin 3.30 g/dL      ALT (SGPT) 17 U/L      Comment: Specimen hemolyzed.  Results may be affected.        AST (SGOT) 20 U/L      Comment: Specimen hemolyzed.  Results may be affected.        Alkaline Phosphatase 58 U/L      Total Bilirubin 0.7 mg/dL      Comment: Specimen hemolyzed.  Results may be affected.        eGFR Non African Amer 24 mL/min/1.73      Globulin 3.8 gm/dL      A/G Ratio 0.9 g/dL      BUN/Creatinine Ratio 17.5     Anion Gap 12.0 mmol/L     Narrative:       The MDRD GFR formula is only valid for adults with stable renal function between ages 18 and 70.    Urine Drug Screen - Urine, Catheter [[id number]] Collected:  06/17/19 0003    Specimen:  Urine, Catheter Updated:  06/17/19 0042     Barbiturates Screen, Urine Negative     Benzodiazepine Screen, Urine Negative     Cocaine Screen, Urine Negative     Opiate Screen Negative     THC, Screen, Urine Negative     Methadone Screen, Urine Negative     Amphetamine Screen, Urine Negative     Creatinine, Urine 33.2 mg/dL      Phencyclidine (PCP), Urine Negative    Narrative:       All urine drugs of abuse requests without chain of custody are for medical screening purposes only.  False positives are possible.      TSH [[id number]]  (Normal) Collected:  06/16/19 2339    Specimen:  Blood Updated:  06/17/19 0040     TSH 2.140 mIU/mL      Comment: Results may be falsely decreased if patient taking Biotin.       Lipase [[id number]]  (Normal) Collected:  06/16/19 2339    Specimen:  Blood Updated:  06/17/19 0039     Lipase 28 U/L     Ethanol [[id number]]  (Normal) Collected:  06/16/19 2339    Specimen:  Blood Updated:  06/17/19 0039     Ethanol % <0.010 %     Narrative:       Plasma Ethanol Clinical Symptoms:    ETOH (%)                Clinical Symptom  .01-.05              No apparent influence  .03-.12              Euphoria, Diminished judgment and attention   .09-.25              Impaired comprehension, Muscle incoordination  .18-.30              Confusion, Staggered gait, Slurred speech  .25-.40              Markedly decreased response to stimuli, unable to stand or                        walk, vomitting, sleep or stupor  .35-.50              Comatose, Anesthesia, Subnormal body temperature      Ammonia [[id number]]  (Normal) Collected:  06/16/19 2339    Specimen:  Blood Updated:  06/17/19 0019     Ammonia 11 umol/L     Troponin [[id number]]  (Normal) Collected:  06/16/19 2339    Specimen:  Blood Updated:  06/17/19 0017     Troponin I <0.030 ng/mL     Narrative:       Troponin I Reference Range:    0.00-0.03  Negative.  Repeat testing in 4-6 hours if clinically indicated.    0.04-0.29  Suspicious for myocardial injury. Serial measurements and clinical  correlation may be necessary to confirm or exclude diagnosis of acute  coronary syndrome.  Repeat testing in 4-6 hours if indicated.     >0.29 Consistent with myocardial injury.  Recommend clinical and laboratory correlation.     Results my be falsely decreased if patient taking Biotin.     Urinalysis With Microscopic If Indicated (No Culture) - Urine, Catheter [[id number]]  (Abnormal) Collected:  06/17/19 0003    Specimen:  Urine, Catheter Updated:  06/17/19 0016     Color, UA Yellow     Appearance, UA Clear     pH, UA 6.0     Specific Gravity, UA 1.007     Glucose, UA Negative     Ketones, UA Negative     Bilirubin, UA Negative     Blood, UA Negative     Protein,  mg/dL (2+)     Leuk Esterase, UA Negative     Nitrite, UA Negative     Urobilinogen, UA 0.2 E.U./dL    Urinalysis, Microscopic Only - Urine, Catheter [[id number]]  (Abnormal) Collected:  06/17/19 0003    Specimen:  Urine, Catheter Updated:  06/17/19 0016     RBC, UA 0-2 /HPF      WBC, UA None Seen /HPF      Bacteria, UA None Seen  /HPF      Squamous Epithelial Cells, UA None Seen /HPF      Hyaline Casts, UA None Seen /LPF      Methodology Automated Microscopy    Protime-INR [[id number]]  (Abnormal) Collected:  06/16/19 2339    Specimen:  Blood Updated:  06/17/19 0008     Protime 9.4 Seconds      INR 0.90    aPTT [[id number]]  (Abnormal) Collected:  06/16/19 2339    Specimen:  Blood Updated:  06/17/19 0008     PTT 23.4 seconds     CBC & Differential [[id number]] Collected:  06/16/19 2339    Specimen:  Blood Updated:  06/16/19 2355    Narrative:       The following orders were created for panel order CBC & Differential.  Procedure                               Abnormality         Status                     ---------                               -----------         ------                     CBC Auto Differential[[id number]]        Abnormal            Final result                 Please view results for these tests on the individual orders.    CBC Auto Differential [[id number]]  (Abnormal) Collected:  06/16/19 2339    Specimen:  Blood Updated:  06/16/19 2355     WBC 8.00 10*3/mm3      RBC 3.77 10*6/mm3      Hemoglobin 11.1 g/dL      Hematocrit 34.2 %      MCV 90.7 fL      MCH 29.5 pg      MCHC 32.5 g/dL      RDW 14.5 %      RDW-SD 46.8 fl      MPV 7.6 fL      Platelets 296 10*3/mm3      Neutrophil % 83.0 %      Lymphocyte % 11.0 %      Monocyte % 4.7 %      Eosinophil % 1.1 %      Basophil % 0.2 %      Neutrophils, Absolute 6.60 10*3/mm3      Lymphocytes, Absolute 0.90 10*3/mm3      Monocytes, Absolute 0.40 10*3/mm3      Eosinophils, Absolute 0.10 10*3/mm3      Basophils, Absolute 0.00 10*3/mm3      nRBC 0.0 /100 WBC                 Imaging Results (all)     Procedure Component Value Units Date/Time    MRI Brain Without Contrast [[id number]] Collected:  06/17/19 2208     Updated:  06/17/19 2212    Narrative:          DATE OF EXAM:   6/17/2019 6:40 PM     PROCEDURE:   MRI BRAIN WO CONTRAST-     INDICATIONS:   Encephalopathy, new onset of confusion      COMPARISON:  CT head 06/17/2019     TECHNIQUE:   Routine magnetic resonance imaging of the brain was performed without  administration of contrast.     FINDINGS:   Signal from the brain appears normal. There is no extra-axial  collection. There is no mass effect. Major vessels show flow void.  Ventricles and cisterns are patent. There is no diffusion restriction to  indicate acute ischemia. No gross orbital abnormality is seen. Pituitary  is normal in size. The included paranasal sinuses are free of fluid  signal        Impression:          1. Normal exam.     Electronically Signed By-Lawanda Davis On:6/17/2019 10:12 PM  This report was finalized on [id number] by  Lawanda Davis, .    XR Chest 1 View [[id number]] Collected:  06/17/19 0901     Updated:  06/17/19 0903    Narrative:       DATE OF EXAM:  6/17/2019 1:46 AM     PROCEDURE:  XR CHEST 1 VW-     INDICATIONS:  cp     COMPARISON:  No Comparisons Available     TECHNIQUE:   Single radiographic view of the chest was obtained.     FINDINGS:  The heart is enlarged. No consolidative process or congestive changes  noted. A few calcified granulomas are noted. There is elevation the  right hemidiaphragm. The mediastinum is within normal limits. The bones  show no focal abnormalities.        Impression:          1. Chronic elevation of the right hemidiaphragm is suspected without  obvious cause.  2. Chronic changes are noted about the chest without obvious pneumonia  or congestive change     Electronically Signed By-Davian Jules On:6/17/2019 9:03 AM  This report was finalized on [id number] by  Davian Jules, .    CT Head Without Contrast [[id number]] Collected:  06/16/19 2355     Updated:  06/17/19 0205    Narrative:       EXAMINATION: CT HEAD WITHOUT CONTRAST    DATE: 6/17/2019 1:24 AM    INDICATION: Confusion, delirium    COMPARISON: None available at the time this dictation.    TECHNIQUE: Noncontrast imaging obtained from the vertex to the skull base.  CT  dose lowering techniques were used, to include: automated exposure control, adjustment for patient size, and or use of iterative reconstruction.?    FINDINGS:    Soft Tissues: No significant soft tissue abnormality.    Skull: No underlying skull fracture or radiopaque foreign body.    Sinuses: Paranasal sinuses are clear.    Mastoids: Mastoid air cells are clear.     Globes and Orbits: Globes and orbits are intact.    Brain: No acute hemorrhage.  No midline shift, masses, or mass effect.  No evidence of acute infarct by noncontrast CT.    Ventricles and Cisterns: Ventricular size and configuration is within normal limits. Basal cisterns are patent. No abnormal extra-axial fluid collection.    Senescent Changes: Small remote appearing lacunar infarct within the right lentiform region.        Impression:           1. No acute intracranial abnormality.    2. Small remote appearing lacunar infarct in the right lentiform region.      Electronically signed by:  Jon Bauer M.D.    6/17/2019 12:04 AM          ECG/EMG Results (most recent)     Procedure Component Value Units Date/Time    ECG 12 Lead [[id number]] Collected:  06/16/19 2334     Updated:  06/17/19 0559    Narrative:       HEART RATE= 55  bpm  RR Interval= 1100  ms  AZ Interval= 220  ms  P Horizontal Axis= 8  deg  P Front Axis= 21  deg  QRSD Interval= 91  ms  QT Interval= 470  ms  QRS Axis= -25  deg  T Wave Axis= 65  deg  - ABNORMAL ECG -  Sinus bradycardia  Ventricular bigeminy  Prolonged AZ interval  Electronically Signed By: Raheel Kothari (Hussain) 17-Jun-2019 05:59:11  Date and Time of Study: 2019-06-16 23:34:41    ECG 12 Lead [[id number]] Collected:  06/17/19 2107     Updated:  06/17/19 2108    Narrative:       HEART RATE= 54  bpm  RR Interval= 1076  ms  AZ Interval= 239  ms  P Horizontal Axis= 47  deg  P Front Axis= 26  deg  QRSD Interval= 95  ms  QT Interval= 472  ms  QRS Axis= -23  deg  T Wave Axis= 40  deg  - ABNORMAL ECG -  Sinus bradycardia  Multiple  ventricular premature complexes  Prolonged KY interval  Inferior infarct, old  Electronically Signed By:   Date and Time of Study: 2019-06-17 21:07:04          Condition on Discharge:  stable    Vital Signs  Temp:  [98 °F (36.7 °C)-98.6 °F (37 °C)] 98.3 °F (36.8 °C)  Heart Rate:  [46-69] 69  Resp:  [13-17] 14  BP: (136-184)/(43-64) 136/43    Physical Exam:     General Appearance:    Alert, cooperative, in no acute distress   Head:    Normocephalic, without obvious abnormality, atraumatic   Eyes:            Lids and lashes normal, conjunctivae and sclerae normal, no   icterus, no pallor, corneas clear, PERRLA   Neck:   No adenopathy, supple, trachea midline, no thyromegaly, no   carotid bruit, no JVD   Back:     No kyphosis present, no scoliosis present, no skin lesions,      erythema or scars, no tenderness to percussion or                   palpation,   range of motion normal   Lungs:     Clear to auscultation,respirations regular, even and                  unlabored    Heart:    Regular rhythm and normal rate, normal S1 and S2, no            murmur, no gallop, no rub, no click   Chest Wall:    No abnormalities observed   Abdomen:     Normal bowel sounds, no masses, no organomegaly, soft        non-tender, non-distended, no guarding, no rebound                tenderness   Extremities:   Moves all extremities well, no edema, no cyanosis, no             redness   Skin:   No bleeding, bruising or rash   Neurologic:   Cranial nerves 2 - 12 grossly intact, sensation intact, DTR       present and equal bilaterally       Discharge Disposition  Home or Self Care    Discharge Medications     Discharge Medications      New Medications      Instructions Start Date   diclofenac 1 % gel gel  Commonly known as:  VOLTAREN   2 g, Topical, 2 Times Daily PRN         Continue These Medications      Instructions Start Date   atorvastatin 10 MG tablet  Commonly known as:  LIPITOR   10 mg, Oral, Daily      baclofen 10 MG  tablet  Commonly known as:  LIORESAL   10 mg, Oral, 3 Times Daily PRN      CALTRATE 600 1500 (600 Ca) MG tablet  Generic drug:  Calcium Carbonate   600 mg, Oral, Daily      DULoxetine 30 MG capsule  Commonly known as:  CYMBALTA   30 mg, Oral, Daily      ferrous sulfate 325 (65 FE) MG tablet   325 mg, Oral, Daily      folic acid 1 MG tablet  Commonly known as:  FOLVITE   TAKE ONE TABLET BY MOUTH ONCE DAILY      hydrALAZINE 100 MG tablet  Commonly known as:  APRESOLINE   100 mg, Oral, 2 Times Daily      hydrochlorothiazide 12.5 MG tablet  Commonly known as:  HYDRODIURIL   12.5 mg, Oral, Daily      Insulin Glargine 300 UNIT/ML solution pen-injector  Commonly known as:  TOUJEO SOLOSTAR   14 Units, Subcutaneous, Daily With Breakfast, Do not resume unless BG are consistently [id number]      levothyroxine 125 MCG tablet  Commonly known as:  SYNTHROID, LEVOTHROID   125 mcg, Oral, Daily      metoprolol tartrate 100 MG tablet  Commonly known as:  LOPRESSOR   100 mg, Oral, 2 Times Daily      predniSONE 5 MG tablet  Commonly known as:  DELTASONE   5 mg, Oral, Daily      ranitidine 150 MG capsule  Commonly known as:  ZANTAC   TAKE 1 CAPSULE BY MOUTH TWICE DAILY      thiamine 100 MG tablet  Commonly known as:  VITAMIN B-1   100 mg, Oral, Daily      Vitamin D 1000 units tablet   1,000 Units, Oral, Daily         Stop These Medications    ALPRAZolam 0.5 MG tablet  Commonly known as:  XANAX     HYDROcodone-acetaminophen 5-325 MG per tablet  Commonly known as:  NORCO     hydrOXYzine 25 MG tablet  Commonly known as:  ATARAX     propranolol 40 MG tablet  Commonly known as:  INDERAL            Discharge Diet: Healthy heart  Activity at Discharge: Gradually resume normal activities    Follow-up Appointments  With cardiology in 3 days  Future Appointments   Date Time Provider Department Center   7/29/2019 12:20 PM Vita Tariq MD [facility] None   7/31/2019  3:00 PM Nazario Tejeda MD [facility]         Test Results  Pending at Discharge   Order Current Status    Blood Culture - Blood, Arm, Left Preliminary result    Blood Culture - Blood, Arm, Right Preliminary result           Risk for Readmission (LACE) Score: 7 (6/18/2019  6:00 AM)          Nikolai Villarreal MD  06/18/19  1:29 PM    Time: Discharge 35 min

## 2019-06-18 NOTE — SIGNIFICANT NOTE
Patient states that she does not feel constipated at this time and does not want a stool softener at this time

## 2019-06-18 NOTE — PLAN OF CARE
Problem: Fall Risk (Adult)  Goal: Identify Related Risk Factors and Signs and Symptoms  Outcome: Ongoing (interventions implemented as appropriate)    Goal: Absence of Fall  Outcome: Ongoing (interventions implemented as appropriate)      Problem: Patient Care Overview  Goal: Plan of Care Review  Outcome: Ongoing (interventions implemented as appropriate)    Goal: Individualization and Mutuality  Outcome: Ongoing (interventions implemented as appropriate)    Goal: Discharge Needs Assessment  Outcome: Ongoing (interventions implemented as appropriate)    Goal: Interprofessional Rounds/Family Conf  Outcome: Ongoing (interventions implemented as appropriate)      Problem: Hypertensive Disease/Crisis (Arterial) (Adult)  Goal: Signs and Symptoms of Listed Potential Problems Will be Absent, Minimized or Managed (Hypertensive Disease/Crisis)  Outcome: Ongoing (interventions implemented as appropriate)      Problem: Skin Injury Risk (Adult)  Goal: Identify Related Risk Factors and Signs and Symptoms  Outcome: Ongoing (interventions implemented as appropriate)    Goal: Skin Health and Integrity  Outcome: Ongoing (interventions implemented as appropriate)

## 2019-06-18 NOTE — PLAN OF CARE
Problem: Fall Risk (Adult)  Goal: Identify Related Risk Factors and Signs and Symptoms  Outcome: Outcome(s) achieved Date Met: 06/18/19    Goal: Absence of Fall  Outcome: Ongoing (interventions implemented as appropriate)      Problem: Patient Care Overview  Goal: Plan of Care Review  Outcome: Ongoing (interventions implemented as appropriate)   06/18/19 0528   Coping/Psychosocial   Plan of Care Reviewed With patient   Plan of Care Review   Progress no change   OTHER   Outcome Summary Patient's BP remains elevated. Treating with IV PRN medications at this time. Patient states that she really wants to go home today. No new needs assessed at this time.     Goal: Discharge Needs Assessment  Outcome: Ongoing (interventions implemented as appropriate)      Problem: Hypertensive Disease/Crisis (Arterial) (Adult)  Goal: Signs and Symptoms of Listed Potential Problems Will be Absent, Minimized or Managed (Hypertensive Disease/Crisis)  Outcome: Ongoing (interventions implemented as appropriate)      Problem: Skin Injury Risk (Adult)  Goal: Identify Related Risk Factors and Signs and Symptoms  Outcome: Outcome(s) achieved Date Met: 06/18/19    Goal: Skin Health and Integrity  Outcome: Ongoing (interventions implemented as appropriate)

## 2019-06-19 ENCOUNTER — READMISSION MANAGEMENT (OUTPATIENT)
Dept: CALL CENTER | Facility: HOSPITAL | Age: 74
End: 2019-06-19

## 2019-06-19 PROCEDURE — 93010 ELECTROCARDIOGRAM REPORT: CPT | Performed by: INTERNAL MEDICINE

## 2019-06-19 NOTE — OUTREACH NOTE
Prep Survey      Responses   Facility patient discharged from?  [facility]   Is patient eligible?  Yes   Discharge diagnosis  acute metabolic encephalopathy secondary to hypertensive emergency with polypharmacy   Does the patient have one of the following disease processes/diagnoses(primary or secondary)?  Other   Does the patient have Home health ordered?  No   Is there a DME ordered?  No   General alerts for this patient  Place a Holter prior to discharge   Prep survey completed?  Yes          Aleisha Skinner RN

## 2019-06-19 NOTE — PROGRESS NOTES
Case Management Discharge Note    Final Note: Routine d/c to home     Destination      No service has been selected for the patient.      Durable Medical Equipment      No service has been selected for the patient.      Dialysis/Infusion      No service has been selected for the patient.      Home Medical Care      No service has been selected for the patient.      Therapy      No service has been selected for the patient.      Community Resources      No service has been selected for the patient.             Final Discharge Disposition Code: 01 - home or self-care

## 2019-06-20 ENCOUNTER — READMISSION MANAGEMENT (OUTPATIENT)
Dept: CALL CENTER | Facility: HOSPITAL | Age: 74
End: 2019-06-20

## 2019-06-20 NOTE — OUTREACH NOTE
Medical Week 1 Survey      Responses   Facility patient discharged from?  [facility]   Does the patient have one of the following disease processes/diagnoses(primary or secondary)?  Other   Is there a successful TCM telephone encounter documented?  No   Week 1 attempt successful?  Yes   Call start time  1324   Call end time  1328   Discharge diagnosis  acute metabolic encephalopathy secondary to hypertensive emergency with polypharmacy   Meds reviewed with patient/caregiver?  Yes   Is the patient having any side effects they believe may be caused by any medication additions or changes?  No   Does the patient have all medications ordered at discharge?  No   What is keeping the patient from filling the prescriptions?  -- [Patient did not know about prescripton for diclofenac]   Nursing Interventions  Nurse provided patient education   Prescription comments  Patient did not know she had a prescription for diclofenac.  Advised her to  at pharmacy.   Is the patient taking all medications as directed (includes completed medication regime)?  No   What is preventing the patient from taking all medications as directed?  Other   Nursing Interventions  Nurse provided patient education   Medication comments  Patient states she will check her pharmacy regarding medication   Does the patient have a primary care provider?   Yes   Does the patient have an appointment with their PCP within 7 days of discharge?  Greater than 7 days   What is preventing the patient from scheduling follow up appointments within 7 days of discharge?  Earlier appointment not available   Has the patient kept scheduled appointments due by today?  N/A   Did the patient receive a copy of their discharge instructions?  Yes   Nursing interventions  Reviewed instructions with patient   What is the patient's perception of their health status since discharge?  Same   Is the patient/caregiver able to teach back signs and symptoms related to disease process for  when to call PCP?  Yes   Is the patient/caregiver able to teach back signs and symptoms related to disease process for when to call 911?  Yes   Is the patient/caregiver able to teach back the hierarchy of who to call/visit for symptoms/problems? PCP, Specialist, Home health nurse, Urgent Care, ED, 911  Yes   Week 1 call completed?  Yes   Graduated  Yes   Graduated/Revoked comments  Patient states she is following up with all care providers and has no  needs at this time          Edith Mckeon, SANDRAN

## 2019-06-22 LAB
BACTERIA SPEC AEROBE CULT: NORMAL
BACTERIA SPEC AEROBE CULT: NORMAL

## 2019-07-16 ENCOUNTER — TELEPHONE (OUTPATIENT)
Dept: GASTROENTEROLOGY | Facility: CLINIC | Age: 74
End: 2019-07-16

## 2019-07-16 RX ORDER — RANITIDINE 150 MG/1
150 CAPSULE ORAL 2 TIMES DAILY
Qty: 180 CAPSULE | Refills: 0 | Status: SHIPPED | OUTPATIENT
Start: 2019-07-16 | End: 2019-09-25

## 2019-07-16 NOTE — TELEPHONE ENCOUNTER
Faxed request received from [facility] for ranitidine 150 mg 1 cap po twice daily, #180.     Message to DR Santizo.

## 2019-07-22 ENCOUNTER — APPOINTMENT (OUTPATIENT)
Dept: CT IMAGING | Facility: HOSPITAL | Age: 74
End: 2019-07-22

## 2019-07-22 ENCOUNTER — HOSPITAL ENCOUNTER (EMERGENCY)
Facility: HOSPITAL | Age: 74
Discharge: HOME | End: 2019-07-22
Attending: EMERGENCY MEDICINE | Admitting: EMERGENCY MEDICINE

## 2019-07-22 ENCOUNTER — APPOINTMENT (OUTPATIENT)
Dept: GENERAL RADIOLOGY | Facility: HOSPITAL | Age: 74
End: 2019-07-22

## 2019-07-22 VITALS
DIASTOLIC BLOOD PRESSURE: 58 MMHG | OXYGEN SATURATION: 96 % | RESPIRATION RATE: 18 BRPM | SYSTOLIC BLOOD PRESSURE: 178 MMHG | HEART RATE: 63 BPM | HEIGHT: 63 IN | WEIGHT: 181 LBS | TEMPERATURE: 98.1 F | BODY MASS INDEX: 32.07 KG/M2

## 2019-07-22 DIAGNOSIS — R10.12 LEFT UPPER QUADRANT PAIN: Primary | ICD-10-CM

## 2019-07-22 DIAGNOSIS — S46.912A STRAIN OF LEFT SHOULDER, INITIAL ENCOUNTER: ICD-10-CM

## 2019-07-22 DIAGNOSIS — K57.92 ACUTE DIVERTICULITIS: ICD-10-CM

## 2019-07-22 LAB
ALBUMIN SERPL-MCNC: 3.3 G/DL (ref 3.5–4.8)
ALBUMIN/GLOB SERPL: 0.8 G/DL (ref 1–1.7)
ALP SERPL-CCNC: 49 U/L (ref 32–91)
ALT SERPL W P-5'-P-CCNC: 14 U/L (ref 14–54)
ANION GAP SERPL CALCULATED.3IONS-SCNC: 15 MMOL/L (ref 5–15)
AST SERPL-CCNC: 14 U/L (ref 15–41)
BASOPHILS # BLD AUTO: 0 10*3/MM3 (ref 0–0.2)
BASOPHILS NFR BLD AUTO: 0.3 % (ref 0–1.5)
BILIRUB SERPL-MCNC: 0.7 MG/DL (ref 0.3–1.2)
BUN BLD-MCNC: 34 MG/DL (ref 8–20)
BUN/CREAT SERPL: 17.9 (ref 5.4–26.2)
CALCIUM SPEC-SCNC: 9.1 MG/DL (ref 8.9–10.3)
CHLORIDE SERPL-SCNC: 106 MMOL/L (ref 101–111)
CO2 SERPL-SCNC: 22 MMOL/L (ref 22–32)
CREAT BLD-MCNC: 1.9 MG/DL (ref 0.4–1)
DEPRECATED RDW RBC AUTO: 49.9 FL (ref 37–54)
EOSINOPHIL # BLD AUTO: 0.2 10*3/MM3 (ref 0–0.4)
EOSINOPHIL NFR BLD AUTO: 1.2 % (ref 0.3–6.2)
ERYTHROCYTE [DISTWIDTH] IN BLOOD BY AUTOMATED COUNT: 15.3 % (ref 12.3–15.4)
GFR SERPL CREATININE-BSD FRML MDRD: 26 ML/MIN/1.73
GLOBULIN UR ELPH-MCNC: 3.9 GM/DL (ref 2.5–3.8)
GLUCOSE BLD-MCNC: 98 MG/DL (ref 65–99)
HCT VFR BLD AUTO: 32 % (ref 34–46.6)
HGB BLD-MCNC: 10.4 G/DL (ref 12–15.9)
LIPASE SERPL-CCNC: 31 U/L (ref 22–51)
LYMPHOCYTES # BLD AUTO: 1 10*3/MM3 (ref 0.7–3.1)
LYMPHOCYTES NFR BLD AUTO: 8.4 % (ref 19.6–45.3)
MCH RBC QN AUTO: 29.6 PG (ref 26.6–33)
MCHC RBC AUTO-ENTMCNC: 32.4 G/DL (ref 31.5–35.7)
MCV RBC AUTO: 91.4 FL (ref 79–97)
MONOCYTES # BLD AUTO: 0.7 10*3/MM3 (ref 0.1–0.9)
MONOCYTES NFR BLD AUTO: 5.3 % (ref 5–12)
NEUTROPHILS # BLD AUTO: 10.6 10*3/MM3 (ref 1.7–7)
NEUTROPHILS NFR BLD AUTO: 84.8 % (ref 42.7–76)
NRBC BLD AUTO-RTO: 0 /100 WBC (ref 0–0.2)
PLATELET # BLD AUTO: 284 10*3/MM3 (ref 140–450)
PMV BLD AUTO: 7.9 FL (ref 6–12)
POTASSIUM BLD-SCNC: 4 MMOL/L (ref 3.6–5.1)
PROT SERPL-MCNC: 7.2 G/DL (ref 6.1–7.9)
RBC # BLD AUTO: 3.5 10*6/MM3 (ref 3.77–5.28)
SODIUM BLD-SCNC: 139 MMOL/L (ref 136–144)
WBC NRBC COR # BLD: 12.5 10*3/MM3 (ref 3.4–10.8)

## 2019-07-22 PROCEDURE — 99283 EMERGENCY DEPT VISIT LOW MDM: CPT

## 2019-07-22 PROCEDURE — 85025 COMPLETE CBC W/AUTO DIFF WBC: CPT | Performed by: EMERGENCY MEDICINE

## 2019-07-22 PROCEDURE — 73030 X-RAY EXAM OF SHOULDER: CPT

## 2019-07-22 PROCEDURE — 74176 CT ABD & PELVIS W/O CONTRAST: CPT

## 2019-07-22 PROCEDURE — 83690 ASSAY OF LIPASE: CPT | Performed by: EMERGENCY MEDICINE

## 2019-07-22 PROCEDURE — 80053 COMPREHEN METABOLIC PANEL: CPT | Performed by: EMERGENCY MEDICINE

## 2019-07-22 RX ORDER — METRONIDAZOLE 500 MG/1
500 TABLET ORAL 3 TIMES DAILY
Qty: 21 TABLET | Refills: 0 | Status: SHIPPED | OUTPATIENT
Start: 2019-07-22 | End: 2019-08-28

## 2019-07-22 RX ORDER — SODIUM CHLORIDE 0.9 % (FLUSH) 0.9 %
10 SYRINGE (ML) INJECTION AS NEEDED
Status: DISCONTINUED | OUTPATIENT
Start: 2019-07-22 | End: 2019-07-22 | Stop reason: HOSPADM

## 2019-07-22 RX ORDER — TRAMADOL HYDROCHLORIDE 50 MG/1
50 TABLET ORAL EVERY 8 HOURS PRN
Qty: 12 TABLET | Refills: 0 | Status: SHIPPED | OUTPATIENT
Start: 2019-07-22 | End: 2019-08-28

## 2019-07-30 PROBLEM — I10 HYPERTENSION: Status: ACTIVE | Noted: 2017-09-28

## 2019-07-30 PROBLEM — E11.9 DIABETES MELLITUS (HCC): Status: ACTIVE | Noted: 2017-09-28

## 2019-07-30 PROBLEM — I49.3 PVCS (PREMATURE VENTRICULAR CONTRACTIONS): Status: ACTIVE | Noted: 2019-05-31

## 2019-07-30 PROBLEM — E78.5 HYPERLIPIDEMIA: Status: ACTIVE | Noted: 2017-09-28

## 2019-07-30 PROBLEM — I45.10 RBBB: Status: ACTIVE | Noted: 2019-07-30

## 2019-07-30 PROBLEM — R00.2 PALPITATIONS: Status: ACTIVE | Noted: 2019-05-29

## 2019-08-13 ENCOUNTER — TELEPHONE (OUTPATIENT)
Dept: CARDIOLOGY | Facility: CLINIC | Age: 74
End: 2019-08-13

## 2019-08-28 ENCOUNTER — OFFICE VISIT (OUTPATIENT)
Dept: CARDIOLOGY | Facility: CLINIC | Age: 74
End: 2019-08-28

## 2019-08-28 VITALS
BODY MASS INDEX: 31.36 KG/M2 | HEART RATE: 69 BPM | SYSTOLIC BLOOD PRESSURE: 130 MMHG | HEIGHT: 63 IN | WEIGHT: 177 LBS | DIASTOLIC BLOOD PRESSURE: 70 MMHG

## 2019-08-28 DIAGNOSIS — I49.3 PVCS (PREMATURE VENTRICULAR CONTRACTIONS): ICD-10-CM

## 2019-08-28 DIAGNOSIS — R00.2 PALPITATIONS: ICD-10-CM

## 2019-08-28 DIAGNOSIS — R07.2 PRECORDIAL PAIN: ICD-10-CM

## 2019-08-28 DIAGNOSIS — E78.2 MIXED HYPERLIPIDEMIA: ICD-10-CM

## 2019-08-28 DIAGNOSIS — I10 ESSENTIAL HYPERTENSION: Primary | ICD-10-CM

## 2019-08-28 PROCEDURE — 99214 OFFICE O/P EST MOD 30 MIN: CPT | Performed by: INTERNAL MEDICINE

## 2019-08-28 RX ORDER — ASPIRIN 81 MG/1
81 TABLET, CHEWABLE ORAL DAILY
COMMUNITY

## 2019-08-28 RX ORDER — HYDROXYZINE PAMOATE 25 MG/1
25 CAPSULE ORAL AS NEEDED
COMMUNITY
Start: 2019-05-03

## 2019-08-28 NOTE — PROGRESS NOTES
Date of Office Visit: 2019  Encounter Provider: Dr. Nazario Tejeda  Place of Service: [facility] CARDIOLOGY [place]  Patient Name: Ya Cisneros  :1945  Nicki Hidalgo MD    Chief Complaint   Patient presents with   • Shortness of Breath  Palpitation  PVCs  Chest pain  Hypertension  Diabetes mellitus     3 month follow up echo/holter     History of Present Illness    I am pleased to see Mrs. cisneros in my office today as a follow-up.    As you know, patient is 74-year-old white female whose past medical history significant for hypertension, hyperlipidemia, diabetes mellitus, CKD, who came today for follow-up    In the 2017, patient was admitted at [facility] with a symptom of chest pain.  She underwent stress test which showed no myocardial ischemia or infarction.  In 2017, she was admitted with symptom of slurring of speech and possibility of TIA was considered. CT scan of head was negative.  Echocardiogram showed EF of 55-60% and no significant valvular heart disease.  Possibility of complicated migraine was considered.  Patient was advised to start Topamax but she did not want it.    In 2019, patient underwent Holter monitor which showed frequent PVCs.  Patient had greater than 18,000 PVCs.  Couplets and triplets were present.  No sustained or nonsustained ventricular tachyarrhythmia was noted.  No atrial fibrillation or tachyarrhythmia noted.  Echocardiogram showed EF of 60 to 65%.  Mild mitral regurgitation noted.    Patient continues to have symptom of palpitation.  Etiology of frequent PVCs unclear to me.  Patient does not report symptoms of sleep apnea but I suspect she may have underlying sleep apnea.  Patient is tired but she is not sure about sleep apnea or snoring.  I advised her to discuss with her  about possibility of observing her sleep.  If she has similar symptoms in future she should have sleep studies.  Patient denies any  orthopnea or PND.  Patient does complain of chest pain which is left-sided.  No radiation.  No correlation with exertion.  Patient does complain of palpitation.  No syncope or presyncope.    Patient is having frequent PVCs I would recommend that patient should proceed with stress test to rule out underlying ischemic substrate.  If this is negative I would consider sleep studies in future.  Continue Lopressor 25 mg twice daily.  Patient may switch to calcium channel blocker in future.  Patient may need PVC ablation therapy if her symptoms continue and she has high frequency of PVCs in future    Patient is in process of moving to [place].    Past Medical History:   Diagnosis Date   • Anxiety    • Clostridium difficile infection 2016    and in    • Diabetes mellitus (CMS/HCC)    • Disease of thyroid gland    • GERD (gastroesophageal reflux disease)    • Hypertension    • Migraine    • Palpitations    • Pancreatitis    • Tachycardia    • TIA (transient ischemic attack)          Past Surgical History:   Procedure Laterality Date   • APPENDECTOMY     • CARDIAC CATHETERIZATION     •  SECTION     • CHOLECYSTECTOMY     • COLONOSCOPY      normal per pt, Dr. Reagan   • COLONOSCOPY N/A 3/1/2017    multiple polyps, tics, IH, scar in transverse colon   • CONVERTED (HISTORICAL) HOLTER     • ECHO - CONVERTED     • ENDOSCOPY N/A 3/1/2017    gastroplasty, nodular mucosa in gastric antrum, nodule in duodenum   • ENDOSCOPY N/A 2018    banded gastroplasty found, non intact appearance   • GASTRIC BYPASS     • HERNIA REPAIR     • LUNG SURGERY      mass LL   • THYROID SURGERY             Current Outpatient Medications:   •  ALPRAZolam (XANAX) 0.5 MG tablet, Take 0.5 mg by mouth As Needed., Disp: , Rfl:   •  aspirin 81 MG chewable tablet, Chew 81 mg Daily., Disp: , Rfl:   •  atorvastatin (LIPITOR) 10 MG tablet, Take 10 mg by mouth Daily., Disp: , Rfl:   •  Calcium Carbonate (CALTRATE 600) 1500 (600 Ca)  MG tablet, Take 600 mg by mouth 2 (Two) Times a Day With Meals., Disp: , Rfl:   •  Cholecalciferol (VITAMIN D) 1000 UNITS tablet, Take 1,000 Units by mouth Daily., Disp: , Rfl:   •  diclofenac (VOLTAREN) 1 % gel gel, Apply 2 g topically to the appropriate area as directed 2 (Two) Times a Day As Needed (Pain)., Disp: 1 tube, Rfl: 0  •  DULoxetine (CYMBALTA) 30 MG capsule, Take 30 mg by mouth Daily., Disp: , Rfl:   •  ferrous sulfate 325 (65 FE) MG tablet, Take 325 mg by mouth Daily., Disp: , Rfl:   •  folic acid (FOLVITE) 1 MG tablet, TAKE ONE TABLET BY MOUTH ONCE DAILY, Disp: , Rfl:   •  hydrALAZINE (APRESOLINE) 100 MG tablet, Take 100 mg by mouth 3 (Three) Times a Day., Disp: , Rfl:   •  hydrochlorothiazide (MICROZIDE) 12.5 MG capsule, Take 25 mg by mouth Daily., Disp: , Rfl:   •  hydrOXYzine pamoate (VISTARIL) 25 MG capsule, Take 25 mg by mouth As Needed., Disp: , Rfl:   •  insulin aspart (NOVOLOG FLEXPEN) 100 UNIT/ML solution pen-injector sc pen, Inject 5 Units under the skin into the appropriate area as directed 3 (Three) Times a Day., Disp: , Rfl:   •  Insulin Glargine (TOUJEO SOLOSTAR) 300 UNIT/ML solution pen-injector, Inject 14 Units under the skin into the appropriate area as directed Daily With Breakfast. Do not resume unless BG are consistently [id number], Disp: , Rfl:   •  Insulin Pen Needle (BD PEN NEEDLE LUDWIG U/F) 32G X 4 MM misc, Take As Directed., Disp: , Rfl:   •  levothyroxine (SYNTHROID, LEVOTHROID) 125 MCG tablet, Take 125 mcg by mouth Daily., Disp: , Rfl:   •  metoprolol tartrate (LOPRESSOR) 25 MG tablet, Take 25 mg by mouth 2 (Two) Times a Day., Disp: , Rfl: 4  •  predniSONE (DELTASONE) 5 MG tablet, Take 5 mg by mouth Daily., Disp: , Rfl:   •  ranitidine (ZANTAC) 150 MG capsule, Take 1 capsule by mouth 2 (Two) Times a Day., Disp: 180 capsule, Rfl: 0  •  thiamine (VITAMIN B-1) 100 MG tablet, Take 100 mg by mouth Daily., Disp: , Rfl:       Social History     Socioeconomic History   • Marital  "status:      Spouse name: Not on file   • Number of children: Not on file   • Years of education: Not on file   • Highest education level: Not on file   Tobacco Use   • Smoking status: Never Smoker   • Smokeless tobacco: Never Used   Substance and Sexual Activity   • Alcohol use: No     Frequency: Never   • Drug use: No   • Sexual activity: Defer         Review of Systems   Constitution: Negative for chills and fever.   HENT: Negative for ear discharge and nosebleeds.    Eyes: Negative for discharge and redness.   Cardiovascular: Positive for palpitations. Negative for chest pain, orthopnea, paroxysmal nocturnal dyspnea and syncope.   Respiratory: Positive for shortness of breath. Negative for cough and wheezing.    Endocrine: Negative for heat intolerance.   Skin: Negative for rash.   Musculoskeletal: Negative for arthritis and myalgias.   Gastrointestinal: Negative for abdominal pain, melena, nausea and vomiting.   Genitourinary: Negative for dysuria and hematuria.   Neurological: Negative for dizziness, light-headedness, numbness and tremors.   Psychiatric/Behavioral: Negative for depression. The patient is nervous/anxious.        Procedures    Procedures    No orders to display           Objective:    /70   Pulse 69   Ht 160 cm (63\")   Wt 80.3 kg (177 lb)   BMI 31.35 kg/m²         Physical Exam   Constitutional: She is oriented to person, place, and time. She appears well-developed and well-nourished.   HENT:   Head: Normocephalic and atraumatic.   Eyes: No scleral icterus.   Neck: No thyromegaly present.   Cardiovascular: Normal rate, regular rhythm and normal heart sounds. Exam reveals no gallop and no friction rub.   No murmur heard.  Pulmonary/Chest: Effort normal and breath sounds normal. No respiratory distress. She has no wheezes. She has no rales.   Abdominal: There is no tenderness.   Musculoskeletal: She exhibits no edema.   Lymphadenopathy:     She has no cervical adenopathy. "   Neurological: She is alert and oriented to person, place, and time.   Skin: No rash noted. No erythema.   Psychiatric: She has a normal mood and affect.           Assessment:       Diagnosis Plan   1. Essential hypertension  Stress Test With Myocardial Perfusion One Day   2. Palpitations  Stress Test With Myocardial Perfusion One Day   3. Mixed hyperlipidemia  Stress Test With Myocardial Perfusion One Day   4. PVCs (premature ventricular contractions)  Stress Test With Myocardial Perfusion One Day   5. Precordial pain   Stress Test With Myocardial Perfusion One Day            Plan:       I recommended to proceed with stress test.  Continue beta-blockers.  I will see the patient in 6 months.  Stress test would be done

## 2019-09-11 ENCOUNTER — HOSPITAL ENCOUNTER (OUTPATIENT)
Dept: NUCLEAR MEDICINE | Facility: HOSPITAL | Age: 74
Discharge: HOME | End: 2019-09-11

## 2019-09-11 DIAGNOSIS — R07.2 PRECORDIAL PAIN: ICD-10-CM

## 2019-09-11 DIAGNOSIS — I10 ESSENTIAL HYPERTENSION: ICD-10-CM

## 2019-09-11 DIAGNOSIS — E78.2 MIXED HYPERLIPIDEMIA: ICD-10-CM

## 2019-09-11 DIAGNOSIS — R00.2 PALPITATIONS: ICD-10-CM

## 2019-09-11 DIAGNOSIS — I49.3 PVCS (PREMATURE VENTRICULAR CONTRACTIONS): ICD-10-CM

## 2019-09-11 PROCEDURE — A9500 TC99M SESTAMIBI: HCPCS | Performed by: INTERNAL MEDICINE

## 2019-09-11 PROCEDURE — 93017 CV STRESS TEST TRACING ONLY: CPT

## 2019-09-11 PROCEDURE — 25010000002 REGADENOSON 0.4 MG/5ML SOLUTION: Performed by: INTERNAL MEDICINE

## 2019-09-11 PROCEDURE — 0 TECHNETIUM SESTAMIBI: Performed by: INTERNAL MEDICINE

## 2019-09-11 PROCEDURE — 78452 HT MUSCLE IMAGE SPECT MULT: CPT

## 2019-09-11 PROCEDURE — 93016 CV STRESS TEST SUPVJ ONLY: CPT | Performed by: NURSE PRACTITIONER

## 2019-09-11 RX ADMIN — TECHNETIUM TC 99M SESTAMIBI 1 DOSE: 1 INJECTION INTRAVENOUS at 09:05

## 2019-09-11 RX ADMIN — REGADENOSON 0.4 MG: 0.08 INJECTION, SOLUTION INTRAVENOUS at 10:25

## 2019-09-11 RX ADMIN — TECHNETIUM TC 99M SESTAMIBI 1 DOSE: 1 INJECTION INTRAVENOUS at 10:25

## 2019-09-12 ENCOUNTER — OFFICE VISIT (OUTPATIENT)
Dept: GASTROENTEROLOGY | Facility: CLINIC | Age: 74
End: 2019-09-12

## 2019-09-12 VITALS
HEIGHT: 63 IN | BODY MASS INDEX: 32 KG/M2 | SYSTOLIC BLOOD PRESSURE: 136 MMHG | WEIGHT: 180.6 LBS | DIASTOLIC BLOOD PRESSURE: 80 MMHG | TEMPERATURE: 98.8 F

## 2019-09-12 DIAGNOSIS — R10.12 LEFT UPPER QUADRANT PAIN: Primary | ICD-10-CM

## 2019-09-12 DIAGNOSIS — K21.9 GASTROESOPHAGEAL REFLUX DISEASE, ESOPHAGITIS PRESENCE NOT SPECIFIED: ICD-10-CM

## 2019-09-12 DIAGNOSIS — Z86.19 HISTORY OF CLOSTRIDIUM DIFFICILE INFECTION: ICD-10-CM

## 2019-09-12 DIAGNOSIS — Z87.19 HISTORY OF DIVERTICULITIS: ICD-10-CM

## 2019-09-12 PROCEDURE — 99214 OFFICE O/P EST MOD 30 MIN: CPT | Performed by: NURSE PRACTITIONER

## 2019-09-12 RX ORDER — MAGNESIUM 200 MG
200 TABLET ORAL DAILY
COMMUNITY
End: 2019-10-07

## 2019-09-12 NOTE — PROGRESS NOTES
Chief Complaint   Patient presents with   • Abdominal Pain       Ya Paz is a  74 y.o. female here for a follow up visit for abdominal pain.    HPI  74-year-old female presents today accompanied by her  for follow-up visit for left upper quadrant abdominal pain, status post diverticulitis and GERD.  She is a patient of Dr. Santizo.  She was last seen in the office on 1/2019.  She was recently treated for a bout of diverticulitis at [facility] on 7/22/2019.  She was treated with a round of Flagyl.  She also has a history of previous C. difficile infection.  She is status post cholecystectomy.  Her last colonoscopy was done on 3/2017 she does have a history of polyps.  She also has a history of GERD with her last EGD being on 2/2018 and tells me lately she is been having horrible reflux symptoms including left shoulder pain as well as some epigastric pain.  She is taking ranitidine 150 mg twice daily but due to her chronic renal disease is not been able to tolerate any PPIs.  She is worried because she is not sure if her issues more related to her recent bout of diverticulitis or if this is all GERD related.  She does report her bowels are moving well.  She denies any nausea and vomiting, constipation, rectal bleeding or melena.  She admits her appetite is okay and her weight is stable.  She is nervous because in the next month or 2 she can be moving out of state to live with her son and grandson and would like some of this resolved before then.  She is not sure if stress is making this worse either.  Past Medical History:   Diagnosis Date   • Anxiety    • Clostridium difficile infection 11/2016    and in 2005   • Diabetes mellitus (CMS/HCC)    • Disease of thyroid gland    • GERD (gastroesophageal reflux disease)    • Hypertension    • Migraine    • Palpitations    • Pancreatitis    • Tachycardia    • TIA (transient ischemic attack)        Past Surgical History:   Procedure Laterality Date   •  "APPENDECTOMY     • CARDIAC CATHETERIZATION     •  SECTION     • CHOLECYSTECTOMY     • COLONOSCOPY      normal per pt, Dr. Reagan   • COLONOSCOPY N/A 3/1/2017    multiple polyps, tics, IH, scar in transverse colon   • CONVERTED (HISTORICAL) HOLTER     • ECHO - CONVERTED     • ENDOSCOPY N/A 3/1/2017    gastroplasty, nodular mucosa in gastric antrum, nodule in duodenum   • ENDOSCOPY N/A 2018    banded gastroplasty found, non intact appearance   • GASTRIC BYPASS     • HERNIA REPAIR     • LUNG SURGERY      mass LL   • THYROID SURGERY         Scheduled Meds:    Continuous Infusions:  No current facility-administered medications for this visit.     PRN Meds:.    Allergies   Allergen Reactions   • Amoxicillin Unknown (See Comments)   • Dapsone Other (See Comments)     Pt states she \"quit producing blood.\"  Quit producing blood   • Levofloxacin Hives and Rash   • Sulfa Antibiotics Unknown (See Comments)     Sister went blind with taking and was advised not to take.  Sister went blind with taking and was advised not to take.   • Sulfamethoxazole-Trimethoprim Unknown (See Comments)   • Tetracycline Hives and Itching   • Amoxicillin-Pot Clavulanate Rash     rash  **tolerates cephalosporins**   **no reason to give aztreonam in the future**  Rash in mouth   • Baclofen Unknown (See Comments)     Encephalopathy   • Sertraline Unknown (See Comments)     tremors   • Tetracyclines & Related        Social History     Socioeconomic History   • Marital status:      Spouse name: Not on file   • Number of children: Not on file   • Years of education: Not on file   • Highest education level: Not on file   Tobacco Use   • Smoking status: Never Smoker   • Smokeless tobacco: Never Used   Substance and Sexual Activity   • Alcohol use: No     Frequency: Never   • Drug use: No   • Sexual activity: Defer       Family History   Problem Relation Age of Onset   • Pancreatitis Brother    • Cancer Brother    • Ulcerative " colitis Son    • Crohn's disease Grandchild    • Cancer Mother    • Cancer Maternal Grandmother        Review of Systems   Constitutional: Positive for fatigue. Negative for appetite change, chills, diaphoresis, fever and unexpected weight change.   HENT: Negative for nosebleeds, postnasal drip, sore throat, trouble swallowing and voice change.    Respiratory: Negative for cough, choking, chest tightness, shortness of breath and wheezing.    Cardiovascular: Negative for chest pain, palpitations and leg swelling.   Gastrointestinal: Positive for abdominal distention and abdominal pain. Negative for anal bleeding, blood in stool, constipation, diarrhea, nausea, rectal pain and vomiting.   Endocrine: Negative for polydipsia, polyphagia and polyuria.   Musculoskeletal: Negative for gait problem.   Skin: Negative for rash and wound.   Allergic/Immunologic: Negative for food allergies.   Neurological: Negative for dizziness, speech difficulty and light-headedness.   Psychiatric/Behavioral: Negative for confusion, self-injury, sleep disturbance and suicidal ideas.       Vitals:    09/12/19 1302   BP: 136/80   Temp: 98.8 °F (37.1 °C)       Physical Exam   Constitutional: She is oriented to person, place, and time. She appears well-developed and well-nourished. She does not appear ill. No distress.   HENT:   Head: Normocephalic.   Eyes: Pupils are equal, round, and reactive to light.   Cardiovascular: Normal rate, regular rhythm and normal heart sounds.   Pulmonary/Chest: Effort normal and breath sounds normal.   Abdominal: Soft. Bowel sounds are normal. She exhibits distension. She exhibits no mass. There is no hepatosplenomegaly. There is tenderness. There is no rebound and no guarding. No hernia.       Musculoskeletal: Normal range of motion.   Neurological: She is alert and oriented to person, place, and time.   Skin: Skin is warm and dry.   Psychiatric: She has a normal mood and affect. Her speech is normal and behavior  is normal. Judgment normal.       No images are attached to the encounter.    Assessment and plan     1. Left upper quadrant pain  - CBC & Differential  - Comprehensive Metabolic Panel  - Amylase  - Lipase  - CT Abdomen Without Contrast; Future    2. Gastroesophageal reflux disease, esophagitis presence not specified    3. History of diverticulitis  - CBC & Differential  - Comprehensive Metabolic Panel  - Amylase  - Lipase  - CT Abdomen Without Contrast; Future    4. History of Clostridium difficile infection  - CBC & Differential  - Comprehensive Metabolic Panel  - Amylase  - Lipase  - CT Abdomen Without Contrast; Future    Reviewed [place] ER records with her today.  Not sure what is causing her left upper quadrant and left shoulder pain.  Could be related to her recent diverticulitis or even her GERD.  Due to her chronic renal disease she is not able to tolerate PPIs long-term.  So she has been taking ranitidine 150 mg 2 tabs every morning.  This does not seem to be helping her reflux at this time.  I do worry some of her symptoms are due to worsening GERD.  Would recommend she talk to her nephrologist about may be adding a temporary short-term dose of Protonix 20 mg daily or omeprazole 20 mg daily for no longer than 3 months.  Patient to talk to her nephrologist about this.  Given her recent history of a diverticulitis flare and the fact that she was only given Flagyl alone I am worried that this may be lingering.  Will check labs today and repeat a CT scan of the abdomen without contrast.  Patient was advised if her symptoms were to worsen that she needs to go to the [facility] ER for immediate evaluation.  Patient is planning on moving in the next month or 2 to [place] to live with her son and grandson.  She would like to get some of this resolved before she moves.

## 2019-09-13 ENCOUNTER — TELEPHONE (OUTPATIENT)
Dept: CARDIOLOGY | Facility: CLINIC | Age: 74
End: 2019-09-13

## 2019-09-13 ENCOUNTER — TELEPHONE (OUTPATIENT)
Dept: GASTROENTEROLOGY | Facility: CLINIC | Age: 74
End: 2019-09-13

## 2019-09-13 LAB
ALBUMIN SERPL-MCNC: 3.7 G/DL (ref 3.5–5.2)
ALBUMIN/GLOB SERPL: 1.5 G/DL
ALP SERPL-CCNC: 57 U/L (ref 39–117)
ALT SERPL-CCNC: 14 U/L (ref 1–33)
AMYLASE SERPL-CCNC: 67 U/L (ref 28–100)
AST SERPL-CCNC: 13 U/L (ref 1–32)
BASOPHILS # BLD AUTO: 0.04 10*3/MM3 (ref 0–0.2)
BASOPHILS NFR BLD AUTO: 0.4 % (ref 0–1.5)
BILIRUB SERPL-MCNC: 0.2 MG/DL (ref 0.2–1.2)
BUN SERPL-MCNC: 44 MG/DL (ref 8–23)
BUN/CREAT SERPL: 21 (ref 7–25)
CALCIUM SERPL-MCNC: 9 MG/DL (ref 8.6–10.5)
CHLORIDE SERPL-SCNC: 105 MMOL/L (ref 98–107)
CO2 SERPL-SCNC: 25.8 MMOL/L (ref 22–29)
CREAT SERPL-MCNC: 2.1 MG/DL (ref 0.57–1)
EOSINOPHIL # BLD AUTO: 0.16 10*3/MM3 (ref 0–0.4)
EOSINOPHIL NFR BLD AUTO: 1.6 % (ref 0.3–6.2)
ERYTHROCYTE [DISTWIDTH] IN BLOOD BY AUTOMATED COUNT: 15.8 % (ref 12.3–15.4)
GLOBULIN SER CALC-MCNC: 2.5 GM/DL
GLUCOSE SERPL-MCNC: 123 MG/DL (ref 65–99)
HCT VFR BLD AUTO: 33.6 % (ref 34–46.6)
HGB BLD-MCNC: 9.9 G/DL (ref 12–15.9)
IMM GRANULOCYTES # BLD AUTO: 0.04 10*3/MM3 (ref 0–0.05)
IMM GRANULOCYTES NFR BLD AUTO: 0.4 % (ref 0–0.5)
LIPASE SERPL-CCNC: 48 U/L (ref 13–60)
LYMPHOCYTES # BLD AUTO: 1.09 10*3/MM3 (ref 0.7–3.1)
LYMPHOCYTES NFR BLD AUTO: 11.1 % (ref 19.6–45.3)
MCH RBC QN AUTO: 29 PG (ref 26.6–33)
MCHC RBC AUTO-ENTMCNC: 29.5 G/DL (ref 31.5–35.7)
MCV RBC AUTO: 98.5 FL (ref 79–97)
MONOCYTES # BLD AUTO: 0.61 10*3/MM3 (ref 0.1–0.9)
MONOCYTES NFR BLD AUTO: 6.2 % (ref 5–12)
NEUTROPHILS # BLD AUTO: 7.9 10*3/MM3 (ref 1.7–7)
NEUTROPHILS NFR BLD AUTO: 80.3 % (ref 42.7–76)
NRBC BLD AUTO-RTO: 0.1 /100 WBC (ref 0–0.2)
PLATELET # BLD AUTO: 296 10*3/MM3 (ref 140–450)
POTASSIUM SERPL-SCNC: 5.1 MMOL/L (ref 3.5–5.2)
PROT SERPL-MCNC: 6.2 G/DL (ref 6–8.5)
RBC # BLD AUTO: 3.41 10*6/MM3 (ref 3.77–5.28)
SODIUM SERPL-SCNC: 142 MMOL/L (ref 136–145)
WBC # BLD AUTO: 9.84 10*3/MM3 (ref 3.4–10.8)

## 2019-09-13 NOTE — TELEPHONE ENCOUNTER
----- Message from DENY Marroquin sent at 9/13/2019  9:29 AM EDT -----  Please call patient and let her know the WBC was barely under normal limits at 9.84. Anything over 10.8 is elevated. Hgb is lower at 9.9. Was 10.4 a month ago. Otherwise looks ok. Will await CT scan results. Thanks.

## 2019-09-16 ENCOUNTER — HOSPITAL ENCOUNTER (EMERGENCY)
Facility: HOSPITAL | Age: 74
Discharge: HOME | End: 2019-09-16
Attending: EMERGENCY MEDICINE | Admitting: EMERGENCY MEDICINE

## 2019-09-16 ENCOUNTER — HOSPITAL ENCOUNTER (OUTPATIENT)
Dept: CT IMAGING | Facility: HOSPITAL | Age: 74
Discharge: HOME | End: 2019-09-16
Admitting: NURSE PRACTITIONER

## 2019-09-16 ENCOUNTER — TELEPHONE (OUTPATIENT)
Dept: GASTROENTEROLOGY | Facility: CLINIC | Age: 74
End: 2019-09-16

## 2019-09-16 ENCOUNTER — APPOINTMENT (OUTPATIENT)
Dept: CARDIOLOGY | Facility: HOSPITAL | Age: 74
End: 2019-09-16

## 2019-09-16 VITALS
HEART RATE: 87 BPM | OXYGEN SATURATION: 98 % | HEIGHT: 63 IN | RESPIRATION RATE: 16 BRPM | BODY MASS INDEX: 31.18 KG/M2 | SYSTOLIC BLOOD PRESSURE: 158 MMHG | WEIGHT: 176 LBS | TEMPERATURE: 98 F | DIASTOLIC BLOOD PRESSURE: 72 MMHG

## 2019-09-16 DIAGNOSIS — Z87.19 HISTORY OF DIVERTICULITIS: ICD-10-CM

## 2019-09-16 DIAGNOSIS — R10.12 LEFT UPPER QUADRANT PAIN: ICD-10-CM

## 2019-09-16 DIAGNOSIS — R10.12 LEFT UPPER QUADRANT PAIN: Primary | ICD-10-CM

## 2019-09-16 DIAGNOSIS — Z86.19 HISTORY OF CLOSTRIDIUM DIFFICILE INFECTION: ICD-10-CM

## 2019-09-16 LAB
ALBUMIN SERPL-MCNC: 3.6 G/DL (ref 3.5–5.2)
ALBUMIN/GLOB SERPL: 1.2 G/DL
ALP SERPL-CCNC: 60 U/L (ref 39–117)
ALT SERPL W P-5'-P-CCNC: 14 U/L (ref 1–33)
ANION GAP SERPL CALCULATED.3IONS-SCNC: 11 MMOL/L (ref 5–15)
AST SERPL-CCNC: 15 U/L (ref 1–32)
BACTERIA UR QL AUTO: NORMAL /HPF
BASOPHILS # BLD AUTO: 0.01 10*3/MM3 (ref 0–0.2)
BASOPHILS NFR BLD AUTO: 0.1 % (ref 0–1.5)
BILIRUB SERPL-MCNC: 0.2 MG/DL (ref 0.2–1.2)
BILIRUB UR QL STRIP: NEGATIVE
BUN BLD-MCNC: 28 MG/DL (ref 8–23)
BUN/CREAT SERPL: 16.7 (ref 7–25)
CALCIUM SPEC-SCNC: 9.1 MG/DL (ref 8.6–10.5)
CHLORIDE SERPL-SCNC: 101 MMOL/L (ref 98–107)
CLARITY UR: CLEAR
CO2 SERPL-SCNC: 26 MMOL/L (ref 22–29)
COLOR UR: YELLOW
CREAT BLD-MCNC: 1.68 MG/DL (ref 0.57–1)
D DIMER PPP FEU-MCNC: 0.76 MCGFEU/ML (ref 0–0.49)
DEPRECATED RDW RBC AUTO: 55.6 FL (ref 37–54)
EOSINOPHIL # BLD AUTO: 0.17 10*3/MM3 (ref 0–0.4)
EOSINOPHIL NFR BLD AUTO: 1.9 % (ref 0.3–6.2)
ERYTHROCYTE [DISTWIDTH] IN BLOOD BY AUTOMATED COUNT: 15.5 % (ref 12.3–15.4)
GFR SERPL CREATININE-BSD FRML MDRD: 30 ML/MIN/1.73
GLOBULIN UR ELPH-MCNC: 3.1 GM/DL
GLUCOSE BLD-MCNC: 72 MG/DL (ref 65–99)
GLUCOSE UR STRIP-MCNC: NEGATIVE MG/DL
HCT VFR BLD AUTO: 34.3 % (ref 34–46.6)
HGB BLD-MCNC: 10.2 G/DL (ref 12–15.9)
HGB UR QL STRIP.AUTO: NEGATIVE
HOLD SPECIMEN: NORMAL
HOLD SPECIMEN: NORMAL
HYALINE CASTS UR QL AUTO: NORMAL /LPF
IMM GRANULOCYTES # BLD AUTO: 0.04 10*3/MM3 (ref 0–0.05)
IMM GRANULOCYTES NFR BLD AUTO: 0.4 % (ref 0–0.5)
KETONES UR QL STRIP: NEGATIVE
LEUKOCYTE ESTERASE UR QL STRIP.AUTO: NEGATIVE
LIPASE SERPL-CCNC: 24 U/L (ref 13–60)
LYMPHOCYTES # BLD AUTO: 2.64 10*3/MM3 (ref 0.7–3.1)
LYMPHOCYTES NFR BLD AUTO: 28.8 % (ref 19.6–45.3)
MCH RBC QN AUTO: 28.7 PG (ref 26.6–33)
MCHC RBC AUTO-ENTMCNC: 29.7 G/DL (ref 31.5–35.7)
MCV RBC AUTO: 96.6 FL (ref 79–97)
MONOCYTES # BLD AUTO: 0.71 10*3/MM3 (ref 0.1–0.9)
MONOCYTES NFR BLD AUTO: 7.8 % (ref 5–12)
NEUTROPHILS # BLD AUTO: 5.59 10*3/MM3 (ref 1.7–7)
NEUTROPHILS NFR BLD AUTO: 61 % (ref 42.7–76)
NITRITE UR QL STRIP: NEGATIVE
NRBC BLD AUTO-RTO: 0 /100 WBC (ref 0–0.2)
PH UR STRIP.AUTO: 8.5 [PH] (ref 5–8)
PLATELET # BLD AUTO: 301 10*3/MM3 (ref 140–450)
PMV BLD AUTO: 9.9 FL (ref 6–12)
POTASSIUM BLD-SCNC: 4 MMOL/L (ref 3.5–5.2)
PROT SERPL-MCNC: 6.7 G/DL (ref 6–8.5)
PROT UR QL STRIP: ABNORMAL
RBC # BLD AUTO: 3.55 10*6/MM3 (ref 3.77–5.28)
RBC # UR: NORMAL /HPF
REF LAB TEST METHOD: NORMAL
SODIUM BLD-SCNC: 138 MMOL/L (ref 136–145)
SP GR UR STRIP: 1.01 (ref 1–1.03)
SQUAMOUS #/AREA URNS HPF: NORMAL /HPF
UROBILINOGEN UR QL STRIP: ABNORMAL
WBC NRBC COR # BLD: 9.16 10*3/MM3 (ref 3.4–10.8)
WBC UR QL AUTO: NORMAL /HPF
WHOLE BLOOD HOLD SPECIMEN: NORMAL
WHOLE BLOOD HOLD SPECIMEN: NORMAL

## 2019-09-16 PROCEDURE — 81001 URINALYSIS AUTO W/SCOPE: CPT | Performed by: NURSE PRACTITIONER

## 2019-09-16 PROCEDURE — 96374 THER/PROPH/DIAG INJ IV PUSH: CPT

## 2019-09-16 PROCEDURE — 99284 EMERGENCY DEPT VISIT MOD MDM: CPT

## 2019-09-16 PROCEDURE — 85025 COMPLETE CBC W/AUTO DIFF WBC: CPT | Performed by: NURSE PRACTITIONER

## 2019-09-16 PROCEDURE — 74176 CT ABD & PELVIS W/O CONTRAST: CPT

## 2019-09-16 PROCEDURE — 83690 ASSAY OF LIPASE: CPT | Performed by: NURSE PRACTITIONER

## 2019-09-16 PROCEDURE — 25010000002 MORPHINE PER 10 MG: Performed by: NURSE PRACTITIONER

## 2019-09-16 PROCEDURE — 80053 COMPREHEN METABOLIC PANEL: CPT | Performed by: NURSE PRACTITIONER

## 2019-09-16 PROCEDURE — 25010000002 ONDANSETRON PER 1 MG: Performed by: NURSE PRACTITIONER

## 2019-09-16 PROCEDURE — 96375 TX/PRO/DX INJ NEW DRUG ADDON: CPT

## 2019-09-16 PROCEDURE — 93970 EXTREMITY STUDY: CPT

## 2019-09-16 PROCEDURE — 85379 FIBRIN DEGRADATION QUANT: CPT | Performed by: NURSE PRACTITIONER

## 2019-09-16 PROCEDURE — 96376 TX/PRO/DX INJ SAME DRUG ADON: CPT

## 2019-09-16 RX ORDER — SODIUM CHLORIDE 0.9 % (FLUSH) 0.9 %
10 SYRINGE (ML) INJECTION AS NEEDED
Status: DISCONTINUED | OUTPATIENT
Start: 2019-09-16 | End: 2019-09-17 | Stop reason: HOSPADM

## 2019-09-16 RX ORDER — ONDANSETRON 4 MG/1
4 TABLET, ORALLY DISINTEGRATING ORAL EVERY 6 HOURS PRN
Qty: 12 TABLET | Refills: 0 | OUTPATIENT
Start: 2019-09-16 | End: 2019-09-25

## 2019-09-16 RX ORDER — MORPHINE SULFATE 2 MG/ML
4 INJECTION, SOLUTION INTRAMUSCULAR; INTRAVENOUS ONCE
Status: COMPLETED | OUTPATIENT
Start: 2019-09-16 | End: 2019-09-16

## 2019-09-16 RX ORDER — HYDROMORPHONE HYDROCHLORIDE 1 MG/ML
0.5 INJECTION, SOLUTION INTRAMUSCULAR; INTRAVENOUS; SUBCUTANEOUS ONCE
Status: DISCONTINUED | OUTPATIENT
Start: 2019-09-16 | End: 2019-09-16

## 2019-09-16 RX ORDER — HYDROCODONE BITARTRATE AND ACETAMINOPHEN 5; 325 MG/1; MG/1
1 TABLET ORAL EVERY 4 HOURS PRN
Qty: 12 TABLET | Refills: 0 | OUTPATIENT
Start: 2019-09-16 | End: 2019-09-25

## 2019-09-16 RX ORDER — ONDANSETRON 2 MG/ML
4 INJECTION INTRAMUSCULAR; INTRAVENOUS ONCE
Status: COMPLETED | OUTPATIENT
Start: 2019-09-16 | End: 2019-09-16

## 2019-09-16 RX ORDER — OMEPRAZOLE 40 MG/1
40 CAPSULE, DELAYED RELEASE ORAL DAILY
Qty: 30 CAPSULE | Refills: 5 | Status: SHIPPED | OUTPATIENT
Start: 2019-09-16 | End: 2020-03-18

## 2019-09-16 RX ADMIN — SODIUM CHLORIDE 1000 ML: 9 INJECTION, SOLUTION INTRAVENOUS at 20:02

## 2019-09-16 RX ADMIN — MORPHINE SULFATE 4 MG: 2 INJECTION, SOLUTION INTRAMUSCULAR; INTRAVENOUS at 22:58

## 2019-09-16 RX ADMIN — ONDANSETRON 4 MG: 2 INJECTION INTRAMUSCULAR; INTRAVENOUS at 20:04

## 2019-09-16 RX ADMIN — MORPHINE SULFATE 4 MG: 2 INJECTION, SOLUTION INTRAMUSCULAR; INTRAVENOUS at 20:04

## 2019-09-16 NOTE — ED NOTES
Pt reports left sided abd pain x few months that has got progressively worse today. Pt is tearful at triage. Denies n/v/d and fever. Pt seen at [facility] and had CT today.          Asuncion Jordan RN  09/16/19 [id number]

## 2019-09-16 NOTE — ED PROVIDER NOTES
EMERGENCY DEPARTMENT ENCOUNTER    CHIEF COMPLAINT  Chief Complaint: LLQ abd pain  History given by: pt  History limited by: nothing  Time Seen: 1917  Room Number: 25/25  PMD: Nicki Hidalgo MD      HPI:  Pt is a 74 y.o. female with hx of diverticulitis, HTN, and DM who presents with worsening, constant, sharp LUQ abd pain that radiates into her L CVA and flank for the past few days. She states that the pain was intermittent for the past few months but has been constant and unbearable the last few days.  She went to see her GI doctor a few days ago and had a CT scan of her abdomen.  She came to the ED today because the pain is unbearable.  She confirms SOA due to the pain and states that her last BM was 2 days ago.  The pain is better with standing and worse with movement.  She denies CP.  She is not on anticoagulents but has been taking Tylenol for pain with no relief.     Duration: a few days  Timing: constant  Location: LUQ abd  Radiation: L CVA and L flank  Quality: sharp  Intensity/Severity: moderate  Progression: worsening  Associated Symptoms: SOA  Aggravating Factors: movement  Alleviating Factors: standing  Previous Episodes: none  Treatment before arrival: none    PAST MEDICAL HISTORY  Active Ambulatory Problems     Diagnosis Date Noted   • Diverticulitis of large intestine without perforation or abscess without bleeding 11/10/2016   • ARF (acute renal failure) (CMS/[facility]) 11/11/2016   • CKD (chronic kidney disease) stage 3, GFR 30-59 ml/min (CMS/[facility]) 11/11/2016   • Proteinuria 11/11/2016   • Diverticulitis 11/11/2016   • Hypervitaminosis D 04/07/2017   • Postsurgical hypothyroidism 04/07/2017   • Chronic fatigue 04/07/2017   • Diabetes mellitus type 2, uncontrolled, with complications (CMS/[facility]) 04/07/2017   • Heartburn 01/19/2018   • Dysphagia 01/19/2018   • Chest pain 04/08/2018   • Abdominal mass 04/08/2018   • Pseudogout of hip, left 04/09/2018   • Iron deficiency anemia 04/10/2018   •  Essential hypertension 2018   • GERD (gastroesophageal reflux disease) 2015   • Hypothyroidism 2018   • History of bariatric surgery 2018   • Gastric hourglass stricture or stenosis 2019   • Palpitations 2019   • Hyperlipidemia 2017   • PVCs (premature ventricular contractions) 2019   • RBBB 2019     Resolved Ambulatory Problems     Diagnosis Date Noted   • Abdominal pain 2019   • Altered mental status 2019     Past Medical History:   Diagnosis Date   • Anxiety    • Clostridium difficile infection 2016   • Diabetes mellitus (CMS/HCC)    • Disease of thyroid gland    • GERD (gastroesophageal reflux disease)    • Hypertension    • Migraine    • Palpitations    • Pancreatitis    • Tachycardia    • TIA (transient ischemic attack)        PAST SURGICAL HISTORY  Past Surgical History:   Procedure Laterality Date   • APPENDECTOMY     • CARDIAC CATHETERIZATION     •  SECTION     • CHOLECYSTECTOMY     • COLONOSCOPY      normal per pt, Dr. Reagan   • COLONOSCOPY N/A 3/1/2017    multiple polyps, tics, IH, scar in transverse colon   • CONVERTED (HISTORICAL) HOLTER     • ECHO - CONVERTED     • ENDOSCOPY N/A 3/1/2017    gastroplasty, nodular mucosa in gastric antrum, nodule in duodenum   • ENDOSCOPY N/A 2018    banded gastroplasty found, non intact appearance   • GASTRIC BYPASS     • HERNIA REPAIR     • LUNG SURGERY      mass LL   • THYROID SURGERY         FAMILY HISTORY  Family History   Problem Relation Age of Onset   • Pancreatitis Brother    • Cancer Brother    • Ulcerative colitis Son    • Crohn's disease Grandchild    • Cancer Mother    • Cancer Maternal Grandmother        SOCIAL HISTORY  Social History     Socioeconomic History   • Marital status:      Spouse name: Not on file   • Number of children: Not on file   • Years of education: Not on file   • Highest education level: Not on file   Tobacco Use   • Smoking status:  Never Smoker   • Smokeless tobacco: Never Used   Substance and Sexual Activity   • Alcohol use: No     Frequency: Never   • Drug use: No   • Sexual activity: Defer         ALLERGIES  Amoxicillin; Dapsone; Levofloxacin; Sulfa antibiotics; Sulfamethoxazole-trimethoprim; Tetracycline; Amoxicillin-pot clavulanate; Baclofen; Sertraline; and Tetracyclines & related    REVIEW OF SYSTEMS  Review of Systems   Constitutional: Negative for chills and fever.   HENT: Negative for sore throat.    Respiratory: Positive for shortness of breath.    Cardiovascular: Negative for chest pain.   Gastrointestinal: Positive for abdominal pain. Negative for nausea and vomiting.   Genitourinary: Positive for flank pain. Negative for dysuria.   Musculoskeletal: Negative for back pain.   Skin: Negative for rash.   Neurological: Negative for dizziness.   Psychiatric/Behavioral: The patient is not nervous/anxious.        PHYSICAL EXAM  ED Triage Vitals   Temp Heart Rate Resp BP SpO2   09/16/19 1547 09/16/19 1547 09/16/19 1547 09/16/19 1800 09/16/19 1547   98 °F (36.7 °C) 79 16 (!) 181/77 98 %       Physical Exam   Constitutional: She is well-developed, well-nourished, and in no distress.   HENT:   Head: Normocephalic.   Mouth/Throat: Mucous membranes are normal.   Eyes: No scleral icterus.   Neck: Normal range of motion.   Cardiovascular: Normal rate, regular rhythm and normal heart sounds.   Pulmonary/Chest: Effort normal and breath sounds normal.   Abdominal: Soft. Bowel sounds are normal. There is tenderness ( LUQ, L flank). There is CVA tenderness ( L).   Musculoskeletal: Normal range of motion.   Neurological: She is alert.   Skin: Skin is warm and dry.   Psychiatric: Mood and affect normal.   Nursing note and vitals reviewed.      LAB RESULTS  Recent Results (from the past 24 hour(s))   Light Blue Top    Collection Time: 09/16/19  6:49 PM   Result Value Ref Range    Extra Tube hold for add-on    Green Top (Gel)    Collection Time: 09/16/19   6:49 PM   Result Value Ref Range    Extra Tube Hold for add-ons.    Lavender Top    Collection Time: 09/16/19  6:49 PM   Result Value Ref Range    Extra Tube hold for add-on    Gold Top - SST    Collection Time: 09/16/19  6:49 PM   Result Value Ref Range    Extra Tube Hold for add-ons.    Comprehensive Metabolic Panel    Collection Time: 09/16/19  6:49 PM   Result Value Ref Range    Glucose 72 65 - 99 mg/dL    BUN 28 (H) 8 - 23 mg/dL    Creatinine 1.68 (H) 0.57 - 1.00 mg/dL    Sodium 138 136 - 145 mmol/L    Potassium 4.0 3.5 - 5.2 mmol/L    Chloride 101 98 - 107 mmol/L    CO2 26.0 22.0 - 29.0 mmol/L    Calcium 9.1 8.6 - 10.5 mg/dL    Total Protein 6.7 6.0 - 8.5 g/dL    Albumin 3.60 3.50 - 5.20 g/dL    ALT (SGPT) 14 1 - 33 U/L    AST (SGOT) 15 1 - 32 U/L    Alkaline Phosphatase 60 39 - 117 U/L    Total Bilirubin 0.2 0.2 - 1.2 mg/dL    eGFR Non African Amer 30 (L) >60 mL/min/1.73    Globulin 3.1 gm/dL    A/G Ratio 1.2 g/dL    BUN/Creatinine Ratio 16.7 7.0 - 25.0    Anion Gap 11.0 5.0 - 15.0 mmol/L   Lipase    Collection Time: 09/16/19  6:49 PM   Result Value Ref Range    Lipase 24 13 - 60 U/L   D-dimer, Quantitative    Collection Time: 09/16/19  6:49 PM   Result Value Ref Range    D-Dimer, Quantitative 0.76 (H) 0.00 - 0.49 MCGFEU/mL   CBC Auto Differential    Collection Time: 09/16/19  6:49 PM   Result Value Ref Range    WBC 9.16 3.40 - 10.80 10*3/mm3    RBC 3.55 (L) 3.77 - 5.28 10*6/mm3    Hemoglobin 10.2 (L) 12.0 - 15.9 g/dL    Hematocrit 34.3 34.0 - 46.6 %    MCV 96.6 79.0 - 97.0 fL    MCH 28.7 26.6 - 33.0 pg    MCHC 29.7 (L) 31.5 - 35.7 g/dL    RDW 15.5 (H) 12.3 - 15.4 %    RDW-SD 55.6 (H) 37.0 - 54.0 fl    MPV 9.9 6.0 - 12.0 fL    Platelets 301 140 - 450 10*3/mm3    Neutrophil % 61.0 42.7 - 76.0 %    Lymphocyte % 28.8 19.6 - 45.3 %    Monocyte % 7.8 5.0 - 12.0 %    Eosinophil % 1.9 0.3 - 6.2 %    Basophil % 0.1 0.0 - 1.5 %    Immature Grans % 0.4 0.0 - 0.5 %    Neutrophils, Absolute 5.59 1.70 - 7.00 10*3/mm3     Lymphocytes, Absolute 2.64 0.70 - 3.10 10*3/mm3    Monocytes, Absolute 0.71 0.10 - 0.90 10*3/mm3    Eosinophils, Absolute 0.17 0.00 - 0.40 10*3/mm3    Basophils, Absolute 0.01 0.00 - 0.20 10*3/mm3    Immature Grans, Absolute 0.04 0.00 - 0.05 10*3/mm3    nRBC 0.0 0.0 - 0.2 /100 WBC   Urinalysis With Microscopic If Indicated (No Culture) - Urine, Clean Catch    Collection Time: 09/16/19  8:13 PM   Result Value Ref Range    Color, UA Yellow Yellow, Straw    Appearance, UA Clear Clear    pH, UA 8.5 (H) 5.0 - 8.0    Specific Gravity, UA 1.007 1.005 - 1.030    Glucose, UA Negative Negative    Ketones, UA Negative Negative    Bilirubin, UA Negative Negative    Blood, UA Negative Negative    Protein, UA 30 mg/dL (1+) (A) Negative    Leuk Esterase, UA Negative Negative    Nitrite, UA Negative Negative    Urobilinogen, UA 0.2 E.U./dL 0.2 - 1.0 E.U./dL   Urinalysis, Microscopic Only - Urine, Clean Catch    Collection Time: 09/16/19  8:13 PM   Result Value Ref Range    RBC, UA 0-2 None Seen, 0-2 /HPF    WBC, UA 0-2 None Seen, 0-2 /HPF    Bacteria, UA None Seen None Seen /HPF    Squamous Epithelial Cells, UA 0-2 None Seen, 0-2 /HPF    Hyaline Casts, UA None Seen None Seen /LPF    Methodology Automated Microscopy    Duplex Venous Lower Extremity - Bilateral    Collection Time: 09/16/19 10:05 PM   Result Value Ref Range    Right Common Femoral Spont Y     Right Common Femoral Phasic Y     Right Common Femoral Augment Y     Right Common Femoral Competent Y     Right Common Femoral Compress C     Right Saphenofemoral Junction Compress C     Right Proximal Femoral Compress C     Right Mid Femoral Spont Y     Right Mid Femoral Phasic Y     Right Mid Femoral Augment Y     Right Mid Femoral Competent Y     Right Mid Femoral Compress C     Right Distal Femoral Compress C     Right Popliteal Spont Y     Right Popliteal Phasic Y     Right Popliteal Augment Y     Right Popliteal Competent Y     Right Popliteal Compress C     Right Posterior  Tibial Compress C     Right Peroneal Compress C     Right GastronemiusSoleal Compress C     Right Greater Saph AK Compress C     Right Greater Saph BK Compress C     Left Common Femoral Spont Y     Left Common Femoral Phasic Y     Left Common Femoral Augment Y     Left Common Femoral Competent Y     Left Common Femoral Compress C     Left Saphenofemoral Junction Compress C     Left Proximal Femoral Compress C     Left Mid Femoral Spont Y     Left Mid Femoral Phasic Y     Left Mid Femoral Augment Y     Left Mid Femoral Competent Y     Left Mid Femoral Compress C     Left Distal Femoral Compress C     Left Popliteal Spont Y     Left Popliteal Phasic Y     Left Popliteal Augment Y     Left Popliteal Competent Y     Left Popliteal Compress C     Left Posterior Tibial Compress C     Left Peroneal Compress C     Left GastronemiusSoleal Compress C     Left Greater Saph AK Compress C     Left Greater Saph BK Compress C        I ordered the above labs and reviewed the results        PROGRESS AND CONSULTS    1958: Reviewed pt's history and workup with Dr. Nitin MD.  At bedside evaluation, they agree with the plan of care.    2200:  Vascular tech called RN Franki Augustine and informed him that pt is negative for DVT.      2310: Patient states that her pain has improved.  Discussed in details with the results of her CT abdomen and pelvis that was done earlier today.  Also notified patient that her bilateral lower extremity Dopplers were negative.  Reviewed implications of results, diagnosis, meds, responsibility to follow up, warning signs and symptoms of possible worsening, potential complications and reasons to return to ER with patient.  Discussed all results and noted any abnormalities with patient.  Discussed absolute need to recheck abnormalities with PMD.    Discussed plan for discharge, as there is no emergent indication for admission.  Pt is agreeable and understands need for follow up and repeat testing.  Pt is aware  "that discharge does not mean that nothing is wrong but it indicates no emergency is present.  Pt is discharged with instructions to follow up with primary care doctor to have their blood pressure rechecked.       DIAGNOSIS  Final diagnoses:   Left upper quadrant pain       FOLLOW UP   Nicki Hidalgo MD  [address]  [address]  [facility] [address]  [phone]    In 1 day        RX     Medication List      New Prescriptions    HYDROcodone-acetaminophen 5-325 MG per tablet  Commonly known as:  NORCO  Take 1 tablet by mouth Every 4 (Four) Hours As Needed for Moderate Pain .     ondansetron ODT 4 MG disintegrating tablet  Commonly known as:  ZOFRAN-ODT  Take 1 tablet by mouth Every 6 (Six) Hours As Needed for Nausea or   Vomiting.          Leo report  [id number] reviewed.  Risks, benefits, alternatives discussed with patient.  Pt consents to treatment and agrees to follow up with PMD tomorrow for further care and any other prescriptions.                 COURSE & MEDICAL DECISION MAKING  Pertinent Labs and Imaging studies that were ordered and reviewed are noted above.  Results were reviewed/discussed with the patient and they were also made aware of online assess.   Pt also made aware that some labs, such as cultures, will not be resulted during ER visit and follow up with PMD is necessary.     MEDICATIONS GIVEN IN ER  Medications   sodium chloride 0.9 % flush 10 mL (not administered)   HYDROmorphone (DILAUDID) injection 0.5 mg (not administered)   sodium chloride 0.9 % bolus 1,000 mL (0 mL Intravenous Stopped 9/16/19 2227)   morphine injection 4 mg (4 mg Intravenous Given 9/16/19 2004)   ondansetron (ZOFRAN) injection 4 mg (4 mg Intravenous Given 9/16/19 2004)       /67   Pulse 69   Temp 98 °F (36.7 °C) (Tympanic)   Resp 16   Ht 160 cm (63\")   Wt 79.8 kg (176 lb)   SpO2 99%   BMI 31.18 kg/m²       I personally reviewed the past medical history, past surgical history, social history, " family history, current medications and allergies as they appear in this chart.  The scribe's note accurately reflects the work and decisions made by me.     Documentation assistance provided by leah Duckworth for XIAO Guallpa on 9/16/2019 at . Information recorded by the scribe was done at my direction and has been verified and validated by me.       Jonathon Duckworth  09/16/19 6520       Pushpa Waters, DENY  09/16/19 5432

## 2019-09-16 NOTE — TELEPHONE ENCOUNTER
"Call to pt.  States has completed CT - was advised might take 2-3 days to be read.     States pain is \"just awful\".  Again advise to go to ER (see call of 9/13).  States \"I just cannot go sit in an ER for hours - I just can't.\".      States discussed ppi's with Dr Jim, Nephrologist, and was advised that would  Be ok to use omeprazole.      Advise will send update to LEELA Leigh - may also contact MD on call.  Again - if pain intolerable - go to ER.    Call to [facility] and spoke with Meme.  Request expedite CT reading.  "

## 2019-09-16 NOTE — TELEPHONE ENCOUNTER
Call to pt.  Advise per M Reese that WBC was barely under normal limits at 9.84.  Anything over 10.8 is elevated.  Hgb lower at 9.9  Was 10.4 a month ago.  Otherwise looks ok.  Will await CT results.    Verb understanding.  States CT today @ 2pm.  States continues with terrible pain as describec with o/v of 9/12.  Ranks at 10 on pain scale.  Unrelenting.    Advise will update M Reese, but that if pain stays OOC - go to ER.  States will await CT results.

## 2019-09-16 NOTE — TELEPHONE ENCOUNTER
Start her on omeprazole 40 mg daily and see if this helps her abd pain along with the H2-blocker she is taking. Will await CT scan results. Agree with plan to go to ER if pain gets worse. Thanks.

## 2019-09-16 NOTE — TELEPHONE ENCOUNTER
----- Message from Cayetano Horn sent at 9/16/2019  3:11 PM EDT -----  Regarding: returning call  Contact: [phone]  Pt said she is returning your call and wants to let you know she is going to be going to the er but wants to talk to Mindi.

## 2019-09-16 NOTE — TELEPHONE ENCOUNTER
Call to pt.  Advise per M Reese to start on omeprazole 40 mg daily and see if this helps abd pain along with H2 blocker she is taking.  Will await CT results.  Agree with plan to go to ER if pain gets worse.    Verb understanding.  States pain OOC - headed to ER.    Jeni completed for omeprazole 40 mg 1 tab po daily, #30, R5.

## 2019-09-16 NOTE — TELEPHONE ENCOUNTER
----- Message from Tia Fabian sent at 9/16/2019  2:07 PM EDT -----  Regarding: call back  Contact: [phone]  Pt is calling back she already did the CT scan and wants to talk with a nurse.

## 2019-09-17 ENCOUNTER — TELEPHONE (OUTPATIENT)
Dept: GASTROENTEROLOGY | Facility: CLINIC | Age: 74
End: 2019-09-17

## 2019-09-17 LAB

## 2019-09-17 NOTE — TELEPHONE ENCOUNTER
Called pt and advised per Karen ROPER that her ct scan was neg for any acute abd process.  Pt reports she went to the ER yesterday but they did not find anything or do anything. She states today she is not having any pain and she will  the omeprazole.  Advised will update Karen ROPER.  Pt verb understanding.

## 2019-09-17 NOTE — ED NOTES
Us tech called stating the pt is negative for DVT. APRN notified      Franki Augustine RN  09/16/19 0492

## 2019-09-17 NOTE — ED PROVIDER NOTES
MD ATTESTATION NOTE    The EDWIN and I have discussed this patient's history, physical exam, and treatment plan.  I have reviewed the documentation and personally had a face to face interaction with the patient. I affirm the documentation and agree with the treatment and plan.  The attached note describes my personal findings.    Patient with left upper quadrant pain that has been going on for several weeks to months now.  She states it is been worse recently, and she is been seen by her PCP and had a CAT scan performed today as an outpatient.    She is anxious, holds her left upper quadrant and lower ribs, but otherwise nothing focal on exam.    Labs unremarkable except for a mild elevation in her d-dimer and chronic renal insufficiency which precludes IV contrast dye.  CT the abdomen performed earlier today showed diverticulosis but nothing else acute.  We did Dopplers of both lower extremities which was negative for DVT.  Patient's abdomen is benign, there is no rash to suggest shingles, and this is not cardiac sounding at all.    Advised patient to follow-up with a PCP, this could represent neuropathic type pain from the thoracic spine, postherpetic neuralgia, or other musculoskeletal etiology.     Jeremiah Taveras MD  09/16/19 7048

## 2019-09-17 NOTE — TELEPHONE ENCOUNTER
----- Message from EDNY Marroquin sent at 9/17/2019  8:47 AM EDT -----  Impression    Extensive colonic diverticulosis most prominent in the  sigmoid region without definite evidence of diverticulitis.  Postoperative changes as noted. Atrophic kidneys containing multiple  bilateral cysts. Otherwise unremarkable CT scan of the abdomen and  pelvis. No acute process is identified.       Please call patient and let her know her CT scan was negative for any acute abdominal process.  How is she doing on the omeprazole?  If she still having severe abdominal pain?  If so where is her pain located and what other symptoms is she having at this time.  Thanks

## 2019-09-17 NOTE — DISCHARGE INSTRUCTIONS
Medications as ordered  Continue current home medications  Diet as tolerated  Increase fluids  Follow up with PMD  Return to er for fever, chills, vomiting, diarrhea, increased pain or any new or worsening symptoms

## 2019-09-20 ENCOUNTER — TELEPHONE (OUTPATIENT)
Dept: CARDIOLOGY | Facility: CLINIC | Age: 74
End: 2019-09-20

## 2019-09-20 NOTE — TELEPHONE ENCOUNTER
Calling for stress test results  Have been calling since last Wednesday  Wants to know how long this takes

## 2019-09-21 LAB
BH CV NUCLEAR PRIOR STUDY: 3
BH CV STRESS BP STAGE 1: NORMAL
BH CV STRESS BP STAGE 2: NORMAL
BH CV STRESS BP STAGE 3: NORMAL
BH CV STRESS COMMENTS STAGE 1: NORMAL
BH CV STRESS COMMENTS STAGE 2: NORMAL
BH CV STRESS DOSE REGADENOSON STAGE 1: 0.4
BH CV STRESS DURATION MIN STAGE 1: 0
BH CV STRESS DURATION MIN STAGE 2: 4
BH CV STRESS DURATION SEC STAGE 1: 10
BH CV STRESS DURATION SEC STAGE 2: 0
BH CV STRESS HR STAGE 1: 66
BH CV STRESS HR STAGE 2: 80
BH CV STRESS HR STAGE 3: 81
BH CV STRESS PROTOCOL 1: NORMAL
BH CV STRESS RECOVERY BP: NORMAL MMHG
BH CV STRESS RECOVERY HR: 75 BPM
BH CV STRESS STAGE 1: 1
BH CV STRESS STAGE 2: 2
BH CV STRESS STAGE 3: 3
LV EF NUC BP: 68 %
MAXIMAL PREDICTED HEART RATE: 147 BPM
PERCENT MAX PREDICTED HR: 55.1 %
STRESS BASELINE BP: NORMAL MMHG
STRESS BASELINE HR: 64 BPM
STRESS PERCENT HR: 65 %
STRESS POST PEAK BP: NORMAL MMHG
STRESS POST PEAK HR: 81 BPM
STRESS TARGET HR: 125 BPM

## 2019-09-21 PROCEDURE — 78452 HT MUSCLE IMAGE SPECT MULT: CPT | Performed by: INTERNAL MEDICINE

## 2019-09-21 PROCEDURE — 93018 CV STRESS TEST I&R ONLY: CPT | Performed by: INTERNAL MEDICINE

## 2019-09-23 ENCOUNTER — TRANSCRIBE ORDERS (OUTPATIENT)
Dept: ADMINISTRATIVE | Facility: HOSPITAL | Age: 74
End: 2019-09-23

## 2019-09-23 ENCOUNTER — LAB (OUTPATIENT)
Dept: LAB | Facility: HOSPITAL | Age: 74
End: 2019-09-23

## 2019-09-23 DIAGNOSIS — N18.30 CHRONIC KIDNEY DISEASE, STAGE III (MODERATE) (HCC): ICD-10-CM

## 2019-09-23 DIAGNOSIS — I10 HYPERTENSION, UNSPECIFIED TYPE: ICD-10-CM

## 2019-09-23 DIAGNOSIS — N18.30 CHRONIC KIDNEY DISEASE, STAGE III (MODERATE) (HCC): Primary | ICD-10-CM

## 2019-09-23 DIAGNOSIS — D64.9 ANEMIA, UNSPECIFIED TYPE: ICD-10-CM

## 2019-09-23 PROBLEM — Z98.84 HISTORY OF BARIATRIC SURGERY: Status: RESOLVED | Noted: 2018-11-16 | Resolved: 2019-09-23

## 2019-09-23 PROBLEM — D50.9 IRON DEFICIENCY ANEMIA: Status: RESOLVED | Noted: 2018-04-10 | Resolved: 2019-09-23

## 2019-09-23 PROBLEM — R13.10 DYSPHAGIA: Status: RESOLVED | Noted: 2018-01-19 | Resolved: 2019-09-23

## 2019-09-23 PROBLEM — E03.9 HYPOTHYROIDISM: Status: RESOLVED | Noted: 2018-08-16 | Resolved: 2019-09-23

## 2019-09-23 PROBLEM — R53.82 CHRONIC FATIGUE: Status: RESOLVED | Noted: 2017-04-07 | Resolved: 2019-09-23

## 2019-09-23 PROBLEM — I45.10 RBBB: Status: RESOLVED | Noted: 2019-07-30 | Resolved: 2019-09-23

## 2019-09-23 PROBLEM — K31.2 GASTRIC HOURGLASS STRICTURE OR STENOSIS: Status: RESOLVED | Noted: 2019-01-18 | Resolved: 2019-09-23

## 2019-09-23 PROBLEM — E67.3 HYPERVITAMINOSIS D: Status: RESOLVED | Noted: 2017-04-07 | Resolved: 2019-09-23

## 2019-09-23 PROBLEM — E89.0 POSTSURGICAL HYPOTHYROIDISM: Status: RESOLVED | Noted: 2017-04-07 | Resolved: 2019-09-23

## 2019-09-23 PROBLEM — E78.2 MIXED HYPERLIPIDEMIA: Status: ACTIVE | Noted: 2017-09-28

## 2019-09-23 PROBLEM — R19.00 ABDOMINAL MASS: Status: RESOLVED | Noted: 2018-04-08 | Resolved: 2019-09-23

## 2019-09-23 PROBLEM — M11.252: Status: RESOLVED | Noted: 2018-04-09 | Resolved: 2019-09-23

## 2019-09-23 PROBLEM — IMO0002 DIABETES MELLITUS TYPE 2, UNCONTROLLED, WITH COMPLICATIONS: Status: RESOLVED | Noted: 2017-04-07 | Resolved: 2019-09-23

## 2019-09-23 LAB
ALBUMIN SERPL-MCNC: 3 G/DL (ref 3.5–4.8)
ALBUMIN/GLOB SERPL: 1 G/DL (ref 1–1.7)
ALP SERPL-CCNC: 59 U/L (ref 32–91)
ALT SERPL W P-5'-P-CCNC: 19 U/L (ref 14–54)
ANION GAP SERPL CALCULATED.3IONS-SCNC: 13.3 MMOL/L (ref 5–15)
AST SERPL-CCNC: 14 U/L (ref 15–41)
BACTERIA UR QL AUTO: ABNORMAL /HPF
BASOPHILS # BLD AUTO: 0 10*3/MM3 (ref 0–0.2)
BASOPHILS NFR BLD AUTO: 0.2 % (ref 0–1.5)
BILIRUB SERPL-MCNC: 0.4 MG/DL (ref 0.3–1.2)
BILIRUB UR QL STRIP: ABNORMAL
BUN BLD-MCNC: 35 MG/DL (ref 8–20)
BUN/CREAT SERPL: 17.5 (ref 5.4–26.2)
CALCIUM SPEC-SCNC: 8.1 MG/DL (ref 8.9–10.3)
CHLORIDE SERPL-SCNC: 105 MMOL/L (ref 101–111)
CLARITY UR: ABNORMAL
CO2 SERPL-SCNC: 24 MMOL/L (ref 22–32)
COLOR UR: ABNORMAL
CREAT BLD-MCNC: 2 MG/DL (ref 0.4–1)
DEPRECATED RDW RBC AUTO: 53.8 FL (ref 37–54)
EOSINOPHIL # BLD AUTO: 0.1 10*3/MM3 (ref 0–0.4)
EOSINOPHIL NFR BLD AUTO: 1 % (ref 0.3–6.2)
ERYTHROCYTE [DISTWIDTH] IN BLOOD BY AUTOMATED COUNT: 16.6 % (ref 12.3–15.4)
GFR SERPL CREATININE-BSD FRML MDRD: 24 ML/MIN/1.73
GLOBULIN UR ELPH-MCNC: 3.1 GM/DL (ref 2.5–3.8)
GLUCOSE BLD-MCNC: 246 MG/DL (ref 65–99)
GLUCOSE UR STRIP-MCNC: NEGATIVE MG/DL
GRAN CASTS URNS QL MICRO: ABNORMAL /LPF
HCT VFR BLD AUTO: 32.7 % (ref 34–46.6)
HGB BLD-MCNC: 10.7 G/DL (ref 12–15.9)
HGB UR QL STRIP.AUTO: NEGATIVE
HYALINE CASTS UR QL AUTO: ABNORMAL /LPF
IRON 24H UR-MRATE: 42 MCG/DL (ref 28–170)
KETONES UR QL STRIP: NEGATIVE
LEUKOCYTE ESTERASE UR QL STRIP.AUTO: ABNORMAL
LYMPHOCYTES # BLD AUTO: 1 10*3/MM3 (ref 0.7–3.1)
LYMPHOCYTES NFR BLD AUTO: 8.4 % (ref 19.6–45.3)
MCH RBC QN AUTO: 30.3 PG (ref 26.6–33)
MCHC RBC AUTO-ENTMCNC: 32.7 G/DL (ref 31.5–35.7)
MCV RBC AUTO: 92.8 FL (ref 79–97)
MONOCYTES # BLD AUTO: 0.8 10*3/MM3 (ref 0.1–0.9)
MONOCYTES NFR BLD AUTO: 6.8 % (ref 5–12)
NEUTROPHILS # BLD AUTO: 10.2 10*3/MM3 (ref 1.7–7)
NEUTROPHILS NFR BLD AUTO: 83.6 % (ref 42.7–76)
NITRITE UR QL STRIP: NEGATIVE
NRBC BLD AUTO-RTO: 0 /100 WBC (ref 0–0.2)
PH UR STRIP.AUTO: 5.5 [PH] (ref 5–8)
PLATELET # BLD AUTO: 293 10*3/MM3 (ref 140–450)
PMV BLD AUTO: 7.6 FL (ref 6–12)
POTASSIUM BLD-SCNC: 5.3 MMOL/L (ref 3.6–5.1)
PROT SERPL-MCNC: 6.1 G/DL (ref 6.1–7.9)
PROT UR QL STRIP: ABNORMAL
RBC # BLD AUTO: 3.52 10*6/MM3 (ref 3.77–5.28)
RBC # UR: ABNORMAL /HPF
REF LAB TEST METHOD: ABNORMAL
SODIUM BLD-SCNC: 137 MMOL/L (ref 136–144)
SP GR UR STRIP: 1.02 (ref 1–1.03)
SQUAMOUS #/AREA URNS HPF: ABNORMAL /HPF
UROBILINOGEN UR QL STRIP: ABNORMAL
WBC NRBC COR # BLD: 12.2 10*3/MM3 (ref 3.4–10.8)
WBC UR QL AUTO: ABNORMAL /HPF

## 2019-09-23 PROCEDURE — 81001 URINALYSIS AUTO W/SCOPE: CPT

## 2019-09-23 PROCEDURE — 85025 COMPLETE CBC W/AUTO DIFF WBC: CPT

## 2019-09-23 PROCEDURE — 80053 COMPREHEN METABOLIC PANEL: CPT

## 2019-09-23 PROCEDURE — 83540 ASSAY OF IRON: CPT

## 2019-09-23 PROCEDURE — 36415 COLL VENOUS BLD VENIPUNCTURE: CPT

## 2019-09-25 ENCOUNTER — OFFICE VISIT (OUTPATIENT)
Dept: CARDIOLOGY | Facility: CLINIC | Age: 74
End: 2019-09-25

## 2019-09-25 VITALS
HEART RATE: 68 BPM | SYSTOLIC BLOOD PRESSURE: 130 MMHG | BODY MASS INDEX: 31.89 KG/M2 | DIASTOLIC BLOOD PRESSURE: 54 MMHG | WEIGHT: 180 LBS | HEIGHT: 63 IN

## 2019-09-25 DIAGNOSIS — E78.2 MIXED HYPERLIPIDEMIA: ICD-10-CM

## 2019-09-25 DIAGNOSIS — I10 ESSENTIAL HYPERTENSION: Primary | ICD-10-CM

## 2019-09-25 DIAGNOSIS — I49.3 PVCS (PREMATURE VENTRICULAR CONTRACTIONS): ICD-10-CM

## 2019-09-25 DIAGNOSIS — R07.9 CHEST PAIN, UNSPECIFIED TYPE: ICD-10-CM

## 2019-09-25 DIAGNOSIS — R00.2 PALPITATIONS: ICD-10-CM

## 2019-09-25 PROCEDURE — 99214 OFFICE O/P EST MOD 30 MIN: CPT | Performed by: INTERNAL MEDICINE

## 2019-10-07 RX ORDER — FOLIC ACID 1 MG/1
1 TABLET ORAL DAILY
COMMUNITY

## 2019-10-08 ENCOUNTER — LAB (OUTPATIENT)
Dept: LAB | Facility: HOSPITAL | Age: 74
End: 2019-10-08

## 2019-10-08 ENCOUNTER — HOSPITAL ENCOUNTER (OUTPATIENT)
Dept: CARDIOLOGY | Facility: HOSPITAL | Age: 74
Discharge: HOME | End: 2019-10-08

## 2019-10-08 ENCOUNTER — HOSPITAL ENCOUNTER (OUTPATIENT)
Facility: HOSPITAL | Age: 74
Discharge: HOME | End: 2019-10-09
Attending: INTERNAL MEDICINE | Admitting: HOSPITALIST

## 2019-10-08 DIAGNOSIS — E78.2 MIXED HYPERLIPIDEMIA: ICD-10-CM

## 2019-10-08 DIAGNOSIS — I49.3 PVCS (PREMATURE VENTRICULAR CONTRACTIONS): ICD-10-CM

## 2019-10-08 DIAGNOSIS — R07.9 CHEST PAIN, UNSPECIFIED TYPE: ICD-10-CM

## 2019-10-08 DIAGNOSIS — I10 ESSENTIAL HYPERTENSION: ICD-10-CM

## 2019-10-08 DIAGNOSIS — R00.2 PALPITATIONS: ICD-10-CM

## 2019-10-08 PROBLEM — I20.8 ANGINA AT REST (HCC): Status: ACTIVE | Noted: 2019-10-08

## 2019-10-08 LAB
ACT BLD: 153 SECONDS (ref 89–137)
ACT BLD: 175 SECONDS (ref 89–137)
ACT BLD: 180 SECONDS (ref 89–137)
ANION GAP SERPL CALCULATED.3IONS-SCNC: 13.4 MMOL/L (ref 5–15)
APTT PPP: 22.5 SECONDS (ref 24–31)
BUN BLD-MCNC: 46 MG/DL (ref 8–20)
BUN/CREAT SERPL: 27.1 (ref 5.4–26.2)
CALCIUM SPEC-SCNC: 8.4 MG/DL (ref 8.9–10.3)
CHLORIDE SERPL-SCNC: 109 MMOL/L (ref 101–111)
CO2 SERPL-SCNC: 24 MMOL/L (ref 22–32)
CREAT BLD-MCNC: 1.7 MG/DL (ref 0.4–1)
DEPRECATED RDW RBC AUTO: 54.3 FL (ref 37–54)
ERYTHROCYTE [DISTWIDTH] IN BLOOD BY AUTOMATED COUNT: 16.4 % (ref 12.3–15.4)
GFR SERPL CREATININE-BSD FRML MDRD: 29 ML/MIN/1.73
GLUCOSE BLD-MCNC: 102 MG/DL (ref 65–99)
GLUCOSE BLDC GLUCOMTR-MCNC: 160 MG/DL (ref 70–105)
GLUCOSE BLDC GLUCOMTR-MCNC: 51 MG/DL (ref 70–105)
GLUCOSE BLDC GLUCOMTR-MCNC: 67 MG/DL (ref 70–105)
GLUCOSE BLDC GLUCOMTR-MCNC: 73 MG/DL (ref 70–105)
HCT VFR BLD AUTO: 31.1 % (ref 34–46.6)
HGB BLD-MCNC: 10.2 G/DL (ref 12–15.9)
INR PPP: 0.91 (ref 0.9–1.1)
MCH RBC QN AUTO: 30.6 PG (ref 26.6–33)
MCHC RBC AUTO-ENTMCNC: 32.9 G/DL (ref 31.5–35.7)
MCV RBC AUTO: 93 FL (ref 79–97)
PLATELET # BLD AUTO: 283 10*3/MM3 (ref 140–450)
PMV BLD AUTO: 7.5 FL (ref 6–12)
POTASSIUM BLD-SCNC: 4.4 MMOL/L (ref 3.6–5.1)
PROTHROMBIN TIME: 9.7 SECONDS (ref 9.6–11.7)
RBC # BLD AUTO: 3.34 10*6/MM3 (ref 3.77–5.28)
SODIUM BLD-SCNC: 142 MMOL/L (ref 136–144)
WBC NRBC COR # BLD: 7.9 10*3/MM3 (ref 3.4–10.8)

## 2019-10-08 PROCEDURE — C1874 STENT, COATED/COV W/DEL SYS: HCPCS | Performed by: INTERNAL MEDICINE

## 2019-10-08 PROCEDURE — 99153 MOD SED SAME PHYS/QHP EA: CPT | Performed by: INTERNAL MEDICINE

## 2019-10-08 PROCEDURE — 82962 GLUCOSE BLOOD TEST: CPT

## 2019-10-08 PROCEDURE — C1769 GUIDE WIRE: HCPCS | Performed by: INTERNAL MEDICINE

## 2019-10-08 PROCEDURE — 93005 ELECTROCARDIOGRAM TRACING: CPT | Performed by: INTERNAL MEDICINE

## 2019-10-08 PROCEDURE — 63710000001 FOLIC ACID 1 MG TABLET: Performed by: INTERNAL MEDICINE

## 2019-10-08 PROCEDURE — 85347 COAGULATION TIME ACTIVATED: CPT

## 2019-10-08 PROCEDURE — A9270 NON-COVERED ITEM OR SERVICE: HCPCS | Performed by: INTERNAL MEDICINE

## 2019-10-08 PROCEDURE — 0 IOPAMIDOL PER 1 ML: Performed by: INTERNAL MEDICINE

## 2019-10-08 PROCEDURE — 85730 THROMBOPLASTIN TIME PARTIAL: CPT

## 2019-10-08 PROCEDURE — 63710000001 VITAMIN B1 100 MG TABLET: Performed by: INTERNAL MEDICINE

## 2019-10-08 PROCEDURE — 99152 MOD SED SAME PHYS/QHP 5/>YRS: CPT | Performed by: INTERNAL MEDICINE

## 2019-10-08 PROCEDURE — G0378 HOSPITAL OBSERVATION PER HR: HCPCS

## 2019-10-08 PROCEDURE — 93458 L HRT ARTERY/VENTRICLE ANGIO: CPT | Performed by: INTERNAL MEDICINE

## 2019-10-08 PROCEDURE — 99219 PR INITIAL OBSERVATION CARE/DAY 50 MINUTES: CPT | Performed by: HOSPITALIST

## 2019-10-08 PROCEDURE — 63710000001 HYDRALAZINE 25 MG TABLET: Performed by: INTERNAL MEDICINE

## 2019-10-08 PROCEDURE — C1887 CATHETER, GUIDING: HCPCS | Performed by: INTERNAL MEDICINE

## 2019-10-08 PROCEDURE — 36415 COLL VENOUS BLD VENIPUNCTURE: CPT

## 2019-10-08 PROCEDURE — C1894 INTRO/SHEATH, NON-LASER: HCPCS | Performed by: INTERNAL MEDICINE

## 2019-10-08 PROCEDURE — C9600 PERC DRUG-EL COR STENT SING: HCPCS | Performed by: INTERNAL MEDICINE

## 2019-10-08 PROCEDURE — 85027 COMPLETE CBC AUTOMATED: CPT

## 2019-10-08 PROCEDURE — 85610 PROTHROMBIN TIME: CPT

## 2019-10-08 PROCEDURE — 63710000001 ATORVASTATIN 10 MG TABLET: Performed by: INTERNAL MEDICINE

## 2019-10-08 PROCEDURE — 63710000001 ACETAMINOPHEN 325 MG TABLET: Performed by: INTERNAL MEDICINE

## 2019-10-08 PROCEDURE — 25010000002 FENTANYL CITRATE (PF) 100 MCG/2ML SOLUTION: Performed by: INTERNAL MEDICINE

## 2019-10-08 PROCEDURE — 25010000002 HEPARIN (PORCINE) PER 1000 UNITS: Performed by: INTERNAL MEDICINE

## 2019-10-08 PROCEDURE — 25010000002 MIDAZOLAM PER 1 MG: Performed by: INTERNAL MEDICINE

## 2019-10-08 PROCEDURE — 92928 PRQ TCAT PLMT NTRAC ST 1 LES: CPT | Performed by: INTERNAL MEDICINE

## 2019-10-08 PROCEDURE — 80048 BASIC METABOLIC PNL TOTAL CA: CPT

## 2019-10-08 PROCEDURE — 63710000001 METOPROLOL TARTRATE 25 MG TABLET: Performed by: INTERNAL MEDICINE

## 2019-10-08 DEVICE — XIENCE SIERRA™ EVEROLIMUS ELUTING CORONARY STENT SYSTEM 2.50 MM X 12 MM / RAPID-EXCHANGE
Type: IMPLANTABLE DEVICE | Status: FUNCTIONAL
Brand: XIENCE SIERRA™

## 2019-10-08 RX ORDER — CLOPIDOGREL BISULFATE 75 MG/1
75 TABLET ORAL DAILY
Status: DISCONTINUED | OUTPATIENT
Start: 2019-10-09 | End: 2019-10-09 | Stop reason: HOSPADM

## 2019-10-08 RX ORDER — ATROPINE SULFATE 0.1 MG/ML
INJECTION INTRAVENOUS
Status: DISCONTINUED
Start: 2019-10-08 | End: 2019-10-08 | Stop reason: WASHOUT

## 2019-10-08 RX ORDER — HYDRALAZINE HYDROCHLORIDE 25 MG/1
100 TABLET, FILM COATED ORAL 3 TIMES DAILY
Status: DISCONTINUED | OUTPATIENT
Start: 2019-10-08 | End: 2019-10-09 | Stop reason: HOSPADM

## 2019-10-08 RX ORDER — ASPIRIN 325 MG
TABLET ORAL AS NEEDED
Status: DISCONTINUED | OUTPATIENT
Start: 2019-10-08 | End: 2019-10-08 | Stop reason: HOSPADM

## 2019-10-08 RX ORDER — LIDOCAINE HYDROCHLORIDE 20 MG/ML
INJECTION, SOLUTION INFILTRATION; PERINEURAL AS NEEDED
Status: DISCONTINUED | OUTPATIENT
Start: 2019-10-08 | End: 2019-10-08 | Stop reason: HOSPADM

## 2019-10-08 RX ORDER — PANTOPRAZOLE SODIUM 40 MG/1
40 TABLET, DELAYED RELEASE ORAL EVERY MORNING
Status: DISCONTINUED | OUTPATIENT
Start: 2019-10-09 | End: 2019-10-09 | Stop reason: HOSPADM

## 2019-10-08 RX ORDER — ACETAMINOPHEN 325 MG/1
650 TABLET ORAL EVERY 4 HOURS PRN
Status: DISCONTINUED | OUTPATIENT
Start: 2019-10-08 | End: 2019-10-09 | Stop reason: HOSPADM

## 2019-10-08 RX ORDER — MIDAZOLAM HYDROCHLORIDE 1 MG/ML
INJECTION INTRAMUSCULAR; INTRAVENOUS AS NEEDED
Status: DISCONTINUED | OUTPATIENT
Start: 2019-10-08 | End: 2019-10-08 | Stop reason: HOSPADM

## 2019-10-08 RX ORDER — HEPARIN SODIUM 1000 [USP'U]/ML
INJECTION, SOLUTION INTRAVENOUS; SUBCUTANEOUS AS NEEDED
Status: DISCONTINUED | OUTPATIENT
Start: 2019-10-08 | End: 2019-10-08 | Stop reason: HOSPADM

## 2019-10-08 RX ORDER — LEVOTHYROXINE SODIUM 0.12 MG/1
125 TABLET ORAL DAILY
Status: DISCONTINUED | OUTPATIENT
Start: 2019-10-09 | End: 2019-10-09 | Stop reason: HOSPADM

## 2019-10-08 RX ORDER — ATROPINE SULFATE 1 MG/ML
.5-1 INJECTION, SOLUTION INTRAMUSCULAR; INTRAVENOUS; SUBCUTANEOUS
Status: DISCONTINUED | OUTPATIENT
Start: 2019-10-08 | End: 2019-10-09 | Stop reason: HOSPADM

## 2019-10-08 RX ORDER — ONDANSETRON 4 MG/1
4 TABLET, FILM COATED ORAL EVERY 6 HOURS PRN
Status: DISCONTINUED | OUTPATIENT
Start: 2019-10-08 | End: 2019-10-09 | Stop reason: HOSPADM

## 2019-10-08 RX ORDER — SODIUM CHLORIDE 9 MG/ML
250 INJECTION, SOLUTION INTRAVENOUS ONCE AS NEEDED
Status: DISCONTINUED | OUTPATIENT
Start: 2019-10-08 | End: 2019-10-09 | Stop reason: HOSPADM

## 2019-10-08 RX ORDER — DOCUSATE SODIUM 100 MG/1
100 CAPSULE, LIQUID FILLED ORAL 2 TIMES DAILY
Status: DISCONTINUED | OUTPATIENT
Start: 2019-10-08 | End: 2019-10-09 | Stop reason: HOSPADM

## 2019-10-08 RX ORDER — FERROUS SULFATE TAB EC 324 MG (65 MG FE EQUIVALENT) 324 (65 FE) MG
324 TABLET DELAYED RESPONSE ORAL
Status: DISCONTINUED | OUTPATIENT
Start: 2019-10-09 | End: 2019-10-09 | Stop reason: HOSPADM

## 2019-10-08 RX ORDER — ASPIRIN 81 MG/1
81 TABLET, CHEWABLE ORAL DAILY
Status: DISCONTINUED | OUTPATIENT
Start: 2019-10-09 | End: 2019-10-09 | Stop reason: HOSPADM

## 2019-10-08 RX ORDER — CLOPIDOGREL BISULFATE 75 MG/1
TABLET ORAL AS NEEDED
Status: DISCONTINUED | OUTPATIENT
Start: 2019-10-08 | End: 2019-10-08 | Stop reason: HOSPADM

## 2019-10-08 RX ORDER — PREDNISONE 10 MG/1
5 TABLET ORAL DAILY
Status: DISCONTINUED | OUTPATIENT
Start: 2019-10-09 | End: 2019-10-09 | Stop reason: HOSPADM

## 2019-10-08 RX ORDER — ONDANSETRON 2 MG/ML
4 INJECTION INTRAMUSCULAR; INTRAVENOUS EVERY 6 HOURS PRN
Status: DISCONTINUED | OUTPATIENT
Start: 2019-10-08 | End: 2019-10-09 | Stop reason: HOSPADM

## 2019-10-08 RX ORDER — THIAMINE HCL 100 MG
100 TABLET ORAL DAILY
Status: DISCONTINUED | OUTPATIENT
Start: 2019-10-08 | End: 2019-10-09 | Stop reason: HOSPADM

## 2019-10-08 RX ORDER — ATORVASTATIN CALCIUM 10 MG/1
10 TABLET, FILM COATED ORAL DAILY
Status: DISCONTINUED | OUTPATIENT
Start: 2019-10-08 | End: 2019-10-09 | Stop reason: HOSPADM

## 2019-10-08 RX ORDER — ALUMINA, MAGNESIA, AND SIMETHICONE 2400; 2400; 240 MG/30ML; MG/30ML; MG/30ML
15 SUSPENSION ORAL EVERY 6 HOURS PRN
Status: DISCONTINUED | OUTPATIENT
Start: 2019-10-08 | End: 2019-10-09 | Stop reason: HOSPADM

## 2019-10-08 RX ORDER — DEXTROSE MONOHYDRATE 25 G/50ML
50 INJECTION, SOLUTION INTRAVENOUS AS NEEDED
Status: DISCONTINUED | OUTPATIENT
Start: 2019-10-08 | End: 2019-10-09 | Stop reason: HOSPADM

## 2019-10-08 RX ORDER — SODIUM CHLORIDE 9 MG/ML
125 INJECTION, SOLUTION INTRAVENOUS CONTINUOUS
Status: DISCONTINUED | OUTPATIENT
Start: 2019-10-08 | End: 2019-10-09 | Stop reason: HOSPADM

## 2019-10-08 RX ORDER — ALPRAZOLAM 0.25 MG/1
0.5 TABLET ORAL 2 TIMES DAILY PRN
Status: DISCONTINUED | OUTPATIENT
Start: 2019-10-08 | End: 2019-10-09 | Stop reason: HOSPADM

## 2019-10-08 RX ORDER — FOLIC ACID 1 MG/1
1 TABLET ORAL DAILY
Status: DISCONTINUED | OUTPATIENT
Start: 2019-10-08 | End: 2019-10-09 | Stop reason: HOSPADM

## 2019-10-08 RX ORDER — DEXTROSE MONOHYDRATE 25 G/50ML
INJECTION, SOLUTION INTRAVENOUS
Status: COMPLETED
Start: 2019-10-08 | End: 2019-10-08

## 2019-10-08 RX ORDER — DIPHENHYDRAMINE HCL 25 MG
25 TABLET ORAL EVERY 6 HOURS PRN
Status: DISCONTINUED | OUTPATIENT
Start: 2019-10-08 | End: 2019-10-09 | Stop reason: HOSPADM

## 2019-10-08 RX ORDER — FENTANYL CITRATE 50 UG/ML
INJECTION, SOLUTION INTRAMUSCULAR; INTRAVENOUS AS NEEDED
Status: DISCONTINUED | OUTPATIENT
Start: 2019-10-08 | End: 2019-10-08 | Stop reason: HOSPADM

## 2019-10-08 RX ADMIN — ATORVASTATIN CALCIUM 10 MG: 10 TABLET, FILM COATED ORAL at 17:19

## 2019-10-08 RX ADMIN — SODIUM CHLORIDE 125 ML/HR: 900 INJECTION, SOLUTION INTRAVENOUS at 22:20

## 2019-10-08 RX ADMIN — Medication 100 MG: at 17:19

## 2019-10-08 RX ADMIN — SODIUM CHLORIDE 125 ML/HR: 900 INJECTION, SOLUTION INTRAVENOUS at 09:43

## 2019-10-08 RX ADMIN — ACETAMINOPHEN 650 MG: 325 TABLET ORAL at 22:18

## 2019-10-08 RX ADMIN — HYDRALAZINE HYDROCHLORIDE 100 MG: 25 TABLET, FILM COATED ORAL at 17:18

## 2019-10-08 RX ADMIN — FOLIC ACID 1 MG: 1 TABLET ORAL at 17:19

## 2019-10-08 RX ADMIN — METOPROLOL TARTRATE 25 MG: 25 TABLET, FILM COATED ORAL at 21:53

## 2019-10-08 RX ADMIN — HYDRALAZINE HYDROCHLORIDE 100 MG: 25 TABLET, FILM COATED ORAL at 22:18

## 2019-10-08 RX ADMIN — DEXTROSE 50 % IN WATER (D50W) INTRAVENOUS SYRINGE 50 ML: at 17:50

## 2019-10-08 RX ADMIN — Medication 600 MG: at 09:44

## 2019-10-08 RX ADMIN — SODIUM CHLORIDE 125 ML/HR: 900 INJECTION, SOLUTION INTRAVENOUS at 16:49

## 2019-10-08 RX ADMIN — DEXTROSE MONOHYDRATE 50 ML: 25 INJECTION, SOLUTION INTRAVENOUS at 17:50

## 2019-10-09 VITALS
TEMPERATURE: 98.5 F | SYSTOLIC BLOOD PRESSURE: 156 MMHG | OXYGEN SATURATION: 96 % | WEIGHT: 188.49 LBS | HEIGHT: 63 IN | BODY MASS INDEX: 33.4 KG/M2 | DIASTOLIC BLOOD PRESSURE: 55 MMHG | RESPIRATION RATE: 16 BRPM | HEART RATE: 68 BPM

## 2019-10-09 LAB
ANION GAP SERPL CALCULATED.3IONS-SCNC: 12.6 MMOL/L (ref 5–15)
ARTICHOKE IGE QN: 48 MG/DL (ref 0–100)
BUN BLD-MCNC: 38 MG/DL (ref 8–20)
BUN/CREAT SERPL: 25.3 (ref 5.4–26.2)
CALCIUM SPEC-SCNC: 7.5 MG/DL (ref 8.9–10.3)
CHLORIDE SERPL-SCNC: 110 MMOL/L (ref 101–111)
CHOLEST SERPL-MCNC: 92 MG/DL
CO2 SERPL-SCNC: 21 MMOL/L (ref 22–32)
CREAT BLD-MCNC: 1.5 MG/DL (ref 0.4–1)
DEPRECATED RDW RBC AUTO: 55.1 FL (ref 37–54)
ERYTHROCYTE [DISTWIDTH] IN BLOOD BY AUTOMATED COUNT: 16.3 % (ref 12.3–15.4)
GFR SERPL CREATININE-BSD FRML MDRD: 34 ML/MIN/1.73
GLUCOSE BLD-MCNC: 111 MG/DL (ref 65–99)
GLUCOSE BLDC GLUCOMTR-MCNC: 91 MG/DL (ref 70–105)
HCT VFR BLD AUTO: 26.3 % (ref 34–46.6)
HDLC SERPL QL: 2.88
HDLC SERPL-MCNC: 32 MG/DL
HGB BLD-MCNC: 8.3 G/DL (ref 12–15.9)
LDLC/HDLC SERPL: 1.48 {RATIO}
MCH RBC QN AUTO: 30.1 PG (ref 26.6–33)
MCHC RBC AUTO-ENTMCNC: 31.6 G/DL (ref 31.5–35.7)
MCV RBC AUTO: 95.2 FL (ref 79–97)
PLATELET # BLD AUTO: 216 10*3/MM3 (ref 140–450)
PMV BLD AUTO: 7.3 FL (ref 6–12)
POTASSIUM BLD-SCNC: 4.6 MMOL/L (ref 3.6–5.1)
RBC # BLD AUTO: 2.77 10*6/MM3 (ref 3.77–5.28)
SODIUM BLD-SCNC: 139 MMOL/L (ref 136–144)
TRIGL SERPL-MCNC: 63 MG/DL
VLDLC SERPL-MCNC: 12.6 MG/DL
WBC NRBC COR # BLD: 7.5 10*3/MM3 (ref 3.4–10.8)

## 2019-10-09 PROCEDURE — 99214 OFFICE O/P EST MOD 30 MIN: CPT | Performed by: INTERNAL MEDICINE

## 2019-10-09 PROCEDURE — 63710000001 DOCUSATE SODIUM 100 MG CAPSULE: Performed by: INTERNAL MEDICINE

## 2019-10-09 PROCEDURE — A9270 NON-COVERED ITEM OR SERVICE: HCPCS | Performed by: INTERNAL MEDICINE

## 2019-10-09 PROCEDURE — 63710000001 VITAMIN B1 100 MG TABLET: Performed by: INTERNAL MEDICINE

## 2019-10-09 PROCEDURE — 80061 LIPID PANEL: CPT | Performed by: INTERNAL MEDICINE

## 2019-10-09 PROCEDURE — 63710000001 CLOPIDOGREL 75 MG TABLET: Performed by: HOSPITALIST

## 2019-10-09 PROCEDURE — 63710000001 FERROUS SULFATE 324 (65 FE) MG TABLET DELAYED-RELEASE: Performed by: INTERNAL MEDICINE

## 2019-10-09 PROCEDURE — 63710000001 FOLIC ACID 1 MG TABLET: Performed by: INTERNAL MEDICINE

## 2019-10-09 PROCEDURE — 63710000001 CYCLOBENZAPRINE 10 MG TABLET: Performed by: NURSE PRACTITIONER

## 2019-10-09 PROCEDURE — A9270 NON-COVERED ITEM OR SERVICE: HCPCS | Performed by: NURSE PRACTITIONER

## 2019-10-09 PROCEDURE — 99217 PR OBSERVATION CARE DISCHARGE MANAGEMENT: CPT | Performed by: HOSPITALIST

## 2019-10-09 PROCEDURE — 93005 ELECTROCARDIOGRAM TRACING: CPT | Performed by: INTERNAL MEDICINE

## 2019-10-09 PROCEDURE — 63710000001 LEVOTHYROXINE 125 MCG TABLET: Performed by: INTERNAL MEDICINE

## 2019-10-09 PROCEDURE — 63710000001 HYDRALAZINE 25 MG TABLET: Performed by: INTERNAL MEDICINE

## 2019-10-09 PROCEDURE — A9270 NON-COVERED ITEM OR SERVICE: HCPCS | Performed by: HOSPITALIST

## 2019-10-09 PROCEDURE — 63710000001 PANTOPRAZOLE 40 MG TABLET DELAYED-RELEASE: Performed by: INTERNAL MEDICINE

## 2019-10-09 PROCEDURE — 63710000001 METOPROLOL TARTRATE 25 MG TABLET: Performed by: INTERNAL MEDICINE

## 2019-10-09 PROCEDURE — 63710000001 ATORVASTATIN 10 MG TABLET: Performed by: INTERNAL MEDICINE

## 2019-10-09 PROCEDURE — 63710000001 ASPIRIN 81 MG CHEWABLE TABLET: Performed by: INTERNAL MEDICINE

## 2019-10-09 PROCEDURE — G0378 HOSPITAL OBSERVATION PER HR: HCPCS

## 2019-10-09 PROCEDURE — 82962 GLUCOSE BLOOD TEST: CPT

## 2019-10-09 PROCEDURE — 80048 BASIC METABOLIC PNL TOTAL CA: CPT | Performed by: INTERNAL MEDICINE

## 2019-10-09 PROCEDURE — 63710000001 PREDNISONE PER 5 MG: Performed by: INTERNAL MEDICINE

## 2019-10-09 PROCEDURE — 85027 COMPLETE CBC AUTOMATED: CPT | Performed by: INTERNAL MEDICINE

## 2019-10-09 RX ORDER — CYCLOBENZAPRINE HCL 10 MG
10 TABLET ORAL ONCE
Status: COMPLETED | OUTPATIENT
Start: 2019-10-09 | End: 2019-10-09

## 2019-10-09 RX ORDER — CLOPIDOGREL BISULFATE 75 MG/1
75 TABLET ORAL DAILY
Qty: 30 TABLET | Refills: 1 | Status: SHIPPED | OUTPATIENT
Start: 2019-10-10

## 2019-10-09 RX ADMIN — CYCLOBENZAPRINE HYDROCHLORIDE 10 MG: 10 TABLET, FILM COATED ORAL at 02:25

## 2019-10-09 RX ADMIN — PREDNISONE 5 MG: 10 TABLET ORAL at 08:43

## 2019-10-09 RX ADMIN — PANTOPRAZOLE SODIUM 40 MG: 40 TABLET, DELAYED RELEASE ORAL at 06:07

## 2019-10-09 RX ADMIN — Medication 100 MG: at 08:42

## 2019-10-09 RX ADMIN — ATORVASTATIN CALCIUM 10 MG: 10 TABLET, FILM COATED ORAL at 08:42

## 2019-10-09 RX ADMIN — FERROUS SULFATE TAB EC 324 MG (65 MG FE EQUIVALENT) 324 MG: 324 (65 FE) TABLET DELAYED RESPONSE at 08:42

## 2019-10-09 RX ADMIN — SODIUM CHLORIDE 125 ML/HR: 900 INJECTION, SOLUTION INTRAVENOUS at 06:07

## 2019-10-09 RX ADMIN — ASPIRIN 81 MG 81 MG: 81 TABLET ORAL at 08:42

## 2019-10-09 RX ADMIN — CLOPIDOGREL BISULFATE 75 MG: 75 TABLET ORAL at 08:42

## 2019-10-09 RX ADMIN — HYDRALAZINE HYDROCHLORIDE 100 MG: 25 TABLET, FILM COATED ORAL at 08:42

## 2019-10-09 RX ADMIN — LEVOTHYROXINE SODIUM 125 MCG: 125 TABLET ORAL at 06:07

## 2019-10-09 RX ADMIN — DOCUSATE SODIUM 100 MG: 100 CAPSULE, LIQUID FILLED ORAL at 08:42

## 2019-10-09 RX ADMIN — FOLIC ACID 1 MG: 1 TABLET ORAL at 08:42

## 2019-10-09 RX ADMIN — METOPROLOL TARTRATE 25 MG: 25 TABLET, FILM COATED ORAL at 08:42

## 2019-10-14 PROCEDURE — 93010 ELECTROCARDIOGRAM REPORT: CPT | Performed by: INTERNAL MEDICINE

## 2020-03-18 RX ORDER — OMEPRAZOLE 40 MG/1
CAPSULE, DELAYED RELEASE ORAL
Qty: 30 CAPSULE | Refills: 0 | Status: SHIPPED | OUTPATIENT
Start: 2020-03-18 | End: 2020-04-15

## 2020-04-15 RX ORDER — OMEPRAZOLE 40 MG/1
CAPSULE, DELAYED RELEASE ORAL
Qty: 30 CAPSULE | Refills: 0 | Status: SHIPPED | OUTPATIENT
Start: 2020-04-15 | End: 2020-07-07

## 2020-07-07 RX ORDER — OMEPRAZOLE 40 MG/1
CAPSULE, DELAYED RELEASE ORAL
Qty: 30 CAPSULE | Refills: 2 | Status: SHIPPED | OUTPATIENT
Start: 2020-07-07

## 2020-10-06 RX ORDER — OMEPRAZOLE 40 MG/1
CAPSULE, DELAYED RELEASE ORAL
Qty: 30 CAPSULE | Refills: 0 | OUTPATIENT
Start: 2020-10-06

## 2023-05-29 NOTE — DISCHARGE SUMMARY
[facility]     Progress Note    Patient Name: Katherine Williamson  : 1951  MRN: [id number]  Primary Care Physician:  Zelalem Flor APRN  Date of admission: 2023    Subjective   Subjective     Chief Complaint:   CVA     History of Present Illness  Patient Reports slept better last night. Denies f/c/n/v/soa/cp. Asking why she has bruising spots on skin.  No breathing difficulty, no cough    Review of Systems    Objective   Objective     Vitals:   Temp:  [96.9 °F (36.1 °C)-98.2 °F (36.8 °C)] 96.9 °F (36.1 °C)  Heart Rate:  [74-87] 87  Resp:  [16-18] 18  BP: (101-154)/(69-72) 101/72  Flow (L/min):  [2] 2    Physical Exam   MENTAL STATUS -  AWAKE / ALERT  HEENT-   SCLERAE ANICTERIC, CONJUNCTIVAE PINK, OP MOIST, NO JVD,  LUNGS -ctab no w/r/c  On 2L in the room  Chest-Zio patch  HEART- RRR, NO RUB, MURMUR, OR GALLOP  ABD - NORMOACTIVE BOWEL SOUNDS, SOFT, NT.    EXT - NO EDEMA OR CYANOSIS  diffuse purpleish petechiae on the anterior lower legs with some areas of ecchymosis as well.  Some old ecchymosis on the forearms.  NEURO -oriented x4  MOTOR EXAM -takes resistance bilaterally        Result Review    Result Review:  I have personally reviewed the results from the time of this admission to 2023 15:18 EDT and agree with these findings:  []  Laboratory list / accordion  []  Microbiology  []  Radiology  []  EKG/Telemetry   []  Cardiology/Vascular   []  Pathology  []  Old records  []  Other:  Most notable findings include:   Results from last 7 days   Lab Units 23  0423 23  0501   SODIUM mmol/L 138 138   POTASSIUM mmol/L 3.3* 3.5   CHLORIDE mmol/L 103 104   CO2 mmol/L 31.9* 29.0   BUN mg/dL 5* 13   CREATININE mg/dL 0.23* 0.24*   GLUCOSE mg/dL 82 90   CALCIUM mg/dL 8.5* 8.1*       Results from last 7 days   Lab Units 23  0423 23  0417 23  0432   WBC 10*3/mm3 11.89* 11.02* 15.03*   HEMOGLOBIN g/dL 10.3* 9.8* 9.7*   HEMATOCRIT % 30.2* 30.0* 27.8*   PLATELETS 10*3/mm3 [phone]  "Discharge Summary    Patient name: Ya Paz    Medical record number: [id number]    Admission date: 3/7/2019  Discharge date:      Attending physician: Dr. Raheel Carrizales    Primary care physician: Nicki Hidalgo MD    Referring physician: Raheel Carrizales Jr., MD  [address] [address]    Condition on discharge: Stable    Primary Diagnoses:  Morbid obesity with co-morbidities    Operative Procedure: None     Ya Paz  is status post EGD with dilatation of gastrojejunostomy stricture with small perforation that has been treated conservatively.  Patient doing well this morning and ready to go home.  Patient remains afebrile hemodynamically stable and abdomen benign on exam.     /72 (BP Location: Right arm, Patient Position: Lying)   Pulse 68   Temp 98.1 °F (36.7 °C) (Oral)   Resp 16   Ht 160 cm (63\")   Wt 89.4 kg (197 lb)   SpO2 97%   BMI 34.90 kg/m²     General:  alert, appears stated age and cooperative   Abdomen: soft, bowel sounds active, nontender and nondistended   Incision:   none   Heart: Regular rate   Lungs: Clear to auscultation bilaterally     I reviewed the patient's new clinical results.     Lab Results (last 24 hours)     Procedure Component Value Units Date/Time    Basic Metabolic Panel [[id number]]  (Abnormal) Collected:  03/10/19 0638    Specimen:  Blood Updated:  03/10/19 0754     Glucose 156 mg/dL      BUN 18 mg/dL      Creatinine 1.56 mg/dL      Sodium 137 mmol/L      Potassium 4.2 mmol/L      Chloride 103 mmol/L      CO2 23.2 mmol/L      Calcium 8.8 mg/dL      eGFR Non African Amer 33 mL/min/1.73      BUN/Creatinine Ratio 11.5     Anion Gap 10.8 mmol/L     Narrative:       The MDRD GFR formula is only valid for adults with stable renal function between ages 18 and 70.    POC Glucose Once [[id number]]  (Abnormal) Collected:  03/10/19 0621    Specimen:  Blood Updated:  03/10/19 0622     Glucose 163 mg/dL     POC Glucose Once " "          Assessment & Plan   Assessment / Plan     Brief Patient Summary:  Katherine Williamson is a 71 y.o. female     Active Hospital Problems:  Active Hospital Problems    Diagnosis    • **Acute right MCA stroke      Plan:   1. Acute R MCA stroke and bilateral infarcts  Stroke prophylaxis-aspirin/Plavix/atorvastatin  Zio patch placed on May 19 for 2 weeks  - 5/20 - Admit for inpt rehab w/ PT, OT, SLP, psychology and rehab medical and nursing care. Continue secondary stroke prophylaxis     2. Pneumonia and right lung abscess and Pleural effusions  - 5/20 - Continue IV antibx. ID following    - 5/21 - Continues to improve   The chest x-ray is reassuring   Expect her to be able to come off the oxygen in the next few days   She will need to have a follow-up chest imaging 6 weeks down the road   Finish the antibiotic course per ID, last day 6/8/2023   -5/22-on 1 L oxygen with activities and maintaining sats  -5/23- Per Pulmonology, continue Merrem. Continue to wean off supplemental oxygen  -5/24-oxygen weaned to 0.5 L nasal cannula  -5/25 -on room air and therapy, more 2 L last night  -5/26 Placed on 2L NC last night, WBC decreased to 15. CXR revealed increased opacification in RLL. Will continue Merrem and appreciate recommendations from Pulmonolgy. CDiff negative and bowel movements decreased this morning.  5/27 - clinically stable this morning. Reviewed Pulm note from 5/26 recommending daily CBC. Will order. CT chest report reviewed from 5/26 which stated \"Persistent right empyema, similar to prior exam. Increased consolidation  in the superior segment right lower lobe, left lower lobe. Multiple  small nodular densities in both lower lungs, indeterminate. Increased  cavitation in the right lower lobe. Decreased left pleural effusion.  Continued follow-up advised.\"  5/28 - WBC improving, clinically stable  5/29 - WBC stable today, pulm stable     ID-meropenem and doxycycline-last dose June 8, 2023  Repeat CT chest June 6, " [[id number]]  (Abnormal) Collected:  03/09/19 2103    Specimen:  Blood Updated:  03/09/19 2105     Glucose 181 mg/dL     POC Glucose Once [[id number]]  (Normal) Collected:  03/09/19 1602    Specimen:  Blood Updated:  03/09/19 1603     Glucose 126 mg/dL     POC Glucose Once [[id number]]  (Normal) Collected:  03/09/19 1059    Specimen:  Blood Updated:  03/09/19 1100     Glucose 122 mg/dL     Basic Metabolic Panel [[id number]]  (Abnormal) Collected:  03/09/19 0929    Specimen:  Blood Updated:  03/09/19 1022     Glucose 124 mg/dL      BUN 17 mg/dL      Creatinine 1.67 mg/dL      Sodium 137 mmol/L      Potassium 4.1 mmol/L      Chloride 101 mmol/L      CO2 24.4 mmol/L      Calcium 8.9 mg/dL      eGFR Non African Amer 30 mL/min/1.73      BUN/Creatinine Ratio 10.2     Anion Gap 11.6 mmol/L     Narrative:       The MDRD GFR formula is only valid for adults with stable renal function between ages 18 and 70.             Assessment:      Doing well with conservative treatment of perforation status post EGD with dilatation of gastrojejunostomy stricture..      Plan:   1. Continue Stage 2 diet  2. Continue with ambulation and Incentive spirometry  3. Plan for d/c home    Patient was seen and examined by Dr. Carrizales.    Hospital Course: The patient is a very pleasant 73 y.o. 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  Patient has been treated conservatively.  Patient upon presentation was afebrile and hemodynamically stable but very tender abdomen on exam.  CAT scan did show free air and was admitted with conservative treatment.  Patient started on IV antibiotics.  LAELIF was consulted for medical management of her home medications.  Those were started IV and then once taking p.o. after negative upper GI they were switched to p.o.  Patient remained afebrile throughout the hospital stay and  2023  May 25-WBC increased to 18 K from 8K 5 days ago.  No fever.  Vital signs stable.  On room air during speech therapy session.  Not acutely ill-appearing.  Had 3 loose semiformed stool today but received bowel program yesterday for recent constipation.   PICC line site unremarkable.   Increased diffuse purpleish petechiae on the anterior lower legs.  She had a few earlier in the week but there more prominent today  Calves are soft without any significant edema.  Continues on meropenem and doxycycline.   Patient reviewed with pulmonology service who will assess.  Although with the recent bowel program, given the white blood cell count increased will check C. difficile with the loose stool today  Will review with infectious disease service  May 27 - Pulm recommending finishing meropenem     3. Acute hypoxic respiratory failure     4. Emphysema d/t Tobacco use     5. Hypertension-continue antihypertensive regimen and avoid hypotensive episodes.     6. DVT prophylaxis -   - 5/20 - Start Lovenox SQ daily     7.  Dysphagia  -5/22-diet advanced to regular solid thin liquids     8.  GI-constipation-May 24-add Senokot-Colace.  Milk of magnesia x1.  Patient maintaining hydration  May 25-loose stool semiformed x3 today.  Changed Senokot Colace to as needed    DVT prophylaxis:  Medical DVT prophylaxis orders are present.    CODE STATUS:    Code Status (Patient has no pulse and is not breathing): CPR (Attempt to Resuscitate)  Medical Interventions (Patient has pulse or is breathing): Full Support      Mira Loomis MD            abdominal pain improved each day.  Blood pressure was elevated and was controlled with medications.  Patient then discharge home.    Discharge medications:      Discharge Medications      Changes to Medications      Instructions Start Date   ferrous sulfate 325 (65 FE) MG tablet  What changed:    · when to take this  · additional instructions   325 mg, Oral, Every Other Day      hydrALAZINE 50 MG tablet  Commonly known as:  APRESOLINE  What changed:    · how much to take  · when to take this   50 mg, Oral, 3 Times Daily      insulin aspart 100 UNIT/ML solution pen-injector sc pen  Commonly known as:  NOVOLOG FLEXPEN  What changed:    · when to take this  · reasons to take this  · additional instructions   6 units before each meal with sliding scale with a max of 100 units daily      predniSONE 5 MG tablet  Commonly known as:  DELTASONE  What changed:    · how much to take  · how to take this  · when to take this  · additional instructions   Take PO daily: 20mg x1week then 10mg x1week then resume 5mg         Continue These Medications      Instructions Start Date   ALPRAZolam 0.5 MG tablet  Commonly known as:  XANAX   0.5 mg, Oral, 3 Times Daily PRN      calcium acetate 667 MG capsule  Commonly known as:  PHOSLO   1,334 mg, Oral, 3 Times Daily With Meals      folic acid 1 MG tablet  Commonly known as:  FOLVITE   TAKE ONE TABLET BY MOUTH ONCE DAILY      HYDROcodone-acetaminophen 5-325 MG per tablet  Commonly known as:  NORCO   1 tablet, Oral, Every 6 Hours PRN      hydrOXYzine 25 MG tablet  Commonly known as:  ATARAX   25 mg, Oral, Nightly PRN      Insulin Pen Needle 32G X 4 MM misc   Use to inject insulin 4 times daily      levothyroxine 125 MCG tablet  Commonly known as:  SYNTHROID, LEVOTHROID   125 mcg, Oral, Daily      lisinopril 10 MG tablet  Commonly known as:  PRINIVIL,ZESTRIL   20 mg, Oral, Daily      metoprolol succinate XL 50 MG 24 hr tablet  Commonly known as:  TOPROL-XL   50 mg, Oral, Every 24 Hours  Scheduled      propranolol 40 MG tablet  Commonly known as:  INDERAL   40 mg, Oral, 3 Times Daily      ranitidine 150 MG capsule  Commonly known as:  ZANTAC   TAKE 1 CAPSULE BY MOUTH TWICE DAILY      thiamine 100 MG tablet  Commonly known as:  VITAMIN B-1   100 mg, Oral      TOUJEO SOLOSTAR 300 UNIT/ML solution pen-injector  Generic drug:  Insulin Glargine   14 Units, Subcutaneous, Daily With Breakfast, Adjust dose according to accucheck      Vitamin D 1000 units tablet   1,000 Units, Oral, Daily         Stop These Medications    aspirin 81 MG EC tablet            Discharge instructions:  Per Bariatric manual; per our protocol      Follow-up appointment: Follow up with Dr. Carrizales in the office as scheduled.  If not already scheduled call for appointment at [phone].

## (undated) DEVICE — TBG NAMIC PRESS MONTR A/ F/M 12IN

## (undated) DEVICE — PINNACLE INTRODUCER SHEATH: Brand: PINNACLE

## (undated) DEVICE — THE SINGLE USE ETRAP – POLYP TRAP IS USED FOR SUCTION RETRIEVAL OF ENDOSCOPICALLY REMOVED POLYPS.: Brand: ETRAP

## (undated) DEVICE — TUBING, SUCTION, 1/4" X 10', STRAIGHT: Brand: MEDLINE

## (undated) DEVICE — SNAR POLYP SENSATION STDOVL 27 240 BX40

## (undated) DEVICE — GW DIAG EMERALD HEPCOAT MOVE JTIP STD .035 3MM 150CM

## (undated) DEVICE — CONTRST ISOVUE300 61PCT 50ML

## (undated) DEVICE — CATH DIAG IMPULSE FR4 6F 100CM

## (undated) DEVICE — CATH DIAG IMPULSE PIG .056 6F 110CM

## (undated) DEVICE — CANN NASL CO2 TRULINK W/O2 A/

## (undated) DEVICE — CATH DIAG IMPULSE FL4 6F 100CM

## (undated) DEVICE — BITEBLOCK OMNI BLOC

## (undated) DEVICE — BOWL PLSTC MD 16OZ BLU STRL

## (undated) DEVICE — PK TRY HEART CATH 50

## (undated) DEVICE — DEV INFL COMPAK W/ACCESSPLUS IN4530

## (undated) DEVICE — Device: Brand: DEFENDO AIR/WATER/SUCTION AND BIOPSY VALVE

## (undated) DEVICE — THE TORRENT IRRIGATION SCOPE CONNECTOR IS USED WITH THE TORRENT IRRIGATION TUBING TO PROVIDE IRRIGATION FLUIDS SUCH AS STERILE WATER DURING GASTROINTESTINAL ENDOSCOPIC PROCEDURES WHEN USED IN CONJUNCTION WITH AN IRRIGATION PUMP (OR ELECTROSURGICAL UNIT).: Brand: TORRENT

## (undated) DEVICE — TBG 02 CRUSH RESIST LF CLR 7FT

## (undated) DEVICE — ELECTRD DEFIB M/FUNC PROPADZ RADIOL 2PK

## (undated) DEVICE — 6F .070 XBLAD3 100CM: Brand: VISTA BRITE TIP

## (undated) DEVICE — HI-TORQUE BALANCE MIDDLEWEIGHT UNIVERSAL II GUIDE WIRE STRAIGHT TIP PAK  190 CM: Brand: HI-TORQUE BALANCE MIDDLEWEIGHT UNIVERSAL II

## (undated) DEVICE — KT DYEVERT PLS EZ W/SMART SYR FOR HI VISC CONTRST DISP

## (undated) DEVICE — FRCP BX RADJAW4 NDL 2.8 240CM LG OG BX40

## (undated) DEVICE — PAD GRND REM POLYHESIVE A/ DISP